# Patient Record
Sex: FEMALE | Race: OTHER | NOT HISPANIC OR LATINO
[De-identification: names, ages, dates, MRNs, and addresses within clinical notes are randomized per-mention and may not be internally consistent; named-entity substitution may affect disease eponyms.]

---

## 2017-01-03 ENCOUNTER — APPOINTMENT (OUTPATIENT)
Dept: PLASTIC SURGERY | Facility: HOSPITAL | Age: 62
End: 2017-01-03

## 2017-01-10 ENCOUNTER — APPOINTMENT (OUTPATIENT)
Dept: NEUROLOGY | Facility: HOSPITAL | Age: 62
End: 2017-01-10

## 2017-01-18 ENCOUNTER — APPOINTMENT (OUTPATIENT)
Dept: ORTHOPEDIC SURGERY | Facility: HOSPITAL | Age: 62
End: 2017-01-18

## 2017-01-31 ENCOUNTER — OUTPATIENT (OUTPATIENT)
Dept: OUTPATIENT SERVICES | Facility: HOSPITAL | Age: 62
LOS: 1 days | Discharge: HOME | End: 2017-01-31

## 2017-02-06 ENCOUNTER — OUTPATIENT (OUTPATIENT)
Dept: OUTPATIENT SERVICES | Facility: HOSPITAL | Age: 62
LOS: 1 days | End: 2017-02-06

## 2017-02-06 ENCOUNTER — APPOINTMENT (OUTPATIENT)
Dept: RHEUMATOLOGY | Facility: HOSPITAL | Age: 62
End: 2017-02-06

## 2017-02-06 VITALS
BODY MASS INDEX: 30.61 KG/M2 | SYSTOLIC BLOOD PRESSURE: 149 MMHG | HEIGHT: 67 IN | WEIGHT: 195 LBS | DIASTOLIC BLOOD PRESSURE: 64 MMHG | HEART RATE: 69 BPM

## 2017-02-06 DIAGNOSIS — M79.7 FIBROMYALGIA: ICD-10-CM

## 2017-02-06 DIAGNOSIS — M65.9 SYNOVITIS AND TENOSYNOVITIS, UNSPECIFIED: ICD-10-CM

## 2017-02-21 ENCOUNTER — APPOINTMENT (OUTPATIENT)
Dept: NEUROLOGY | Facility: HOSPITAL | Age: 62
End: 2017-02-21

## 2017-02-21 ENCOUNTER — OUTPATIENT (OUTPATIENT)
Dept: OUTPATIENT SERVICES | Facility: HOSPITAL | Age: 62
LOS: 1 days | End: 2017-02-21

## 2017-02-21 VITALS
BODY MASS INDEX: 30.45 KG/M2 | DIASTOLIC BLOOD PRESSURE: 70 MMHG | HEART RATE: 68 BPM | SYSTOLIC BLOOD PRESSURE: 135 MMHG | WEIGHT: 194 LBS | HEIGHT: 67 IN

## 2017-02-22 ENCOUNTER — APPOINTMENT (OUTPATIENT)
Dept: ORTHOPEDIC SURGERY | Facility: HOSPITAL | Age: 62
End: 2017-02-22

## 2017-02-22 ENCOUNTER — APPOINTMENT (OUTPATIENT)
Dept: RADIOLOGY | Facility: HOSPITAL | Age: 62
End: 2017-02-22

## 2017-02-22 ENCOUNTER — OUTPATIENT (OUTPATIENT)
Dept: OUTPATIENT SERVICES | Facility: HOSPITAL | Age: 62
LOS: 1 days | End: 2017-02-22
Payer: MEDICAID

## 2017-02-22 VITALS
DIASTOLIC BLOOD PRESSURE: 63 MMHG | HEART RATE: 66 BPM | BODY MASS INDEX: 30.13 KG/M2 | SYSTOLIC BLOOD PRESSURE: 135 MMHG | WEIGHT: 192 LBS | HEIGHT: 67 IN

## 2017-02-22 DIAGNOSIS — M54.5 LOW BACK PAIN: ICD-10-CM

## 2017-02-22 PROCEDURE — 73564 X-RAY EXAM KNEE 4 OR MORE: CPT | Mod: 26,50

## 2017-02-23 DIAGNOSIS — M19.90 UNSPECIFIED OSTEOARTHRITIS, UNSPECIFIED SITE: ICD-10-CM

## 2017-03-07 ENCOUNTER — APPOINTMENT (OUTPATIENT)
Dept: PLASTIC SURGERY | Facility: HOSPITAL | Age: 62
End: 2017-03-07

## 2017-03-07 ENCOUNTER — EMERGENCY (EMERGENCY)
Facility: HOSPITAL | Age: 62
LOS: 1 days | Discharge: HOME | End: 2017-03-07
Attending: EMERGENCY MEDICINE | Admitting: EMERGENCY MEDICINE
Payer: MEDICAID

## 2017-03-07 ENCOUNTER — OUTPATIENT (OUTPATIENT)
Dept: OUTPATIENT SERVICES | Facility: HOSPITAL | Age: 62
LOS: 1 days | End: 2017-03-07

## 2017-03-07 VITALS
TEMPERATURE: 98 F | RESPIRATION RATE: 16 BRPM | HEART RATE: 70 BPM | SYSTOLIC BLOOD PRESSURE: 127 MMHG | DIASTOLIC BLOOD PRESSURE: 60 MMHG | OXYGEN SATURATION: 98 %

## 2017-03-07 VITALS
RESPIRATION RATE: 16 BRPM | HEART RATE: 63 BPM | TEMPERATURE: 98 F | SYSTOLIC BLOOD PRESSURE: 148 MMHG | OXYGEN SATURATION: 95 % | DIASTOLIC BLOOD PRESSURE: 65 MMHG

## 2017-03-07 VITALS — HEIGHT: 64 IN | SYSTOLIC BLOOD PRESSURE: 142 MMHG | HEART RATE: 65 BPM | DIASTOLIC BLOOD PRESSURE: 75 MMHG

## 2017-03-07 LAB
ALBUMIN SERPL ELPH-MCNC: 4.1 G/DL — SIGNIFICANT CHANGE UP (ref 3.3–5)
ALP SERPL-CCNC: 96 U/L — SIGNIFICANT CHANGE UP (ref 40–120)
ALT FLD-CCNC: 28 U/L — SIGNIFICANT CHANGE UP (ref 4–33)
APPEARANCE UR: CLEAR — SIGNIFICANT CHANGE UP
AST SERPL-CCNC: 26 U/L — SIGNIFICANT CHANGE UP (ref 4–32)
BACTERIA # UR AUTO: SIGNIFICANT CHANGE UP
BASE EXCESS BLDV CALC-SCNC: 5.2 MMOL/L — SIGNIFICANT CHANGE UP
BASOPHILS # BLD AUTO: 0.02 K/UL — SIGNIFICANT CHANGE UP (ref 0–0.2)
BASOPHILS NFR BLD AUTO: 0.2 % — SIGNIFICANT CHANGE UP (ref 0–2)
BILIRUB SERPL-MCNC: 0.6 MG/DL — SIGNIFICANT CHANGE UP (ref 0.2–1.2)
BILIRUB UR-MCNC: NEGATIVE — SIGNIFICANT CHANGE UP
BLOOD GAS VENOUS - CREATININE: 0.51 MG/DL — SIGNIFICANT CHANGE UP (ref 0.5–1.3)
BLOOD UR QL VISUAL: NEGATIVE — SIGNIFICANT CHANGE UP
BUN SERPL-MCNC: 13 MG/DL — SIGNIFICANT CHANGE UP (ref 7–23)
CALCIUM SERPL-MCNC: 8.9 MG/DL — SIGNIFICANT CHANGE UP (ref 8.4–10.5)
CHLORIDE BLDV-SCNC: 106 MMOL/L — SIGNIFICANT CHANGE UP (ref 96–108)
CHLORIDE SERPL-SCNC: 105 MMOL/L — SIGNIFICANT CHANGE UP (ref 98–107)
CO2 SERPL-SCNC: 25 MMOL/L — SIGNIFICANT CHANGE UP (ref 22–31)
COLOR SPEC: SIGNIFICANT CHANGE UP
CREAT SERPL-MCNC: 0.55 MG/DL — SIGNIFICANT CHANGE UP (ref 0.5–1.3)
EOSINOPHIL # BLD AUTO: 0.13 K/UL — SIGNIFICANT CHANGE UP (ref 0–0.5)
EOSINOPHIL NFR BLD AUTO: 1.3 % — SIGNIFICANT CHANGE UP (ref 0–6)
GAS PNL BLDV: 137 MMOL/L — SIGNIFICANT CHANGE UP (ref 136–146)
GLUCOSE BLDV-MCNC: 89 — SIGNIFICANT CHANGE UP (ref 70–99)
GLUCOSE SERPL-MCNC: 92 MG/DL — SIGNIFICANT CHANGE UP (ref 70–99)
GLUCOSE UR-MCNC: NEGATIVE — SIGNIFICANT CHANGE UP
HCO3 BLDV-SCNC: 28 MMOL/L — HIGH (ref 20–27)
HCT VFR BLD CALC: 39.7 % — SIGNIFICANT CHANGE UP (ref 34.5–45)
HCT VFR BLDV CALC: 41.5 % — SIGNIFICANT CHANGE UP (ref 34.5–45)
HGB BLD-MCNC: 13.4 G/DL — SIGNIFICANT CHANGE UP (ref 11.5–15.5)
HGB BLDV-MCNC: 13.5 G/DL — SIGNIFICANT CHANGE UP (ref 11.5–15.5)
IMM GRANULOCYTES NFR BLD AUTO: 0.1 % — SIGNIFICANT CHANGE UP (ref 0–1.5)
KETONES UR-MCNC: NEGATIVE — SIGNIFICANT CHANGE UP
LACTATE BLDV-MCNC: 0.6 MMOL/L — SIGNIFICANT CHANGE UP (ref 0.5–2)
LEUKOCYTE ESTERASE UR-ACNC: NEGATIVE — SIGNIFICANT CHANGE UP
LIDOCAIN IGE QN: 28 U/L — SIGNIFICANT CHANGE UP (ref 7–60)
LYMPHOCYTES # BLD AUTO: 3.11 K/UL — SIGNIFICANT CHANGE UP (ref 1–3.3)
LYMPHOCYTES # BLD AUTO: 31.8 % — SIGNIFICANT CHANGE UP (ref 13–44)
MCHC RBC-ENTMCNC: 30.1 PG — SIGNIFICANT CHANGE UP (ref 27–34)
MCHC RBC-ENTMCNC: 33.8 % — SIGNIFICANT CHANGE UP (ref 32–36)
MCV RBC AUTO: 89.2 FL — SIGNIFICANT CHANGE UP (ref 80–100)
MONOCYTES # BLD AUTO: 0.6 K/UL — SIGNIFICANT CHANGE UP (ref 0–0.9)
MONOCYTES NFR BLD AUTO: 6.1 % — SIGNIFICANT CHANGE UP (ref 2–14)
MUCOUS THREADS # UR AUTO: SIGNIFICANT CHANGE UP
NEUTROPHILS # BLD AUTO: 5.92 K/UL — SIGNIFICANT CHANGE UP (ref 1.8–7.4)
NEUTROPHILS NFR BLD AUTO: 60.5 % — SIGNIFICANT CHANGE UP (ref 43–77)
NITRITE UR-MCNC: NEGATIVE — SIGNIFICANT CHANGE UP
PCO2 BLDV: 51 MMHG — SIGNIFICANT CHANGE UP (ref 41–51)
PH BLDV: 7.39 PH — SIGNIFICANT CHANGE UP (ref 7.32–7.43)
PH UR: 8 — SIGNIFICANT CHANGE UP (ref 4.6–8)
PLATELET # BLD AUTO: 295 K/UL — SIGNIFICANT CHANGE UP (ref 150–400)
PMV BLD: 10.1 FL — SIGNIFICANT CHANGE UP (ref 7–13)
PO2 BLDV: 39 MMHG — SIGNIFICANT CHANGE UP (ref 35–40)
POTASSIUM BLDV-SCNC: 3.9 MMOL/L — SIGNIFICANT CHANGE UP (ref 3.4–4.5)
POTASSIUM SERPL-MCNC: 4.3 MMOL/L — SIGNIFICANT CHANGE UP (ref 3.5–5.3)
POTASSIUM SERPL-SCNC: 4.3 MMOL/L — SIGNIFICANT CHANGE UP (ref 3.5–5.3)
PROT SERPL-MCNC: 6.3 G/DL — SIGNIFICANT CHANGE UP (ref 6–8.3)
PROT UR-MCNC: 10 — SIGNIFICANT CHANGE UP
RBC # BLD: 4.45 M/UL — SIGNIFICANT CHANGE UP (ref 3.8–5.2)
RBC # FLD: 14.2 % — SIGNIFICANT CHANGE UP (ref 10.3–14.5)
RBC CASTS # UR COMP ASSIST: SIGNIFICANT CHANGE UP (ref 0–?)
SAO2 % BLDV: 69.8 % — SIGNIFICANT CHANGE UP (ref 60–85)
SODIUM SERPL-SCNC: 143 MMOL/L — SIGNIFICANT CHANGE UP (ref 135–145)
SP GR SPEC: 1.02 — SIGNIFICANT CHANGE UP (ref 1–1.03)
UROBILINOGEN FLD QL: NORMAL E.U. — SIGNIFICANT CHANGE UP (ref 0.1–0.2)
WBC # BLD: 9.79 K/UL — SIGNIFICANT CHANGE UP (ref 3.8–10.5)
WBC # FLD AUTO: 9.79 K/UL — SIGNIFICANT CHANGE UP (ref 3.8–10.5)
WBC UR QL: SIGNIFICANT CHANGE UP (ref 0–?)

## 2017-03-07 PROCEDURE — 76705 ECHO EXAM OF ABDOMEN: CPT | Mod: 26

## 2017-03-07 PROCEDURE — 71020: CPT | Mod: 26

## 2017-03-07 PROCEDURE — 99284 EMERGENCY DEPT VISIT MOD MDM: CPT

## 2017-03-07 RX ORDER — ACETAMINOPHEN 500 MG
650 TABLET ORAL ONCE
Qty: 0 | Refills: 0 | Status: COMPLETED | OUTPATIENT
Start: 2017-03-07 | End: 2017-03-07

## 2017-03-07 RX ADMIN — Medication 650 MILLIGRAM(S): at 15:20

## 2017-03-07 NOTE — ED ADULT NURSE NOTE - OBJECTIVE STATEMENT
Received patient to room 14 with c/o chest pain and abdominal pain.  Pt states that her abdominal pain is more chronic than acute. Patient evaluated by MD. IVL placed to left AC 20 gauge. Labs drawn and sent. Awaiting results and disposition.   DALE Lawson

## 2017-03-07 NOTE — ED PROVIDER NOTE - ATTENDING CONTRIBUTION TO CARE
DR. CHAPA, ATTENDING MD-  I performed a face to face bedside interview with patient regarding history of present illness, review of symptoms and past medical history. I completed an independent physical exam.  I have discussed patient's plan of care with the resident.   Documentation as above in the note.   HPI: 63 yo F with multiple medical problems including fibromyalgia that presents with general body pain x multiple weeks but worsening in last few days. Pain RUQ of abd or right rib, non radiating. Worse with mvmt, improves with rest Not worse with breathing. Denies actual chest pain, SOB. No fevers, chills, nausea, vomiting, diarrhea. No trauma to area. She was told in past Costochondritis and was treated with IBU and improved. Thinks this is an exacerbation of her costochondritis. No history of shingles.   EXAM: No rash on body, tenderness to palpation right rib lateral 7-8. No signs trauma, lungs ctab, heart RRR, FROM in all extremities, appears well, walking around ED asking for pen. Eating and drinking.   MDM: Most likely acute on chronic pain. Appears well. Will provide meds and obtain labs and reassess.

## 2017-03-07 NOTE — ED PROVIDER NOTE - PROGRESS NOTE DETAILS
Pt feeling well, discussed results, need for f/u. Advised to take ibuprofen or celecoxib but not together, can also take tylenol for pain. Given copies of results. Pt expresses understanding and amenable to plan.

## 2017-03-07 NOTE — ED ADULT NURSE REASSESSMENT NOTE - NS ED NURSE REASSESS COMMENT FT1
rpt from Keeley HUNTER, pt appears in NAD @ this time, comfortable, SL placed/labs sent by Keeley, awaiting results and further orders, will continue to monitor.

## 2017-03-07 NOTE — ED PROVIDER NOTE - OBJECTIVE STATEMENT
62F PMH HTN, HLD, fibromyalgia, arthritis p/w 1 year RUQ pain radiating to flank and left sided chest discomfort worse with movement. Reports previously seeing a cardiologist (Does not recall name) for chest pain and diagnosed with costochondritis. Pain is at its baseline. Takes ibuprofen for pain which sometimes relieves symptoms. Nonexertional. Abdominal pain is intermittent, not associated with food, sometimes worse with movement. No fever/chills. No change in symptoms over the past few weeks.

## 2017-03-07 NOTE — ED ADULT NURSE NOTE - CHIEF COMPLAINT QUOTE
c/o left sided chest pain with no radiation,  RUQ abdominal pain with radiation to right side of back and scapula, with nausea x 2 days.  Saw hand surgeon today who instructed patient to go to ED for evaluation.   PMH: HTN, HLD, fibromyalgia, arthritis,

## 2017-04-03 ENCOUNTER — APPOINTMENT (OUTPATIENT)
Dept: RHEUMATOLOGY | Facility: HOSPITAL | Age: 62
End: 2017-04-03

## 2017-04-03 ENCOUNTER — OUTPATIENT (OUTPATIENT)
Dept: OUTPATIENT SERVICES | Facility: HOSPITAL | Age: 62
LOS: 1 days | End: 2017-04-03

## 2017-04-03 VITALS
BODY MASS INDEX: 33.64 KG/M2 | WEIGHT: 196 LBS | SYSTOLIC BLOOD PRESSURE: 125 MMHG | DIASTOLIC BLOOD PRESSURE: 60 MMHG | HEART RATE: 72 BPM

## 2017-04-03 VITALS — BODY MASS INDEX: 31.64 KG/M2 | HEIGHT: 66 IN

## 2017-04-03 DIAGNOSIS — M79.7 FIBROMYALGIA: ICD-10-CM

## 2017-04-03 DIAGNOSIS — M65.9 SYNOVITIS AND TENOSYNOVITIS, UNSPECIFIED: ICD-10-CM

## 2017-04-05 ENCOUNTER — APPOINTMENT (OUTPATIENT)
Dept: ORTHOPEDIC SURGERY | Facility: HOSPITAL | Age: 62
End: 2017-04-05

## 2017-04-05 ENCOUNTER — OUTPATIENT (OUTPATIENT)
Dept: OUTPATIENT SERVICES | Facility: HOSPITAL | Age: 62
LOS: 1 days | End: 2017-04-05

## 2017-04-05 VITALS
SYSTOLIC BLOOD PRESSURE: 125 MMHG | BODY MASS INDEX: 31.47 KG/M2 | HEART RATE: 70 BPM | DIASTOLIC BLOOD PRESSURE: 64 MMHG | WEIGHT: 195 LBS

## 2017-04-06 DIAGNOSIS — M25.50 PAIN IN UNSPECIFIED JOINT: ICD-10-CM

## 2017-04-06 DIAGNOSIS — M17.0 BILATERAL PRIMARY OSTEOARTHRITIS OF KNEE: ICD-10-CM

## 2017-04-06 DIAGNOSIS — F17.200 NICOTINE DEPENDENCE, UNSPECIFIED, UNCOMPLICATED: ICD-10-CM

## 2017-05-01 ENCOUNTER — APPOINTMENT (OUTPATIENT)
Dept: INTERNAL MEDICINE | Facility: HOSPITAL | Age: 62
End: 2017-05-01

## 2017-05-04 ENCOUNTER — APPOINTMENT (OUTPATIENT)
Dept: GASTROENTEROLOGY | Facility: HOSPITAL | Age: 62
End: 2017-05-04

## 2017-05-08 ENCOUNTER — OTHER (OUTPATIENT)
Age: 62
End: 2017-05-08

## 2017-05-10 ENCOUNTER — OTHER (OUTPATIENT)
Age: 62
End: 2017-05-10

## 2017-05-15 ENCOUNTER — APPOINTMENT (OUTPATIENT)
Dept: RHEUMATOLOGY | Facility: HOSPITAL | Age: 62
End: 2017-05-15

## 2017-05-15 ENCOUNTER — OUTPATIENT (OUTPATIENT)
Dept: OUTPATIENT SERVICES | Facility: HOSPITAL | Age: 62
LOS: 1 days | End: 2017-05-15

## 2017-05-15 VITALS
BODY MASS INDEX: 31.18 KG/M2 | WEIGHT: 194 LBS | HEART RATE: 66 BPM | HEIGHT: 66 IN | SYSTOLIC BLOOD PRESSURE: 118 MMHG | DIASTOLIC BLOOD PRESSURE: 53 MMHG

## 2017-05-15 DIAGNOSIS — Z79.899 OTHER LONG TERM (CURRENT) DRUG THERAPY: ICD-10-CM

## 2017-05-15 DIAGNOSIS — M79.7 FIBROMYALGIA: ICD-10-CM

## 2017-05-17 ENCOUNTER — APPOINTMENT (OUTPATIENT)
Dept: ORTHOPEDIC SURGERY | Facility: HOSPITAL | Age: 62
End: 2017-05-17

## 2017-05-17 ENCOUNTER — OUTPATIENT (OUTPATIENT)
Dept: OUTPATIENT SERVICES | Facility: HOSPITAL | Age: 62
LOS: 1 days | End: 2017-05-17

## 2017-05-17 VITALS
HEART RATE: 80 BPM | SYSTOLIC BLOOD PRESSURE: 116 MMHG | WEIGHT: 193.06 LBS | DIASTOLIC BLOOD PRESSURE: 62 MMHG | BODY MASS INDEX: 31.16 KG/M2

## 2017-05-17 DIAGNOSIS — F17.200 NICOTINE DEPENDENCE, UNSPECIFIED, UNCOMPLICATED: ICD-10-CM

## 2017-05-17 DIAGNOSIS — M17.10 UNILATERAL PRIMARY OSTEOARTHRITIS, UNSPECIFIED KNEE: ICD-10-CM

## 2017-05-23 ENCOUNTER — APPOINTMENT (OUTPATIENT)
Dept: NEUROLOGY | Facility: HOSPITAL | Age: 62
End: 2017-05-23

## 2017-05-23 ENCOUNTER — OUTPATIENT (OUTPATIENT)
Dept: OUTPATIENT SERVICES | Facility: HOSPITAL | Age: 62
LOS: 1 days | End: 2017-05-23

## 2017-05-23 VITALS
SYSTOLIC BLOOD PRESSURE: 134 MMHG | DIASTOLIC BLOOD PRESSURE: 64 MMHG | HEIGHT: 66 IN | HEART RATE: 71 BPM | WEIGHT: 194 LBS | BODY MASS INDEX: 31.18 KG/M2

## 2017-05-23 DIAGNOSIS — Z87.898 PERSONAL HISTORY OF OTHER SPECIFIED CONDITIONS: ICD-10-CM

## 2017-05-24 DIAGNOSIS — Z87.898 PERSONAL HISTORY OF OTHER SPECIFIED CONDITIONS: ICD-10-CM

## 2017-05-24 DIAGNOSIS — Z01.419 ENCOUNTER FOR GYNECOLOGICAL EXAMINATION (GENERAL) (ROUTINE) WITHOUT ABNORMAL FINDINGS: ICD-10-CM

## 2017-05-30 ENCOUNTER — FORM ENCOUNTER (OUTPATIENT)
Age: 62
End: 2017-05-30

## 2017-05-31 ENCOUNTER — OUTPATIENT (OUTPATIENT)
Dept: OUTPATIENT SERVICES | Facility: HOSPITAL | Age: 62
LOS: 1 days | End: 2017-05-31
Payer: MEDICAID

## 2017-05-31 ENCOUNTER — APPOINTMENT (OUTPATIENT)
Dept: MAMMOGRAPHY | Facility: IMAGING CENTER | Age: 62
End: 2017-05-31

## 2017-05-31 DIAGNOSIS — Z00.00 ENCOUNTER FOR GENERAL ADULT MEDICAL EXAMINATION WITHOUT ABNORMAL FINDINGS: ICD-10-CM

## 2017-05-31 DIAGNOSIS — J44.9 CHRONIC OBSTRUCTIVE PULMONARY DISEASE, UNSPECIFIED: ICD-10-CM

## 2017-05-31 PROCEDURE — 77066 DX MAMMO INCL CAD BI: CPT

## 2017-05-31 PROCEDURE — 76641 ULTRASOUND BREAST COMPLETE: CPT

## 2017-05-31 PROCEDURE — G0279: CPT

## 2017-06-01 ENCOUNTER — APPOINTMENT (OUTPATIENT)
Dept: GASTROENTEROLOGY | Facility: HOSPITAL | Age: 62
End: 2017-06-01

## 2017-06-06 ENCOUNTER — OUTPATIENT (OUTPATIENT)
Dept: OUTPATIENT SERVICES | Facility: HOSPITAL | Age: 62
LOS: 1 days | End: 2017-06-06
Payer: MEDICAID

## 2017-06-06 ENCOUNTER — APPOINTMENT (OUTPATIENT)
Dept: MRI IMAGING | Facility: IMAGING CENTER | Age: 62
End: 2017-06-06

## 2017-06-06 DIAGNOSIS — M65.9 SYNOVITIS AND TENOSYNOVITIS, UNSPECIFIED: ICD-10-CM

## 2017-06-06 PROCEDURE — 73221 MRI JOINT UPR EXTREM W/O DYE: CPT

## 2017-06-26 ENCOUNTER — APPOINTMENT (OUTPATIENT)
Dept: RHEUMATOLOGY | Facility: HOSPITAL | Age: 62
End: 2017-06-26

## 2017-07-10 ENCOUNTER — APPOINTMENT (OUTPATIENT)
Dept: INTERNAL MEDICINE | Facility: HOSPITAL | Age: 62
End: 2017-07-10

## 2017-07-10 ENCOUNTER — LABORATORY RESULT (OUTPATIENT)
Age: 62
End: 2017-07-10

## 2017-07-10 ENCOUNTER — OUTPATIENT (OUTPATIENT)
Dept: OUTPATIENT SERVICES | Facility: HOSPITAL | Age: 62
LOS: 1 days | End: 2017-07-10

## 2017-07-10 VITALS — HEIGHT: 66 IN | WEIGHT: 192 LBS | BODY MASS INDEX: 30.86 KG/M2

## 2017-07-10 VITALS — DIASTOLIC BLOOD PRESSURE: 64 MMHG | SYSTOLIC BLOOD PRESSURE: 134 MMHG

## 2017-07-10 LAB
ALBUMIN SERPL ELPH-MCNC: 4.3 G/DL — SIGNIFICANT CHANGE UP (ref 3.3–5)
ALP SERPL-CCNC: 110 U/L — SIGNIFICANT CHANGE UP (ref 40–120)
ALT FLD-CCNC: 29 U/L — SIGNIFICANT CHANGE UP (ref 4–33)
AST SERPL-CCNC: 21 U/L — SIGNIFICANT CHANGE UP (ref 4–32)
BILIRUB SERPL-MCNC: 0.6 MG/DL — SIGNIFICANT CHANGE UP (ref 0.2–1.2)
BUN SERPL-MCNC: 9 MG/DL — SIGNIFICANT CHANGE UP (ref 7–23)
CALCIUM SERPL-MCNC: 9.2 MG/DL — SIGNIFICANT CHANGE UP (ref 8.4–10.5)
CHLORIDE SERPL-SCNC: 103 MMOL/L — SIGNIFICANT CHANGE UP (ref 98–107)
CHOLEST SERPL-MCNC: 89 MG/DL — LOW (ref 120–199)
CO2 SERPL-SCNC: 27 MMOL/L — SIGNIFICANT CHANGE UP (ref 22–31)
CREAT SERPL-MCNC: 0.61 MG/DL — SIGNIFICANT CHANGE UP (ref 0.5–1.3)
GLUCOSE SERPL-MCNC: 82 MG/DL — SIGNIFICANT CHANGE UP (ref 70–99)
HBA1C BLD-MCNC: 6 % — HIGH (ref 4–5.6)
HDLC SERPL-MCNC: 42 MG/DL — LOW (ref 45–65)
LIPID PNL WITH DIRECT LDL SERPL: 31 MG/DL — SIGNIFICANT CHANGE UP
MAGNESIUM SERPL-MCNC: 2.2 MG/DL — SIGNIFICANT CHANGE UP (ref 1.6–2.6)
PHOSPHATE SERPL-MCNC: 4 MG/DL — SIGNIFICANT CHANGE UP (ref 2.5–4.5)
POTASSIUM SERPL-MCNC: 3.7 MMOL/L — SIGNIFICANT CHANGE UP (ref 3.5–5.3)
POTASSIUM SERPL-SCNC: 3.7 MMOL/L — SIGNIFICANT CHANGE UP (ref 3.5–5.3)
PROT SERPL-MCNC: 6.8 G/DL — SIGNIFICANT CHANGE UP (ref 6–8.3)
SODIUM SERPL-SCNC: 143 MMOL/L — SIGNIFICANT CHANGE UP (ref 135–145)
TRIGL SERPL-MCNC: 137 MG/DL — SIGNIFICANT CHANGE UP (ref 10–149)

## 2017-07-12 DIAGNOSIS — F17.200 NICOTINE DEPENDENCE, UNSPECIFIED, UNCOMPLICATED: ICD-10-CM

## 2017-07-12 DIAGNOSIS — J44.9 CHRONIC OBSTRUCTIVE PULMONARY DISEASE, UNSPECIFIED: ICD-10-CM

## 2017-07-12 DIAGNOSIS — M25.569 PAIN IN UNSPECIFIED KNEE: ICD-10-CM

## 2017-07-12 DIAGNOSIS — I10 ESSENTIAL (PRIMARY) HYPERTENSION: ICD-10-CM

## 2017-07-12 DIAGNOSIS — M79.7 FIBROMYALGIA: ICD-10-CM

## 2017-07-12 DIAGNOSIS — F32.9 MAJOR DEPRESSIVE DISORDER, SINGLE EPISODE, UNSPECIFIED: ICD-10-CM

## 2017-07-12 DIAGNOSIS — I44.7 LEFT BUNDLE-BRANCH BLOCK, UNSPECIFIED: ICD-10-CM

## 2017-07-12 DIAGNOSIS — E78.5 HYPERLIPIDEMIA, UNSPECIFIED: ICD-10-CM

## 2017-07-24 ENCOUNTER — APPOINTMENT (OUTPATIENT)
Dept: RHEUMATOLOGY | Facility: HOSPITAL | Age: 62
End: 2017-07-24

## 2017-08-10 ENCOUNTER — OUTPATIENT (OUTPATIENT)
Dept: OUTPATIENT SERVICES | Facility: HOSPITAL | Age: 62
LOS: 1 days | End: 2017-08-10

## 2017-08-10 ENCOUNTER — APPOINTMENT (OUTPATIENT)
Dept: GASTROENTEROLOGY | Facility: HOSPITAL | Age: 62
End: 2017-08-10

## 2017-08-10 VITALS
WEIGHT: 196 LBS | SYSTOLIC BLOOD PRESSURE: 124 MMHG | BODY MASS INDEX: 29.7 KG/M2 | HEIGHT: 68 IN | DIASTOLIC BLOOD PRESSURE: 54 MMHG | HEART RATE: 76 BPM

## 2017-08-11 DIAGNOSIS — E66.9 OBESITY, UNSPECIFIED: ICD-10-CM

## 2017-08-11 DIAGNOSIS — R79.89 OTHER SPECIFIED ABNORMAL FINDINGS OF BLOOD CHEMISTRY: ICD-10-CM

## 2017-08-11 DIAGNOSIS — Z08 ENCOUNTER FOR FOLLOW-UP EXAMINATION AFTER COMPLETED TREATMENT FOR MALIGNANT NEOPLASM: ICD-10-CM

## 2017-08-21 ENCOUNTER — APPOINTMENT (OUTPATIENT)
Dept: RHEUMATOLOGY | Facility: HOSPITAL | Age: 62
End: 2017-08-21
Payer: MEDICAID

## 2017-08-21 ENCOUNTER — OUTPATIENT (OUTPATIENT)
Dept: OUTPATIENT SERVICES | Facility: HOSPITAL | Age: 62
LOS: 1 days | End: 2017-08-21

## 2017-08-21 VITALS
HEIGHT: 68 IN | HEART RATE: 70 BPM | SYSTOLIC BLOOD PRESSURE: 84 MMHG | WEIGHT: 194 LBS | DIASTOLIC BLOOD PRESSURE: 72 MMHG | BODY MASS INDEX: 29.4 KG/M2

## 2017-08-21 VITALS — SYSTOLIC BLOOD PRESSURE: 115 MMHG | DIASTOLIC BLOOD PRESSURE: 55 MMHG

## 2017-08-21 PROCEDURE — 99214 OFFICE O/P EST MOD 30 MIN: CPT | Mod: GC

## 2017-08-21 RX ORDER — AMITRIPTYLINE HYDROCHLORIDE 10 MG/1
10 TABLET, FILM COATED ORAL
Qty: 30 | Refills: 1 | Status: DISCONTINUED | COMMUNITY
Start: 2017-02-21 | End: 2017-08-21

## 2017-08-23 DIAGNOSIS — M65.9 SYNOVITIS AND TENOSYNOVITIS, UNSPECIFIED: ICD-10-CM

## 2017-08-23 DIAGNOSIS — M17.0 BILATERAL PRIMARY OSTEOARTHRITIS OF KNEE: ICD-10-CM

## 2017-08-23 DIAGNOSIS — M79.7 FIBROMYALGIA: ICD-10-CM

## 2017-09-20 ENCOUNTER — APPOINTMENT (OUTPATIENT)
Dept: ORTHOPEDIC SURGERY | Facility: HOSPITAL | Age: 62
End: 2017-09-20

## 2017-09-20 ENCOUNTER — OUTPATIENT (OUTPATIENT)
Dept: OUTPATIENT SERVICES | Facility: HOSPITAL | Age: 62
LOS: 1 days | End: 2017-09-20

## 2017-09-20 VITALS
SYSTOLIC BLOOD PRESSURE: 107 MMHG | WEIGHT: 193.38 LBS | DIASTOLIC BLOOD PRESSURE: 53 MMHG | BODY MASS INDEX: 29.4 KG/M2 | HEART RATE: 65 BPM

## 2017-09-21 DIAGNOSIS — M17.0 BILATERAL PRIMARY OSTEOARTHRITIS OF KNEE: ICD-10-CM

## 2017-10-02 ENCOUNTER — LABORATORY RESULT (OUTPATIENT)
Age: 62
End: 2017-10-02

## 2017-10-02 ENCOUNTER — OUTPATIENT (OUTPATIENT)
Dept: OUTPATIENT SERVICES | Facility: HOSPITAL | Age: 62
LOS: 1 days | End: 2017-10-02

## 2017-10-02 ENCOUNTER — APPOINTMENT (OUTPATIENT)
Dept: RHEUMATOLOGY | Facility: HOSPITAL | Age: 62
End: 2017-10-02
Payer: MEDICAID

## 2017-10-02 VITALS
SYSTOLIC BLOOD PRESSURE: 139 MMHG | DIASTOLIC BLOOD PRESSURE: 64 MMHG | WEIGHT: 192 LBS | BODY MASS INDEX: 29.1 KG/M2 | HEIGHT: 68 IN

## 2017-10-02 DIAGNOSIS — M65.9 SYNOVITIS AND TENOSYNOVITIS, UNSPECIFIED: ICD-10-CM

## 2017-10-02 DIAGNOSIS — M79.7 FIBROMYALGIA: ICD-10-CM

## 2017-10-02 LAB
ALBUMIN SERPL ELPH-MCNC: 4 G/DL — SIGNIFICANT CHANGE UP (ref 3.3–5)
ALP SERPL-CCNC: 115 U/L — SIGNIFICANT CHANGE UP (ref 40–120)
ALT FLD-CCNC: 48 U/L — HIGH (ref 4–33)
AST SERPL-CCNC: 34 U/L — HIGH (ref 4–32)
BILIRUB SERPL-MCNC: 0.5 MG/DL — SIGNIFICANT CHANGE UP (ref 0.2–1.2)
BUN SERPL-MCNC: 14 MG/DL — SIGNIFICANT CHANGE UP (ref 7–23)
CALCIUM SERPL-MCNC: 8.9 MG/DL — SIGNIFICANT CHANGE UP (ref 8.4–10.5)
CHLORIDE SERPL-SCNC: 103 MMOL/L — SIGNIFICANT CHANGE UP (ref 98–107)
CK SERPL-CCNC: 276 U/L — HIGH (ref 25–170)
CO2 SERPL-SCNC: 27 MMOL/L — SIGNIFICANT CHANGE UP (ref 22–31)
CREAT SERPL-MCNC: 0.68 MG/DL — SIGNIFICANT CHANGE UP (ref 0.5–1.3)
GLUCOSE SERPL-MCNC: 89 MG/DL — SIGNIFICANT CHANGE UP (ref 70–99)
MAGNESIUM SERPL-MCNC: 1.9 MG/DL — SIGNIFICANT CHANGE UP (ref 1.6–2.6)
PHOSPHATE SERPL-MCNC: 4.4 MG/DL — SIGNIFICANT CHANGE UP (ref 2.5–4.5)
POTASSIUM SERPL-MCNC: 4.2 MMOL/L — SIGNIFICANT CHANGE UP (ref 3.5–5.3)
POTASSIUM SERPL-SCNC: 4.2 MMOL/L — SIGNIFICANT CHANGE UP (ref 3.5–5.3)
PROT SERPL-MCNC: 6.5 G/DL — SIGNIFICANT CHANGE UP (ref 6–8.3)
SODIUM SERPL-SCNC: 144 MMOL/L — SIGNIFICANT CHANGE UP (ref 135–145)

## 2017-10-02 PROCEDURE — 99214 OFFICE O/P EST MOD 30 MIN: CPT | Mod: GC

## 2017-10-24 ENCOUNTER — MEDICATION RENEWAL (OUTPATIENT)
Age: 62
End: 2017-10-24

## 2017-10-26 ENCOUNTER — APPOINTMENT (OUTPATIENT)
Dept: INTERNAL MEDICINE | Facility: HOSPITAL | Age: 62
End: 2017-10-26
Payer: MEDICAID

## 2017-10-26 ENCOUNTER — LABORATORY RESULT (OUTPATIENT)
Age: 62
End: 2017-10-26

## 2017-10-26 ENCOUNTER — OUTPATIENT (OUTPATIENT)
Dept: OUTPATIENT SERVICES | Facility: HOSPITAL | Age: 62
LOS: 1 days | End: 2017-10-26

## 2017-10-26 VITALS — HEIGHT: 68 IN | BODY MASS INDEX: 29.44 KG/M2 | WEIGHT: 194.22 LBS

## 2017-10-26 VITALS — SYSTOLIC BLOOD PRESSURE: 122 MMHG | DIASTOLIC BLOOD PRESSURE: 68 MMHG

## 2017-10-26 LAB
CK SERPL-CCNC: 329 U/L — HIGH (ref 25–170)
HBA1C BLD-MCNC: 5.9 % — HIGH (ref 4–5.6)

## 2017-10-26 PROCEDURE — 99214 OFFICE O/P EST MOD 30 MIN: CPT | Mod: GC

## 2017-10-26 RX ORDER — DULOXETINE HYDROCHLORIDE 30 MG/1
30 CAPSULE, DELAYED RELEASE ORAL
Qty: 1 | Refills: 2 | Status: DISCONTINUED | COMMUNITY
Start: 2017-08-21 | End: 2017-10-26

## 2017-10-27 ENCOUNTER — APPOINTMENT (OUTPATIENT)
Dept: ULTRASOUND IMAGING | Facility: CLINIC | Age: 62
End: 2017-10-27
Payer: MEDICAID

## 2017-10-27 ENCOUNTER — OUTPATIENT (OUTPATIENT)
Dept: OUTPATIENT SERVICES | Facility: HOSPITAL | Age: 62
LOS: 1 days | End: 2017-10-27
Payer: MEDICAID

## 2017-10-27 DIAGNOSIS — M17.0 BILATERAL PRIMARY OSTEOARTHRITIS OF KNEE: ICD-10-CM

## 2017-10-27 DIAGNOSIS — M79.7 FIBROMYALGIA: ICD-10-CM

## 2017-10-27 DIAGNOSIS — F17.200 NICOTINE DEPENDENCE, UNSPECIFIED, UNCOMPLICATED: ICD-10-CM

## 2017-10-27 DIAGNOSIS — R91.1 SOLITARY PULMONARY NODULE: ICD-10-CM

## 2017-10-27 DIAGNOSIS — K21.9 GASTRO-ESOPHAGEAL REFLUX DISEASE WITHOUT ESOPHAGITIS: ICD-10-CM

## 2017-10-27 DIAGNOSIS — R79.89 OTHER SPECIFIED ABNORMAL FINDINGS OF BLOOD CHEMISTRY: ICD-10-CM

## 2017-10-27 LAB
CREAT UR-MCNC: 76 MG/DL — SIGNIFICANT CHANGE UP
MICROALBUMIN UR-MCNC: < 0.3 MG/DL — SIGNIFICANT CHANGE UP
TSH SERPL-MCNC: 0.94 UIU/ML — SIGNIFICANT CHANGE UP (ref 0.27–4.2)

## 2017-10-27 PROCEDURE — 20604 DRAIN/INJ JOINT/BURSA W/US: CPT

## 2017-10-27 PROCEDURE — 20604 DRAIN/INJ JOINT/BURSA W/US: CPT | Mod: LT

## 2017-11-09 ENCOUNTER — EMERGENCY (EMERGENCY)
Facility: HOSPITAL | Age: 62
LOS: 1 days | Discharge: HOME | End: 2017-11-09
Attending: EMERGENCY MEDICINE
Payer: MEDICAID

## 2017-11-09 VITALS
TEMPERATURE: 98 F | RESPIRATION RATE: 18 BRPM | SYSTOLIC BLOOD PRESSURE: 143 MMHG | DIASTOLIC BLOOD PRESSURE: 69 MMHG | OXYGEN SATURATION: 97 % | HEART RATE: 70 BPM

## 2017-11-09 VITALS
HEART RATE: 78 BPM | OXYGEN SATURATION: 98 % | HEIGHT: 67 IN | RESPIRATION RATE: 987 BRPM | TEMPERATURE: 99 F | DIASTOLIC BLOOD PRESSURE: 66 MMHG | SYSTOLIC BLOOD PRESSURE: 112 MMHG | WEIGHT: 190.04 LBS

## 2017-11-09 LAB
ALBUMIN SERPL ELPH-MCNC: 3.6 G/DL — SIGNIFICANT CHANGE UP (ref 3.5–5)
ALP SERPL-CCNC: 122 U/L — HIGH (ref 40–120)
ALT FLD-CCNC: 48 U/L DA — SIGNIFICANT CHANGE UP (ref 10–60)
ANION GAP SERPL CALC-SCNC: 6 MMOL/L — SIGNIFICANT CHANGE UP (ref 5–17)
AST SERPL-CCNC: 27 U/L — SIGNIFICANT CHANGE UP (ref 10–40)
BASOPHILS # BLD AUTO: 0.1 K/UL — SIGNIFICANT CHANGE UP (ref 0–0.2)
BASOPHILS NFR BLD AUTO: 1 % — SIGNIFICANT CHANGE UP (ref 0–2)
BILIRUB SERPL-MCNC: 0.6 MG/DL — SIGNIFICANT CHANGE UP (ref 0.2–1.2)
BUN SERPL-MCNC: 9 MG/DL — SIGNIFICANT CHANGE UP (ref 7–18)
CALCIUM SERPL-MCNC: 8.4 MG/DL — SIGNIFICANT CHANGE UP (ref 8.4–10.5)
CHLORIDE SERPL-SCNC: 106 MMOL/L — SIGNIFICANT CHANGE UP (ref 96–108)
CK SERPL-CCNC: 383 U/L — HIGH (ref 21–215)
CO2 SERPL-SCNC: 28 MMOL/L — SIGNIFICANT CHANGE UP (ref 22–31)
CREAT SERPL-MCNC: 0.57 MG/DL — SIGNIFICANT CHANGE UP (ref 0.5–1.3)
EOSINOPHIL # BLD AUTO: 0.2 K/UL — SIGNIFICANT CHANGE UP (ref 0–0.5)
EOSINOPHIL NFR BLD AUTO: 2.5 % — SIGNIFICANT CHANGE UP (ref 0–6)
GLUCOSE SERPL-MCNC: 97 MG/DL — SIGNIFICANT CHANGE UP (ref 70–99)
HCT VFR BLD CALC: 41.5 % — SIGNIFICANT CHANGE UP (ref 34.5–45)
HGB BLD-MCNC: 13.5 G/DL — SIGNIFICANT CHANGE UP (ref 11.5–15.5)
LYMPHOCYTES # BLD AUTO: 2.9 K/UL — SIGNIFICANT CHANGE UP (ref 1–3.3)
LYMPHOCYTES # BLD AUTO: 33.3 % — SIGNIFICANT CHANGE UP (ref 13–44)
MCHC RBC-ENTMCNC: 30.9 PG — SIGNIFICANT CHANGE UP (ref 27–34)
MCHC RBC-ENTMCNC: 32.6 GM/DL — SIGNIFICANT CHANGE UP (ref 32–36)
MCV RBC AUTO: 94.8 FL — SIGNIFICANT CHANGE UP (ref 80–100)
MONOCYTES # BLD AUTO: 0.6 K/UL — SIGNIFICANT CHANGE UP (ref 0–0.9)
MONOCYTES NFR BLD AUTO: 6.8 % — SIGNIFICANT CHANGE UP (ref 2–14)
NEUTROPHILS # BLD AUTO: 4.9 K/UL — SIGNIFICANT CHANGE UP (ref 1.8–7.4)
NEUTROPHILS NFR BLD AUTO: 56.3 % — SIGNIFICANT CHANGE UP (ref 43–77)
PLATELET # BLD AUTO: 273 K/UL — SIGNIFICANT CHANGE UP (ref 150–400)
POTASSIUM SERPL-MCNC: 4.1 MMOL/L — SIGNIFICANT CHANGE UP (ref 3.5–5.3)
POTASSIUM SERPL-SCNC: 4.1 MMOL/L — SIGNIFICANT CHANGE UP (ref 3.5–5.3)
PROT SERPL-MCNC: 6.8 G/DL — SIGNIFICANT CHANGE UP (ref 6–8.3)
RBC # BLD: 4.38 M/UL — SIGNIFICANT CHANGE UP (ref 3.8–5.2)
RBC # FLD: 13.5 % — SIGNIFICANT CHANGE UP (ref 10.3–14.5)
SODIUM SERPL-SCNC: 140 MMOL/L — SIGNIFICANT CHANGE UP (ref 135–145)
TROPONIN I SERPL-MCNC: <0.015 NG/ML — SIGNIFICANT CHANGE UP (ref 0–0.04)
WBC # BLD: 8.7 K/UL — SIGNIFICANT CHANGE UP (ref 3.8–10.5)
WBC # FLD AUTO: 8.7 K/UL — SIGNIFICANT CHANGE UP (ref 3.8–10.5)

## 2017-11-09 PROCEDURE — 72040 X-RAY EXAM NECK SPINE 2-3 VW: CPT

## 2017-11-09 PROCEDURE — 71046 X-RAY EXAM CHEST 2 VIEWS: CPT

## 2017-11-09 PROCEDURE — 73030 X-RAY EXAM OF SHOULDER: CPT | Mod: 26,RT

## 2017-11-09 PROCEDURE — 72040 X-RAY EXAM NECK SPINE 2-3 VW: CPT | Mod: 26

## 2017-11-09 PROCEDURE — 73020 X-RAY EXAM OF SHOULDER: CPT

## 2017-11-09 PROCEDURE — 99285 EMERGENCY DEPT VISIT HI MDM: CPT

## 2017-11-09 PROCEDURE — 80053 COMPREHEN METABOLIC PANEL: CPT

## 2017-11-09 PROCEDURE — 71020: CPT | Mod: 26

## 2017-11-09 PROCEDURE — 73030 X-RAY EXAM OF SHOULDER: CPT

## 2017-11-09 PROCEDURE — 84484 ASSAY OF TROPONIN QUANT: CPT

## 2017-11-09 PROCEDURE — 99284 EMERGENCY DEPT VISIT MOD MDM: CPT | Mod: 25

## 2017-11-09 PROCEDURE — 82550 ASSAY OF CK (CPK): CPT

## 2017-11-09 PROCEDURE — 85027 COMPLETE CBC AUTOMATED: CPT

## 2017-11-09 RX ORDER — IBUPROFEN 200 MG
400 TABLET ORAL ONCE
Qty: 0 | Refills: 0 | Status: COMPLETED | OUTPATIENT
Start: 2017-11-09 | End: 2017-11-09

## 2017-11-09 RX ORDER — SODIUM CHLORIDE 9 MG/ML
1000 INJECTION INTRAMUSCULAR; INTRAVENOUS; SUBCUTANEOUS ONCE
Qty: 0 | Refills: 0 | Status: COMPLETED | OUTPATIENT
Start: 2017-11-09 | End: 2017-11-09

## 2017-11-09 RX ADMIN — Medication 400 MILLIGRAM(S): at 11:54

## 2017-11-09 RX ADMIN — SODIUM CHLORIDE 1000 MILLILITER(S): 9 INJECTION INTRAMUSCULAR; INTRAVENOUS; SUBCUTANEOUS at 11:13

## 2017-11-09 RX ADMIN — Medication 400 MILLIGRAM(S): at 11:13

## 2017-11-09 NOTE — ED PROVIDER NOTE - MEDICAL DECISION MAKING DETAILS
Pt with R should pain likely component of osteoarthritis, will r/o dislocation or fracture. Will order xray of neck, CXR, R shoulder. Anterior neck and chest consider component of cellulitis. Given Hx of CK, will check for rabdo. Will order Labs, and check CK level. Pt with R should pain likely component of osteoarthritis, will r/o dislocation or fracture. Will order xray of neck, CXR, R shoulder. Anterior neck and chest redness appears inflammatory; consider component of cellulitis. Given Hx of elevated CK, will check for rabdo. Will order Labs, and check CK level.

## 2017-11-09 NOTE — ED PROVIDER NOTE - CARE PLAN
Principal Discharge DX:	Neck pain  Secondary Diagnosis:	Shoulder pain  Secondary Diagnosis:	Elevated CK

## 2017-11-09 NOTE — ED PROVIDER NOTE - UPPER EXTREMITY EXAM, RIGHT
(+) tenderness to palpation over R clavicle T3 and T4 with mild redness, blanching erythema and swelling

## 2017-11-09 NOTE — ED PROVIDER NOTE - OBJECTIVE STATEMENT
61 y/o female with significant PMHx of HTN, HLD, fibromyalgia and osteoporosis presents to the ED c/o neck pain x several months. Pt reports redness on upper chest area x 1 week. Pt also reports pain in anterior chest, and R shoulder pain. Pt reports neck pain worsened after taking Duloxetine. Pt was on atorvastatin that was stopped x 2 weeks ago due to CK level. Pt denies vomiting, CP, SOB, fever, chills or any other complaints. Current Smoker. Allergies: Aleve.

## 2017-11-11 ENCOUNTER — OUTPATIENT (OUTPATIENT)
Dept: OUTPATIENT SERVICES | Facility: HOSPITAL | Age: 62
LOS: 1 days | End: 2017-11-11
Payer: MEDICAID

## 2017-11-11 ENCOUNTER — APPOINTMENT (OUTPATIENT)
Dept: CT IMAGING | Facility: IMAGING CENTER | Age: 62
End: 2017-11-11
Payer: MEDICAID

## 2017-11-11 DIAGNOSIS — R79.89 OTHER SPECIFIED ABNORMAL FINDINGS OF BLOOD CHEMISTRY: ICD-10-CM

## 2017-11-11 PROCEDURE — G0297: CPT | Mod: 26

## 2017-11-11 PROCEDURE — G0297: CPT

## 2017-11-30 ENCOUNTER — APPOINTMENT (OUTPATIENT)
Dept: INTERNAL MEDICINE | Facility: HOSPITAL | Age: 62
End: 2017-11-30
Payer: MEDICAID

## 2017-11-30 ENCOUNTER — OUTPATIENT (OUTPATIENT)
Dept: OUTPATIENT SERVICES | Facility: HOSPITAL | Age: 62
LOS: 1 days | End: 2017-11-30

## 2017-11-30 ENCOUNTER — LABORATORY RESULT (OUTPATIENT)
Age: 62
End: 2017-11-30

## 2017-11-30 VITALS — SYSTOLIC BLOOD PRESSURE: 111 MMHG | DIASTOLIC BLOOD PRESSURE: 59 MMHG | HEART RATE: 75 BPM

## 2017-11-30 VITALS — HEIGHT: 68 IN | WEIGHT: 194 LBS | BODY MASS INDEX: 29.4 KG/M2

## 2017-11-30 LAB
ALBUMIN SERPL ELPH-MCNC: 3.9 G/DL — SIGNIFICANT CHANGE UP (ref 3.3–5)
ALP SERPL-CCNC: 117 U/L — SIGNIFICANT CHANGE UP (ref 40–120)
ALT FLD-CCNC: 45 U/L — HIGH (ref 4–33)
AST SERPL-CCNC: 27 U/L — SIGNIFICANT CHANGE UP (ref 4–32)
BASOPHILS # BLD AUTO: 0.04 K/UL — SIGNIFICANT CHANGE UP (ref 0–0.2)
BASOPHILS NFR BLD AUTO: 0.4 % — SIGNIFICANT CHANGE UP (ref 0–2)
BILIRUB SERPL-MCNC: 0.3 MG/DL — SIGNIFICANT CHANGE UP (ref 0.2–1.2)
BUN SERPL-MCNC: 17 MG/DL — SIGNIFICANT CHANGE UP (ref 7–23)
CALCIUM SERPL-MCNC: 8.3 MG/DL — LOW (ref 8.4–10.5)
CHLORIDE SERPL-SCNC: 102 MMOL/L — SIGNIFICANT CHANGE UP (ref 98–107)
CK SERPL-CCNC: 225 U/L — HIGH (ref 25–170)
CO2 SERPL-SCNC: 27 MMOL/L — SIGNIFICANT CHANGE UP (ref 22–31)
CREAT SERPL-MCNC: 0.63 MG/DL — SIGNIFICANT CHANGE UP (ref 0.5–1.3)
EOSINOPHIL # BLD AUTO: 0.21 K/UL — SIGNIFICANT CHANGE UP (ref 0–0.5)
EOSINOPHIL NFR BLD AUTO: 1.9 % — SIGNIFICANT CHANGE UP (ref 0–6)
GLUCOSE SERPL-MCNC: 81 MG/DL — SIGNIFICANT CHANGE UP (ref 70–99)
HCT VFR BLD CALC: 38.7 % — SIGNIFICANT CHANGE UP (ref 34.5–45)
HGB BLD-MCNC: 13.2 G/DL — SIGNIFICANT CHANGE UP (ref 11.5–15.5)
IMM GRANULOCYTES # BLD AUTO: 0.08 # — SIGNIFICANT CHANGE UP
IMM GRANULOCYTES NFR BLD AUTO: 0.7 % — SIGNIFICANT CHANGE UP (ref 0–1.5)
LDH SERPL L TO P-CCNC: 243 U/L — HIGH (ref 135–225)
LYMPHOCYTES # BLD AUTO: 3.39 K/UL — HIGH (ref 1–3.3)
LYMPHOCYTES # BLD AUTO: 30.4 % — SIGNIFICANT CHANGE UP (ref 13–44)
MCHC RBC-ENTMCNC: 30.8 PG — SIGNIFICANT CHANGE UP (ref 27–34)
MCHC RBC-ENTMCNC: 34.1 % — SIGNIFICANT CHANGE UP (ref 32–36)
MCV RBC AUTO: 90.4 FL — SIGNIFICANT CHANGE UP (ref 80–100)
MONOCYTES # BLD AUTO: 0.91 K/UL — HIGH (ref 0–0.9)
MONOCYTES NFR BLD AUTO: 8.2 % — SIGNIFICANT CHANGE UP (ref 2–14)
NEUTROPHILS # BLD AUTO: 6.51 K/UL — SIGNIFICANT CHANGE UP (ref 1.8–7.4)
NEUTROPHILS NFR BLD AUTO: 58.4 % — SIGNIFICANT CHANGE UP (ref 43–77)
NRBC # FLD: 0 — SIGNIFICANT CHANGE UP
PLATELET # BLD AUTO: 303 K/UL — SIGNIFICANT CHANGE UP (ref 150–400)
PMV BLD: 10.6 FL — SIGNIFICANT CHANGE UP (ref 7–13)
POTASSIUM SERPL-MCNC: 4.1 MMOL/L — SIGNIFICANT CHANGE UP (ref 3.5–5.3)
POTASSIUM SERPL-SCNC: 4.1 MMOL/L — SIGNIFICANT CHANGE UP (ref 3.5–5.3)
PROT SERPL-MCNC: 6.3 G/DL — SIGNIFICANT CHANGE UP (ref 6–8.3)
RBC # BLD: 4.28 M/UL — SIGNIFICANT CHANGE UP (ref 3.8–5.2)
RBC # FLD: 15.2 % — HIGH (ref 10.3–14.5)
SODIUM SERPL-SCNC: 141 MMOL/L — SIGNIFICANT CHANGE UP (ref 135–145)
WBC # BLD: 11.14 K/UL — HIGH (ref 3.8–10.5)
WBC # FLD AUTO: 11.14 K/UL — HIGH (ref 3.8–10.5)

## 2017-11-30 PROCEDURE — 99214 OFFICE O/P EST MOD 30 MIN: CPT | Mod: GC

## 2017-11-30 RX ORDER — DULOXETINE HYDROCHLORIDE 60 MG/1
60 CAPSULE, DELAYED RELEASE PELLETS ORAL
Qty: 30 | Refills: 0 | Status: DISCONTINUED | COMMUNITY
Start: 2017-10-26 | End: 2017-11-30

## 2017-12-01 DIAGNOSIS — K21.9 GASTRO-ESOPHAGEAL REFLUX DISEASE WITHOUT ESOPHAGITIS: ICD-10-CM

## 2017-12-01 DIAGNOSIS — Z00.00 ENCOUNTER FOR GENERAL ADULT MEDICAL EXAMINATION WITHOUT ABNORMAL FINDINGS: ICD-10-CM

## 2017-12-01 DIAGNOSIS — J44.9 CHRONIC OBSTRUCTIVE PULMONARY DISEASE, UNSPECIFIED: ICD-10-CM

## 2017-12-01 DIAGNOSIS — M19.90 UNSPECIFIED OSTEOARTHRITIS, UNSPECIFIED SITE: ICD-10-CM

## 2017-12-01 DIAGNOSIS — F17.200 NICOTINE DEPENDENCE, UNSPECIFIED, UNCOMPLICATED: ICD-10-CM

## 2017-12-01 DIAGNOSIS — R79.89 OTHER SPECIFIED ABNORMAL FINDINGS OF BLOOD CHEMISTRY: ICD-10-CM

## 2017-12-01 DIAGNOSIS — Z23 ENCOUNTER FOR IMMUNIZATION: ICD-10-CM

## 2017-12-01 DIAGNOSIS — M79.7 FIBROMYALGIA: ICD-10-CM

## 2017-12-01 DIAGNOSIS — R91.1 SOLITARY PULMONARY NODULE: ICD-10-CM

## 2017-12-07 ENCOUNTER — LABORATORY RESULT (OUTPATIENT)
Age: 62
End: 2017-12-07

## 2017-12-07 LAB
BASOPHILS # BLD AUTO: 0.02 K/UL — SIGNIFICANT CHANGE UP (ref 0–0.2)
BASOPHILS NFR BLD AUTO: 0.3 % — SIGNIFICANT CHANGE UP (ref 0–2)
CK SERPL-CCNC: 253 U/L — HIGH (ref 25–170)
EOSINOPHIL # BLD AUTO: 0.15 K/UL — SIGNIFICANT CHANGE UP (ref 0–0.5)
EOSINOPHIL NFR BLD AUTO: 2 % — SIGNIFICANT CHANGE UP (ref 0–6)
HCT VFR BLD CALC: 40.1 % — SIGNIFICANT CHANGE UP (ref 34.5–45)
HGB BLD-MCNC: 13.6 G/DL — SIGNIFICANT CHANGE UP (ref 11.5–15.5)
IMM GRANULOCYTES # BLD AUTO: 0.03 # — SIGNIFICANT CHANGE UP
IMM GRANULOCYTES NFR BLD AUTO: 0.4 % — SIGNIFICANT CHANGE UP (ref 0–1.5)
LDH SERPL L TO P-CCNC: 276 U/L — HIGH (ref 135–225)
LYMPHOCYTES # BLD AUTO: 2.91 K/UL — SIGNIFICANT CHANGE UP (ref 1–3.3)
LYMPHOCYTES # BLD AUTO: 38.5 % — SIGNIFICANT CHANGE UP (ref 13–44)
MCHC RBC-ENTMCNC: 31 PG — SIGNIFICANT CHANGE UP (ref 27–34)
MCHC RBC-ENTMCNC: 33.9 % — SIGNIFICANT CHANGE UP (ref 32–36)
MCV RBC AUTO: 91.3 FL — SIGNIFICANT CHANGE UP (ref 80–100)
MONOCYTES # BLD AUTO: 0.56 K/UL — SIGNIFICANT CHANGE UP (ref 0–0.9)
MONOCYTES NFR BLD AUTO: 7.4 % — SIGNIFICANT CHANGE UP (ref 2–14)
NEUTROPHILS # BLD AUTO: 3.89 K/UL — SIGNIFICANT CHANGE UP (ref 1.8–7.4)
NEUTROPHILS NFR BLD AUTO: 51.4 % — SIGNIFICANT CHANGE UP (ref 43–77)
NRBC # FLD: 0 — SIGNIFICANT CHANGE UP
PLATELET # BLD AUTO: 293 K/UL — SIGNIFICANT CHANGE UP (ref 150–400)
PMV BLD: 10.5 FL — SIGNIFICANT CHANGE UP (ref 7–13)
RBC # BLD: 4.39 M/UL — SIGNIFICANT CHANGE UP (ref 3.8–5.2)
RBC # FLD: 14.6 % — HIGH (ref 10.3–14.5)
WBC # BLD: 7.56 K/UL — SIGNIFICANT CHANGE UP (ref 3.8–10.5)
WBC # FLD AUTO: 7.56 K/UL — SIGNIFICANT CHANGE UP (ref 3.8–10.5)

## 2017-12-13 ENCOUNTER — LABORATORY RESULT (OUTPATIENT)
Age: 62
End: 2017-12-13

## 2017-12-13 ENCOUNTER — APPOINTMENT (OUTPATIENT)
Dept: INTERNAL MEDICINE | Facility: HOSPITAL | Age: 62
End: 2017-12-13
Payer: MEDICAID

## 2017-12-13 ENCOUNTER — OUTPATIENT (OUTPATIENT)
Dept: OUTPATIENT SERVICES | Facility: HOSPITAL | Age: 62
LOS: 1 days | End: 2017-12-13

## 2017-12-13 VITALS — HEIGHT: 68 IN | BODY MASS INDEX: 29.34 KG/M2 | WEIGHT: 193.56 LBS

## 2017-12-13 VITALS
DIASTOLIC BLOOD PRESSURE: 61 MMHG | RESPIRATION RATE: 16 BRPM | SYSTOLIC BLOOD PRESSURE: 140 MMHG | TEMPERATURE: 98.2 F | HEART RATE: 68 BPM

## 2017-12-13 DIAGNOSIS — K46.9 UNSPECIFIED ABDOMINAL HERNIA W/OUT OBSTRUCTION OR GANGRENE: ICD-10-CM

## 2017-12-13 DIAGNOSIS — Z08 ENCOUNTER FOR FOLLOW-UP EXAMINATION AFTER COMPLETED TREATMENT FOR MALIGNANT NEOPLASM: ICD-10-CM

## 2017-12-13 DIAGNOSIS — M62.89 OTHER SPECIFIED DISORDERS OF MUSCLE: ICD-10-CM

## 2017-12-13 DIAGNOSIS — I83.899 VARICOSE VEINS OF UNSPECIFIED LOWER EXTREMITY WITH OTHER COMPLICATIONS: ICD-10-CM

## 2017-12-13 DIAGNOSIS — K21.9 GASTRO-ESOPHAGEAL REFLUX DISEASE W/OUT ESOPHAGITIS: ICD-10-CM

## 2017-12-13 DIAGNOSIS — K62.5 HEMORRHAGE OF ANUS AND RECTUM: ICD-10-CM

## 2017-12-13 DIAGNOSIS — M25.50 PAIN IN UNSPECIFIED JOINT: ICD-10-CM

## 2017-12-13 DIAGNOSIS — M79.606 PAIN IN LEG, UNSPECIFIED: ICD-10-CM

## 2017-12-13 DIAGNOSIS — Z87.39 PERSONAL HISTORY OF OTHER DISEASES OF THE MUSCULOSKELETAL SYSTEM AND CONNECTIVE TISSUE: ICD-10-CM

## 2017-12-13 DIAGNOSIS — Z86.2 PERSONAL HISTORY OF DISEASES OF THE BLOOD AND BLOOD-FORMING ORGANS AND CERTAIN DISORDERS INVOLVING THE IMMUNE MECHANISM: ICD-10-CM

## 2017-12-13 DIAGNOSIS — Z87.42 PERSONAL HISTORY OF OTHER DISEASES OF THE FEMALE GENITAL TRACT: ICD-10-CM

## 2017-12-13 DIAGNOSIS — R19.5 OTHER FECAL ABNORMALITIES: ICD-10-CM

## 2017-12-13 DIAGNOSIS — L84 CORNS AND CALLOSITIES: ICD-10-CM

## 2017-12-13 DIAGNOSIS — Z87.448 PERSONAL HISTORY OF OTHER DISEASES OF URINARY SYSTEM: ICD-10-CM

## 2017-12-13 DIAGNOSIS — Z87.898 PERSONAL HISTORY OF OTHER SPECIFIED CONDITIONS: ICD-10-CM

## 2017-12-13 DIAGNOSIS — Z85.038 ENCOUNTER FOR FOLLOW-UP EXAMINATION AFTER COMPLETED TREATMENT FOR MALIGNANT NEOPLASM: ICD-10-CM

## 2017-12-13 DIAGNOSIS — M21.949 UNSPECIFIED ACQUIRED DEFORMITY OF HAND, UNSPECIFIED HAND: ICD-10-CM

## 2017-12-13 DIAGNOSIS — Z87.2 PERSONAL HISTORY OF DISEASES OF THE SKIN AND SUBCUTANEOUS TISSUE: ICD-10-CM

## 2017-12-13 DIAGNOSIS — M25.569 PAIN IN UNSPECIFIED KNEE: ICD-10-CM

## 2017-12-13 LAB
ALBUMIN SERPL ELPH-MCNC: 4 G/DL — SIGNIFICANT CHANGE UP (ref 3.3–5)
ALP SERPL-CCNC: 104 U/L — SIGNIFICANT CHANGE UP (ref 40–120)
ALT FLD-CCNC: 36 U/L — HIGH (ref 4–33)
AST SERPL-CCNC: 24 U/L — SIGNIFICANT CHANGE UP (ref 4–32)
BILIRUB SERPL-MCNC: 0.2 MG/DL — SIGNIFICANT CHANGE UP (ref 0.2–1.2)
BUN SERPL-MCNC: 12 MG/DL — SIGNIFICANT CHANGE UP (ref 7–23)
CALCIUM SERPL-MCNC: 8.9 MG/DL — SIGNIFICANT CHANGE UP (ref 8.4–10.5)
CHLORIDE SERPL-SCNC: 102 MMOL/L — SIGNIFICANT CHANGE UP (ref 98–107)
CHOLEST SERPL-MCNC: 161 MG/DL — SIGNIFICANT CHANGE UP (ref 120–199)
CK MB BLD-MCNC: 2.4 — SIGNIFICANT CHANGE UP (ref 0–2.5)
CK MB BLD-MCNC: 5.59 NG/ML — HIGH (ref 1–4.7)
CK SERPL-CCNC: 230 U/L — HIGH (ref 25–170)
CO2 SERPL-SCNC: 28 MMOL/L — SIGNIFICANT CHANGE UP (ref 22–31)
CREAT SERPL-MCNC: 0.78 MG/DL — SIGNIFICANT CHANGE UP (ref 0.5–1.3)
GLUCOSE SERPL-MCNC: 81 MG/DL — SIGNIFICANT CHANGE UP (ref 70–99)
HDLC SERPL-MCNC: 48 MG/DL — SIGNIFICANT CHANGE UP (ref 45–65)
LIPID PNL WITH DIRECT LDL SERPL: 97 MG/DL — SIGNIFICANT CHANGE UP
POTASSIUM SERPL-MCNC: 4.1 MMOL/L — SIGNIFICANT CHANGE UP (ref 3.5–5.3)
POTASSIUM SERPL-SCNC: 4.1 MMOL/L — SIGNIFICANT CHANGE UP (ref 3.5–5.3)
PROT SERPL-MCNC: 6.3 G/DL — SIGNIFICANT CHANGE UP (ref 6–8.3)
SODIUM SERPL-SCNC: 141 MMOL/L — SIGNIFICANT CHANGE UP (ref 135–145)
TRIGL SERPL-MCNC: 179 MG/DL — HIGH (ref 10–149)

## 2017-12-13 PROCEDURE — 99214 OFFICE O/P EST MOD 30 MIN: CPT | Mod: GC

## 2017-12-18 DIAGNOSIS — J44.9 CHRONIC OBSTRUCTIVE PULMONARY DISEASE, UNSPECIFIED: ICD-10-CM

## 2017-12-18 DIAGNOSIS — M19.019 PRIMARY OSTEOARTHRITIS, UNSPECIFIED SHOULDER: ICD-10-CM

## 2017-12-18 DIAGNOSIS — I10 ESSENTIAL (PRIMARY) HYPERTENSION: ICD-10-CM

## 2017-12-18 DIAGNOSIS — M67.911 UNSPECIFIED DISORDER OF SYNOVIUM AND TENDON, RIGHT SHOULDER: ICD-10-CM

## 2017-12-18 DIAGNOSIS — Z00.00 ENCOUNTER FOR GENERAL ADULT MEDICAL EXAMINATION WITHOUT ABNORMAL FINDINGS: ICD-10-CM

## 2017-12-18 DIAGNOSIS — K21.9 GASTRO-ESOPHAGEAL REFLUX DISEASE WITHOUT ESOPHAGITIS: ICD-10-CM

## 2017-12-18 DIAGNOSIS — E78.5 HYPERLIPIDEMIA, UNSPECIFIED: ICD-10-CM

## 2017-12-18 DIAGNOSIS — F32.9 MAJOR DEPRESSIVE DISORDER, SINGLE EPISODE, UNSPECIFIED: ICD-10-CM

## 2017-12-18 DIAGNOSIS — M46.92 UNSPECIFIED INFLAMMATORY SPONDYLOPATHY, CERVICAL REGION: ICD-10-CM

## 2017-12-22 ENCOUNTER — FORM ENCOUNTER (OUTPATIENT)
Age: 62
End: 2017-12-22

## 2017-12-23 ENCOUNTER — OUTPATIENT (OUTPATIENT)
Dept: OUTPATIENT SERVICES | Facility: HOSPITAL | Age: 62
LOS: 1 days | End: 2017-12-23
Payer: MEDICAID

## 2017-12-23 ENCOUNTER — APPOINTMENT (OUTPATIENT)
Dept: ULTRASOUND IMAGING | Facility: IMAGING CENTER | Age: 62
End: 2017-12-23
Payer: MEDICAID

## 2017-12-23 DIAGNOSIS — Z00.8 ENCOUNTER FOR OTHER GENERAL EXAMINATION: ICD-10-CM

## 2017-12-23 PROCEDURE — 76536 US EXAM OF HEAD AND NECK: CPT | Mod: 26

## 2017-12-23 PROCEDURE — 76536 US EXAM OF HEAD AND NECK: CPT

## 2017-12-27 ENCOUNTER — APPOINTMENT (OUTPATIENT)
Dept: ORTHOPEDIC SURGERY | Facility: HOSPITAL | Age: 62
End: 2017-12-27

## 2018-01-03 ENCOUNTER — APPOINTMENT (OUTPATIENT)
Dept: PULMONOLOGY | Facility: CLINIC | Age: 63
End: 2018-01-03
Payer: MEDICAID

## 2018-01-03 VITALS
BODY MASS INDEX: 31.44 KG/M2 | SYSTOLIC BLOOD PRESSURE: 130 MMHG | HEIGHT: 65.5 IN | WEIGHT: 191 LBS | TEMPERATURE: 98 F | DIASTOLIC BLOOD PRESSURE: 70 MMHG | RESPIRATION RATE: 18 BRPM | OXYGEN SATURATION: 98 % | HEART RATE: 63 BPM

## 2018-01-03 PROCEDURE — ZZZZZ: CPT

## 2018-01-03 PROCEDURE — 94060 EVALUATION OF WHEEZING: CPT

## 2018-01-03 PROCEDURE — 94729 DIFFUSING CAPACITY: CPT

## 2018-01-03 PROCEDURE — 99205 OFFICE O/P NEW HI 60 MIN: CPT | Mod: 25

## 2018-01-03 PROCEDURE — 99407 BEHAV CHNG SMOKING > 10 MIN: CPT

## 2018-01-03 PROCEDURE — 94726 PLETHYSMOGRAPHY LUNG VOLUMES: CPT

## 2018-01-04 ENCOUNTER — APPOINTMENT (OUTPATIENT)
Dept: INTERNAL MEDICINE | Facility: HOSPITAL | Age: 63
End: 2018-01-04

## 2018-01-08 ENCOUNTER — APPOINTMENT (OUTPATIENT)
Dept: RHEUMATOLOGY | Facility: HOSPITAL | Age: 63
End: 2018-01-08

## 2018-01-10 ENCOUNTER — APPOINTMENT (OUTPATIENT)
Dept: OBGYN | Facility: HOSPITAL | Age: 63
End: 2018-01-10

## 2018-01-13 ENCOUNTER — APPOINTMENT (OUTPATIENT)
Dept: ULTRASOUND IMAGING | Facility: IMAGING CENTER | Age: 63
End: 2018-01-13

## 2018-01-29 ENCOUNTER — APPOINTMENT (OUTPATIENT)
Dept: ULTRASOUND IMAGING | Facility: IMAGING CENTER | Age: 63
End: 2018-01-29
Payer: MEDICAID

## 2018-01-29 ENCOUNTER — OUTPATIENT (OUTPATIENT)
Dept: OUTPATIENT SERVICES | Facility: HOSPITAL | Age: 63
LOS: 1 days | End: 2018-01-29
Payer: MEDICAID

## 2018-01-29 DIAGNOSIS — R79.89 OTHER SPECIFIED ABNORMAL FINDINGS OF BLOOD CHEMISTRY: ICD-10-CM

## 2018-01-29 PROCEDURE — 76700 US EXAM ABDOM COMPLETE: CPT | Mod: 26

## 2018-01-29 PROCEDURE — 76700 US EXAM ABDOM COMPLETE: CPT

## 2018-01-31 ENCOUNTER — OUTPATIENT (OUTPATIENT)
Dept: OUTPATIENT SERVICES | Facility: HOSPITAL | Age: 63
LOS: 1 days | End: 2018-01-31

## 2018-01-31 ENCOUNTER — APPOINTMENT (OUTPATIENT)
Dept: OBGYN | Facility: HOSPITAL | Age: 63
End: 2018-01-31
Payer: MEDICAID

## 2018-01-31 VITALS
HEART RATE: 68 BPM | HEIGHT: 65.5 IN | SYSTOLIC BLOOD PRESSURE: 150 MMHG | DIASTOLIC BLOOD PRESSURE: 68 MMHG | WEIGHT: 192.46 LBS | BODY MASS INDEX: 31.68 KG/M2

## 2018-01-31 PROCEDURE — ZZZZZ: CPT | Mod: GC

## 2018-02-01 ENCOUNTER — RESULT REVIEW (OUTPATIENT)
Age: 63
End: 2018-02-01

## 2018-02-01 DIAGNOSIS — Z00.00 ENCOUNTER FOR GENERAL ADULT MEDICAL EXAMINATION WITHOUT ABNORMAL FINDINGS: ICD-10-CM

## 2018-02-01 DIAGNOSIS — D72.829 ELEVATED WHITE BLOOD CELL COUNT, UNSPECIFIED: ICD-10-CM

## 2018-02-01 DIAGNOSIS — Z01.419 ENCOUNTER FOR GYNECOLOGICAL EXAMINATION (GENERAL) (ROUTINE) WITHOUT ABNORMAL FINDINGS: ICD-10-CM

## 2018-02-02 LAB — HPV HIGH+LOW RISK DNA PNL CVX: SIGNIFICANT CHANGE UP

## 2018-02-06 ENCOUNTER — RESULT REVIEW (OUTPATIENT)
Age: 63
End: 2018-02-06

## 2018-02-06 ENCOUNTER — APPOINTMENT (OUTPATIENT)
Dept: HEMATOLOGY ONCOLOGY | Facility: CLINIC | Age: 63
End: 2018-02-06
Payer: MEDICAID

## 2018-02-06 ENCOUNTER — APPOINTMENT (OUTPATIENT)
Dept: INTERNAL MEDICINE | Facility: HOSPITAL | Age: 63
End: 2018-02-06

## 2018-02-06 VITALS
WEIGHT: 190.7 LBS | DIASTOLIC BLOOD PRESSURE: 80 MMHG | RESPIRATION RATE: 16 BRPM | SYSTOLIC BLOOD PRESSURE: 148 MMHG | BODY MASS INDEX: 31.02 KG/M2 | OXYGEN SATURATION: 98 % | HEIGHT: 65.63 IN | HEART RATE: 71 BPM | TEMPERATURE: 98 F

## 2018-02-06 DIAGNOSIS — D72.829 ELEVATED WHITE BLOOD CELL COUNT, UNSPECIFIED: ICD-10-CM

## 2018-02-06 LAB
BASOPHILS # BLD AUTO: 0.1 K/UL — SIGNIFICANT CHANGE UP (ref 0–0.2)
BASOPHILS NFR BLD AUTO: 1 % — SIGNIFICANT CHANGE UP (ref 0–2)
CYTOLOGY SPEC DOC CYTO: SIGNIFICANT CHANGE UP
EOSINOPHIL # BLD AUTO: 0.3 K/UL — SIGNIFICANT CHANGE UP (ref 0–0.5)
EOSINOPHIL NFR BLD AUTO: 3 % — SIGNIFICANT CHANGE UP (ref 0–6)
HCT VFR BLD CALC: 40.5 % — SIGNIFICANT CHANGE UP (ref 34.5–45)
HGB BLD-MCNC: 14.2 G/DL — SIGNIFICANT CHANGE UP (ref 11.5–15.5)
LYMPHOCYTES # BLD AUTO: 3 K/UL — SIGNIFICANT CHANGE UP (ref 1–3.3)
LYMPHOCYTES # BLD AUTO: 34.8 % — SIGNIFICANT CHANGE UP (ref 13–44)
MCHC RBC-ENTMCNC: 31.4 PG — SIGNIFICANT CHANGE UP (ref 27–34)
MCHC RBC-ENTMCNC: 35.1 G/DL — SIGNIFICANT CHANGE UP (ref 32–36)
MCV RBC AUTO: 89.6 FL — SIGNIFICANT CHANGE UP (ref 80–100)
MONOCYTES # BLD AUTO: 0.7 K/UL — SIGNIFICANT CHANGE UP (ref 0–0.9)
MONOCYTES NFR BLD AUTO: 7.9 % — SIGNIFICANT CHANGE UP (ref 2–14)
NEUTROPHILS # BLD AUTO: 4.6 K/UL — SIGNIFICANT CHANGE UP (ref 1.8–7.4)
NEUTROPHILS NFR BLD AUTO: 53.1 % — SIGNIFICANT CHANGE UP (ref 43–77)
PLATELET # BLD AUTO: 301 K/UL — SIGNIFICANT CHANGE UP (ref 150–400)
RBC # BLD: 4.52 M/UL — SIGNIFICANT CHANGE UP (ref 3.8–5.2)
RBC # FLD: 12.3 % — SIGNIFICANT CHANGE UP (ref 10.3–14.5)
WBC # BLD: 8.8 K/UL — SIGNIFICANT CHANGE UP (ref 3.8–10.5)
WBC # FLD AUTO: 8.8 K/UL — SIGNIFICANT CHANGE UP (ref 3.8–10.5)

## 2018-02-06 PROCEDURE — 99204 OFFICE O/P NEW MOD 45 MIN: CPT

## 2018-02-06 RX ORDER — ROSUVASTATIN CALCIUM 20 MG/1
20 TABLET, FILM COATED ORAL DAILY
Qty: 30 | Refills: 2 | Status: COMPLETED | COMMUNITY
Start: 2017-12-14 | End: 2018-02-06

## 2018-02-07 PROBLEM — D72.829 LEUKOCYTOSIS, UNSPECIFIED TYPE: Status: ACTIVE | Noted: 2017-12-01

## 2018-02-15 ENCOUNTER — OUTPATIENT (OUTPATIENT)
Dept: OUTPATIENT SERVICES | Facility: HOSPITAL | Age: 63
LOS: 1 days | End: 2018-02-15

## 2018-02-15 ENCOUNTER — APPOINTMENT (OUTPATIENT)
Dept: GASTROENTEROLOGY | Facility: HOSPITAL | Age: 63
End: 2018-02-15
Payer: MEDICAID

## 2018-02-15 VITALS
HEART RATE: 70 BPM | BODY MASS INDEX: 31.65 KG/M2 | SYSTOLIC BLOOD PRESSURE: 123 MMHG | HEIGHT: 65 IN | DIASTOLIC BLOOD PRESSURE: 42 MMHG | WEIGHT: 190 LBS

## 2018-02-15 PROCEDURE — 99213 OFFICE O/P EST LOW 20 MIN: CPT | Mod: GC

## 2018-02-16 DIAGNOSIS — K63.5 POLYP OF COLON: ICD-10-CM

## 2018-02-16 DIAGNOSIS — K92.1 MELENA: ICD-10-CM

## 2018-02-16 DIAGNOSIS — E66.9 OBESITY, UNSPECIFIED: ICD-10-CM

## 2018-02-22 ENCOUNTER — OUTPATIENT (OUTPATIENT)
Dept: OUTPATIENT SERVICES | Facility: HOSPITAL | Age: 63
LOS: 1 days | End: 2018-02-22

## 2018-02-22 ENCOUNTER — LABORATORY RESULT (OUTPATIENT)
Age: 63
End: 2018-02-22

## 2018-02-22 ENCOUNTER — APPOINTMENT (OUTPATIENT)
Dept: INTERNAL MEDICINE | Facility: HOSPITAL | Age: 63
End: 2018-02-22
Payer: MEDICAID

## 2018-02-22 VITALS — HEART RATE: 72 BPM | RESPIRATION RATE: 16 BRPM | SYSTOLIC BLOOD PRESSURE: 140 MMHG | DIASTOLIC BLOOD PRESSURE: 68 MMHG

## 2018-02-22 VITALS — WEIGHT: 187 LBS | BODY MASS INDEX: 31.16 KG/M2 | HEIGHT: 65 IN

## 2018-02-22 LAB
ALBUMIN SERPL ELPH-MCNC: 4 G/DL — SIGNIFICANT CHANGE UP (ref 3.3–5)
ALP SERPL-CCNC: 98 U/L — SIGNIFICANT CHANGE UP (ref 40–120)
ALT FLD-CCNC: 16 U/L — SIGNIFICANT CHANGE UP (ref 4–33)
AST SERPL-CCNC: 19 U/L — SIGNIFICANT CHANGE UP (ref 4–32)
BILIRUB SERPL-MCNC: 0.3 MG/DL — SIGNIFICANT CHANGE UP (ref 0.2–1.2)
BUN SERPL-MCNC: 12 MG/DL — SIGNIFICANT CHANGE UP (ref 7–23)
CALCIUM SERPL-MCNC: 8.9 MG/DL — SIGNIFICANT CHANGE UP (ref 8.4–10.5)
CHLORIDE SERPL-SCNC: 100 MMOL/L — SIGNIFICANT CHANGE UP (ref 98–107)
CK SERPL-CCNC: 233 U/L — HIGH (ref 25–170)
CO2 SERPL-SCNC: 29 MMOL/L — SIGNIFICANT CHANGE UP (ref 22–31)
CREAT SERPL-MCNC: 0.67 MG/DL — SIGNIFICANT CHANGE UP (ref 0.5–1.3)
GLUCOSE SERPL-MCNC: 114 MG/DL — HIGH (ref 70–99)
HBA1C BLD-MCNC: 5.7 % — HIGH (ref 4–5.6)
POTASSIUM SERPL-MCNC: 3.8 MMOL/L — SIGNIFICANT CHANGE UP (ref 3.5–5.3)
POTASSIUM SERPL-SCNC: 3.8 MMOL/L — SIGNIFICANT CHANGE UP (ref 3.5–5.3)
PROT SERPL-MCNC: 6.4 G/DL — SIGNIFICANT CHANGE UP (ref 6–8.3)
SODIUM SERPL-SCNC: 140 MMOL/L — SIGNIFICANT CHANGE UP (ref 135–145)

## 2018-02-22 PROCEDURE — 99214 OFFICE O/P EST MOD 30 MIN: CPT | Mod: GC

## 2018-02-23 DIAGNOSIS — R73.03 PREDIABETES: ICD-10-CM

## 2018-02-23 DIAGNOSIS — M67.911 UNSPECIFIED DISORDER OF SYNOVIUM AND TENDON, RIGHT SHOULDER: ICD-10-CM

## 2018-02-23 LAB — GLUCOSE BLDC GLUCOMTR-MCNC: 161

## 2018-02-26 ENCOUNTER — LABORATORY RESULT (OUTPATIENT)
Age: 63
End: 2018-02-26

## 2018-02-26 ENCOUNTER — APPOINTMENT (OUTPATIENT)
Dept: RHEUMATOLOGY | Facility: HOSPITAL | Age: 63
End: 2018-02-26
Payer: MEDICAID

## 2018-02-26 ENCOUNTER — OUTPATIENT (OUTPATIENT)
Dept: OUTPATIENT SERVICES | Facility: HOSPITAL | Age: 63
LOS: 1 days | End: 2018-02-26

## 2018-02-26 VITALS
HEART RATE: 62 BPM | WEIGHT: 189 LBS | SYSTOLIC BLOOD PRESSURE: 131 MMHG | DIASTOLIC BLOOD PRESSURE: 60 MMHG | BODY MASS INDEX: 31.49 KG/M2 | HEIGHT: 65 IN

## 2018-02-26 DIAGNOSIS — M25.419 EFFUSION, UNSPECIFIED SHOULDER: ICD-10-CM

## 2018-02-26 DIAGNOSIS — R73.03 PREDIABETES: ICD-10-CM

## 2018-02-26 DIAGNOSIS — M67.911 UNSPECIFIED DISORDER OF SYNOVIUM AND TENDON, RIGHT SHOULDER: ICD-10-CM

## 2018-02-26 DIAGNOSIS — M19.90 UNSPECIFIED OSTEOARTHRITIS, UNSPECIFIED SITE: ICD-10-CM

## 2018-02-26 DIAGNOSIS — M17.0 BILATERAL PRIMARY OSTEOARTHRITIS OF KNEE: ICD-10-CM

## 2018-02-26 LAB
HBV CORE AB SER-ACNC: NONREACTIVE — SIGNIFICANT CHANGE UP
HBV SURFACE AB SER-ACNC: NONREACTIVE — SIGNIFICANT CHANGE UP
HBV SURFACE AG SER-ACNC: NONREACTIVE — SIGNIFICANT CHANGE UP
HCV AB S/CO SERPL IA: 0.08 S/CO — SIGNIFICANT CHANGE UP
HCV AB SERPL-IMP: SIGNIFICANT CHANGE UP

## 2018-02-26 PROCEDURE — 99215 OFFICE O/P EST HI 40 MIN: CPT | Mod: GC

## 2018-02-28 LAB
M TB TUBERC IFN-G BLD QL: 0.04 IU/ML — SIGNIFICANT CHANGE UP
M TB TUBERC IFN-G BLD QL: 0.28 IU/ML — SIGNIFICANT CHANGE UP
M TB TUBERC IFN-G BLD QL: NEGATIVE — SIGNIFICANT CHANGE UP
MITOGEN IGNF BCKGRD COR BLD-ACNC: >10 IU/ML — SIGNIFICANT CHANGE UP

## 2018-03-01 LAB — HLA-B27 QL: SIGNIFICANT CHANGE UP

## 2018-03-10 ENCOUNTER — APPOINTMENT (OUTPATIENT)
Dept: MRI IMAGING | Facility: IMAGING CENTER | Age: 63
End: 2018-03-10

## 2018-03-18 ENCOUNTER — TRANSCRIPTION ENCOUNTER (OUTPATIENT)
Age: 63
End: 2018-03-18

## 2018-03-21 ENCOUNTER — APPOINTMENT (OUTPATIENT)
Dept: ORTHOPEDIC SURGERY | Facility: HOSPITAL | Age: 63
End: 2018-03-21

## 2018-03-26 ENCOUNTER — APPOINTMENT (OUTPATIENT)
Dept: RHEUMATOLOGY | Facility: HOSPITAL | Age: 63
End: 2018-03-26
Payer: MEDICAID

## 2018-03-26 ENCOUNTER — OUTPATIENT (OUTPATIENT)
Dept: OUTPATIENT SERVICES | Facility: HOSPITAL | Age: 63
LOS: 1 days | End: 2018-03-26

## 2018-03-26 VITALS
SYSTOLIC BLOOD PRESSURE: 140 MMHG | BODY MASS INDEX: 31.34 KG/M2 | HEART RATE: 67 BPM | HEIGHT: 66 IN | DIASTOLIC BLOOD PRESSURE: 55 MMHG | WEIGHT: 195 LBS

## 2018-03-26 DIAGNOSIS — R79.89 OTHER SPECIFIED ABNORMAL FINDINGS OF BLOOD CHEMISTRY: ICD-10-CM

## 2018-03-26 DIAGNOSIS — M46.90 UNSPECIFIED INFLAMMATORY SPONDYLOPATHY, SITE UNSPECIFIED: ICD-10-CM

## 2018-03-26 PROCEDURE — 99214 OFFICE O/P EST MOD 30 MIN: CPT | Mod: GC

## 2018-03-31 ENCOUNTER — APPOINTMENT (OUTPATIENT)
Dept: ULTRASOUND IMAGING | Facility: IMAGING CENTER | Age: 63
End: 2018-03-31

## 2018-04-09 ENCOUNTER — FORM ENCOUNTER (OUTPATIENT)
Age: 63
End: 2018-04-09

## 2018-04-10 ENCOUNTER — OUTPATIENT (OUTPATIENT)
Dept: OUTPATIENT SERVICES | Facility: HOSPITAL | Age: 63
LOS: 1 days | End: 2018-04-10
Payer: MEDICAID

## 2018-04-10 ENCOUNTER — APPOINTMENT (OUTPATIENT)
Dept: ULTRASOUND IMAGING | Facility: CLINIC | Age: 63
End: 2018-04-10
Payer: MEDICAID

## 2018-04-10 ENCOUNTER — APPOINTMENT (OUTPATIENT)
Dept: ULTRASOUND IMAGING | Facility: CLINIC | Age: 63
End: 2018-04-10

## 2018-04-10 DIAGNOSIS — M25.419 EFFUSION, UNSPECIFIED SHOULDER: ICD-10-CM

## 2018-04-10 PROCEDURE — 76882 US LMTD JT/FCL EVL NVASC XTR: CPT | Mod: 26,RT

## 2018-04-10 PROCEDURE — 76882 US LMTD JT/FCL EVL NVASC XTR: CPT

## 2018-04-10 PROCEDURE — 76536 US EXAM OF HEAD AND NECK: CPT

## 2018-04-11 ENCOUNTER — APPOINTMENT (OUTPATIENT)
Dept: ORTHOPEDIC SURGERY | Facility: HOSPITAL | Age: 63
End: 2018-04-11

## 2018-04-19 ENCOUNTER — APPOINTMENT (OUTPATIENT)
Dept: INTERNAL MEDICINE | Facility: HOSPITAL | Age: 63
End: 2018-04-19

## 2018-04-23 ENCOUNTER — APPOINTMENT (OUTPATIENT)
Dept: RHEUMATOLOGY | Facility: HOSPITAL | Age: 63
End: 2018-04-23

## 2018-05-02 ENCOUNTER — OUTPATIENT (OUTPATIENT)
Dept: OUTPATIENT SERVICES | Facility: HOSPITAL | Age: 63
LOS: 1 days | End: 2018-05-02

## 2018-05-02 ENCOUNTER — APPOINTMENT (OUTPATIENT)
Dept: ORTHOPEDIC SURGERY | Facility: HOSPITAL | Age: 63
End: 2018-05-02

## 2018-05-02 VITALS
BODY MASS INDEX: 31.16 KG/M2 | WEIGHT: 193.06 LBS | SYSTOLIC BLOOD PRESSURE: 127 MMHG | HEART RATE: 104 BPM | DIASTOLIC BLOOD PRESSURE: 62 MMHG

## 2018-05-04 DIAGNOSIS — M25.511 PAIN IN RIGHT SHOULDER: ICD-10-CM

## 2018-05-04 DIAGNOSIS — M17.0 BILATERAL PRIMARY OSTEOARTHRITIS OF KNEE: ICD-10-CM

## 2018-05-14 ENCOUNTER — APPOINTMENT (OUTPATIENT)
Dept: RHEUMATOLOGY | Facility: HOSPITAL | Age: 63
End: 2018-05-14

## 2018-05-25 ENCOUNTER — APPOINTMENT (OUTPATIENT)
Dept: INTERNAL MEDICINE | Facility: HOSPITAL | Age: 63
End: 2018-05-25
Payer: MEDICAID

## 2018-05-25 ENCOUNTER — LABORATORY RESULT (OUTPATIENT)
Age: 63
End: 2018-05-25

## 2018-05-25 ENCOUNTER — OUTPATIENT (OUTPATIENT)
Dept: OUTPATIENT SERVICES | Facility: HOSPITAL | Age: 63
LOS: 1 days | End: 2018-05-25

## 2018-05-25 VITALS — HEART RATE: 68 BPM | DIASTOLIC BLOOD PRESSURE: 70 MMHG | SYSTOLIC BLOOD PRESSURE: 130 MMHG

## 2018-05-25 VITALS — HEIGHT: 66 IN | BODY MASS INDEX: 30.53 KG/M2 | WEIGHT: 190 LBS

## 2018-05-25 DIAGNOSIS — J44.9 CHRONIC OBSTRUCTIVE PULMONARY DISEASE, UNSPECIFIED: ICD-10-CM

## 2018-05-25 DIAGNOSIS — R79.89 OTHER SPECIFIED ABNORMAL FINDINGS OF BLOOD CHEMISTRY: ICD-10-CM

## 2018-05-25 LAB
ALBUMIN SERPL ELPH-MCNC: 4.3 G/DL — SIGNIFICANT CHANGE UP (ref 3.3–5)
ALP SERPL-CCNC: 98 U/L — SIGNIFICANT CHANGE UP (ref 40–120)
ALT FLD-CCNC: 13 U/L — SIGNIFICANT CHANGE UP (ref 4–33)
AST SERPL-CCNC: 16 U/L — SIGNIFICANT CHANGE UP (ref 4–32)
BASOPHILS # BLD AUTO: 0.03 K/UL — SIGNIFICANT CHANGE UP (ref 0–0.2)
BASOPHILS NFR BLD AUTO: 0.3 % — SIGNIFICANT CHANGE UP (ref 0–2)
BILIRUB SERPL-MCNC: 0.6 MG/DL — SIGNIFICANT CHANGE UP (ref 0.2–1.2)
BUN SERPL-MCNC: 14 MG/DL — SIGNIFICANT CHANGE UP (ref 7–23)
CALCIUM SERPL-MCNC: 9.1 MG/DL — SIGNIFICANT CHANGE UP (ref 8.4–10.5)
CHLORIDE SERPL-SCNC: 98 MMOL/L — SIGNIFICANT CHANGE UP (ref 98–107)
CO2 SERPL-SCNC: 24 MMOL/L — SIGNIFICANT CHANGE UP (ref 22–31)
CREAT SERPL-MCNC: 0.79 MG/DL — SIGNIFICANT CHANGE UP (ref 0.5–1.3)
CRP SERPL-MCNC: 5.4 MG/L — HIGH
EOSINOPHIL # BLD AUTO: 0.17 K/UL — SIGNIFICANT CHANGE UP (ref 0–0.5)
EOSINOPHIL NFR BLD AUTO: 1.6 % — SIGNIFICANT CHANGE UP (ref 0–6)
ERYTHROCYTE [SEDIMENTATION RATE] IN BLOOD: 12 MM/HR — SIGNIFICANT CHANGE UP (ref 4–25)
GLUCOSE SERPL-MCNC: 98 MG/DL — SIGNIFICANT CHANGE UP (ref 70–99)
HCT VFR BLD CALC: 42.1 % — SIGNIFICANT CHANGE UP (ref 34.5–45)
HGB BLD-MCNC: 14.3 G/DL — SIGNIFICANT CHANGE UP (ref 11.5–15.5)
IMM GRANULOCYTES # BLD AUTO: 0.03 # — SIGNIFICANT CHANGE UP
IMM GRANULOCYTES NFR BLD AUTO: 0.3 % — SIGNIFICANT CHANGE UP (ref 0–1.5)
LYMPHOCYTES # BLD AUTO: 29.5 % — SIGNIFICANT CHANGE UP (ref 13–44)
LYMPHOCYTES # BLD AUTO: 3.08 K/UL — SIGNIFICANT CHANGE UP (ref 1–3.3)
MCHC RBC-ENTMCNC: 29.8 PG — SIGNIFICANT CHANGE UP (ref 27–34)
MCHC RBC-ENTMCNC: 34 % — SIGNIFICANT CHANGE UP (ref 32–36)
MCV RBC AUTO: 87.7 FL — SIGNIFICANT CHANGE UP (ref 80–100)
MONOCYTES # BLD AUTO: 0.8 K/UL — SIGNIFICANT CHANGE UP (ref 0–0.9)
MONOCYTES NFR BLD AUTO: 7.7 % — SIGNIFICANT CHANGE UP (ref 2–14)
NEUTROPHILS # BLD AUTO: 6.33 K/UL — SIGNIFICANT CHANGE UP (ref 1.8–7.4)
NEUTROPHILS NFR BLD AUTO: 60.6 % — SIGNIFICANT CHANGE UP (ref 43–77)
NRBC # FLD: 0 — SIGNIFICANT CHANGE UP
PLATELET # BLD AUTO: 333 K/UL — SIGNIFICANT CHANGE UP (ref 150–400)
PMV BLD: 10.8 FL — SIGNIFICANT CHANGE UP (ref 7–13)
POTASSIUM SERPL-MCNC: 3.9 MMOL/L — SIGNIFICANT CHANGE UP (ref 3.5–5.3)
POTASSIUM SERPL-SCNC: 3.9 MMOL/L — SIGNIFICANT CHANGE UP (ref 3.5–5.3)
PROT SERPL-MCNC: 6.9 G/DL — SIGNIFICANT CHANGE UP (ref 6–8.3)
RBC # BLD: 4.8 M/UL — SIGNIFICANT CHANGE UP (ref 3.8–5.2)
RBC # FLD: 14 % — SIGNIFICANT CHANGE UP (ref 10.3–14.5)
SODIUM SERPL-SCNC: 137 MMOL/L — SIGNIFICANT CHANGE UP (ref 135–145)
WBC # BLD: 10.44 K/UL — SIGNIFICANT CHANGE UP (ref 3.8–10.5)
WBC # FLD AUTO: 10.44 K/UL — SIGNIFICANT CHANGE UP (ref 3.8–10.5)

## 2018-05-25 PROCEDURE — 99214 OFFICE O/P EST MOD 30 MIN: CPT | Mod: GC

## 2018-05-25 RX ORDER — PREDNISONE 5 MG/1
5 TABLET ORAL
Qty: 60 | Refills: 0 | Status: COMPLETED | COMMUNITY
Start: 2018-02-26 | End: 2018-05-25

## 2018-05-25 RX ORDER — METHOTREXATE 2.5 MG/1
2.5 TABLET ORAL
Qty: 30 | Refills: 0 | Status: COMPLETED | COMMUNITY
Start: 2018-03-26 | End: 2018-05-25

## 2018-05-25 RX ORDER — FAMOTIDINE 20 MG/1
20 TABLET, FILM COATED ORAL
Qty: 1 | Refills: 1 | Status: DISCONTINUED | COMMUNITY
Start: 2017-10-26 | End: 2018-05-25

## 2018-05-25 RX ORDER — BUPROPION HYDROCHLORIDE 75 MG/1
75 TABLET, FILM COATED ORAL
Refills: 0 | Status: DISCONTINUED | COMMUNITY
Start: 2018-02-06 | End: 2018-05-25

## 2018-05-25 RX ORDER — ALBUTEROL SULFATE 90 UG/1
108 (90 BASE) AEROSOL, METERED RESPIRATORY (INHALATION)
Qty: 1 | Refills: 3 | Status: DISCONTINUED | COMMUNITY
Start: 2017-11-30 | End: 2018-05-25

## 2018-05-25 NOTE — REVIEW OF SYSTEMS
[Chest Pain] : chest pain [Cough] : cough [Joint Pain] : joint pain [Joint Swelling] : joint swelling [Muscle Pain] : muscle pain [Depression] : depression [Negative] : Gastrointestinal

## 2018-05-29 DIAGNOSIS — M19.019 PRIMARY OSTEOARTHRITIS, UNSPECIFIED SHOULDER: ICD-10-CM

## 2018-05-29 DIAGNOSIS — M79.7 FIBROMYALGIA: ICD-10-CM

## 2018-05-29 DIAGNOSIS — F17.200 NICOTINE DEPENDENCE, UNSPECIFIED, UNCOMPLICATED: ICD-10-CM

## 2018-05-29 DIAGNOSIS — I10 ESSENTIAL (PRIMARY) HYPERTENSION: ICD-10-CM

## 2018-05-29 DIAGNOSIS — E78.5 HYPERLIPIDEMIA, UNSPECIFIED: ICD-10-CM

## 2018-05-29 DIAGNOSIS — M67.911 UNSPECIFIED DISORDER OF SYNOVIUM AND TENDON, RIGHT SHOULDER: ICD-10-CM

## 2018-05-29 DIAGNOSIS — R79.89 OTHER SPECIFIED ABNORMAL FINDINGS OF BLOOD CHEMISTRY: ICD-10-CM

## 2018-05-29 DIAGNOSIS — R91.1 SOLITARY PULMONARY NODULE: ICD-10-CM

## 2018-05-29 DIAGNOSIS — F32.9 MAJOR DEPRESSIVE DISORDER, SINGLE EPISODE, UNSPECIFIED: ICD-10-CM

## 2018-05-29 DIAGNOSIS — R07.9 CHEST PAIN, UNSPECIFIED: ICD-10-CM

## 2018-05-29 NOTE — PHYSICAL EXAM
[No Acute Distress] : no acute distress [Well Nourished] : well nourished [Well Developed] : well developed [Normal Sclera/Conjunctiva] : normal sclera/conjunctiva [PERRL] : pupils equal round and reactive to light [No JVD] : no jugular venous distention [No Respiratory Distress] : no respiratory distress  [Normal Rate] : normal rate  [Regular Rhythm] : with a regular rhythm [No Carotid Bruits] : no carotid bruits [No Edema] : there was no peripheral edema [Soft] : abdomen soft [Non Tender] : non-tender [No Rash] : no rash [de-identified] : Mild diffuse rhonchi [de-identified] : 1/6 systolic murmur heard at R USB, non-radiating to carotids  [de-identified] : Swelling in L sternoclavicular joing, Rib pain, knee pain

## 2018-05-29 NOTE — ASSESSMENT
[FreeTextEntry1] : 62 y.o. F with HTN, pre-diabetes, HLD, HTN, fibromyalgia, R rotator cuff tendonitis, b/l knee OA, R sternolavicular arthritis, NAFLD, depression colon polyps, and active smoking who presents for f/u chronic conditions:\par \par 1)R sternoclavicular pain: \par -rheum concerned for inflammatory arthritis, serologies negative for RA and inflammatory myopathies,; also concern for sero negative arthropathy; however, HLA B 27 negative \par -Pain improved on course of MTX and prednisone taper ; sxs have returned, but not as severe as before \par -continue pain control with tylenol and NSAIDs\par -c/w with possible management with MTX as per here\par -CBC without evidence of anemia s/p MTX \par \par 2) R rotator cuff tendonitis: \par -c/w PT\par -declined subacromial and biceps tendon injection \par -f/u with ortho\par \par 3) B/l knee OA: \par -PT of LE after completion of shoulder PT\par -discussion with ortho, but ortho defers surgery until after smoking cessation is accomplished \par -prescription for rollator printed as pt's current walker has a damaged wheel \par \par 4) Fibromyalgia: \par -c/w duloxetine 60 mg daily; \par -wanted to initiate low dose pregablin, but it is not covered by pt's insurance \par \par 5) Chest pain: \par -Occurs with activity and resolves with rest; possibly consistent with stable angina\par -ordered nuclear stress test \par \par 6) Lung nodule: \par -yearly low dose chest CT, due in 11/2018\par \par 7) Smoking: \par -Counseled on smoking cessation again \par -Patient agreeable to trying nicotine gum again\par -plans to go to [place], as pt feels being around her grandkids will motivate her to stop smoking. \par -chest CT shows emphysematous changes, but PFTs normal\par \par 8) HLD: \par -Lipid panel upon next visit\par \par 9) HTN\par -well controlled \par -c/w with amlodipine and lisinopril \par -Rx for BP device given to patient\par -needs urine microalbumin next visit\par \par 10) Colonic polyps: \par -Pending colonoscopy and GI f/u\par \par 11) NAFLD: \par -Transaminitis resolved with weight loss\par \par 12) Pre-diabetes\par -A1C continues in pre-diabetic range\par -continue with lifestyle modifications \par \par 13) HCM:\par -UTD with influenza vaccine\par -UTD pap smear\par -Colonoscopy to be scheduled for 2018\par -UTD pneumovax, Tdap\par -mammogram in 2017; can repeat in up to 2 years, hence 2019\par \par

## 2018-05-29 NOTE — HISTORY OF PRESENT ILLNESS
[FreeTextEntry1] : f/u chronic issues  [de-identified] : 62 y.o. F with HTN, pre-diabetes, HLD, HTN, fibromyalgia, R rotator cuff tendonitis, b/l knee OA, R sternolavicular arthritis, NAFLD, depression colon polyps, and active smoking who presents for f/u chronic conditions. Patient missed her appt last clinic cycle. With regard to her sternoclavicular pain, patient was started on a course of prednisone by rheumatology, which was followed by a 2 week course of MTX. Patient states that while on prednisone, her pain improved; symptoms have since returned, but are not as severe as they were prior to initiation of prednisone.  \par \par Patient has intentionally lost 36 lbs since last seen in clinic. She has been successful by eliminating soda, beer, mixed drinks, and a diet high in meat from her diet. She also no longer eats late at night. \par With regard to R shoulder tendonitis, patient has been following with PT. She saw ortho recently; they recommended subacromial and biceps injection, but pt declined. Patient was advised to continue PT and return in 6 weeks. Pt had unfortunate encounter with ortho in clinic, and she tearfully recounted how rude the attending was toward her during the appointment. Pt states she is going to [place] and wont' make her next appt with ortho, and will request a different orthopedist when she returns. \par \par With regard to knee OA, pt will undergo PT for LE once she finishes shoulder PT. Total knee replacement was discussed with ortho, but they defer surgery until patient quits smoking. \par \par With regard to smoking, pt continues to smoke 7-8 cigarettes a day. She hopes that she will stop smoking once she travels to [place], as she does not smoke around her grandkids. She does not like using nicotine patches, but expressed desire to use nicotine gum. \par \par With regard to fibromyalgia, patient continues to report generalized body aches. She states her pain is somewhat better on duloxetine, but she still experiences pain daily. \par \par With regard to HTN, patient's BP home monitor broke; she requests new one. She measures her BP at a local pharmacy a few times a week; she states it is usually <140/90.\par \par Patient saw GI in February for evaluation for colonoscopy given hx of colonic polyps in 2015. She was prescribed prep for colonoscopy and instructed to make appt for colonoscopy; she is to return to see GI after colonoscopy is performed. Additionally, given hx of elevated transaminases, now resolved, GI suspects hx of NAFLD. Transaminitis has resolved now, after weight loss. \par \par With regard to lung nodule, CT shows stability in size. Pulm performed PFTs, which were normal. Pt was instructed by pulm to repeat yearly low dose CT. \par \par On review of systems, patient is positive for CP with activity that resolves at rest. Review of systems is otherwise negative. \par

## 2018-06-04 ENCOUNTER — APPOINTMENT (OUTPATIENT)
Dept: RHEUMATOLOGY | Facility: HOSPITAL | Age: 63
End: 2018-06-04

## 2018-06-06 ENCOUNTER — APPOINTMENT (OUTPATIENT)
Dept: ORTHOPEDIC SURGERY | Facility: HOSPITAL | Age: 63
End: 2018-06-06

## 2018-06-07 ENCOUNTER — APPOINTMENT (OUTPATIENT)
Dept: GASTROENTEROLOGY | Facility: HOSPITAL | Age: 63
End: 2018-06-07
Payer: MEDICAID

## 2018-06-07 ENCOUNTER — OUTPATIENT (OUTPATIENT)
Dept: OUTPATIENT SERVICES | Facility: HOSPITAL | Age: 63
LOS: 1 days | End: 2018-06-07

## 2018-06-07 VITALS
SYSTOLIC BLOOD PRESSURE: 149 MMHG | DIASTOLIC BLOOD PRESSURE: 89 MMHG | HEART RATE: 68 BPM | BODY MASS INDEX: 31.98 KG/M2 | HEIGHT: 66 IN | WEIGHT: 199 LBS

## 2018-06-07 PROCEDURE — 99213 OFFICE O/P EST LOW 20 MIN: CPT | Mod: GC

## 2018-06-08 DIAGNOSIS — K63.5 POLYP OF COLON: ICD-10-CM

## 2018-06-08 DIAGNOSIS — D12.6 BENIGN NEOPLASM OF COLON, UNSPECIFIED: ICD-10-CM

## 2018-06-08 DIAGNOSIS — E66.9 OBESITY, UNSPECIFIED: ICD-10-CM

## 2018-07-16 PROBLEM — M79.7 FIBROMYALGIA: Chronic | Status: ACTIVE | Noted: 2017-11-09

## 2018-07-19 ENCOUNTER — APPOINTMENT (OUTPATIENT)
Dept: CV DIAGNOSTICS | Facility: HOSPITAL | Age: 63
End: 2018-07-19
Payer: MEDICAID

## 2018-07-19 PROCEDURE — 78452 HT MUSCLE IMAGE SPECT MULT: CPT | Mod: 26

## 2018-07-19 PROCEDURE — 93016 CV STRESS TEST SUPVJ ONLY: CPT | Mod: GC

## 2018-07-19 PROCEDURE — 93018 CV STRESS TEST I&R ONLY: CPT | Mod: GC

## 2018-07-23 ENCOUNTER — APPOINTMENT (OUTPATIENT)
Dept: RHEUMATOLOGY | Facility: HOSPITAL | Age: 63
End: 2018-07-23

## 2018-07-25 ENCOUNTER — OUTPATIENT (OUTPATIENT)
Dept: OUTPATIENT SERVICES | Facility: HOSPITAL | Age: 63
LOS: 1 days | End: 2018-07-25
Payer: MEDICAID

## 2018-07-25 ENCOUNTER — APPOINTMENT (OUTPATIENT)
Dept: MAMMOGRAPHY | Facility: IMAGING CENTER | Age: 63
End: 2018-07-25
Payer: MEDICAID

## 2018-07-25 DIAGNOSIS — Z00.8 ENCOUNTER FOR OTHER GENERAL EXAMINATION: ICD-10-CM

## 2018-07-25 PROCEDURE — 77063 BREAST TOMOSYNTHESIS BI: CPT | Mod: 26

## 2018-07-25 PROCEDURE — 77063 BREAST TOMOSYNTHESIS BI: CPT

## 2018-07-25 PROCEDURE — 77067 SCR MAMMO BI INCL CAD: CPT

## 2018-07-25 PROCEDURE — 77067 SCR MAMMO BI INCL CAD: CPT | Mod: 26

## 2018-07-26 ENCOUNTER — RX RENEWAL (OUTPATIENT)
Age: 63
End: 2018-07-26

## 2018-08-02 ENCOUNTER — APPOINTMENT (OUTPATIENT)
Dept: INTERNAL MEDICINE | Facility: HOSPITAL | Age: 63
End: 2018-08-02
Payer: MEDICAID

## 2018-08-02 ENCOUNTER — OUTPATIENT (OUTPATIENT)
Dept: OUTPATIENT SERVICES | Facility: HOSPITAL | Age: 63
LOS: 1 days | End: 2018-08-02

## 2018-08-02 VITALS — HEIGHT: 66 IN | WEIGHT: 195 LBS | BODY MASS INDEX: 31.34 KG/M2

## 2018-08-02 DIAGNOSIS — T50.905A EPIGASTRIC PAIN: ICD-10-CM

## 2018-08-02 DIAGNOSIS — M67.911 UNSPECIFIED DISORDER OF SYNOVIUM AND TENDON, RIGHT SHOULDER: ICD-10-CM

## 2018-08-02 DIAGNOSIS — R10.13 EPIGASTRIC PAIN: ICD-10-CM

## 2018-08-02 PROCEDURE — 99214 OFFICE O/P EST MOD 30 MIN: CPT | Mod: GC

## 2018-08-02 RX ORDER — ASPIRIN 81 MG/1
81 TABLET, DELAYED RELEASE ORAL
Qty: 30 | Refills: 3 | Status: DISCONTINUED | COMMUNITY
Start: 2018-02-06 | End: 2018-08-02

## 2018-08-02 RX ORDER — SERTRALINE HYDROCHLORIDE 50 MG/1
50 TABLET, FILM COATED ORAL
Qty: 30 | Refills: 0 | Status: COMPLETED | COMMUNITY
Start: 2017-03-30 | End: 2018-08-02

## 2018-08-02 RX ORDER — IBUPROFEN 600 MG/1
600 TABLET, FILM COATED ORAL 3 TIMES DAILY
Qty: 30 | Refills: 1 | Status: COMPLETED | COMMUNITY
Start: 2018-05-26 | End: 2018-08-02

## 2018-08-02 RX ORDER — FOLIC ACID 1 MG/1
1 TABLET ORAL
Qty: 90 | Refills: 0 | Status: COMPLETED | COMMUNITY
Start: 2018-03-26 | End: 2018-08-02

## 2018-08-07 DIAGNOSIS — N64.4 MASTODYNIA: ICD-10-CM

## 2018-08-09 PROBLEM — M67.911 DISORDER OF RIGHT ROTATOR CUFF: Status: ACTIVE | Noted: 2017-12-13

## 2018-08-09 NOTE — REVIEW OF SYSTEMS
[Heartburn] : heartburn [Joint Pain] : joint pain [Joint Stiffness] : joint stiffness [Negative] : Genitourinary [FreeTextEntry7] : epigastric pain [FreeTextEntry9] : Pain in right shoulder

## 2018-08-09 NOTE — ASSESSMENT
[FreeTextEntry1] : 62 y.o. F with HTN, pre-diabetes, HLD, HTN, fibromyalgia, R rotator cuff tendonitis, b/l knee OA, R sternolavicular arthritis, NAFLD, depression colon polyps, and active smoking who presents for f/u chronic conditions:\par \par 1)R sternoclavicular pain: \par -improved since last visti\par -Patient continued to take NSAIDs and tylenol PRN for pain\par \par 2) R rotator cuff tendonitis: \par -c/w PT\par -f/u with ortho\par \par 3) B/l knee OA: \par -PT of LE after completion of shoulder PT\par -discussion with ortho, but ortho defers surgery until after smoking cessation is accomplished \par -prescription and letter from physician stating necessity for walker for rollator printed as pt's current walker has a damaged wheel \par \par 4) Fibromyalgia: \par -c/w duloxetine 60 mg daily; \par -follow up with rheumatology\par \par 5) Chest pain: \par -Stress test with no significant ST changes. Nuclear stress test study with normal myocardial perfusion\par -suspect musculoskeletal etiology of CP\par \par 6) Lung nodule: \par -yearly low dose chest CT, due in 11/2018\par \par 7) Smoking: \par -Counseled on smoking cessation again \par -Pt in pre-contemplative stage \par -chest CT shows emphysematous changes, but PFTs normal\par \par 8) HLD: \par -Lipid panel upon next visit\par \par 9) HTN\par -well controlled \par -c/w with amlodipine and lisinopril \par -Rx for BP device given to patient\par \par 10) Colonic polyps: \par -Pending colonoscopy scheduling\par \par 11) Epigastric pain: \par -likely in setting of NSAID use\par -initiated pepcid for symptom control\par \par 12) Pre-diabetes\par -A1C continues in pre-diabetic range\par -continue with lifestyle modifications \par \par 13) HCM:\par -UTD with influenza vaccine\par -UTD pap smear\par -Colonoscopy to be scheduled for 2018\par -UTD pneumovax, Tdap\par -mammogram in 2017; can repeat in up to 2 years, hence 2019\par

## 2018-08-09 NOTE — PHYSICAL EXAM
[No Acute Distress] : no acute distress [Well Nourished] : well nourished [Well Developed] : well developed [Normal Sclera/Conjunctiva] : normal sclera/conjunctiva [PERRL] : pupils equal round and reactive to light [EOMI] : extraocular movements intact [Normal Outer Ear/Nose] : the outer ears and nose were normal in appearance [Normal Oropharynx] : the oropharynx was normal [No JVD] : no jugular venous distention [Supple] : supple [No Lymphadenopathy] : no lymphadenopathy [No Respiratory Distress] : no respiratory distress  [Clear to Auscultation] : lungs were clear to auscultation bilaterally [Normal Rate] : normal rate  [Regular Rhythm] : with a regular rhythm [Normal S1, S2] : normal S1 and S2 [No Carotid Bruits] : no carotid bruits [No Abdominal Bruit] : a ~M bruit was not heard ~T in the abdomen [No Varicosities] : no varicosities [No Edema] : there was no peripheral edema [No Extremity Clubbing/Cyanosis] : no extremity clubbing/cyanosis [No Palpable Aorta] : no palpable aorta [Soft] : abdomen soft [Non Tender] : non-tender [Non-distended] : non-distended [No Masses] : no abdominal mass palpated [No HSM] : no HSM [Normal Bowel Sounds] : normal bowel sounds [No Joint Swelling] : no joint swelling [Grossly Normal Strength/Tone] : grossly normal strength/tone

## 2018-08-15 DIAGNOSIS — F17.200 NICOTINE DEPENDENCE, UNSPECIFIED, UNCOMPLICATED: ICD-10-CM

## 2018-08-15 DIAGNOSIS — K21.9 GASTRO-ESOPHAGEAL REFLUX DISEASE WITHOUT ESOPHAGITIS: ICD-10-CM

## 2018-08-15 DIAGNOSIS — R73.03 PREDIABETES: ICD-10-CM

## 2018-08-15 DIAGNOSIS — R91.1 SOLITARY PULMONARY NODULE: ICD-10-CM

## 2018-08-15 DIAGNOSIS — I10 ESSENTIAL (PRIMARY) HYPERTENSION: ICD-10-CM

## 2018-08-15 DIAGNOSIS — M19.019 PRIMARY OSTEOARTHRITIS, UNSPECIFIED SHOULDER: ICD-10-CM

## 2018-08-15 DIAGNOSIS — M67.911 UNSPECIFIED DISORDER OF SYNOVIUM AND TENDON, RIGHT SHOULDER: ICD-10-CM

## 2018-08-15 DIAGNOSIS — K63.5 POLYP OF COLON: ICD-10-CM

## 2018-08-15 DIAGNOSIS — M79.7 FIBROMYALGIA: ICD-10-CM

## 2018-08-30 ENCOUNTER — APPOINTMENT (OUTPATIENT)
Dept: GASTROENTEROLOGY | Facility: HOSPITAL | Age: 63
End: 2018-08-30

## 2018-10-01 ENCOUNTER — APPOINTMENT (OUTPATIENT)
Dept: RHEUMATOLOGY | Facility: HOSPITAL | Age: 63
End: 2018-10-01

## 2018-10-07 ENCOUNTER — FORM ENCOUNTER (OUTPATIENT)
Age: 63
End: 2018-10-07

## 2018-10-08 ENCOUNTER — APPOINTMENT (OUTPATIENT)
Dept: RHEUMATOLOGY | Facility: HOSPITAL | Age: 63
End: 2018-10-08
Payer: MEDICAID

## 2018-10-08 ENCOUNTER — APPOINTMENT (OUTPATIENT)
Dept: RADIOLOGY | Facility: HOSPITAL | Age: 63
End: 2018-10-08

## 2018-10-08 ENCOUNTER — OUTPATIENT (OUTPATIENT)
Dept: OUTPATIENT SERVICES | Facility: HOSPITAL | Age: 63
LOS: 1 days | End: 2018-10-08
Payer: MEDICAID

## 2018-10-08 ENCOUNTER — LABORATORY RESULT (OUTPATIENT)
Age: 63
End: 2018-10-08

## 2018-10-08 VITALS
HEART RATE: 66 BPM | HEIGHT: 66 IN | SYSTOLIC BLOOD PRESSURE: 139 MMHG | BODY MASS INDEX: 31.5 KG/M2 | DIASTOLIC BLOOD PRESSURE: 90 MMHG | WEIGHT: 196 LBS

## 2018-10-08 DIAGNOSIS — Z79.899 OTHER LONG TERM (CURRENT) DRUG THERAPY: ICD-10-CM

## 2018-10-08 LAB
ALBUMIN SERPL ELPH-MCNC: 4.1 G/DL — SIGNIFICANT CHANGE UP (ref 3.3–5)
ALP SERPL-CCNC: 113 U/L — SIGNIFICANT CHANGE UP (ref 40–120)
ALT FLD-CCNC: 16 U/L — SIGNIFICANT CHANGE UP (ref 4–33)
AST SERPL-CCNC: 17 U/L — SIGNIFICANT CHANGE UP (ref 4–32)
BASOPHILS # BLD AUTO: 0.02 K/UL — SIGNIFICANT CHANGE UP (ref 0–0.2)
BASOPHILS NFR BLD AUTO: 0.2 % — SIGNIFICANT CHANGE UP (ref 0–2)
BILIRUB SERPL-MCNC: 0.4 MG/DL — SIGNIFICANT CHANGE UP (ref 0.2–1.2)
BUN SERPL-MCNC: 10 MG/DL — SIGNIFICANT CHANGE UP (ref 7–23)
CALCIUM SERPL-MCNC: 9.1 MG/DL — SIGNIFICANT CHANGE UP (ref 8.4–10.5)
CHLORIDE SERPL-SCNC: 103 MMOL/L — SIGNIFICANT CHANGE UP (ref 98–107)
CO2 SERPL-SCNC: 27 MMOL/L — SIGNIFICANT CHANGE UP (ref 22–31)
CREAT SERPL-MCNC: 0.64 MG/DL — SIGNIFICANT CHANGE UP (ref 0.5–1.3)
EOSINOPHIL # BLD AUTO: 0.19 K/UL — SIGNIFICANT CHANGE UP (ref 0–0.5)
EOSINOPHIL NFR BLD AUTO: 2.3 % — SIGNIFICANT CHANGE UP (ref 0–6)
GLUCOSE SERPL-MCNC: 95 MG/DL — SIGNIFICANT CHANGE UP (ref 70–99)
HCT VFR BLD CALC: 41.2 % — SIGNIFICANT CHANGE UP (ref 34.5–45)
HGB BLD-MCNC: 13.6 G/DL — SIGNIFICANT CHANGE UP (ref 11.5–15.5)
IMM GRANULOCYTES # BLD AUTO: 0.03 # — SIGNIFICANT CHANGE UP
IMM GRANULOCYTES NFR BLD AUTO: 0.4 % — SIGNIFICANT CHANGE UP (ref 0–1.5)
LYMPHOCYTES # BLD AUTO: 2.87 K/UL — SIGNIFICANT CHANGE UP (ref 1–3.3)
LYMPHOCYTES # BLD AUTO: 35.3 % — SIGNIFICANT CHANGE UP (ref 13–44)
MCHC RBC-ENTMCNC: 29.2 PG — SIGNIFICANT CHANGE UP (ref 27–34)
MCHC RBC-ENTMCNC: 33 % — SIGNIFICANT CHANGE UP (ref 32–36)
MCV RBC AUTO: 88.6 FL — SIGNIFICANT CHANGE UP (ref 80–100)
MONOCYTES # BLD AUTO: 0.68 K/UL — SIGNIFICANT CHANGE UP (ref 0–0.9)
MONOCYTES NFR BLD AUTO: 8.4 % — SIGNIFICANT CHANGE UP (ref 2–14)
NEUTROPHILS # BLD AUTO: 4.34 K/UL — SIGNIFICANT CHANGE UP (ref 1.8–7.4)
NEUTROPHILS NFR BLD AUTO: 53.4 % — SIGNIFICANT CHANGE UP (ref 43–77)
NRBC # FLD: 0 — SIGNIFICANT CHANGE UP
PLATELET # BLD AUTO: 314 K/UL — SIGNIFICANT CHANGE UP (ref 150–400)
PMV BLD: 10.7 FL — SIGNIFICANT CHANGE UP (ref 7–13)
POTASSIUM SERPL-MCNC: 4.1 MMOL/L — SIGNIFICANT CHANGE UP (ref 3.5–5.3)
POTASSIUM SERPL-SCNC: 4.1 MMOL/L — SIGNIFICANT CHANGE UP (ref 3.5–5.3)
PROT SERPL-MCNC: 6.6 G/DL — SIGNIFICANT CHANGE UP (ref 6–8.3)
RBC # BLD: 4.65 M/UL — SIGNIFICANT CHANGE UP (ref 3.8–5.2)
RBC # FLD: 13.9 % — SIGNIFICANT CHANGE UP (ref 10.3–14.5)
SODIUM SERPL-SCNC: 142 MMOL/L — SIGNIFICANT CHANGE UP (ref 135–145)
WBC # BLD: 8.13 K/UL — SIGNIFICANT CHANGE UP (ref 3.8–10.5)
WBC # FLD AUTO: 8.13 K/UL — SIGNIFICANT CHANGE UP (ref 3.8–10.5)

## 2018-10-08 PROCEDURE — 99214 OFFICE O/P EST MOD 30 MIN: CPT | Mod: GC

## 2018-10-08 PROCEDURE — 73565 X-RAY EXAM OF KNEES: CPT | Mod: 26

## 2018-10-08 PROCEDURE — 73560 X-RAY EXAM OF KNEE 1 OR 2: CPT | Mod: 26,50

## 2018-10-08 PROCEDURE — 73521 X-RAY EXAM HIPS BI 2 VIEWS: CPT | Mod: 26

## 2018-10-08 PROCEDURE — 72202 X-RAY EXAM SI JOINTS 3/> VWS: CPT | Mod: 26

## 2018-10-09 DIAGNOSIS — M79.7 FIBROMYALGIA: ICD-10-CM

## 2018-10-09 DIAGNOSIS — Z79.899 OTHER LONG TERM (CURRENT) DRUG THERAPY: ICD-10-CM

## 2018-10-09 DIAGNOSIS — M19.90 UNSPECIFIED OSTEOARTHRITIS, UNSPECIFIED SITE: ICD-10-CM

## 2018-10-09 DIAGNOSIS — M19.019 PRIMARY OSTEOARTHRITIS, UNSPECIFIED SHOULDER: ICD-10-CM

## 2018-12-02 ENCOUNTER — FORM ENCOUNTER (OUTPATIENT)
Age: 63
End: 2018-12-02

## 2018-12-03 ENCOUNTER — EMERGENCY (EMERGENCY)
Facility: HOSPITAL | Age: 63
LOS: 1 days | Discharge: STILL ADMITTED OR ADMINISTRATIVE | End: 2018-12-03
Admitting: EMERGENCY MEDICINE

## 2018-12-03 ENCOUNTER — OUTPATIENT (OUTPATIENT)
Dept: OUTPATIENT SERVICES | Facility: HOSPITAL | Age: 63
LOS: 1 days | End: 2018-12-03
Payer: MEDICAID

## 2018-12-03 ENCOUNTER — APPOINTMENT (OUTPATIENT)
Dept: RHEUMATOLOGY | Facility: HOSPITAL | Age: 63
End: 2018-12-03
Payer: MEDICAID

## 2018-12-03 ENCOUNTER — LABORATORY RESULT (OUTPATIENT)
Age: 63
End: 2018-12-03

## 2018-12-03 ENCOUNTER — APPOINTMENT (OUTPATIENT)
Dept: RADIOLOGY | Facility: HOSPITAL | Age: 63
End: 2018-12-03

## 2018-12-03 VITALS
SYSTOLIC BLOOD PRESSURE: 134 MMHG | WEIGHT: 201 LBS | DIASTOLIC BLOOD PRESSURE: 65 MMHG | HEIGHT: 66 IN | BODY MASS INDEX: 32.3 KG/M2 | HEART RATE: 75 BPM

## 2018-12-03 VITALS — TEMPERATURE: 97.9 F

## 2018-12-03 LAB
ALBUMIN SERPL ELPH-MCNC: 4 G/DL — SIGNIFICANT CHANGE UP (ref 3.3–5)
ALP SERPL-CCNC: 118 U/L — SIGNIFICANT CHANGE UP (ref 40–120)
ALT FLD-CCNC: 16 U/L — SIGNIFICANT CHANGE UP (ref 4–33)
AST SERPL-CCNC: 15 U/L — SIGNIFICANT CHANGE UP (ref 4–32)
BASOPHILS # BLD AUTO: 0.04 K/UL — SIGNIFICANT CHANGE UP (ref 0–0.2)
BASOPHILS NFR BLD AUTO: 0.5 % — SIGNIFICANT CHANGE UP (ref 0–2)
BILIRUB SERPL-MCNC: 0.3 MG/DL — SIGNIFICANT CHANGE UP (ref 0.2–1.2)
BUN SERPL-MCNC: 6 MG/DL — LOW (ref 7–23)
CALCIUM SERPL-MCNC: 9.4 MG/DL — SIGNIFICANT CHANGE UP (ref 8.4–10.5)
CHLORIDE SERPL-SCNC: 104 MMOL/L — SIGNIFICANT CHANGE UP (ref 98–107)
CO2 SERPL-SCNC: 27 MMOL/L — SIGNIFICANT CHANGE UP (ref 22–31)
CREAT SERPL-MCNC: 0.71 MG/DL — SIGNIFICANT CHANGE UP (ref 0.5–1.3)
EOSINOPHIL # BLD AUTO: 0.18 K/UL — SIGNIFICANT CHANGE UP (ref 0–0.5)
EOSINOPHIL NFR BLD AUTO: 2.2 % — SIGNIFICANT CHANGE UP (ref 0–6)
GLUCOSE SERPL-MCNC: 85 MG/DL — SIGNIFICANT CHANGE UP (ref 70–99)
HCT VFR BLD CALC: 40.6 % — SIGNIFICANT CHANGE UP (ref 34.5–45)
HGB BLD-MCNC: 13.6 G/DL — SIGNIFICANT CHANGE UP (ref 11.5–15.5)
IMM GRANULOCYTES # BLD AUTO: 0.04 # — SIGNIFICANT CHANGE UP
IMM GRANULOCYTES NFR BLD AUTO: 0.5 % — SIGNIFICANT CHANGE UP (ref 0–1.5)
LYMPHOCYTES # BLD AUTO: 2.37 K/UL — SIGNIFICANT CHANGE UP (ref 1–3.3)
LYMPHOCYTES # BLD AUTO: 28.9 % — SIGNIFICANT CHANGE UP (ref 13–44)
MCHC RBC-ENTMCNC: 30.2 PG — SIGNIFICANT CHANGE UP (ref 27–34)
MCHC RBC-ENTMCNC: 33.5 % — SIGNIFICANT CHANGE UP (ref 32–36)
MCV RBC AUTO: 90 FL — SIGNIFICANT CHANGE UP (ref 80–100)
MONOCYTES # BLD AUTO: 0.73 K/UL — SIGNIFICANT CHANGE UP (ref 0–0.9)
MONOCYTES NFR BLD AUTO: 8.9 % — SIGNIFICANT CHANGE UP (ref 2–14)
NEUTROPHILS # BLD AUTO: 4.83 K/UL — SIGNIFICANT CHANGE UP (ref 1.8–7.4)
NEUTROPHILS NFR BLD AUTO: 59 % — SIGNIFICANT CHANGE UP (ref 43–77)
NRBC # FLD: 0 — SIGNIFICANT CHANGE UP
PLATELET # BLD AUTO: 326 K/UL — SIGNIFICANT CHANGE UP (ref 150–400)
PMV BLD: 10.5 FL — SIGNIFICANT CHANGE UP (ref 7–13)
POTASSIUM SERPL-MCNC: 4.1 MMOL/L — SIGNIFICANT CHANGE UP (ref 3.5–5.3)
POTASSIUM SERPL-SCNC: 4.1 MMOL/L — SIGNIFICANT CHANGE UP (ref 3.5–5.3)
PROT SERPL-MCNC: 6.4 G/DL — SIGNIFICANT CHANGE UP (ref 6–8.3)
RBC # BLD: 4.51 M/UL — SIGNIFICANT CHANGE UP (ref 3.8–5.2)
RBC # FLD: 14.1 % — SIGNIFICANT CHANGE UP (ref 10.3–14.5)
SODIUM SERPL-SCNC: 143 MMOL/L — SIGNIFICANT CHANGE UP (ref 135–145)
WBC # BLD: 8.19 K/UL — SIGNIFICANT CHANGE UP (ref 3.8–10.5)
WBC # FLD AUTO: 8.19 K/UL — SIGNIFICANT CHANGE UP (ref 3.8–10.5)

## 2018-12-03 PROCEDURE — 71045 X-RAY EXAM CHEST 1 VIEW: CPT | Mod: 26

## 2018-12-03 PROCEDURE — 99213 OFFICE O/P EST LOW 20 MIN: CPT | Mod: GC

## 2018-12-05 DIAGNOSIS — R20.2 PARESTHESIA OF SKIN: ICD-10-CM

## 2018-12-05 DIAGNOSIS — M19.90 UNSPECIFIED OSTEOARTHRITIS, UNSPECIFIED SITE: ICD-10-CM

## 2018-12-05 DIAGNOSIS — M79.7 FIBROMYALGIA: ICD-10-CM

## 2018-12-05 DIAGNOSIS — M17.0 BILATERAL PRIMARY OSTEOARTHRITIS OF KNEE: ICD-10-CM

## 2018-12-05 DIAGNOSIS — Z79.899 OTHER LONG TERM (CURRENT) DRUG THERAPY: ICD-10-CM

## 2018-12-05 DIAGNOSIS — M19.019 PRIMARY OSTEOARTHRITIS, UNSPECIFIED SHOULDER: ICD-10-CM

## 2018-12-05 LAB
BACTERIA UR CULT: SIGNIFICANT CHANGE UP
SPECIMEN SOURCE: SIGNIFICANT CHANGE UP

## 2018-12-05 NOTE — DISCUSSION/SUMMARY
[Specialty: _____] : Specialty: [unfilled] [ED Visit] : a visit to ED [FreeTextEntry1] : Patient went to the [facility] on December 3rd for back pain but left before Triage or physican saw the pt as she had another appt to go to.

## 2018-12-20 ENCOUNTER — APPOINTMENT (OUTPATIENT)
Dept: GASTROENTEROLOGY | Facility: HOSPITAL | Age: 63
End: 2018-12-20

## 2019-01-06 ENCOUNTER — FORM ENCOUNTER (OUTPATIENT)
Age: 64
End: 2019-01-06

## 2019-01-07 ENCOUNTER — APPOINTMENT (OUTPATIENT)
Dept: RADIOLOGY | Facility: HOSPITAL | Age: 64
End: 2019-01-07

## 2019-01-07 ENCOUNTER — LABORATORY RESULT (OUTPATIENT)
Age: 64
End: 2019-01-07

## 2019-01-07 ENCOUNTER — APPOINTMENT (OUTPATIENT)
Dept: RHEUMATOLOGY | Facility: HOSPITAL | Age: 64
End: 2019-01-07
Payer: MEDICAID

## 2019-01-07 ENCOUNTER — OUTPATIENT (OUTPATIENT)
Dept: OUTPATIENT SERVICES | Facility: HOSPITAL | Age: 64
LOS: 1 days | End: 2019-01-07
Payer: MEDICAID

## 2019-01-07 VITALS
BODY MASS INDEX: 32.47 KG/M2 | HEART RATE: 75 BPM | SYSTOLIC BLOOD PRESSURE: 141 MMHG | WEIGHT: 202 LBS | HEIGHT: 66 IN | DIASTOLIC BLOOD PRESSURE: 57 MMHG

## 2019-01-07 DIAGNOSIS — M13.80 OTHER SPECIFIED ARTHRITIS, UNSPECIFIED SITE: ICD-10-CM

## 2019-01-07 DIAGNOSIS — M79.7 FIBROMYALGIA: ICD-10-CM

## 2019-01-07 DIAGNOSIS — Z79.899 OTHER LONG TERM (CURRENT) DRUG THERAPY: ICD-10-CM

## 2019-01-07 DIAGNOSIS — M19.019 PRIMARY OSTEOARTHRITIS, UNSPECIFIED SHOULDER: ICD-10-CM

## 2019-01-07 LAB
ALBUMIN SERPL ELPH-MCNC: 4.1 G/DL — SIGNIFICANT CHANGE UP (ref 3.3–5)
ALP SERPL-CCNC: 108 U/L — SIGNIFICANT CHANGE UP (ref 40–120)
ALT FLD-CCNC: 18 U/L — SIGNIFICANT CHANGE UP (ref 4–33)
AST SERPL-CCNC: 13 U/L — SIGNIFICANT CHANGE UP (ref 4–32)
BASOPHILS # BLD AUTO: 0.04 K/UL — SIGNIFICANT CHANGE UP (ref 0–0.2)
BASOPHILS NFR BLD AUTO: 0.3 % — SIGNIFICANT CHANGE UP (ref 0–2)
BILIRUB SERPL-MCNC: < 0.2 MG/DL — LOW (ref 0.2–1.2)
BUN SERPL-MCNC: 13 MG/DL — SIGNIFICANT CHANGE UP (ref 7–23)
CALCIUM SERPL-MCNC: 9 MG/DL — SIGNIFICANT CHANGE UP (ref 8.4–10.5)
CHLORIDE SERPL-SCNC: 104 MMOL/L — SIGNIFICANT CHANGE UP (ref 98–107)
CO2 SERPL-SCNC: 27 MMOL/L — SIGNIFICANT CHANGE UP (ref 22–31)
CREAT SERPL-MCNC: 0.65 MG/DL — SIGNIFICANT CHANGE UP (ref 0.5–1.3)
EOSINOPHIL # BLD AUTO: 0.04 K/UL — SIGNIFICANT CHANGE UP (ref 0–0.5)
EOSINOPHIL NFR BLD AUTO: 0.3 % — SIGNIFICANT CHANGE UP (ref 0–6)
GLUCOSE SERPL-MCNC: 84 MG/DL — SIGNIFICANT CHANGE UP (ref 70–99)
HCT VFR BLD CALC: 42.5 % — SIGNIFICANT CHANGE UP (ref 34.5–45)
HGB BLD-MCNC: 13.7 G/DL — SIGNIFICANT CHANGE UP (ref 11.5–15.5)
IMM GRANULOCYTES NFR BLD AUTO: 0.6 % — SIGNIFICANT CHANGE UP (ref 0–1.5)
LYMPHOCYTES # BLD AUTO: 1.91 K/UL — SIGNIFICANT CHANGE UP (ref 1–3.3)
LYMPHOCYTES # BLD AUTO: 13.9 % — SIGNIFICANT CHANGE UP (ref 13–44)
MCHC RBC-ENTMCNC: 29.5 PG — SIGNIFICANT CHANGE UP (ref 27–34)
MCHC RBC-ENTMCNC: 32.2 % — SIGNIFICANT CHANGE UP (ref 32–36)
MCV RBC AUTO: 91.6 FL — SIGNIFICANT CHANGE UP (ref 80–100)
MONOCYTES # BLD AUTO: 0.7 K/UL — SIGNIFICANT CHANGE UP (ref 0–0.9)
MONOCYTES NFR BLD AUTO: 5.1 % — SIGNIFICANT CHANGE UP (ref 2–14)
NEUTROPHILS # BLD AUTO: 10.97 K/UL — HIGH (ref 1.8–7.4)
NEUTROPHILS NFR BLD AUTO: 79.8 % — HIGH (ref 43–77)
NRBC # FLD: 0 K/UL — LOW (ref 25–125)
PLATELET # BLD AUTO: 354 K/UL — SIGNIFICANT CHANGE UP (ref 150–400)
PMV BLD: 10 FL — SIGNIFICANT CHANGE UP (ref 7–13)
POTASSIUM SERPL-MCNC: 4.3 MMOL/L — SIGNIFICANT CHANGE UP (ref 3.5–5.3)
POTASSIUM SERPL-SCNC: 4.3 MMOL/L — SIGNIFICANT CHANGE UP (ref 3.5–5.3)
PROT SERPL-MCNC: 6.7 G/DL — SIGNIFICANT CHANGE UP (ref 6–8.3)
RBC # BLD: 4.64 M/UL — SIGNIFICANT CHANGE UP (ref 3.8–5.2)
RBC # FLD: 14.9 % — HIGH (ref 10.3–14.5)
SODIUM SERPL-SCNC: 142 MMOL/L — SIGNIFICANT CHANGE UP (ref 135–145)
WBC # BLD: 13.74 K/UL — HIGH (ref 3.8–10.5)
WBC # FLD AUTO: 13.74 K/UL — HIGH (ref 3.8–10.5)

## 2019-01-07 PROCEDURE — 72052 X-RAY EXAM NECK SPINE 6/>VWS: CPT | Mod: 26

## 2019-01-07 PROCEDURE — 99214 OFFICE O/P EST MOD 30 MIN: CPT | Mod: GC

## 2019-01-08 ENCOUNTER — OTHER (OUTPATIENT)
Age: 64
End: 2019-01-08

## 2019-01-08 NOTE — PHYSICAL EXAM
[General Appearance - Alert] : alert [General Appearance - In No Acute Distress] : in no acute distress [General Appearance - Well Nourished] : well nourished [General Appearance - Well-Appearing] : healthy appearing [Sclera] : the sclera and conjunctiva were normal [Extraocular Movements] : extraocular movements were intact [Outer Ear] : the ears and nose were normal in appearance [Hearing Threshold Finger Rub Not [place]] : hearing was normal [Neck Appearance] : the appearance of the neck was normal [Heart Sounds] : normal S1 and S2 [Murmurs] : no murmurs [Edema] : there was no peripheral edema [Bowel Sounds] : normal bowel sounds [Abdomen Soft] : soft [Abdomen Tenderness] : non-tender [Cervical Lymph Nodes Enlarged Posterior Bilaterally] : posterior cervical [Cervical Lymph Nodes Enlarged Anterior Bilaterally] : anterior cervical [Supraclavicular Lymph Nodes Enlarged Bilaterally] : supraclavicular [] : no rash [Sensation] : the sensory exam was normal to light touch and pinprick [Motor Exam] : the motor exam was normal [No Focal Deficits] : no focal deficits [Oriented To Time, Place, And Person] : oriented to person, place, and time [Auscultation Breath Sounds / Voice Sounds] : lungs were clear to auscultation bilaterally [FreeTextEntry1] : TTP and minimal redness over b/l sternoclavicular joint. Mild swelling. Mild tenosynovitis synovitis of L hand. No synovitis of rest of joints. L hand w venous anomaly. Pt tender on lower back, upper back, and minimally on neck, upper arms, forearms, thighs, calves. Neck with ROM of lateral flexion, rotation, flexion, extension, but w pain.

## 2019-01-08 NOTE — REVIEW OF SYSTEMS
[Feeling Poorly] : feeling poorly [Feeling Tired] : feeling tired [Cough] : cough [Arthralgias] : arthralgias [Joint Pain] : joint pain [Joint Swelling] : joint swelling [As Noted in HPI] : as noted in HPI [Depression] : depression [Negative] : Heme/Lymph [Fever] : no fever [Chills] : no chills [Eyesight Problems] : no eyesight problems [Discharge From Eyes] : no purulent discharge from the eyes [Chest Pain] : no chest pain [Palpitations] : no palpitations [Shortness Of Breath] : no shortness of breath [Wheezing] : no wheezing [Abdominal Pain] : no abdominal pain [Vomiting] : no vomiting [Constipation] : no constipation [Diarrhea] : no diarrhea [Dysuria] : no dysuria [Incontinence] : no incontinence [Pelvic Pain] : no pelvic pain [Dysmenorrhea] : no dysmenorrhea [Skin Lesions] : no skin lesions [Skin Wound] : no skin wound [Dizziness] : no dizziness [Fainting] : no fainting [Muscle Weakness] : no muscle weakness

## 2019-01-08 NOTE — ASSESSMENT
[FreeTextEntry1] : 63yo F hx inflammatory arthritis (tenosynovitis/ R sternoclavicular arthritis), fibromyalgia, b/l knee OA presents for f/u visit\par \par # inflammatory arthritis\par -pt w evidence of tenosynovitis on R hand in MRI of 2016, US of sternoclavicular joints on 4/18 showed synovitis\par -pattern more consistent with seronegative spondyloarthropathy, however, imaging showed no SI pathology, 10/18\par -MITCHELL 1:320 but negative for RF, CCP, HLA- B27 , SSA, SSB, dsDNA\par -pt w continued improvement on prednisone 10 mg and MTX 12.5; will consider increase dosage after CBC, CMP is sent and resulted\par -c/w folic acid while on MTX\par -c/w pred 10 mg as pt still w synovitis despite improvement \par \par \par #neck pain\par -likely secondary to FM but will obtain Xray of cervical spine to assess for arthritic changes\par -PT with massages, US trigger points, stretching exercises\par \par # Fibromyalgia\par - on Cymbalta 60mg qd in coordination w psychiatrist\par -in the past on amitriptyline and sertraline w no relief\par -not fully controlled, so will add PT for further relief\par \par # Knee OA\par --Xray of knees done 10/18 showed b/l knee effusion, R>L, and advanced OA changes with osteophytes, medial joint space narrowing\par -ortho offered TKR, but patient hesitant \par \par #cough\par -pt with same complaints last month, w negative CXR\par -seen By Dr. Lisker on 1/18, w normal PFTs\par -likely secondary to smoking, pt counseled on smoking cessation\par \par RTC in 4 weeks\par d/w Dr. Dias\par

## 2019-01-08 NOTE — HISTORY OF PRESENT ILLNESS
[FreeTextEntry1] : 62 F hx inflammatory arthritis (tenosynovitis/ R sternoclavicular arthritis), fibromyalgia, b/l knee OA presents for f/u visit\par \par Today's event:\par Pt feels chest congestion since when she was last seen here on Dec 3. Chest congestion, yellow phlegm. No nasal congestion. Coughing. Minimal SOB. Continues to smoke half a pack a day. \par \par Pt does feel better as far as b/l hand pain. Gives 5/10, before 8/10. Pt also improved w sternoclavicular pain. Less swelling swelling. However, does feel stiff all the time. \par \par Pt pain w neck. for 2 years. For last year, pt has had neck pain that is worsening. Worse with movement. Has gotten physical therapy before and has helped. Pt has not had recent fall. But did fall in 5/18.Pt fell on L side. Did not strike head/neck. Pt tripped.\par \par Has pain on right ribs, under breast. Pt has pain for months. Pain on palpation. \par \par Feeling tired, fatigue. Only sleep 4-5 hours. Interrupted. Does not feel refreshed. Pt feels depressed, , no SI, HI. \par \par Pt last visit on  Dec 3rd was started on MTX 12.5 weekly, and pred 10mg, after long period of no medications, and uncontrolled arthritis. \par \par \par FM: has consant body aches and tenderness. Currenlty on cymbalta 60mg. \par \par . \par Hx: \par \par 10/18\par Pt with pain in sternoclavicular joint, R>L. b/l hands and wrist. There was suspicion of seronegative arthropathy, given pattern, but Xray of SI joint was intact. Pt started on MTX and pred, took only for 1 month and stopped, because pt was confused. \par \par 3/18\par Reporting 3 months of R neck pain and R clavicular swelling. Pt had US done, which showed sternoclavicular synovitis on 4/18. Pt was placed on MTX prednisone but pt did not follow up. \par \par \par 10/17\par At last visit in 08/17 Cymbalta 30mg started for FM. Pt without response on 30mg so her psychiatrist increased to 60mg.  Pt was on Zoloft and Elavil in the past without FM relief.  Pt requires b/l TKR and has been following with ortho, however they will operate on her only once she stops smoking, Pt working on smoking cessation. Takes ibuprofen only intermittently. Pt also c/o intermittent calf cramps. On Lipitor 80 mg x 2 years without recent medication changes.\par \par 08/17\par Pt last seen on 5/15/17 when she had complained of continuing pain of her L wrist and b/l LE pain.\par She had MRI L hand on 6/6/17 which showed persistent fluid within the extensor carpi radialis brevis and longus and extensor digitorum tendons with resolution of fluid surrounding flexor tendons compared to MRI LUE from 5/10/16.\par \par Pt was prescribed amitriptyline previously but says she has not been taking it because she was getting headaches. Takes Tylenol for pain control. \par Not taking her Zoloft because it was tapered off due to concern for interaction with Amitriptyline.  \par \par 8/16\par First clinic visit. Hypertrophy of L hand noticed. MRI of L hand was ordered, which showed tenosynitis. Xrays showed OA. MITCHELL was 1:320RF, CCP, HLA- B27, DsDNA, DsDNA negative

## 2019-02-04 ENCOUNTER — APPOINTMENT (OUTPATIENT)
Dept: RHEUMATOLOGY | Facility: HOSPITAL | Age: 64
End: 2019-02-04

## 2019-02-21 ENCOUNTER — EMERGENCY (EMERGENCY)
Facility: HOSPITAL | Age: 64
LOS: 1 days | Discharge: HOME | End: 2019-02-21
Attending: EMERGENCY MEDICINE
Payer: MEDICAID

## 2019-02-21 VITALS
TEMPERATURE: 98 F | DIASTOLIC BLOOD PRESSURE: 74 MMHG | SYSTOLIC BLOOD PRESSURE: 121 MMHG | HEIGHT: 68 IN | WEIGHT: 202.83 LBS | OXYGEN SATURATION: 97 % | HEART RATE: 72 BPM | RESPIRATION RATE: 20 BRPM

## 2019-02-21 VITALS
DIASTOLIC BLOOD PRESSURE: 73 MMHG | HEART RATE: 70 BPM | OXYGEN SATURATION: 99 % | SYSTOLIC BLOOD PRESSURE: 119 MMHG | RESPIRATION RATE: 18 BRPM

## 2019-02-21 PROCEDURE — 82962 GLUCOSE BLOOD TEST: CPT

## 2019-02-21 PROCEDURE — 94640 AIRWAY INHALATION TREATMENT: CPT

## 2019-02-21 PROCEDURE — 99283 EMERGENCY DEPT VISIT LOW MDM: CPT | Mod: 25

## 2019-02-21 PROCEDURE — 93010 ELECTROCARDIOGRAM REPORT: CPT

## 2019-02-21 PROCEDURE — 71046 X-RAY EXAM CHEST 2 VIEWS: CPT

## 2019-02-21 PROCEDURE — 71046 X-RAY EXAM CHEST 2 VIEWS: CPT | Mod: 26

## 2019-02-21 PROCEDURE — 93005 ELECTROCARDIOGRAM TRACING: CPT

## 2019-02-21 PROCEDURE — 99284 EMERGENCY DEPT VISIT MOD MDM: CPT

## 2019-02-21 RX ORDER — ALBUTEROL 90 UG/1
3 AEROSOL, METERED ORAL
Qty: 100 | Refills: 0
Start: 2019-02-21

## 2019-02-21 RX ORDER — ALBUTEROL 90 UG/1
2 AEROSOL, METERED ORAL
Qty: 1 | Refills: 0
Start: 2019-02-21

## 2019-02-21 RX ORDER — ALBUTEROL 90 UG/1
2.5 AEROSOL, METERED ORAL ONCE
Qty: 0 | Refills: 0 | Status: COMPLETED | OUTPATIENT
Start: 2019-02-21 | End: 2019-02-21

## 2019-02-21 RX ADMIN — ALBUTEROL 2.5 MILLIGRAM(S): 90 AEROSOL, METERED ORAL at 11:13

## 2019-02-21 NOTE — ED PROVIDER NOTE - CLINICAL SUMMARY MEDICAL DECISION MAKING FREE TEXT BOX
63 y/o F pt with chronic cough and back pain. Will check x-ray, give nebs and reassess. 63 y/o F pt with chronic cough and back pain shortness of breath. no respiratory distress on evaluation. Will check x-ray, give nebs and reassess.

## 2019-02-21 NOTE — ED ADULT NURSE NOTE - OBJECTIVE STATEMENT
pt came in via walker w/ co sob, back pain and rib pain bilaterally accompanied by productive cough ( green-yellow sputum). Breathing is unlabored, denies fever, chills. Denies cp, dizziness, headache.

## 2019-02-21 NOTE — ED PROVIDER NOTE - PHYSICAL EXAMINATION
back tenderness to palpation to upper and lower back. diffuse, no focal tenderness to palpation. no respiratory distress, speaking full sentences

## 2019-02-21 NOTE — ED PROVIDER NOTE - OBJECTIVE STATEMENT
65 y/o F pt with a PMHx of endometriosis, fibromyalgia, HLD, HTN, and a significant PSHx of tubal ligation, presents to the ED with complaints of 1 month of diffused back pin worse with movement associated with cough. Patient reports her cough has gotten worse. Patient states she had a pulmonary function test recently and does not have to take Albuterol. Patient is currently on 10mg of prednisone for arthritis. Patient denies fever, chills or any other complaints. Allergies: Aleve 65 y/o F pt with a PMHx of endometriosis, fibromyalgia, HLD, HTN, and a significant PSHx of tubal ligation, presents to the ED with complaints of 1 month of diffused back pin worse with movement associated with cough. Patient reports her cough has gotten worse. Patient states she had a pulmonary function test recently and does not have to take Albuterol. Patient is a long time smoke. Patient is currently on 10mg of prednisone for arthritis. Patient denies fever, chills or any other complaints. Allergies: Aleve

## 2019-02-25 ENCOUNTER — APPOINTMENT (OUTPATIENT)
Dept: INTERNAL MEDICINE | Facility: HOSPITAL | Age: 64
End: 2019-02-25

## 2019-02-26 ENCOUNTER — LABORATORY RESULT (OUTPATIENT)
Age: 64
End: 2019-02-26

## 2019-02-26 ENCOUNTER — OTHER (OUTPATIENT)
Age: 64
End: 2019-02-26

## 2019-02-26 ENCOUNTER — OUTPATIENT (OUTPATIENT)
Dept: OUTPATIENT SERVICES | Facility: HOSPITAL | Age: 64
LOS: 1 days | End: 2019-02-26

## 2019-02-26 ENCOUNTER — MED ADMIN CHARGE (OUTPATIENT)
Age: 64
End: 2019-02-26

## 2019-02-26 ENCOUNTER — APPOINTMENT (OUTPATIENT)
Dept: INTERNAL MEDICINE | Facility: HOSPITAL | Age: 64
End: 2019-02-26
Payer: MEDICAID

## 2019-02-26 VITALS — BODY MASS INDEX: 32.14 KG/M2 | HEIGHT: 66 IN | WEIGHT: 200 LBS

## 2019-02-26 VITALS — SYSTOLIC BLOOD PRESSURE: 136 MMHG | HEART RATE: 69 BPM | DIASTOLIC BLOOD PRESSURE: 69 MMHG

## 2019-02-26 LAB
CHOLEST SERPL-MCNC: 215 MG/DL — HIGH (ref 120–199)
HBA1C BLD-MCNC: 5.5 % — SIGNIFICANT CHANGE UP (ref 4–5.6)
HDLC SERPL-MCNC: 43 MG/DL — LOW (ref 45–65)
LIPID PNL WITH DIRECT LDL SERPL: 146 MG/DL — SIGNIFICANT CHANGE UP
TRIGL SERPL-MCNC: 248 MG/DL — HIGH (ref 10–149)

## 2019-02-26 PROCEDURE — 99214 OFFICE O/P EST MOD 30 MIN: CPT | Mod: GC

## 2019-02-26 RX ORDER — FAMOTIDINE 20 MG/1
20 TABLET, FILM COATED ORAL
Qty: 90 | Refills: 1 | Status: DISCONTINUED | COMMUNITY
Start: 2018-08-02 | End: 2019-02-26

## 2019-02-26 NOTE — COUNSELING
[Discussed Risks and Advised to Quit Smoking] : Discussed risks and advised to quit smoking [Discussed Cessation Medication] : cessation medication was discussed [Patient motivation] : Patient motivation

## 2019-02-27 DIAGNOSIS — Z23 ENCOUNTER FOR IMMUNIZATION: ICD-10-CM

## 2019-03-03 NOTE — PHYSICAL EXAM
[No Acute Distress] : no acute distress [Well Nourished] : well nourished [Well Developed] : well developed [PERRL] : pupils equal round and reactive to light [EOMI] : extraocular movements intact [Normal Outer Ear/Nose] : the outer ears and nose were normal in appearance [Normal Oropharynx] : the oropharynx was normal [No JVD] : no jugular venous distention [No Lymphadenopathy] : no lymphadenopathy [No Respiratory Distress] : no respiratory distress  [Clear to Auscultation] : lungs were clear to auscultation bilaterally [Normal Rate] : normal rate  [Regular Rhythm] : with a regular rhythm [Normal S1, S2] : normal S1 and S2 [No CVA Tenderness] : no CVA  tenderness [No Rash] : no rash [No Focal Deficits] : no focal deficits [Normal Affect] : the affect was normal

## 2019-03-05 NOTE — HEALTH RISK ASSESSMENT
[16-20] : 16-20 [One fall no injury in past year] : Patient reported one fall in the past year without injury [] : No

## 2019-03-05 NOTE — HISTORY OF PRESENT ILLNESS
[FreeTextEntry1] : f/u of chronic conditions  [de-identified] : 64 y.o. F with HTN, pre-diabetes, HLD, HTN, fibromyalgia, inflammatory arthritis, b/l knee OA, R sternolavicular arthritis, NAFLD, depression, colonic polyps, and active smoking who presents for f/u chronic conditions.\par \par Fibromyalgia: Pt complains of diffuse body aches. She is endorsing pain and spasms of her calves, L>R, which has been worsening since about a week ago. The pain improves with stretching and ankle dorsiflexion. \par \par Inflammatory arthritis: Pt has evidence of tenosynovitis on R hand and sternoclavicular synovitis evident on US. Pt reports that her sternoclavicular pain had improved after initiation of prednisone/MTX by rheumatology. Patient has been out of medication for approximately 2 weeks; now that she's off medication, her pain is recurring. She was supposed to follow up with rheumatology but missed her appointment as she was not feeling well. \par \par Knee OA: Pt has not continued to follow up with ortho. She takes PRN Tylenol for knee pain. \par \par Smoking: Patient continues to smoke between 7-10 cigarettes a day. She states there are days when she does not smoke at all. She uses nicotine gum on the days she does not smoke. \par

## 2019-03-05 NOTE — ASSESSMENT
[FreeTextEntry1] : 64 y.o. F with HTN, pre-diabetes, HLD, HTN, fibromyalgia, inflammatory arthritis, b/l knee OA, R sternolavicular arthritis, NAFLD, depression, colonic polyps, and active smoking who presents for f/u chronic conditions.\par \par 1) Inflammatory arthritis: \par -Pt continues to endorse \par -Tasked rheumatology to ascertain whether pt will be continued on prednisone/MTX. \par \par 2) Fibromyalgia:\par -continue with Cymbalta 60 mg\par -pt will follow up with PT as per rheumatology recommendation\par \par 3) Colonic polyps: \par -Pt due for colonoscopy this year \par -referral given \par \par 4) Smoking: \par -pt declines smoking cessation program referral at this time \par -counseled re smoking cessation\par -pt has nicotine gum at home that she is encouraged to use \par -annual low dose chest CT referral given \par -pulm f/u for repeat PFTs\par \par 5) HCM: \par -flu vaccinination today\par -A1C, lipid panel\par -UTD mammogram until July 2019\par -Annual gyn exam due; UTD with pap smear (HPV negative) \par \par RTC in 6 months\par \par \par Dorothy Valentino, PGY-2\par \par Discussed with Dr. Cintron \par \par \par

## 2019-03-05 NOTE — REVIEW OF SYSTEMS
[Recent Change In Weight] : ~T recent weight change [Lower Ext Edema] : lower extremity edema [Cough] : cough [Joint Pain] : joint pain [Joint Stiffness] : joint stiffness [Joint Swelling] : joint swelling [Muscle Pain] : muscle pain [Back Pain] : back pain [Negative] : Heme/Lymph [FreeTextEntry2] : Weight gain

## 2019-03-25 ENCOUNTER — OUTPATIENT (OUTPATIENT)
Dept: OUTPATIENT SERVICES | Facility: HOSPITAL | Age: 64
LOS: 1 days | End: 2019-03-25

## 2019-03-25 ENCOUNTER — LABORATORY RESULT (OUTPATIENT)
Age: 64
End: 2019-03-25

## 2019-03-25 ENCOUNTER — APPOINTMENT (OUTPATIENT)
Dept: RHEUMATOLOGY | Facility: HOSPITAL | Age: 64
End: 2019-03-25
Payer: MEDICAID

## 2019-03-25 VITALS
HEART RATE: 71 BPM | DIASTOLIC BLOOD PRESSURE: 70 MMHG | WEIGHT: 204 LBS | HEIGHT: 66 IN | BODY MASS INDEX: 32.78 KG/M2 | SYSTOLIC BLOOD PRESSURE: 143 MMHG

## 2019-03-25 DIAGNOSIS — M79.18 MYALGIA, OTHER SITE: ICD-10-CM

## 2019-03-25 PROBLEM — R20.2 PARESTHESIAS: Status: ACTIVE | Noted: 2019-03-25

## 2019-03-25 LAB
ALBUMIN SERPL ELPH-MCNC: 4.4 G/DL — SIGNIFICANT CHANGE UP (ref 3.3–5)
ALP SERPL-CCNC: 105 U/L — SIGNIFICANT CHANGE UP (ref 40–120)
ALT FLD-CCNC: 18 U/L — SIGNIFICANT CHANGE UP (ref 4–33)
ANION GAP SERPL CALC-SCNC: 12 MMO/L — SIGNIFICANT CHANGE UP (ref 7–14)
AST SERPL-CCNC: 16 U/L — SIGNIFICANT CHANGE UP (ref 4–32)
BASOPHILS # BLD AUTO: 0.03 K/UL — SIGNIFICANT CHANGE UP (ref 0–0.2)
BASOPHILS NFR BLD AUTO: 0.2 % — SIGNIFICANT CHANGE UP (ref 0–2)
BILIRUB SERPL-MCNC: 0.4 MG/DL — SIGNIFICANT CHANGE UP (ref 0.2–1.2)
BUN SERPL-MCNC: 13 MG/DL — SIGNIFICANT CHANGE UP (ref 7–23)
CALCIUM SERPL-MCNC: 9.4 MG/DL — SIGNIFICANT CHANGE UP (ref 8.4–10.5)
CHLORIDE SERPL-SCNC: 104 MMOL/L — SIGNIFICANT CHANGE UP (ref 98–107)
CO2 SERPL-SCNC: 28 MMOL/L — SIGNIFICANT CHANGE UP (ref 22–31)
CREAT SERPL-MCNC: 0.69 MG/DL — SIGNIFICANT CHANGE UP (ref 0.5–1.3)
EOSINOPHIL # BLD AUTO: 0.11 K/UL — SIGNIFICANT CHANGE UP (ref 0–0.5)
EOSINOPHIL NFR BLD AUTO: 0.8 % — SIGNIFICANT CHANGE UP (ref 0–6)
FOLATE SERPL-MCNC: > 20 NG/ML — HIGH (ref 4.7–20)
GLUCOSE SERPL-MCNC: 103 MG/DL — HIGH (ref 70–99)
HCT VFR BLD CALC: 43.3 % — SIGNIFICANT CHANGE UP (ref 34.5–45)
HGB BLD-MCNC: 14 G/DL — SIGNIFICANT CHANGE UP (ref 11.5–15.5)
IMM GRANULOCYTES NFR BLD AUTO: 0.8 % — SIGNIFICANT CHANGE UP (ref 0–1.5)
LYMPHOCYTES # BLD AUTO: 15.5 % — SIGNIFICANT CHANGE UP (ref 13–44)
LYMPHOCYTES # BLD AUTO: 2.23 K/UL — SIGNIFICANT CHANGE UP (ref 1–3.3)
MCHC RBC-ENTMCNC: 30.3 PG — SIGNIFICANT CHANGE UP (ref 27–34)
MCHC RBC-ENTMCNC: 32.3 % — SIGNIFICANT CHANGE UP (ref 32–36)
MCV RBC AUTO: 93.7 FL — SIGNIFICANT CHANGE UP (ref 80–100)
MONOCYTES # BLD AUTO: 0.73 K/UL — SIGNIFICANT CHANGE UP (ref 0–0.9)
MONOCYTES NFR BLD AUTO: 5.1 % — SIGNIFICANT CHANGE UP (ref 2–14)
NEUTROPHILS # BLD AUTO: 11.16 K/UL — HIGH (ref 1.8–7.4)
NEUTROPHILS NFR BLD AUTO: 77.6 % — HIGH (ref 43–77)
NRBC # FLD: 0 K/UL — SIGNIFICANT CHANGE UP (ref 0–0)
PLATELET # BLD AUTO: 348 K/UL — SIGNIFICANT CHANGE UP (ref 150–400)
PMV BLD: 10.9 FL — SIGNIFICANT CHANGE UP (ref 7–13)
POTASSIUM SERPL-MCNC: 4.5 MMOL/L — SIGNIFICANT CHANGE UP (ref 3.5–5.3)
POTASSIUM SERPL-SCNC: 4.5 MMOL/L — SIGNIFICANT CHANGE UP (ref 3.5–5.3)
PROT SERPL-MCNC: 6.7 G/DL — SIGNIFICANT CHANGE UP (ref 6–8.3)
RBC # BLD: 4.62 M/UL — SIGNIFICANT CHANGE UP (ref 3.8–5.2)
RBC # FLD: 15.9 % — HIGH (ref 10.3–14.5)
SODIUM SERPL-SCNC: 144 MMOL/L — SIGNIFICANT CHANGE UP (ref 135–145)
TSH SERPL-MCNC: 1 UIU/ML — SIGNIFICANT CHANGE UP (ref 0.27–4.2)
VIT B12 SERPL-MCNC: 240 PG/ML — SIGNIFICANT CHANGE UP (ref 200–900)
WBC # BLD: 14.38 K/UL — HIGH (ref 3.8–10.5)
WBC # FLD AUTO: 14.38 K/UL — HIGH (ref 3.8–10.5)

## 2019-03-25 PROCEDURE — 99214 OFFICE O/P EST MOD 30 MIN: CPT | Mod: GC

## 2019-03-26 DIAGNOSIS — M79.18 MYALGIA, OTHER SITE: ICD-10-CM

## 2019-03-26 DIAGNOSIS — M19.90 UNSPECIFIED OSTEOARTHRITIS, UNSPECIFIED SITE: ICD-10-CM

## 2019-03-26 DIAGNOSIS — R20.0 ANESTHESIA OF SKIN: ICD-10-CM

## 2019-03-26 NOTE — ASSESSMENT
[FreeTextEntry1] : 63yo F hx inflammatory arthritis (tenosynovitis/ R sternoclavicular arthritis), fibromyalgia, b/l knee OA presents for f/u visit\par \par # inflammatory arthritis\par -pt w evidence of tenosynovitis on R hand in MRI of 2016, US of sternoclavicular joints on 4/18 showed synovitis\par -pattern more consistent with seronegative spondyloarthropathy, however, imaging showed no SI pathology, 10/18\par -MITCHELL 1:320 but negative for RF, CCP, HLA- B27 , SSA, SSB, dsDNA\par -pt w minimal synovitis on exam on prednisone 10 mg and MTX 12.5; will however stop MTX for now as possible metabolic neuropathy being worked up\par -c/w folic acid \par -c/w pred 10 mg as pt still w synovitis despite improvement \par -will obtain CBC, CMP\par \par #L arm numbness/ b/l hand numbness\par -Xray done n 1/19 did show cervical spine degenerative changes, including C4-C5, C5-C6 disc protrusions and retrolisthesis\par -reproducible symptoms on L lateral flexion of neck\par -will obtain MRI of cervical spine wout contrast\par -will refer to physiatry for possible facet joint injection\par -will refer to neurology and obtain TSH, B12, folic acid for possible metabolic neuropathy\par \par #R upper buttock pain\par -pt did have several falls\par -will obtain Xray of pelvis to evaluate fracture\par \par \par # Fibromyalgia\par - on Cymbalta 60mg qd in coordination w psychiatrist\par -in the past on amitriptyline and sertraline w no relief\par \par # Knee OA\par --Xray of knees done 10/18 showed b/l knee effusion, R>L, and advanced OA changes with osteophytes, medial joint space narrowing\par -ortho offered TKR, but patient hesitant \par \par #cough\par -chronic \par -seen By Dr. Lisker on 1/18, w normal PFTs; \par -likely secondary to smoking, pt has been counseled on smoking cessation\par -pt to f/u w pulm\par \par #HCM\par -influenza vaccine 3/19, prevnar 2015\par -Quant Gold, Hep B, C negative 2/18, will need repeat\par -will need DEXA scan \par \par RTC in 2 weeks\par d/w Dr. Jauregui\par

## 2019-03-26 NOTE — HISTORY OF PRESENT ILLNESS
[FreeTextEntry1] : 62 F hx inflammatory arthritis (tenosynovitis/ R sternoclavicular arthritis), fibromyalgia, b/l knee OA presents for f/u visit\par \par Todays event:\par \par 2 weeks ago, pt went to ER in [place]. Pt saw ER for SOB. Had nebulizer tx done. No increase in steroids. No abx. \par \par Pt complaining of weight gain since on steroids. Has gained 14 lbs. Pt on pred 10mg and MTX 6 tabs weekly. \par \par Pt says when she lifts up R shoulder, pt feels tingling, and pins and needles. Goes up down to the fingers. It is her entire hand. Pt also in whole arm. Pt says she had that also in R torso, but that went away. Pt says this has been going on for a week. Pt also experiences tingling and numbness on both hands. \par \par Pt says her sternoclavicular joints are much better. Pain is 5/10. Does have b/l shoulder pain 7/10.  \par \par 2 weeks has had pain in R lower buttock for 2 weeks. Pt feel first week of March. Pt was moving from one sit to the other, and fell on her buttocks. She was on SSI office. Pt did fall 1 week ago. Pt sitting on bed, and reached for something, and landed on back. Pt able to walk. \par \par No fevers, chills, rashes. \par \par Hx: \par \par 10/18\par Pt with pain in sternoclavicular joint, R>L. b/l hands and wrist. There was suspicion of seronegative arthropathy, given pattern, but Xray of SI joint was intact. Pt started on MTX and pred, took only for 1 month and stopped, because pt was confused. \par \par 3/18\par Reporting 3 months of R neck pain and R clavicular swelling. Pt had US done, which showed sternoclavicular synovitis on 4/18. Pt was placed on MTX prednisone but pt did not follow up. \par \par \par 10/17\par At last visit in 08/17 Cymbalta 30mg started for FM. Pt without response on 30mg so her psychiatrist increased to 60mg.  Pt was on Zoloft and Elavil in the past without FM relief.  Pt requires b/l TKR and has been following with ortho, however they will operate on her only once she stops smoking, Pt working on smoking cessation. Takes ibuprofen only intermittently. Pt also c/o intermittent calf cramps. On Lipitor 80 mg x 2 years without recent medication changes.\par \par 08/17\par Pt last seen on 5/15/17 when she had complained of continuing pain of her L wrist and b/l LE pain.\par She had MRI L hand on 6/6/17 which showed persistent fluid within the extensor carpi radialis brevis and longus and extensor digitorum tendons with resolution of fluid surrounding flexor tendons compared to MRI LUE from 5/10/16.\par \par Pt was prescribed amitriptyline previously but says she has not been taking it because she was getting headaches. Takes Tylenol for pain control. \par Not taking her Zoloft because it was tapered off due to concern for interaction with Amitriptyline.  \par \par 8/16\par First clinic visit. Hypertrophy of L hand noticed. MRI of L hand was ordered, which showed tenosynitis. Xrays showed OA. MITCHELL was 1:320RF, CCP, HLA- B27, DsDNA, DsDNA negative

## 2019-03-26 NOTE — PHYSICAL EXAM
[General Appearance - Alert] : alert [General Appearance - In No Acute Distress] : in no acute distress [General Appearance - Well Nourished] : well nourished [General Appearance - Well-Appearing] : healthy appearing [Sclera] : the sclera and conjunctiva were normal [Extraocular Movements] : extraocular movements were intact [Outer Ear] : the ears and nose were normal in appearance [Hearing Threshold Finger Rub Not Dickey] : hearing was normal [Neck Appearance] : the appearance of the neck was normal [Auscultation Breath Sounds / Voice Sounds] : lungs were clear to auscultation bilaterally [Heart Sounds] : normal S1 and S2 [Murmurs] : no murmurs [Edema] : there was no peripheral edema [Bowel Sounds] : normal bowel sounds [Abdomen Soft] : soft [Abdomen Tenderness] : non-tender [Cervical Lymph Nodes Enlarged Posterior Bilaterally] : posterior cervical [Cervical Lymph Nodes Enlarged Anterior Bilaterally] : anterior cervical [Supraclavicular Lymph Nodes Enlarged Bilaterally] : supraclavicular [] : no rash [Sensation] : the sensory exam was normal to light touch and pinprick [Motor Exam] : the motor exam was normal [No Focal Deficits] : no focal deficits [Oriented To Time, Place, And Person] : oriented to person, place, and time [FreeTextEntry1] : no focal deficitis. Numbness/tingling reproducible when flexing neck L laterally.

## 2019-03-26 NOTE — REVIEW OF SYSTEMS
[Feeling Poorly] : feeling poorly [Feeling Tired] : feeling tired [As Noted in HPI] : as noted in HPI [Negative] : Genitourinary [Fever] : no fever [Chills] : no chills [Eyesight Problems] : no eyesight problems [Discharge From Eyes] : no purulent discharge from the eyes [Chest Pain] : no chest pain [Palpitations] : no palpitations [Abdominal Pain] : no abdominal pain [Vomiting] : no vomiting [Constipation] : no constipation [Diarrhea] : no diarrhea [Dysuria] : no dysuria [Incontinence] : no incontinence [Pelvic Pain] : no pelvic pain [Dysmenorrhea] : no dysmenorrhea [Skin Lesions] : no skin lesions [Skin Wound] : no skin wound [Dizziness] : no dizziness [Fainting] : no fainting [Muscle Weakness] : no muscle weakness

## 2019-04-08 ENCOUNTER — APPOINTMENT (OUTPATIENT)
Dept: RHEUMATOLOGY | Facility: HOSPITAL | Age: 64
End: 2019-04-08

## 2019-04-26 ENCOUNTER — FORM ENCOUNTER (OUTPATIENT)
Age: 64
End: 2019-04-26

## 2019-04-27 ENCOUNTER — APPOINTMENT (OUTPATIENT)
Dept: MRI IMAGING | Facility: IMAGING CENTER | Age: 64
End: 2019-04-27
Payer: MEDICAID

## 2019-04-27 ENCOUNTER — OUTPATIENT (OUTPATIENT)
Dept: OUTPATIENT SERVICES | Facility: HOSPITAL | Age: 64
LOS: 1 days | End: 2019-04-27
Payer: MEDICAID

## 2019-04-27 ENCOUNTER — APPOINTMENT (OUTPATIENT)
Dept: RADIOLOGY | Facility: IMAGING CENTER | Age: 64
End: 2019-04-27
Payer: MEDICAID

## 2019-04-27 DIAGNOSIS — R20.0 ANESTHESIA OF SKIN: ICD-10-CM

## 2019-04-27 DIAGNOSIS — M79.18 MYALGIA, OTHER SITE: ICD-10-CM

## 2019-04-27 PROCEDURE — 72141 MRI NECK SPINE W/O DYE: CPT | Mod: 26

## 2019-04-27 PROCEDURE — 72170 X-RAY EXAM OF PELVIS: CPT | Mod: 26

## 2019-04-27 PROCEDURE — 72170 X-RAY EXAM OF PELVIS: CPT

## 2019-04-27 PROCEDURE — 72141 MRI NECK SPINE W/O DYE: CPT

## 2019-05-07 ENCOUNTER — APPOINTMENT (OUTPATIENT)
Dept: PULMONOLOGY | Facility: CLINIC | Age: 64
End: 2019-05-07
Payer: MEDICAID

## 2019-05-07 VITALS
SYSTOLIC BLOOD PRESSURE: 150 MMHG | RESPIRATION RATE: 16 BRPM | HEIGHT: 64.96 IN | TEMPERATURE: 97.5 F | HEART RATE: 68 BPM | DIASTOLIC BLOOD PRESSURE: 83 MMHG | WEIGHT: 202 LBS | BODY MASS INDEX: 33.66 KG/M2 | OXYGEN SATURATION: 97 %

## 2019-05-07 PROCEDURE — ZZZZZ: CPT

## 2019-05-07 PROCEDURE — 99214 OFFICE O/P EST MOD 30 MIN: CPT | Mod: 25

## 2019-05-07 PROCEDURE — 94060 EVALUATION OF WHEEZING: CPT

## 2019-05-07 PROCEDURE — 94729 DIFFUSING CAPACITY: CPT

## 2019-05-07 PROCEDURE — 94726 PLETHYSMOGRAPHY LUNG VOLUMES: CPT

## 2019-05-07 NOTE — PHYSICAL EXAM
[General Appearance - Well Developed] : well developed [Normal Appearance] : normal appearance [Well Groomed] : well groomed [General Appearance - Well Nourished] : well nourished [No Deformities] : no deformities [General Appearance - In No Acute Distress] : no acute distress [Normal Conjunctiva] : the conjunctiva exhibited no abnormalities [Eyelids - No Xanthelasma] : the eyelids demonstrated no xanthelasmas [Normal Oropharynx] : normal oropharynx [Neck Appearance] : the appearance of the neck was normal [Neck Cervical Mass (___cm)] : no neck mass was observed [Jugular Venous Distention Increased] : there was no jugular-venous distention [Thyroid Diffuse Enlargement] : the thyroid was not enlarged [Thyroid Nodule] : there were no palpable thyroid nodules [Heart Rate And Rhythm] : heart rate and rhythm were normal [Heart Sounds] : normal S1 and S2 [Murmurs] : no murmurs present [Respiration, Rhythm And Depth] : normal respiratory rhythm and effort [Exaggerated Use Of Accessory Muscles For Inspiration] : no accessory muscle use [Auscultation Breath Sounds / Voice Sounds] : lungs were clear to auscultation bilaterally [Abdomen Soft] : soft [Abdomen Mass (___ Cm)] : no abdominal mass palpated [Abdomen Tenderness] : non-tender [Nail Clubbing] : no clubbing of the fingernails [Cyanosis, Localized] : no localized cyanosis [Petechial Hemorrhages (___cm)] : no petechial hemorrhages [] : no ischemic changes [Deep Tendon Reflexes (DTR)] : deep tendon reflexes were 2+ and symmetric [Sensation] : the sensory exam was normal to light touch and pinprick [No Focal Deficits] : no focal deficits [Impaired Insight] : insight and judgment were intact [Oriented To Time, Place, And Person] : oriented to person, place, and time [Affect] : the affect was normal [FreeTextEntry1] : walker

## 2019-05-07 NOTE — HISTORY OF PRESENT ILLNESS
[FreeTextEntry1] : 64-year-old woman here for followup. Current smoker. Seen in the [facility] emergency department February 21 for COPD exacerbation. Treated with albuterol. Negative chest x-ray. Her port she was sent home with medication albuterol but did not get a prescription for nebulizer machine, so she has been using her friends. She is using it sometimes twice a day, but not every day. She is still smoking a pack per day.\par \par Patient was last seen in January 2018, at which time it was recommended that she have her low-dose CAT scan in November. She did not followup with me. CAT scan was again recommended by rheumatology this past February, she has not yet had it done.\par \par Using a walker to walk secondary to inflammatory arthritis.\par \par Still smokes a pack per day. Now starts to notice more cough and shortness of breath, needing albuterol.

## 2019-05-20 ENCOUNTER — OUTPATIENT (OUTPATIENT)
Dept: OUTPATIENT SERVICES | Facility: HOSPITAL | Age: 64
LOS: 1 days | End: 2019-05-20

## 2019-05-20 ENCOUNTER — APPOINTMENT (OUTPATIENT)
Dept: RHEUMATOLOGY | Facility: CLINIC | Age: 64
End: 2019-05-20
Payer: MEDICAID

## 2019-05-20 ENCOUNTER — LABORATORY RESULT (OUTPATIENT)
Age: 64
End: 2019-05-20

## 2019-05-20 VITALS — HEIGHT: 64 IN | WEIGHT: 200 LBS | BODY MASS INDEX: 34.15 KG/M2

## 2019-05-20 VITALS — DIASTOLIC BLOOD PRESSURE: 70 MMHG | SYSTOLIC BLOOD PRESSURE: 156 MMHG | HEART RATE: 77 BPM

## 2019-05-20 DIAGNOSIS — M17.10 UNILATERAL PRIMARY OSTEOARTHRITIS, UNSPECIFIED KNEE: ICD-10-CM

## 2019-05-20 DIAGNOSIS — M79.7 FIBROMYALGIA: ICD-10-CM

## 2019-05-20 DIAGNOSIS — R20.0 ANESTHESIA OF SKIN: ICD-10-CM

## 2019-05-20 DIAGNOSIS — M48.02 SPINAL STENOSIS, CERVICAL REGION: ICD-10-CM

## 2019-05-20 DIAGNOSIS — G99.2 SPINAL STENOSIS, CERVICAL REGION: ICD-10-CM

## 2019-05-20 DIAGNOSIS — M19.90 UNSPECIFIED OSTEOARTHRITIS, UNSPECIFIED SITE: ICD-10-CM

## 2019-05-20 DIAGNOSIS — Z79.899 OTHER LONG TERM (CURRENT) DRUG THERAPY: ICD-10-CM

## 2019-05-20 LAB
ALBUMIN SERPL ELPH-MCNC: 4.3 G/DL — SIGNIFICANT CHANGE UP (ref 3.3–5)
ALP SERPL-CCNC: 90 U/L — SIGNIFICANT CHANGE UP (ref 40–120)
ALT FLD-CCNC: 13 U/L — SIGNIFICANT CHANGE UP (ref 4–33)
ANION GAP SERPL CALC-SCNC: 11 MMO/L — SIGNIFICANT CHANGE UP (ref 7–14)
AST SERPL-CCNC: 13 U/L — SIGNIFICANT CHANGE UP (ref 4–32)
BASOPHILS # BLD AUTO: 0.04 K/UL — SIGNIFICANT CHANGE UP (ref 0–0.2)
BASOPHILS NFR BLD AUTO: 0.3 % — SIGNIFICANT CHANGE UP (ref 0–2)
BILIRUB SERPL-MCNC: 0.2 MG/DL — SIGNIFICANT CHANGE UP (ref 0.2–1.2)
BUN SERPL-MCNC: 12 MG/DL — SIGNIFICANT CHANGE UP (ref 7–23)
CALCIUM SERPL-MCNC: 9.6 MG/DL — SIGNIFICANT CHANGE UP (ref 8.4–10.5)
CHLORIDE SERPL-SCNC: 104 MMOL/L — SIGNIFICANT CHANGE UP (ref 98–107)
CO2 SERPL-SCNC: 28 MMOL/L — SIGNIFICANT CHANGE UP (ref 22–31)
CREAT SERPL-MCNC: 0.68 MG/DL — SIGNIFICANT CHANGE UP (ref 0.5–1.3)
EOSINOPHIL # BLD AUTO: 0.12 K/UL — SIGNIFICANT CHANGE UP (ref 0–0.5)
EOSINOPHIL NFR BLD AUTO: 0.9 % — SIGNIFICANT CHANGE UP (ref 0–6)
GLUCOSE SERPL-MCNC: 105 MG/DL — HIGH (ref 70–99)
HCT VFR BLD CALC: 43.9 % — SIGNIFICANT CHANGE UP (ref 34.5–45)
HGB BLD-MCNC: 13.9 G/DL — SIGNIFICANT CHANGE UP (ref 11.5–15.5)
IMM GRANULOCYTES NFR BLD AUTO: 0.5 % — SIGNIFICANT CHANGE UP (ref 0–1.5)
LYMPHOCYTES # BLD AUTO: 18.8 % — SIGNIFICANT CHANGE UP (ref 13–44)
LYMPHOCYTES # BLD AUTO: 2.54 K/UL — SIGNIFICANT CHANGE UP (ref 1–3.3)
MCHC RBC-ENTMCNC: 30 PG — SIGNIFICANT CHANGE UP (ref 27–34)
MCHC RBC-ENTMCNC: 31.7 % — LOW (ref 32–36)
MCV RBC AUTO: 94.6 FL — SIGNIFICANT CHANGE UP (ref 80–100)
MONOCYTES # BLD AUTO: 1.03 K/UL — HIGH (ref 0–0.9)
MONOCYTES NFR BLD AUTO: 7.6 % — SIGNIFICANT CHANGE UP (ref 2–14)
NEUTROPHILS # BLD AUTO: 9.69 K/UL — HIGH (ref 1.8–7.4)
NEUTROPHILS NFR BLD AUTO: 71.9 % — SIGNIFICANT CHANGE UP (ref 43–77)
NRBC # FLD: 0.02 K/UL — SIGNIFICANT CHANGE UP (ref 0–0)
PLATELET # BLD AUTO: 339 K/UL — SIGNIFICANT CHANGE UP (ref 150–400)
PMV BLD: 10.6 FL — SIGNIFICANT CHANGE UP (ref 7–13)
POTASSIUM SERPL-MCNC: 4.5 MMOL/L — SIGNIFICANT CHANGE UP (ref 3.5–5.3)
POTASSIUM SERPL-SCNC: 4.5 MMOL/L — SIGNIFICANT CHANGE UP (ref 3.5–5.3)
PROT SERPL-MCNC: 6.6 G/DL — SIGNIFICANT CHANGE UP (ref 6–8.3)
RBC # BLD: 4.64 M/UL — SIGNIFICANT CHANGE UP (ref 3.8–5.2)
RBC # FLD: 14.1 % — SIGNIFICANT CHANGE UP (ref 10.3–14.5)
SODIUM SERPL-SCNC: 143 MMOL/L — SIGNIFICANT CHANGE UP (ref 135–145)
WBC # BLD: 13.49 K/UL — HIGH (ref 3.8–10.5)
WBC # FLD AUTO: 13.49 K/UL — HIGH (ref 3.8–10.5)

## 2019-05-20 PROCEDURE — 99214 OFFICE O/P EST MOD 30 MIN: CPT | Mod: GC

## 2019-05-23 ENCOUNTER — APPOINTMENT (OUTPATIENT)
Dept: NEUROLOGY | Facility: CLINIC | Age: 64
End: 2019-05-23
Payer: MEDICAID

## 2019-05-23 VITALS
HEART RATE: 67 BPM | DIASTOLIC BLOOD PRESSURE: 70 MMHG | SYSTOLIC BLOOD PRESSURE: 152 MMHG | TEMPERATURE: 97.4 F | OXYGEN SATURATION: 98 % | BODY MASS INDEX: 34.15 KG/M2 | HEIGHT: 64 IN | WEIGHT: 200 LBS

## 2019-05-23 PROCEDURE — 99205 OFFICE O/P NEW HI 60 MIN: CPT

## 2019-05-23 RX ORDER — GABAPENTIN 100 MG/1
100 CAPSULE ORAL
Qty: 90 | Refills: 2 | Status: DISCONTINUED | COMMUNITY
Start: 2019-05-20 | End: 2019-05-23

## 2019-05-23 RX ORDER — FOLIC ACID 1 MG/1
1 TABLET ORAL DAILY
Qty: 30 | Refills: 1 | Status: DISCONTINUED | COMMUNITY
Start: 2018-12-05 | End: 2019-05-23

## 2019-05-23 NOTE — DATA REVIEWED
[de-identified] : rheum note appreciated\par TSH normal\par \par \par \par EXAM: MR SPINE CERVICAL  (images reviwed)\par \par \par PROCEDURE DATE: 04/27/2019 \par \par \par \par INTERPRETATION: CLINICAL INDICATION: Left arm numbness. \par \par Multiplanar Multisequence MR of the CERVICAL SPINE without contrast \par \par Prior Studies: None. \par \par FINDINGS: \par \par ALIGNMENT: There is 4 mm retrolisthesis of C4 on C5, 2 to 3 mm \par retrolisthesis of C5 on C6, and trace retrolisthesis of C6 on C7. \par \par VERTEBRAL BODIES: No acute compression deformity. \par \par DISC SPACES: There is multilevel disc desiccation. There is moderate disc \par height loss at C4/C5 and mild disc height loss at C5/C6. \par \par MARROW: There is trace endplate edema at C4/C5. \par \par IMAGED BRAIN: Within normal limits. \par \par CERVICAL CORD: Normal in caliber and signal characteristics. \par \par IMAGED NECK SOFT TISSUES: Grossly unremarkable. \par \par The findings at the individual levels are as follows: \par \par C1/2: Intact. \par \par C2/3: There is no spinal canal or neural foraminal narrowing. \par \par C3/4: There is no spinal canal or neural foraminal narrowing. \par \par C4/5: There is a diffuse disc bulge with posterior osseous ridging which is \par asymmetric to the left. There is left greater than right uncovertebral \par hypertrophy and bilateral facet arthrosis. Findings result in mild to \par moderate spinal canal narrowing and bilateral neural foraminal narrowing, \par severe on the left and moderate to severe on the right. \par \par C5/6: There is a diffuse disc bulge with a superimposed central disc \par protrusion. There is posterior osseous ridging and bilateral uncovertebral \par hypertrophy. There is bilateral facet arthrosis. Findings result in moderate \par spinal canal narrowing and moderate to severe bilateral neural foraminal \par narrowing, left greater than right. \par \par C6/7: There is a small central disc contusion which is asymmetric to the \par left. There is left greater than right uncovertebral hypertrophy. Findings \par result in mild to moderate spinal canal narrowing and moderate left neural \par foraminal narrowing. \par \par C7/T1: There is mild posterior ligamentous hypertrophy. There is no spinal \par canal or neural foraminal narrowing. \par \par IMPRESSION: \par \par Multilevel cervical spondylosis with mild retrolisthesis of C4 on C5, trace \par to mild retrolisthesis of C5 on C6, and trace retrolisthesis of C6 on C7. \par \par C4/5: There is mild to moderate spinal canal narrowing and bilateral neural \par foraminal narrowing, severe on the left and moderate to severe on the right. \par \par C5/6: There is moderate spinal canal narrowing and moderate to severe \par bilateral neural foraminal narrowing, left greater than right. \par \par C6/7: There is mild to moderate spinal canal narrowing and moderate left \par neural foraminal narrowing. \par \par \par \par \par \par \par \par \par EKG: \par ZANDER ESCALANTE M.D., ATTENDING RADIOLOGIST \par This document has been electronically signed. May 1 2019 1:17PM \par \par

## 2019-05-23 NOTE — REVIEW OF SYSTEMS
[As Noted in HPI] : as noted in HPI [Depression] : depression [Incontinence] : incontinence [Joint Pain] : joint pain [Skin Lesions] : skin lesion [Muscle Weakness] : muscle weakness [Easy Bruising] : a tendency for easy bruising [Fever] : no fever [Suicidal] : not suicidal [Eyesight Problems] : no eyesight problems [Loss Of Hearing] : no hearing loss [Chest Pain] : no chest pain [Shortness Of Breath] : no shortness of breath [Vomiting] : no vomiting

## 2019-05-23 NOTE — HISTORY OF PRESENT ILLNESS
[FreeTextEntry1] : Patient has hypertension, rheumatoid arthritis and fibromyalgia and is here for balance problems going on for years. The patient feels numbness and tingling in bilateral legs for years. She also has weakness in her legs. She has had several falls. She has diffuse body pains but no specific back pain. No bowel or bladder incontinence or saddle anesthesia. The patient denies strokelike symptoms.\par \par Additionally, the patient has neck pain radiating to her left arm, present for the past 2 months. There is no weakness in the arms. The neck pain is worsening.\par \par The patient takes Cymbalta 60 mg for pain. She was also given Neurontin 100 mg 3 times a day, however she has not yet started this medication.

## 2019-05-23 NOTE — CONSULT LETTER
[Dear  ___] : Dear  [unfilled], [Consult Letter:] : I had the pleasure of evaluating your patient, [unfilled]. [Please see my note below.] : Please see my note below. [Consult Closing:] : Thank you very much for allowing me to participate in the care of this patient.  If you have any questions, please do not hesitate to contact me. [Sincerely,] : Sincerely, [FreeTextEntry3] : Allison Mercer MD, MPH\par

## 2019-05-23 NOTE — PHYSICAL EXAM
Yes [General Appearance - Alert] : alert [General Appearance - In No Acute Distress] : in no acute distress [Person] : oriented to person [Place] : oriented to place [Time] : oriented to time [Registration Intact] : recent registration memory intact [Concentration Intact] : normal concentrating ability [Visual Intact] : visual attention was ~T not ~L decreased [Naming Objects] : no difficulty naming common objects [Repeating Phrases] : no difficulty repeating a phrase [Fluency] : fluency intact [Comprehension] : comprehension intact [Vocabulary] : adequate range of vocabulary [Cranial Nerves Optic (II)] : visual acuity intact bilaterally,  visual fields full to confrontation, pupils equal round and reactive to light [Cranial Nerves Oculomotor (III)] : extraocular motion intact [Cranial Nerves Trigeminal (V)] : facial sensation intact symmetrically [Cranial Nerves Facial (VII)] : face symmetrical [Cranial Nerves Vestibulocochlear (VIII)] : hearing was intact bilaterally [Cranial Nerves Glossopharyngeal (IX)] : tongue and palate midline [Cranial Nerves Accessory (XI - Cranial And Spinal)] : head turning and shoulder shrug symmetric [Cranial Nerves Hypoglossal (XII)] : there was no tongue deviation with protrusion [Motor Tone] : muscle tone was normal in all four extremities [Motor Strength] : muscle strength was normal in all four extremities [Involuntary Movements] : no involuntary movements were seen [Limited Balance] : the patient's balance was impaired [1+] : Ankle jerk left 1+ [Full Pulse] : the pedal pulses are present [Coordination - Dysmetria Impaired Finger-to-Nose Bilateral] : not present [Coordination - Dysmetria Impaired Heel-to-Shin Bilateral] : not present [Plantar Reflex Right Only] : normal on the right [Plantar Reflex Left Only] : normal on the left [FreeTextEntry5] : fundi not visualized [FreeTextEntry7] : sensory loss in right C7 distribution to LT, vibration decreased to bl ankles [FreeTextEntry8] : Antalgic gait

## 2019-05-23 NOTE — ASSESSMENT
[FreeTextEntry1] : Balance problems concerning for peripheral neuropathy. Will obtain neuropathy labs and nerve conduction EMG of the legs.\par \par Cervical radiculopathy to the right with sensory loss in the right T7 distribution. We will get nerve conduction EMG of the arms.  Will refer the patient to physical therapy. Will consider spine surgery, patient declined surgery at this time.\par \par Will titrate Neurontin to 300 mg 3 times a day. Continue Cymbalta 60 mg. Will refer the patient to evaluation with orthopedics for bilateral hip and knee pain.

## 2019-05-24 LAB
ALBUMIN MFR SERPL ELPH: 63.4 %
ALBUMIN SERPL-MCNC: 3.9 G/DL
ALBUMIN/GLOB SERPL: 1.8 RATIO
ALPHA1 GLOB MFR SERPL ELPH: 5 %
ALPHA1 GLOB SERPL ELPH-MCNC: 0.3 G/DL
ALPHA2 GLOB MFR SERPL ELPH: 11.3 %
ALPHA2 GLOB SERPL ELPH-MCNC: 0.7 G/DL
B-GLOBULIN MFR SERPL ELPH: 11.2 %
B-GLOBULIN SERPL ELPH-MCNC: 0.7 G/DL
ESTIMATED AVERAGE GLUCOSE: 117 MG/DL
FOLATE SERPL-MCNC: >20 NG/ML
GAMMA GLOB FLD ELPH-MCNC: 0.6 G/DL
GAMMA GLOB MFR SERPL ELPH: 9.1 %
HBA1C MFR BLD HPLC: 5.7 %
INTERPRETATION SERPL IEP-IMP: NORMAL
M PROTEIN SPEC IFE-MCNC: NORMAL
PROT SERPL-MCNC: 6.1 G/DL
PROT SERPL-MCNC: 6.1 G/DL
T3 SERPL-MCNC: 119 NG/DL
T4 SERPL-MCNC: 6.2 UG/DL
TSH SERPL-ACNC: 0.79 UIU/ML
VIT B12 SERPL-MCNC: 239 PG/ML

## 2019-05-24 NOTE — PHYSICAL EXAM
[General Appearance - Alert] : alert [General Appearance - In No Acute Distress] : in no acute distress [General Appearance - Well Nourished] : well nourished [General Appearance - Well-Appearing] : healthy appearing [Sclera] : the sclera and conjunctiva were normal [Extraocular Movements] : extraocular movements were intact [Outer Ear] : the ears and nose were normal in appearance [Hearing Threshold Finger Rub Not [place]] : hearing was normal [Neck Appearance] : the appearance of the neck was normal [Auscultation Breath Sounds / Voice Sounds] : lungs were clear to auscultation bilaterally [Heart Sounds] : normal S1 and S2 [Murmurs] : no murmurs [Edema] : there was no peripheral edema [Bowel Sounds] : normal bowel sounds [Abdomen Soft] : soft [Abdomen Tenderness] : non-tender [Cervical Lymph Nodes Enlarged Posterior Bilaterally] : posterior cervical [Cervical Lymph Nodes Enlarged Anterior Bilaterally] : anterior cervical [Supraclavicular Lymph Nodes Enlarged Bilaterally] : supraclavicular [] : no rash [Sensation] : the sensory exam was normal to light touch and pinprick [Motor Exam] : the motor exam was normal [No Focal Deficits] : no focal deficits [Oriented To Time, Place, And Person] : oriented to person, place, and time [FreeTextEntry1] :  b/l sternoclavicular joint w no synovitis. L hand w no tenosynovitis. No synovitis of rest of joints. tender points throughout body.

## 2019-05-24 NOTE — ASSESSMENT
[FreeTextEntry1] : 63yo F hx inflammatory arthritis (tenosynovitis/ R sternoclavicular arthritis), fibromyalgia, b/l knee OA presents for f/u visit\par \par # inflammatory arthritis\par -pt w evidence of tenosynovitis on R hand in MRI of 2016, US of sternoclavicular joints on 4/18 showed synovitis\par -pattern more consistent with seronegative spondyloarthropathy, however, imaging showed no SI pathology, 10/18\par -MITCHELL 1:320 but negative for RF, CCP, HLA- B27 , SSA, SSB, dsDNA\par -pt w minimal synovitis on exam on prednisone 10 mg ; will restart MTX at 12.5mg weekly\par -c/w folic acid \par  -will obtain CBC, CMP\par \par #L arm numbness/ b/l hand numbness\par -MRI of cervical spine done on 4/19 showed face arthropathy and mod cervical spine stenosis\par -pt referred to spine center\par \par # Fibromyalgia\par - on Cymbalta 60mg qd in coordination w psychiatrist\par -in the past on amitriptyline and sertraline w no relief\par -started on gabapentin 100mg TID \par \par # Knee OA\par --Xray of knees done 10/18 showed b/l knee effusion, R>L, and advanced OA changes with osteophytes, medial joint space narrowing\par -ortho offered TKR, but patient hesitant \par \par #cough\par -chronic \par -seen By Dr. Lisker on 1/19,  PFTs showing FVC response to bronchodilators\par -pt prescribed bronchodilators, follows w pulm\par -likely secondary to smoking, pt has been counseled on smoking cessation\par \par \par #HCM\par -influenza vaccine 3/19, prevnar 2015, needs pneumovax\par -Quant Gold, Hep B, C negative 2/18, will need repeat\par -will need DEXA scan \par \par RTC in 6 weeks\par d/w Dr. Dias\par

## 2019-05-24 NOTE — REVIEW OF SYSTEMS
[As Noted in HPI] : as noted in HPI [Negative] : Heme/Lymph [Fever] : no fever [Chills] : no chills [Eyesight Problems] : no eyesight problems [Discharge From Eyes] : no purulent discharge from the eyes [Chest Pain] : no chest pain [Palpitations] : no palpitations [Abdominal Pain] : no abdominal pain [Vomiting] : no vomiting [Constipation] : no constipation [Diarrhea] : no diarrhea [Dysuria] : no dysuria [Incontinence] : no incontinence [Pelvic Pain] : no pelvic pain [Dysmenorrhea] : no dysmenorrhea [Skin Lesions] : no skin lesions [Skin Wound] : no skin wound [Dizziness] : no dizziness [Fainting] : no fainting [Muscle Weakness] : no muscle weakness

## 2019-05-24 NOTE — HISTORY OF PRESENT ILLNESS
[FreeTextEntry1] : 62 F hx inflammatory arthritis (tenosynovitis/ R sternoclavicular arthritis), fibromyalgia, b/l knee OA presents for f/u visit\par \par Todays event:\par Pt was seen on 3/19 and had tingling/numbness in R arm. MRI of the cervical spine was done, and it showed mod cervical spine stenosis. Given neuropathy work up MTX was temporarily d/ester. \par \par Feels her arthritis is under control. Not as much sternoclavicular pain. Pt L hand w no significant synovitis. \par \par No fevers, chills, SOB, n/v, abdominal pain, focal weakness, sensory loss. Pt does get diffuse body aches form FM. \par \par Hx: \par \par 10/18\par Pt with pain in sternoclavicular joint, R>L. b/l hands and wrist. There was suspicion of seronegative arthropathy, given pattern, but Xray of SI joint was intact. Pt started on MTX and pred, took only for 1 month and stopped, because pt was confused. \par \par 3/18\par Reporting 3 months of R neck pain and R clavicular swelling. Pt had US done, which showed sternoclavicular synovitis on 4/18. Pt was placed on MTX prednisone but pt did not follow up. \par \par \par 10/17\par At last visit in 08/17 Cymbalta 30mg started for FM. Pt without response on 30mg so her psychiatrist increased to 60mg.  Pt was on Zoloft and Elavil in the past without FM relief.  Pt requires b/l TKR and has been following with ortho, however they will operate on her only once she stops smoking, Pt working on smoking cessation. Takes ibuprofen only intermittently. Pt also c/o intermittent calf cramps. On Lipitor 80 mg x 2 years without recent medication changes.\par \par 08/17\par Pt last seen on 5/15/17 when she had complained of continuing pain of her L wrist and b/l LE pain.\par She had MRI L hand on 6/6/17 which showed persistent fluid within the extensor carpi radialis brevis and longus and extensor digitorum tendons with resolution of fluid surrounding flexor tendons compared to MRI LUE from 5/10/16.\par \par Pt was prescribed amitriptyline previously but says she has not been taking it because she was getting headaches. Takes Tylenol for pain control. \par Not taking her Zoloft because it was tapered off due to concern for interaction with Amitriptyline.  \par \par 8/16\par First clinic visit. Hypertrophy of L hand noticed. MRI of L hand was ordered, which showed tenosynitis. Xrays showed OA. MITCHELL was 1:320RF, CCP, HLA- B27, DsDNA, DsDNA negative

## 2019-05-30 LAB — METHYLMALONATE SERPL-SCNC: 247 NMOL/L

## 2019-05-31 ENCOUNTER — OTHER (OUTPATIENT)
Age: 64
End: 2019-05-31

## 2019-06-03 ENCOUNTER — OUTPATIENT (OUTPATIENT)
Dept: OUTPATIENT SERVICES | Facility: HOSPITAL | Age: 64
LOS: 1 days | End: 2019-06-03

## 2019-06-03 ENCOUNTER — APPOINTMENT (OUTPATIENT)
Dept: OBGYN | Facility: HOSPITAL | Age: 64
End: 2019-06-03
Payer: MEDICAID

## 2019-06-03 PROCEDURE — 99396 PREV VISIT EST AGE 40-64: CPT | Mod: GC

## 2019-06-05 DIAGNOSIS — Z01.419 ENCOUNTER FOR GYNECOLOGICAL EXAMINATION (GENERAL) (ROUTINE) WITHOUT ABNORMAL FINDINGS: ICD-10-CM

## 2019-06-06 ENCOUNTER — OUTPATIENT (OUTPATIENT)
Dept: OUTPATIENT SERVICES | Facility: HOSPITAL | Age: 64
LOS: 1 days | End: 2019-06-06

## 2019-06-06 ENCOUNTER — APPOINTMENT (OUTPATIENT)
Dept: GASTROENTEROLOGY | Facility: CLINIC | Age: 64
End: 2019-06-06
Payer: MEDICAID

## 2019-06-06 ENCOUNTER — FORM ENCOUNTER (OUTPATIENT)
Age: 64
End: 2019-06-06

## 2019-06-06 VITALS
WEIGHT: 205 LBS | HEART RATE: 68 BPM | HEIGHT: 64 IN | BODY MASS INDEX: 35 KG/M2 | SYSTOLIC BLOOD PRESSURE: 136 MMHG | DIASTOLIC BLOOD PRESSURE: 66 MMHG

## 2019-06-06 DIAGNOSIS — F17.200 NICOTINE DEPENDENCE, UNSPECIFIED, UNCOMPLICATED: ICD-10-CM

## 2019-06-06 DIAGNOSIS — K57.30 DIVERTICULOSIS OF LARGE INTESTINE W/OUT PERFORATION OR ABSCESS W/OUT BLEEDING: ICD-10-CM

## 2019-06-06 PROCEDURE — 99213 OFFICE O/P EST LOW 20 MIN: CPT | Mod: GC

## 2019-06-06 NOTE — PHYSICAL EXAM
[General Appearance - Well Nourished] : well nourished [General Appearance - Well Developed] : well developed [Sclera] : the sclera and conjunctiva were normal [Extraocular Movements] : extraocular movements were intact [Outer Ear] : the ears and nose were normal in appearance [Hearing Threshold Finger Rub Not [place]] : hearing was normal [Neck Appearance] : the appearance of the neck was normal [Neck Cervical Mass (___cm)] : no neck mass was observed [Respiration, Rhythm And Depth] : normal respiratory rhythm and effort [Exaggerated Use Of Accessory Muscles For Inspiration] : no accessory muscle use [Heart Sounds] : normal S1 and S2 [Murmurs] : no murmurs [Heart Sounds Pericardial Friction Rub] : no pericardial rub [Bowel Sounds] : normal bowel sounds [Abdomen Soft] : soft [Abdomen Tenderness] : non-tender [Cervical Lymph Nodes Enlarged Posterior Bilaterally] : posterior cervical [Cervical Lymph Nodes Enlarged Anterior Bilaterally] : anterior cervical [Abnormal Walk] : normal gait [Nail Clubbing] : no clubbing  or cyanosis of the fingernails [Motor Tone] : muscle strength and tone were normal [Skin Color & Pigmentation] : normal skin color and pigmentation [] : no rash [Motor Exam] : the motor exam was normal [Sensation] : the sensory exam was normal to light touch and pinprick [Affect] : the affect was normal [Mood] : the mood was normal

## 2019-06-06 NOTE — END OF VISIT
[] : Fellow [FreeTextEntry3] : As modified and discussed with patient\par MD EDWIGE Jimenez [facility]\par Associate Professor of Medicine\par [facility]\par

## 2019-06-06 NOTE — HISTORY OF PRESENT ILLNESS
[de-identified] : 63 year old female with HTN, HLD, COPD, fibromyalgia, obesity, colonic polyps (tubular adenoma) presenting for follow-up, given history of colonic polyp. \par \par Pt is here for a repeat colonoscopy. Pt had a colonoscopy in 2015, at that time was found to have multiple polyps.\par \par Colonoscopy report is as below: 11/2015                                                                               \par Impression:          - One 7 mm polyp in the cecum. Resected and retrieved.\par                      - Multiple 3 to 7 mm polyps in the descending and \par                      sigmoid colon. Resected and retrieved.\par                      - Non-bleeding internal hemorrhoids.\par Recommendation:      - Discharge patient to home.\par                      - Resume previous diet.\par                      - Await pathology results.\par                      - Return to GI clinic in 4 weeks.\par                      - Repeat colonoscopy in 3 years for surveillance based \par                      on pathology results.\par \par Biopsy report: 11/2015\par 1. Rectal polyp, biopsy:\par - Hyperplastic polyp.\par \par 2. Cecal polyp, biopsy:\par - Tubular adenoma.\par \par 3. Descending colon  polyps, biopsy:\par - Hyperplastic polyp.\par \par 4. Sigmoid colon polyps #1, biopsy:\par - Hyperplastic polyp.\par \par 5. Sigmoid colon polyps #2, biopsy:\par - Hyperplastic polyp.\par \par                                                                           \par The patient denies heartburn, dyspepsia, dysphagia, change in bowel habit, melena, weight loss, anemia or hematochezia, hematemesis. Pt denies family history of gallstones, colorectal cancer, polyps, breast, ovarian cancer. \par

## 2019-06-06 NOTE — ASSESSMENT
[FreeTextEntry1] : Impression\par 1) Colonoscopy screening in an average risk patient\par \par Recommendations:\par -Repeat colonoscopy ordered\par Risks and benefits of the procedure, including: perforation (tear in the colon), bleeding, reaction to sedation, and possible risk for surgical resection and colostomy bag explained\par \par Discussed with Dr Lucas/Paulette\par

## 2019-06-07 ENCOUNTER — APPOINTMENT (OUTPATIENT)
Dept: CT IMAGING | Facility: IMAGING CENTER | Age: 64
End: 2019-06-07
Payer: MEDICAID

## 2019-06-07 ENCOUNTER — OUTPATIENT (OUTPATIENT)
Dept: OUTPATIENT SERVICES | Facility: HOSPITAL | Age: 64
LOS: 1 days | End: 2019-06-07
Payer: MEDICAID

## 2019-06-07 DIAGNOSIS — F17.210 NICOTINE DEPENDENCE, CIGARETTES, UNCOMPLICATED: ICD-10-CM

## 2019-06-07 PROCEDURE — G0297: CPT

## 2019-06-07 PROCEDURE — G0297: CPT | Mod: 26

## 2019-06-10 ENCOUNTER — APPOINTMENT (OUTPATIENT)
Dept: ORTHOPEDIC SURGERY | Facility: CLINIC | Age: 64
End: 2019-06-10
Payer: MEDICAID

## 2019-06-10 VITALS
WEIGHT: 202 LBS | BODY MASS INDEX: 33.66 KG/M2 | HEIGHT: 65 IN | SYSTOLIC BLOOD PRESSURE: 135 MMHG | DIASTOLIC BLOOD PRESSURE: 68 MMHG | HEART RATE: 72 BPM

## 2019-06-10 DIAGNOSIS — Z80.0 FAMILY HISTORY OF MALIGNANT NEOPLASM OF DIGESTIVE ORGANS: ICD-10-CM

## 2019-06-10 DIAGNOSIS — Z87.39 PERSONAL HISTORY OF OTHER DISEASES OF THE MUSCULOSKELETAL SYSTEM AND CONNECTIVE TISSUE: ICD-10-CM

## 2019-06-10 DIAGNOSIS — Z80.41 FAMILY HISTORY OF MALIGNANT NEOPLASM OF OVARY: ICD-10-CM

## 2019-06-10 DIAGNOSIS — Z86.79 PERSONAL HISTORY OF OTHER DISEASES OF THE CIRCULATORY SYSTEM: ICD-10-CM

## 2019-06-10 DIAGNOSIS — Z80.3 FAMILY HISTORY OF MALIGNANT NEOPLASM OF BREAST: ICD-10-CM

## 2019-06-10 DIAGNOSIS — Z87.42 PERSONAL HISTORY OF OTHER DISEASES OF THE FEMALE GENITAL TRACT: ICD-10-CM

## 2019-06-10 DIAGNOSIS — Z82.49 FAMILY HISTORY OF ISCHEMIC HEART DISEASE AND OTHER DISEASES OF THE CIRCULATORY SYSTEM: ICD-10-CM

## 2019-06-10 PROCEDURE — 99213 OFFICE O/P EST LOW 20 MIN: CPT

## 2019-06-10 PROCEDURE — 73564 X-RAY EXAM KNEE 4 OR MORE: CPT | Mod: RT

## 2019-06-10 NOTE — HISTORY OF PRESENT ILLNESS
[de-identified] : CC bilateral knee\par \par HPI 63 yo female right HD presents with chronic onset of many months of constant pain in the bilateral  knee [without injury]. The pain is worse, and rated a 10 out of 10, described as pain of all types, [without radiation]. Praying makes the pain better and not praying makes the pain worse. The patient reports associated symptoms of pop clinic locking grinding swelling instability numbness and weakness. The patient - pain at night affecting sleep, and + similar pain previously.\par \par The patient has tried the following treatments:\par Activity modification	+\par Ice/Compression  	+\par Braces    		-\par Nsaids    		+\par Physical Therapy 	+ without improvement\par Cortisone Injection	+ with 1-2 days improvement\par Visco Injection		-\par Arthroscopy		-\par \par Review of Systems is positive for the above musculoskeletal symptoms and is otherwise non-contributory for general, constitutional, psychiatric, neurologic, HEENT, cardiac, respiratory, gastrointestinal, reproductive, lymphatic, and dermatologic complaints.\par \par Consult by Dr Mercer\par \par

## 2019-06-10 NOTE — PHYSICAL EXAM
[de-identified] : Physical Examination\par General: well nourished, in no acute distress, alert and oriented to person, place and time\par Psychiatric: normal mood and affect, no abnormal movements or speech patterns\par Eyes: vision intact + glasses\par Throat: no thyromegaly\par Lymph: no enlarged nodes, no lymphedema in extremity\par Respiratory: no wheezing, no shortness of breath with ambulation\par Cardiac: no cardiac leg swelling, 2+ peripheral pulses\par Neurology: normal gross sensation in extremities to light touch\par Abdomen: soft, non-tender, tympanic, no masses\par \par Musculoskeletal Examination\par Ambulation	+ antalgic gait, + assistive devices\par \par Knee			Right			Left\par General\par      Swelling/Deformity	normal			normal	\par      Skin			normal			normal\par      Erythema		-			-\par      Standing Alignment	varus			varus\par      Effusion		trace			trace\par Range of Motion\par      Hip			full painless ROM		full painless ROM\par      Knee Flexion		100			100\par      Knee Extension	0			0\par Patella\par      J Sign		-			-\par      Quad Medial/Lateral	1/1 1/1\par      Apprehension		-			-\par      Rodolfo's		+			+\par      Grind Sign		+			+\par      Crepitus		+			+\par Palpation\par      Medial Joint Line	+			+\par      Medial Fem Condyle	-			-\par      Lateral Joint Line	-			-\par      Quad Tendon		-			-\par      Patella Tendon	-			-\par      Medial Patella		-			-\par      Lateral Patella 	-			-\par      Posterior Knee	+			+\par Ligamentous\par      Varus @ 0° / 30°	-/-			-/-\par      Valgus @ 0° / 30°	-/-			-/-\par \par Strength Examination/Atrophy\par      Hip Flexors 		5+			5+\par      Quadriceps		5+			5+\par      Hamstring		5+			5+\par      Tibialis Anterior	5+			5+\par      Achilles/Soleus	5+			5+\par Sensation\par      Deep Peroneal	normal			normal\par      Superficial Peroneal 	normal			normal\par      Sural  		normal			normal\par      Posterior Tibial 	normal			normal\par      Saphneous 		normal			normal\par Pulses\par      DP			2+			2+\par

## 2019-06-10 NOTE — DISCUSSION/SUMMARY
[de-identified] : Bilateral knee osteoarthritis\par \par \par The patient and I discussed the causes and progression of degenerative joint disease of the knee. Models, diagrams and drawings were used in the discussion. Treatment can include conservative non-operative management and surgical options. Conservative management includes weight loss, activity modification, physical therapy to improve motion and strength in the muscles around the knee and the body's core, PO and topical NSAIDs, corticosteroid and/or viscosupplementation intra-articular injections. If the patient fails to improve with non-operative management, surgical management is possible. Depending upon the patient's age, BMI, activity level, degree and location of arthrosis different surgical options are possible including arthroscopic debridement with chondroplasty, high-tibial osteotomy, unicondylar/partial arthroplasty, and total joint arthroplasty.\par \par Patient has not responded significantly to all nonoperative modalities and treatment.\par Patient is not yet ready for surgery.\par Given lack of response all other nonoperative management techniques I do not recommend further trials of nonoperative technique.\par \par The patient verifies their understanding the the visit, diagnosis and plan. They agree with the treatment plan and will contact the office with any questions or problems.\par Follow up\par PRN

## 2019-06-11 DIAGNOSIS — K59.00 CONSTIPATION, UNSPECIFIED: ICD-10-CM

## 2019-06-11 DIAGNOSIS — E66.9 OBESITY, UNSPECIFIED: ICD-10-CM

## 2019-06-11 DIAGNOSIS — D12.6 BENIGN NEOPLASM OF COLON, UNSPECIFIED: ICD-10-CM

## 2019-06-11 DIAGNOSIS — K57.30 DIVERTICULOSIS OF LARGE INTESTINE WITHOUT PERFORATION OR ABSCESS WITHOUT BLEEDING: ICD-10-CM

## 2019-06-12 ENCOUNTER — CHART COPY (OUTPATIENT)
Age: 64
End: 2019-06-12

## 2019-06-15 DIAGNOSIS — M47.812 SPONDYLOSIS W/OUT MYELOPATHY OR RADICULOPATHY, CERVICAL REGION: ICD-10-CM

## 2019-06-15 DIAGNOSIS — M46.90 UNSPECIFIED INFLAMMATORY SPONDYLOPATHY, SITE UNSPECIFIED: ICD-10-CM

## 2019-06-17 ENCOUNTER — APPOINTMENT (OUTPATIENT)
Dept: RHEUMATOLOGY | Facility: CLINIC | Age: 64
End: 2019-06-17
Payer: MEDICAID

## 2019-06-17 ENCOUNTER — OUTPATIENT (OUTPATIENT)
Dept: OUTPATIENT SERVICES | Facility: HOSPITAL | Age: 64
LOS: 1 days | End: 2019-06-17

## 2019-06-17 VITALS
HEIGHT: 65 IN | HEART RATE: 70 BPM | WEIGHT: 202 LBS | DIASTOLIC BLOOD PRESSURE: 72 MMHG | BODY MASS INDEX: 33.66 KG/M2 | SYSTOLIC BLOOD PRESSURE: 140 MMHG

## 2019-06-17 DIAGNOSIS — M13.89 OTHER SPECIFIED ARTHRITIS, MULTIPLE SITES: ICD-10-CM

## 2019-06-17 DIAGNOSIS — M54.12 RADICULOPATHY, CERVICAL REGION: ICD-10-CM

## 2019-06-17 DIAGNOSIS — M46.90 UNSPECIFIED INFLAMMATORY SPONDYLOPATHY, SITE UNSPECIFIED: ICD-10-CM

## 2019-06-17 DIAGNOSIS — M19.90 UNSPECIFIED OSTEOARTHRITIS, UNSPECIFIED SITE: ICD-10-CM

## 2019-06-17 DIAGNOSIS — M17.10 UNILATERAL PRIMARY OSTEOARTHRITIS, UNSPECIFIED KNEE: ICD-10-CM

## 2019-06-17 PROBLEM — M47.812 NECK ARTHRITIS: Status: ACTIVE | Noted: 2017-12-13

## 2019-06-17 PROCEDURE — 99214 OFFICE O/P EST MOD 30 MIN: CPT | Mod: GC

## 2019-06-18 NOTE — ASSESSMENT
[FreeTextEntry1] : 63yo F hx inflammatory arthritis (tenosynovitis/ R sternoclavicular arthritis), fibromyalgia, b/l knee OA presents for f/u visit\par \par # inflammatory arthritis\par -pt w evidence of tenosynovitis on R hand in MRI of 2016, US of sternoclavicular joints on 4/18 showed synovitis\par -pattern more consistent with seronegative spondyloarthropathy, however, imaging showed no SI pathology, 10/18\par -MITCHELL 1:320 but negative for RF, CCP, HLA- B27 , SSA, SSB, dsDNA\par -pt w minimal synovitis on exam; will reduce prednisone to 7.5mg, and increase MTX to 20mg weekly\par -c/w folic acid \par  -will obtain CBC, CMP 2 weeks after increase in MTX\par \par #L arm numbness/ b/l hand numbness\par -MRI of cervical spine done on 4/19 showed face arthropathy and mod cervical spine stenosis\par -spine center appt on June 21st\par \par # Fibromyalgia\par - on Cymbalta 60mg qd in coordination w psychiatrist\par -in the past on amitriptyline and sertraline w no relief\par -gabapentin 100mg TID\par \par # Knee OA\par --Xray of knees done 10/18 showed b/l knee effusion, R>L, and advanced OA changes with osteophytes, medial joint space narrowing\par -ortho offered TKR on 6/19, but patient hesitant \par \par #cough\par -chronic \par -seen By Dr. Lisker on 4/19,  PFTs showing FVC response to bronchodilators\par -pt prescribed bronchodilators, follows w pulm\par -likely secondary to smoking\par \par \par #HCM\par -influenza vaccine 3/19, prevnar 2015, needs pneumovax\par -Quant Gold, Hep B, C negative 2/18, will repeat when patient gets bloodwork in 2 weeks\par -DEXA scan referral given \par \par RTC in 6-8 weeks\par d/w Dr. Jauregui\par

## 2019-06-18 NOTE — PHYSICAL EXAM
[General Appearance - Alert] : alert [General Appearance - Well Nourished] : well nourished [General Appearance - In No Acute Distress] : in no acute distress [General Appearance - Well-Appearing] : healthy appearing [Sclera] : the sclera and conjunctiva were normal [Extraocular Movements] : extraocular movements were intact [Outer Ear] : the ears and nose were normal in appearance [Hearing Threshold Finger Rub Not [place]] : hearing was normal [Neck Appearance] : the appearance of the neck was normal [Auscultation Breath Sounds / Voice Sounds] : lungs were clear to auscultation bilaterally [Heart Sounds] : normal S1 and S2 [Edema] : there was no peripheral edema [Murmurs] : no murmurs [Bowel Sounds] : normal bowel sounds [Abdomen Soft] : soft [Abdomen Tenderness] : non-tender [Cervical Lymph Nodes Enlarged Posterior Bilaterally] : posterior cervical [Cervical Lymph Nodes Enlarged Anterior Bilaterally] : anterior cervical [Supraclavicular Lymph Nodes Enlarged Bilaterally] : supraclavicular [] : no rash [No Focal Deficits] : no focal deficits [Oriented To Time, Place, And Person] : oriented to person, place, and time [Motor Exam] : the motor exam was normal [FreeTextEntry1] : residual synovitis on R sternoclavicular joint. . L hand w no tenosynovitis. No synovitis of rest of joints. tender points throughout body.

## 2019-06-18 NOTE — HISTORY OF PRESENT ILLNESS
[FreeTextEntry1] : 64 F hx inflammatory arthritis (tenosynovitis/ R sternoclavicular arthritis), fibromyalgia, b/l knee OA presents for f/u visit\par \par Todays event: \par Threw away gabapentin because she thought it was opioids. \par \par Has appt w spine surgery on June 21st for cervical spine stenosis. Continues w numbness on finger tips. \par \par Being worked up for balance issues by neurology EMG. Metabolic work up negative. \par Pt saw ortho 6/10 for OA. Recommended for sx, but pt not ready. \par \par Pt says she has minimal activity w her arthritis. Minimal pain on R sternoiclavicular joint. Minimal swelling. Does have pain on exertion. No swelling or pain of in jont of hands, but still w tingling/numbness of hands.  Pain in knee on exertion/walking from OA. \par \par Does have pain on body from FM at various tender points. \par \par No fevers, chills, rashes, abdominal pain, n/v/d. \par \par \par Going [place] for 2 weeks.  \par \par Hx: \par \par 5/19\par MTX 12.5mg restarted given neuropathy work up completed. \par \par 3/19\par W numbness on finger tips. MRI of cervical spine showed mod cervical spine stenosis\par \par 10/18\par Pt with pain in sternoclavicular joint, R>L. b/l hands and wrist. There was suspicion of seronegative arthropathy, given pattern, but Xray of SI joint was intact. Pt started on MTX and pred, took only for 1 month and stopped, because pt was confused. \par \par 3/18\par Reporting 3 months of R neck pain and R clavicular swelling. Pt had US done, which showed sternoclavicular synovitis on 4/18. Pt was placed on MTX prednisone but pt did not follow up. \par \par \par 10/17\par At last visit in 08/17 Cymbalta 30mg started for FM. Pt without response on 30mg so her psychiatrist increased to 60mg.  Pt was on Zoloft and Elavil in the past without FM relief.  Pt requires b/l TKR and has been following with ortho, however they will operate on her only once she stops smoking, Pt working on smoking cessation. Takes ibuprofen only intermittently. Pt also c/o intermittent calf cramps. On Lipitor 80 mg x 2 years without recent medication changes.\par \par 08/17\par Pt last seen on 5/15/17 when she had complained of continuing pain of her L wrist and b/l LE pain.\par She had MRI L hand on 6/6/17 which showed persistent fluid within the extensor carpi radialis brevis and longus and extensor digitorum tendons with resolution of fluid surrounding flexor tendons compared to MRI LUE from 5/10/16.\par \par Pt was prescribed amitriptyline previously but says she has not been taking it because she was getting headaches. Takes Tylenol for pain control. \par Not taking her Zoloft because it was tapered off due to concern for interaction with Amitriptyline.  \par \par 8/16\par First clinic visit. Hypertrophy of L hand noticed. MRI of L hand was ordered, which showed tenosynitis. Xrays showed OA. MITCHELL was 1:320RF, CCP, HLA- B27, DsDNA, DsDNA negative

## 2019-06-18 NOTE — REVIEW OF SYSTEMS
[Negative] : Heme/Lymph [As Noted in HPI] : as noted in HPI [Feeling Poorly] : feeling poorly [Feeling Tired] : feeling tired [Fever] : no fever [Chills] : no chills [Eyesight Problems] : no eyesight problems [Discharge From Eyes] : no purulent discharge from the eyes [Chest Pain] : no chest pain [Palpitations] : no palpitations [Abdominal Pain] : no abdominal pain [Vomiting] : no vomiting [Constipation] : no constipation [Diarrhea] : no diarrhea [Dysuria] : no dysuria [Incontinence] : no incontinence [Pelvic Pain] : no pelvic pain [Dysmenorrhea] : no dysmenorrhea [Skin Lesions] : no skin lesions [Skin Wound] : no skin wound [Dizziness] : no dizziness [Fainting] : no fainting [Muscle Weakness] : no muscle weakness

## 2019-06-21 ENCOUNTER — APPOINTMENT (OUTPATIENT)
Dept: ORTHOPEDIC SURGERY | Facility: CLINIC | Age: 64
End: 2019-06-21
Payer: MEDICAID

## 2019-06-21 VITALS
BODY MASS INDEX: 33.28 KG/M2 | WEIGHT: 200 LBS | HEART RATE: 68 BPM | OXYGEN SATURATION: 97 % | SYSTOLIC BLOOD PRESSURE: 150 MMHG | DIASTOLIC BLOOD PRESSURE: 82 MMHG

## 2019-06-21 DIAGNOSIS — M43.16 SPONDYLOLISTHESIS, LUMBAR REGION: ICD-10-CM

## 2019-06-21 DIAGNOSIS — M47.817 SPONDYLOSIS W/OUT MYELOPATHY OR RADICULOPATHY, LUMBOSACRAL REGION: ICD-10-CM

## 2019-06-21 PROCEDURE — 99214 OFFICE O/P EST MOD 30 MIN: CPT

## 2019-06-21 PROCEDURE — 72100 X-RAY EXAM L-S SPINE 2/3 VWS: CPT

## 2019-07-05 ENCOUNTER — APPOINTMENT (OUTPATIENT)
Dept: ORTHOPEDIC SURGERY | Facility: CLINIC | Age: 64
End: 2019-07-05

## 2019-07-05 NOTE — PHYSICAL EXAM
[Alert] : alert [Awake] : awake [Normal] : vagina [No Tenderness] : no rectal tenderness [Labia Majora] : labia major [Acute Distress] : no acute distress

## 2019-07-11 ENCOUNTER — OTHER (OUTPATIENT)
Age: 64
End: 2019-07-11

## 2019-07-15 ENCOUNTER — APPOINTMENT (OUTPATIENT)
Dept: NEUROLOGY | Facility: CLINIC | Age: 64
End: 2019-07-15
Payer: MEDICAID

## 2019-07-15 VITALS
HEART RATE: 77 BPM | WEIGHT: 201 LBS | OXYGEN SATURATION: 98 % | SYSTOLIC BLOOD PRESSURE: 151 MMHG | DIASTOLIC BLOOD PRESSURE: 73 MMHG | HEIGHT: 65 IN | BODY MASS INDEX: 33.49 KG/M2

## 2019-07-15 PROCEDURE — 95908 NRV CNDJ TST 3-4 STUDIES: CPT

## 2019-07-15 PROCEDURE — 99214 OFFICE O/P EST MOD 30 MIN: CPT

## 2019-07-15 NOTE — ASSESSMENT
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  Will refer to hand surgery\par \par Will titrate Neurontin to 300 mg 3 times a day. Continue Cymbalta 60 mg. patient will see orthopedics for bilateral hip and knee pain. \par \par

## 2019-07-15 NOTE — HISTORY OF PRESENT ILLNESS
[FreeTextEntry1] : Patient has hypertension, rheumatoid arthritis and fibromyalgia and is here for balance problems going on for years. The patient feels numbness and tingling in bilateral legs for years. She also has weakness in her legs. She has had several falls. She has diffuse body pains but no specific back pain. No bowel or bladder incontinence or saddle anesthesia. The patient denies strokelike symptoms.\par \par Additionally, the patient has neck pain radiating to her left arm, present for the past few months. There is no weakness in the arms. The neck pain is worsening.  The patient has mass in the left dorsal hand which was previously excised and has reappeared.\par \par The patient takes Cymbalta 60 mg for pain. She was also given Neurontin 300 mg 3 times a day, however she has not yet started this medication. \par

## 2019-07-15 NOTE — PHYSICAL EXAM
[General Appearance - Alert] : alert [General Appearance - In No Acute Distress] : in no acute distress [Person] : oriented to person [Place] : oriented to place [Time] : oriented to time [Concentration Intact] : normal concentrating ability [Fluency] : fluency intact [Comprehension] : comprehension intact [Vocabulary] : adequate range of vocabulary [Cranial Nerves Optic (II)] : visual acuity intact bilaterally,  visual fields full to confrontation, pupils equal round and reactive to light [Cranial Nerves Oculomotor (III)] : extraocular motion intact [Cranial Nerves Trigeminal (V)] : facial sensation intact symmetrically [Cranial Nerves Facial (VII)] : face symmetrical [Motor Tone] : muscle tone was normal in all four extremities [Motor Strength] : muscle strength was normal in all four extremities [Limited Balance] : the patient's balance was impaired [FreeTextEntry7] : sensory loss bilateral median nerve distribution and bl legs to LT

## 2019-07-15 NOTE — DATA REVIEWED
[de-identified] : ortho and rheum notes appreciated\par ncs/emg: termianted and incomplete due to patient cooperation, CTS with demyelinating features ion the left\par neuropathy w/p notable for HbA1C of 5.7

## 2019-07-17 ENCOUNTER — FORM ENCOUNTER (OUTPATIENT)
Age: 64
End: 2019-07-17

## 2019-07-18 ENCOUNTER — APPOINTMENT (OUTPATIENT)
Dept: RADIOLOGY | Facility: IMAGING CENTER | Age: 64
End: 2019-07-18
Payer: MEDICAID

## 2019-07-18 ENCOUNTER — OUTPATIENT (OUTPATIENT)
Dept: OUTPATIENT SERVICES | Facility: HOSPITAL | Age: 64
LOS: 1 days | End: 2019-07-18
Payer: MEDICAID

## 2019-07-18 DIAGNOSIS — Z79.899 OTHER LONG TERM (CURRENT) DRUG THERAPY: ICD-10-CM

## 2019-07-18 PROCEDURE — 77080 DXA BONE DENSITY AXIAL: CPT | Mod: 26

## 2019-07-18 PROCEDURE — 77080 DXA BONE DENSITY AXIAL: CPT

## 2019-07-22 ENCOUNTER — OUTPATIENT (OUTPATIENT)
Dept: OUTPATIENT SERVICES | Facility: HOSPITAL | Age: 64
LOS: 1 days | End: 2019-07-22

## 2019-07-22 ENCOUNTER — LABORATORY RESULT (OUTPATIENT)
Age: 64
End: 2019-07-22

## 2019-07-22 ENCOUNTER — APPOINTMENT (OUTPATIENT)
Dept: RHEUMATOLOGY | Facility: CLINIC | Age: 64
End: 2019-07-22
Payer: MEDICAID

## 2019-07-22 VITALS
OXYGEN SATURATION: 96 % | HEIGHT: 67 IN | WEIGHT: 201 LBS | SYSTOLIC BLOOD PRESSURE: 130 MMHG | BODY MASS INDEX: 31.55 KG/M2 | HEART RATE: 75 BPM | DIASTOLIC BLOOD PRESSURE: 70 MMHG

## 2019-07-22 DIAGNOSIS — M65.9 SYNOVITIS AND TENOSYNOVITIS, UNSPECIFIED: ICD-10-CM

## 2019-07-22 DIAGNOSIS — Z79.899 OTHER LONG TERM (CURRENT) DRUG THERAPY: ICD-10-CM

## 2019-07-22 DIAGNOSIS — M54.12 RADICULOPATHY, CERVICAL REGION: ICD-10-CM

## 2019-07-22 DIAGNOSIS — M19.90 UNSPECIFIED OSTEOARTHRITIS, UNSPECIFIED SITE: ICD-10-CM

## 2019-07-22 LAB
ALBUMIN SERPL ELPH-MCNC: 4.2 G/DL — SIGNIFICANT CHANGE UP (ref 3.3–5)
ALP SERPL-CCNC: 94 U/L — SIGNIFICANT CHANGE UP (ref 40–120)
ALT FLD-CCNC: 19 U/L — SIGNIFICANT CHANGE UP (ref 4–33)
ANION GAP SERPL CALC-SCNC: 12 MMO/L — SIGNIFICANT CHANGE UP (ref 7–14)
AST SERPL-CCNC: 16 U/L — SIGNIFICANT CHANGE UP (ref 4–32)
BASOPHILS # BLD AUTO: 0.03 K/UL — SIGNIFICANT CHANGE UP (ref 0–0.2)
BASOPHILS NFR BLD AUTO: 0.4 % — SIGNIFICANT CHANGE UP (ref 0–2)
BILIRUB SERPL-MCNC: 0.2 MG/DL — SIGNIFICANT CHANGE UP (ref 0.2–1.2)
BUN SERPL-MCNC: 11 MG/DL — SIGNIFICANT CHANGE UP (ref 7–23)
CALCIUM SERPL-MCNC: 9.3 MG/DL — SIGNIFICANT CHANGE UP (ref 8.4–10.5)
CHLORIDE SERPL-SCNC: 103 MMOL/L — SIGNIFICANT CHANGE UP (ref 98–107)
CO2 SERPL-SCNC: 25 MMOL/L — SIGNIFICANT CHANGE UP (ref 22–31)
CREAT SERPL-MCNC: 0.63 MG/DL — SIGNIFICANT CHANGE UP (ref 0.5–1.3)
EOSINOPHIL # BLD AUTO: 0.14 K/UL — SIGNIFICANT CHANGE UP (ref 0–0.5)
EOSINOPHIL NFR BLD AUTO: 1.6 % — SIGNIFICANT CHANGE UP (ref 0–6)
GLUCOSE SERPL-MCNC: 108 MG/DL — HIGH (ref 70–99)
HCT VFR BLD CALC: 41.7 % — SIGNIFICANT CHANGE UP (ref 34.5–45)
HGB BLD-MCNC: 13.7 G/DL — SIGNIFICANT CHANGE UP (ref 11.5–15.5)
IMM GRANULOCYTES NFR BLD AUTO: 0.4 % — SIGNIFICANT CHANGE UP (ref 0–1.5)
LYMPHOCYTES # BLD AUTO: 1.91 K/UL — SIGNIFICANT CHANGE UP (ref 1–3.3)
LYMPHOCYTES # BLD AUTO: 22.4 % — SIGNIFICANT CHANGE UP (ref 13–44)
MCHC RBC-ENTMCNC: 31.2 PG — SIGNIFICANT CHANGE UP (ref 27–34)
MCHC RBC-ENTMCNC: 32.9 % — SIGNIFICANT CHANGE UP (ref 32–36)
MCV RBC AUTO: 95 FL — SIGNIFICANT CHANGE UP (ref 80–100)
MONOCYTES # BLD AUTO: 0.62 K/UL — SIGNIFICANT CHANGE UP (ref 0–0.9)
MONOCYTES NFR BLD AUTO: 7.3 % — SIGNIFICANT CHANGE UP (ref 2–14)
NEUTROPHILS # BLD AUTO: 5.8 K/UL — SIGNIFICANT CHANGE UP (ref 1.8–7.4)
NEUTROPHILS NFR BLD AUTO: 67.9 % — SIGNIFICANT CHANGE UP (ref 43–77)
NRBC # FLD: 0 K/UL — SIGNIFICANT CHANGE UP (ref 0–0)
PLATELET # BLD AUTO: 314 K/UL — SIGNIFICANT CHANGE UP (ref 150–400)
PMV BLD: 11.1 FL — SIGNIFICANT CHANGE UP (ref 7–13)
POTASSIUM SERPL-MCNC: 4 MMOL/L — SIGNIFICANT CHANGE UP (ref 3.5–5.3)
POTASSIUM SERPL-SCNC: 4 MMOL/L — SIGNIFICANT CHANGE UP (ref 3.5–5.3)
PROT SERPL-MCNC: 6.7 G/DL — SIGNIFICANT CHANGE UP (ref 6–8.3)
RBC # BLD: 4.39 M/UL — SIGNIFICANT CHANGE UP (ref 3.8–5.2)
RBC # FLD: 15.1 % — HIGH (ref 10.3–14.5)
SODIUM SERPL-SCNC: 140 MMOL/L — SIGNIFICANT CHANGE UP (ref 135–145)
WBC # BLD: 8.53 K/UL — SIGNIFICANT CHANGE UP (ref 3.8–10.5)
WBC # FLD AUTO: 8.53 K/UL — SIGNIFICANT CHANGE UP (ref 3.8–10.5)

## 2019-07-22 PROCEDURE — 99214 OFFICE O/P EST MOD 30 MIN: CPT | Mod: GC

## 2019-07-23 LAB
HAV IGM SER-ACNC: NONREACTIVE — SIGNIFICANT CHANGE UP
HBV CORE AB SER-ACNC: NONREACTIVE — SIGNIFICANT CHANGE UP
HBV CORE IGM SER-ACNC: NONREACTIVE — SIGNIFICANT CHANGE UP
HBV SURFACE AG SER-ACNC: NONREACTIVE — SIGNIFICANT CHANGE UP
HCV AB S/CO SERPL IA: 0.06 S/CO — SIGNIFICANT CHANGE UP (ref 0–0.99)
HCV AB SERPL-IMP: SIGNIFICANT CHANGE UP

## 2019-07-23 NOTE — PHYSICAL EXAM
[General Appearance - Alert] : alert [General Appearance - In No Acute Distress] : in no acute distress [General Appearance - Well Nourished] : well nourished [General Appearance - Well-Appearing] : healthy appearing [Sclera] : the sclera and conjunctiva were normal [Extraocular Movements] : extraocular movements were intact [Outer Ear] : the ears and nose were normal in appearance [Hearing Threshold Finger Rub Not [place]] : hearing was normal [Neck Appearance] : the appearance of the neck was normal [Heart Sounds] : normal S1 and S2 [Auscultation Breath Sounds / Voice Sounds] : lungs were clear to auscultation bilaterally [Murmurs] : no murmurs [Bowel Sounds] : normal bowel sounds [Edema] : there was no peripheral edema [Abdomen Soft] : soft [Abdomen Tenderness] : non-tender [Cervical Lymph Nodes Enlarged Posterior Bilaterally] : posterior cervical [Cervical Lymph Nodes Enlarged Anterior Bilaterally] : anterior cervical [Supraclavicular Lymph Nodes Enlarged Bilaterally] : supraclavicular [] : no rash [Motor Exam] : the motor exam was normal [No Focal Deficits] : no focal deficits [Oriented To Time, Place, And Person] : oriented to person, place, and time [FreeTextEntry1] : no synovitis R sternoclavicular joint. . L hand w no tenosynovitis. L dorsum malformation.  No synovitis of rest of joints. tender points throughout body.

## 2019-07-23 NOTE — HISTORY OF PRESENT ILLNESS
[FreeTextEntry1] : 64 F hx inflammatory arthritis (tenosynovitis/ R sternoclavicular arthritis), fibromyalgia, b/l knee OA presents for f/u visit\par \par Saw spine sx, and offered PT. However, not sure if cervical spine stenosis addressed? \par \par Pain in R ribs for months now. Pt feels its getting worse. Worse when pt gets upset. No fall, or trauma.\par \par Saw neuro  7/19. Neurontin increased, but pt refusing to take neurontin. Pt said to have disbalance from cervical radiculopathy, T7. Pt also found to CTS in L wrist in EMG. Pt could not tolerate rest of EMG. \par \par \par No fevers, chills, rashes, abdominal pain, n/v/d. \par \par \par Hx: \par \par 7/19\par Pt MTX uptitrated from 15mg to 20mg. Pred decreased from 10mg to 7.5 mg. \par \par 5/19\par MTX 12.5mg restarted given neuropathy work up completed. \par \par 3/19\par W numbness on finger tips. MRI of cervical spine showed mod cervical spine stenosis\par \par 10/18\par Pt with pain in sternoclavicular joint, R>L. b/l hands and wrist. There was suspicion of seronegative arthropathy, given pattern, but Xray of SI joint was intact. Pt started on MTX and pred, took only for 1 month and stopped, because pt was confused. \par \par 3/18\par Reporting 3 months of R neck pain and R clavicular swelling. Pt had US done, which showed sternoclavicular synovitis on 4/18. Pt was placed on MTX prednisone but pt did not follow up. \par \par \par 10/17\par At last visit in 08/17 Cymbalta 30mg started for FM. Pt without response on 30mg so her psychiatrist increased to 60mg.  Pt was on Zoloft and Elavil in the past without FM relief.  Pt requires b/l TKR and has been following with ortho, however they will operate on her only once she stops smoking, Pt working on smoking cessation. Takes ibuprofen only intermittently. Pt also c/o intermittent calf cramps. On Lipitor 80 mg x 2 years without recent medication changes.\par \par 08/17\par Pt last seen on 5/15/17 when she had complained of continuing pain of her L wrist and b/l LE pain.\par She had MRI L hand on 6/6/17 which showed persistent fluid within the extensor carpi radialis brevis and longus and extensor digitorum tendons with resolution of fluid surrounding flexor tendons compared to MRI LUE from 5/10/16.\par \par Pt was prescribed amitriptyline previously but says she has not been taking it because she was getting headaches. Takes Tylenol for pain control. \par Not taking her Zoloft because it was tapered off due to concern for interaction with Amitriptyline.  \par \par 8/16\par First clinic visit. Hypertrophy of L hand noticed. MRI of L hand was ordered, which showed tenosynitis. Xrays showed OA. MITCHELL was 1:320RF, CCP, HLA- B27, DsDNA, DsDNA negative

## 2019-07-23 NOTE — REVIEW OF SYSTEMS
[As Noted in HPI] : as noted in HPI [Negative] : Integumentary [Discharge From Eyes] : no purulent discharge from the eyes [Eyesight Problems] : no eyesight problems [Chest Pain] : no chest pain [Palpitations] : no palpitations [Constipation] : no constipation [Diarrhea] : no diarrhea [Pelvic Pain] : no pelvic pain [Dysmenorrhea] : no dysmenorrhea [Dizziness] : no dizziness [Fainting] : no fainting [Muscle Weakness] : no muscle weakness [de-identified] : numbness/tingling in UE

## 2019-07-24 LAB
GAMMA INTERFERON BACKGROUND BLD IA-ACNC: 0.02 IU/ML — SIGNIFICANT CHANGE UP
M TB IFN-G BLD-IMP: NEGATIVE — SIGNIFICANT CHANGE UP
M TB IFN-G CD4+ BCKGRND COR BLD-ACNC: 0.06 IU/ML — SIGNIFICANT CHANGE UP
M TB IFN-G CD4+CD8+ BCKGRND COR BLD-ACNC: 0.03 IU/ML — SIGNIFICANT CHANGE UP
QUANT TB PLUS MITOGEN MINUS NIL: 7.71 IU/ML — SIGNIFICANT CHANGE UP

## 2019-07-26 ENCOUNTER — OTHER (OUTPATIENT)
Age: 64
End: 2019-07-26

## 2019-07-29 ENCOUNTER — FORM ENCOUNTER (OUTPATIENT)
Age: 64
End: 2019-07-29

## 2019-07-30 ENCOUNTER — APPOINTMENT (OUTPATIENT)
Dept: ULTRASOUND IMAGING | Facility: IMAGING CENTER | Age: 64
End: 2019-07-30
Payer: MEDICAID

## 2019-07-30 ENCOUNTER — OUTPATIENT (OUTPATIENT)
Dept: OUTPATIENT SERVICES | Facility: HOSPITAL | Age: 64
LOS: 1 days | End: 2019-07-30
Payer: MEDICAID

## 2019-07-30 ENCOUNTER — APPOINTMENT (OUTPATIENT)
Dept: MAMMOGRAPHY | Facility: IMAGING CENTER | Age: 64
End: 2019-07-30
Payer: MEDICAID

## 2019-07-30 DIAGNOSIS — R92.8 OTHER ABNORMAL AND INCONCLUSIVE FINDINGS ON DIAGNOSTIC IMAGING OF BREAST: ICD-10-CM

## 2019-07-30 PROCEDURE — 77066 DX MAMMO INCL CAD BI: CPT | Mod: 26

## 2019-07-30 PROCEDURE — G0279: CPT

## 2019-07-30 PROCEDURE — 77062 BREAST TOMOSYNTHESIS BI: CPT | Mod: 26

## 2019-07-30 PROCEDURE — 76641 ULTRASOUND BREAST COMPLETE: CPT | Mod: 26,50

## 2019-07-30 PROCEDURE — 77066 DX MAMMO INCL CAD BI: CPT

## 2019-07-30 PROCEDURE — 76641 ULTRASOUND BREAST COMPLETE: CPT

## 2019-07-31 ENCOUNTER — APPOINTMENT (OUTPATIENT)
Dept: NEUROLOGY | Facility: CLINIC | Age: 64
End: 2019-07-31

## 2019-08-14 ENCOUNTER — OTHER (OUTPATIENT)
Age: 64
End: 2019-08-14

## 2019-08-16 ENCOUNTER — OTHER (OUTPATIENT)
Age: 64
End: 2019-08-16

## 2019-08-29 ENCOUNTER — APPOINTMENT (OUTPATIENT)
Dept: GASTROENTEROLOGY | Facility: CLINIC | Age: 64
End: 2019-08-29

## 2019-09-09 ENCOUNTER — MESSAGE (OUTPATIENT)
Age: 64
End: 2019-09-09

## 2019-09-16 ENCOUNTER — LABORATORY RESULT (OUTPATIENT)
Age: 64
End: 2019-09-16

## 2019-09-16 ENCOUNTER — APPOINTMENT (OUTPATIENT)
Dept: RHEUMATOLOGY | Facility: CLINIC | Age: 64
End: 2019-09-16
Payer: MEDICAID

## 2019-09-16 ENCOUNTER — OUTPATIENT (OUTPATIENT)
Dept: OUTPATIENT SERVICES | Facility: HOSPITAL | Age: 64
LOS: 1 days | End: 2019-09-16

## 2019-09-16 VITALS
HEIGHT: 67 IN | BODY MASS INDEX: 31.39 KG/M2 | DIASTOLIC BLOOD PRESSURE: 76 MMHG | HEART RATE: 76 BPM | WEIGHT: 200 LBS | SYSTOLIC BLOOD PRESSURE: 132 MMHG

## 2019-09-16 DIAGNOSIS — M17.10 UNILATERAL PRIMARY OSTEOARTHRITIS, UNSPECIFIED KNEE: ICD-10-CM

## 2019-09-16 DIAGNOSIS — M54.12 RADICULOPATHY, CERVICAL REGION: ICD-10-CM

## 2019-09-16 DIAGNOSIS — Z79.899 OTHER LONG TERM (CURRENT) DRUG THERAPY: ICD-10-CM

## 2019-09-16 DIAGNOSIS — R26.89 OTHER ABNORMALITIES OF GAIT AND MOBILITY: ICD-10-CM

## 2019-09-16 DIAGNOSIS — M65.9 SYNOVITIS AND TENOSYNOVITIS, UNSPECIFIED: ICD-10-CM

## 2019-09-16 DIAGNOSIS — F17.210 NICOTINE DEPENDENCE, CIGARETTES, UNCOMPLICATED: ICD-10-CM

## 2019-09-16 DIAGNOSIS — M67.441 GANGLION, RIGHT HAND: ICD-10-CM

## 2019-09-16 DIAGNOSIS — M25.552 PAIN IN RIGHT HIP: ICD-10-CM

## 2019-09-16 DIAGNOSIS — M25.551 PAIN IN RIGHT HIP: ICD-10-CM

## 2019-09-16 DIAGNOSIS — M19.019 PRIMARY OSTEOARTHRITIS, UNSPECIFIED SHOULDER: ICD-10-CM

## 2019-09-16 DIAGNOSIS — M19.90 UNSPECIFIED OSTEOARTHRITIS, UNSPECIFIED SITE: ICD-10-CM

## 2019-09-16 DIAGNOSIS — M54.5 LOW BACK PAIN: ICD-10-CM

## 2019-09-16 LAB
ALBUMIN SERPL ELPH-MCNC: 4.8 G/DL — SIGNIFICANT CHANGE UP (ref 3.3–5)
ALP SERPL-CCNC: 102 U/L — SIGNIFICANT CHANGE UP (ref 40–120)
ALT FLD-CCNC: 16 U/L — SIGNIFICANT CHANGE UP (ref 4–33)
ANION GAP SERPL CALC-SCNC: 13 MMO/L — SIGNIFICANT CHANGE UP (ref 7–14)
AST SERPL-CCNC: 14 U/L — SIGNIFICANT CHANGE UP (ref 4–32)
BASOPHILS # BLD AUTO: 0.03 K/UL — SIGNIFICANT CHANGE UP (ref 0–0.2)
BASOPHILS NFR BLD AUTO: 0.3 % — SIGNIFICANT CHANGE UP (ref 0–2)
BILIRUB SERPL-MCNC: 0.3 MG/DL — SIGNIFICANT CHANGE UP (ref 0.2–1.2)
BUN SERPL-MCNC: 10 MG/DL — SIGNIFICANT CHANGE UP (ref 7–23)
CALCIUM SERPL-MCNC: 9.6 MG/DL — SIGNIFICANT CHANGE UP (ref 8.4–10.5)
CHLORIDE SERPL-SCNC: 100 MMOL/L — SIGNIFICANT CHANGE UP (ref 98–107)
CO2 SERPL-SCNC: 27 MMOL/L — SIGNIFICANT CHANGE UP (ref 22–31)
CREAT SERPL-MCNC: 0.72 MG/DL — SIGNIFICANT CHANGE UP (ref 0.5–1.3)
EOSINOPHIL # BLD AUTO: 0.14 K/UL — SIGNIFICANT CHANGE UP (ref 0–0.5)
EOSINOPHIL NFR BLD AUTO: 1.2 % — SIGNIFICANT CHANGE UP (ref 0–6)
GLUCOSE SERPL-MCNC: 99 MG/DL — SIGNIFICANT CHANGE UP (ref 70–99)
HCT VFR BLD CALC: 43.8 % — SIGNIFICANT CHANGE UP (ref 34.5–45)
HGB BLD-MCNC: 14.3 G/DL — SIGNIFICANT CHANGE UP (ref 11.5–15.5)
IMM GRANULOCYTES NFR BLD AUTO: 0.4 % — SIGNIFICANT CHANGE UP (ref 0–1.5)
LYMPHOCYTES # BLD AUTO: 2.63 K/UL — SIGNIFICANT CHANGE UP (ref 1–3.3)
LYMPHOCYTES # BLD AUTO: 23.1 % — SIGNIFICANT CHANGE UP (ref 13–44)
MCHC RBC-ENTMCNC: 30.7 PG — SIGNIFICANT CHANGE UP (ref 27–34)
MCHC RBC-ENTMCNC: 32.6 % — SIGNIFICANT CHANGE UP (ref 32–36)
MCV RBC AUTO: 94 FL — SIGNIFICANT CHANGE UP (ref 80–100)
MONOCYTES # BLD AUTO: 0.82 K/UL — SIGNIFICANT CHANGE UP (ref 0–0.9)
MONOCYTES NFR BLD AUTO: 7.2 % — SIGNIFICANT CHANGE UP (ref 2–14)
NEUTROPHILS # BLD AUTO: 7.72 K/UL — HIGH (ref 1.8–7.4)
NEUTROPHILS NFR BLD AUTO: 67.8 % — SIGNIFICANT CHANGE UP (ref 43–77)
NRBC # FLD: 0 K/UL — SIGNIFICANT CHANGE UP (ref 0–0)
PLATELET # BLD AUTO: 360 K/UL — SIGNIFICANT CHANGE UP (ref 150–400)
PMV BLD: 10.3 FL — SIGNIFICANT CHANGE UP (ref 7–13)
POTASSIUM SERPL-MCNC: 3.8 MMOL/L — SIGNIFICANT CHANGE UP (ref 3.5–5.3)
POTASSIUM SERPL-SCNC: 3.8 MMOL/L — SIGNIFICANT CHANGE UP (ref 3.5–5.3)
PROT SERPL-MCNC: 7.4 G/DL — SIGNIFICANT CHANGE UP (ref 6–8.3)
RBC # BLD: 4.66 M/UL — SIGNIFICANT CHANGE UP (ref 3.8–5.2)
RBC # FLD: 14.6 % — HIGH (ref 10.3–14.5)
SODIUM SERPL-SCNC: 140 MMOL/L — SIGNIFICANT CHANGE UP (ref 135–145)
WBC # BLD: 11.39 K/UL — HIGH (ref 3.8–10.5)
WBC # FLD AUTO: 11.39 K/UL — HIGH (ref 3.8–10.5)

## 2019-09-16 PROCEDURE — 99214 OFFICE O/P EST MOD 30 MIN: CPT | Mod: GC

## 2019-09-17 LAB — 24R-OH-CALCIDIOL SERPL-MCNC: 20.5 NG/ML — LOW (ref 30–80)

## 2019-09-17 NOTE — HISTORY OF PRESENT ILLNESS
[FreeTextEntry1] : 64 F hx inflammatory arthritis (tenosynovitis/ R sternoclavicular arthritis), fibromyalgia, b/l knee OA presents for f/u visit\par \par Todays event:\par \par b/l shoulder pain, back of L knee. Swelling of b/l knees. Pain worse when moving around or when sitting in a certain position. \par Gained 10 lbs. \par \par Still w pain, numbness in finger tips. \par \par Saw spine sx, and offered PT. However, not sure if cervical spine stenosis addressed? \par \par No fevers, chills, rashes, abdominal pain, n/v/d. \par \par \par Hx: \par \par #inflammatory arthritis hx \par Pt came on 8/16 Hypertrophy of L hand noticed. MRI of L hand was ordered, which showed tenosynitis. Xrays showed OA. MITCHELL was 1:320RF, CCP, HLA- B27, DsDNA, DsDNA negative. Pt initially only tx as FM, but repeat MRI L hand on 6/6/17 showed persistent fluid within the extensor carpi radialis brevis and longus and extensor digitorum tendons with resolution of fluid surrounding flexor tendons compared to MRI LUE from 5/10/16. On 3/18, pt complained of R neck and R clavicular pain. . Pt had US done, which showed sternoclavicular synovitis on 4/18. Pt was placed on MTX prednisone but pt did not follow up. On 10/18 pt returned, w persistent pain on R sternoclavicular joint, and L hand pain. Given pattern, seronegative spondyloarthropathy was suspected. 10/18 SI joint Xrays shows no sacroiliitis. Pt again lost of f/u. On 3/19 complained of hand numbness, pain. MTX restarted on 5/19. Uptitrated to 20mg on 7/19.\par \par #FM hx\par Pt also has hx of superimposed fibromyalgia w various tender points. Amitriptyline gave pt h/a. Zoloft seemed no effective? Pt started on cymbalta, which was somewhat effective on 10/17.\par \par #OA\par Radiographic and clinical signs of OA on knees. Offered TKR on 6/19 by ortho but pt refuses. \par \par #Neuropathy\par 3/19 complaining of W numbness on finger tips. MTX was held given concern for neuropathy. Neuropathy work up was done, including B12, folic acid levels, methylmalonic acid, TSH, immunofixation, SPEP, all negative.  MRI of cervical spine showed mod cervical spine stenosis. \par \par \par \par \par \par \par \par \par \par \par

## 2019-09-17 NOTE — PHYSICAL EXAM
[General Appearance - Alert] : alert [General Appearance - In No Acute Distress] : in no acute distress [General Appearance - Well Nourished] : well nourished [General Appearance - Well-Appearing] : healthy appearing [Sclera] : the sclera and conjunctiva were normal [Outer Ear] : the ears and nose were normal in appearance [Extraocular Movements] : extraocular movements were intact [Hearing Threshold Finger Rub Not [place]] : hearing was normal [Neck Appearance] : the appearance of the neck was normal [Auscultation Breath Sounds / Voice Sounds] : lungs were clear to auscultation bilaterally [Heart Sounds] : normal S1 and S2 [Murmurs] : no murmurs [Edema] : there was no peripheral edema [Bowel Sounds] : normal bowel sounds [Abdomen Tenderness] : non-tender [Abdomen Soft] : soft [Cervical Lymph Nodes Enlarged Posterior Bilaterally] : posterior cervical [Cervical Lymph Nodes Enlarged Anterior Bilaterally] : anterior cervical [Supraclavicular Lymph Nodes Enlarged Bilaterally] : supraclavicular [] : no rash [Motor Exam] : the motor exam was normal [No Focal Deficits] : no focal deficits [Oriented To Time, Place, And Person] : oriented to person, place, and time [FreeTextEntry1] : obese

## 2019-09-17 NOTE — ASSESSMENT
[FreeTextEntry1] : 63yo F hx inflammatory arthritis (tenosynovitis/ R sternoclavicular arthritis), fibromyalgia, b/l knee OA presents for f/u visit\par \par # inflammatory arthritis\par -pt w evidence of tenosynovitis on R hand in MRI of 2016, US of sternoclavicular joints on 5/18 showed synovitis\par -pattern more consistent with seronegative spondyloarthropathy, however, imaging showed no SI pathology, 10/18\par -MITCHELL 1:320 but negative for RF, CCP, HLA- B27 , SSA, SSB, dsDNA\par -unsure if pt has continued synovitis on exam, which will warrant escalation to biologic vs FM\par -will obtain US of L wrist/hand to assess for synovitis and dictate on tx\par -c/w  prednisone to 7.5mg\par c/w MTX 20mg weekly w folic acid \par  -CBC, CMP \par \par #L arm numbness/ b/l hand numbness\par -MRI of cervical spine done on 4/19 showed face arthropathy and mod cervical spine stenosis\par -pt had spine surgery visit on 6/19, but only lumbar spine degenerative changes addressed, but cervical spine stenosis? \par -will message spine sx regarding plan of tx for this\par \par # Fibromyalgia\par - on Cymbalta 60mg qd in coordination w psychiatrist\par -in the past on amitriptyline and sertraline w no relief\par -PT\par \par # Knee OA\par --Xray of knees done 10/18 showed b/l knee effusion, R>L, and advanced OA changes with osteophytes, medial joint space narrowing\par -ortho offered TKR on 6/19, but patient refused\par \par #cough\par -chronic \par -seen By Dr. Lisker on 4/19,  PFTs showing FVC response to bronchodilators\par -pt prescribed bronchodilators, follows w pulm\par -likely secondary to smoking\par \par \par #HCM\par -influenza vaccine 3/19, pneumovax 2015; prevnar 7/19\par -Quant Gold, Hep B, C 7/19\par -DEXA scan normal 6/19, repeat in 2 years \par \par RTC in 8 weeks\par d/w Dr. Jauregui \par

## 2019-09-25 ENCOUNTER — APPOINTMENT (OUTPATIENT)
Dept: ORTHOPEDIC SURGERY | Facility: CLINIC | Age: 64
End: 2019-09-25

## 2019-10-15 ENCOUNTER — MEDICATION RENEWAL (OUTPATIENT)
Age: 64
End: 2019-10-15

## 2019-10-16 ENCOUNTER — RX RENEWAL (OUTPATIENT)
Age: 64
End: 2019-10-16

## 2019-10-23 ENCOUNTER — OTHER (OUTPATIENT)
Age: 64
End: 2019-10-23

## 2019-10-28 ENCOUNTER — APPOINTMENT (OUTPATIENT)
Dept: INTERNAL MEDICINE | Facility: CLINIC | Age: 64
End: 2019-10-28

## 2019-11-01 ENCOUNTER — APPOINTMENT (OUTPATIENT)
Dept: INTERNAL MEDICINE | Facility: CLINIC | Age: 64
End: 2019-11-01

## 2019-11-18 ENCOUNTER — APPOINTMENT (OUTPATIENT)
Dept: RHEUMATOLOGY | Facility: CLINIC | Age: 64
End: 2019-11-18

## 2019-12-02 ENCOUNTER — APPOINTMENT (OUTPATIENT)
Dept: INTERNAL MEDICINE | Facility: CLINIC | Age: 64
End: 2019-12-02
Payer: MEDICAID

## 2019-12-02 ENCOUNTER — OUTPATIENT (OUTPATIENT)
Dept: OUTPATIENT SERVICES | Facility: HOSPITAL | Age: 64
LOS: 1 days | End: 2019-12-02

## 2019-12-02 VITALS
OXYGEN SATURATION: 97 % | SYSTOLIC BLOOD PRESSURE: 144 MMHG | WEIGHT: 202.5 LBS | DIASTOLIC BLOOD PRESSURE: 72 MMHG | HEART RATE: 76 BPM | BODY MASS INDEX: 31.78 KG/M2 | HEIGHT: 67 IN

## 2019-12-02 PROCEDURE — 99214 OFFICE O/P EST MOD 30 MIN: CPT | Mod: GC

## 2019-12-02 RX ORDER — MAGNESIUM CITRATE
SOLUTION, ORAL ORAL
Qty: 2 | Refills: 0 | Status: DISCONTINUED | COMMUNITY
Start: 2018-06-07 | End: 2019-12-02

## 2019-12-02 RX ORDER — NEOMYCIN SULFATE, POLYMYXIN B SULFATE, AND PRAMOXINE HYDROCHLORIDE 3.5; 10000; 1 MG/G; [USP'U]/G; MG/G
2 CREAM TOPICAL
Qty: 1 | Refills: 2 | Status: DISCONTINUED | COMMUNITY
Start: 2018-05-26 | End: 2019-12-02

## 2019-12-02 RX ORDER — PREDNISONE 10 MG/1
10 TABLET ORAL DAILY
Qty: 30 | Refills: 1 | Status: DISCONTINUED | COMMUNITY
Start: 2018-10-08 | End: 2019-12-02

## 2019-12-02 RX ORDER — ATORVASTATIN CALCIUM 20 MG/1
20 TABLET, FILM COATED ORAL
Qty: 90 | Refills: 1 | Status: DISCONTINUED | COMMUNITY
Start: 2019-02-27 | End: 2019-12-02

## 2019-12-02 RX ORDER — GABAPENTIN 300 MG/1
300 CAPSULE ORAL
Qty: 90 | Refills: 5 | Status: DISCONTINUED | COMMUNITY
Start: 2019-07-15 | End: 2019-12-02

## 2019-12-05 ENCOUNTER — RX RENEWAL (OUTPATIENT)
Age: 64
End: 2019-12-05

## 2019-12-06 DIAGNOSIS — R73.03 PREDIABETES: ICD-10-CM

## 2019-12-06 DIAGNOSIS — E78.5 HYPERLIPIDEMIA, UNSPECIFIED: ICD-10-CM

## 2019-12-06 DIAGNOSIS — R13.10 DYSPHAGIA, UNSPECIFIED: ICD-10-CM

## 2019-12-06 DIAGNOSIS — M79.7 FIBROMYALGIA: ICD-10-CM

## 2019-12-06 DIAGNOSIS — I10 ESSENTIAL (PRIMARY) HYPERTENSION: ICD-10-CM

## 2019-12-06 DIAGNOSIS — F17.200 NICOTINE DEPENDENCE, UNSPECIFIED, UNCOMPLICATED: ICD-10-CM

## 2019-12-06 DIAGNOSIS — E32.9 DISEASE OF THYMUS, UNSPECIFIED: ICD-10-CM

## 2019-12-06 NOTE — PHYSICAL EXAM
[No Acute Distress] : no acute distress [Normal Sclera/Conjunctiva] : normal sclera/conjunctiva [EOMI] : extraocular movements intact [Normal Oropharynx] : the oropharynx was normal [No Lymphadenopathy] : no lymphadenopathy [Supple] : supple [No Accessory Muscle Use] : no accessory muscle use [Soft] : abdomen soft [Normal Rate] : normal rate  [Normal S1, S2] : normal S1 and S2 [Regular Rhythm] : with a regular rhythm [No Murmur] : no murmur heard [Normal Supraclavicular Nodes] : no supraclavicular lymphadenopathy [Normal Axillary Nodes] : no axillary lymphadenopathy [No Rash] : no rash [Normal Insight/Judgement] : insight and judgment were intact [Normal Anterior Cervical Nodes] : no anterior cervical lymphadenopathy [Alert and Oriented x3] : oriented to person, place, and time [No Respiratory Distress] : no respiratory distress  [Non-distended] : non-distended [de-identified] : Obese female  [de-identified] : Abdominal hernia noted  [de-identified] : coarse breath sounds, rhonchi L>R, no wheezing appreciated  [de-identified] : Pt unsteady gait, ambulates with walker.  [de-identified] : Depressed mood, denies passive or active SI.

## 2019-12-06 NOTE — END OF VISIT
[] : Resident [FreeTextEntry3] : 64yoF presents for follow up of chronic medical conditions.\par -Smoking: Smoking cessation discussed at length with patient today, patient is willing to cut down and will continue to consider quitting fully next year\par -Dysphagia: With solids and liquids, no weight loss or vomiting, patient advised to follow up with GI\par -HTN: BP slightly elevated today but has been out of lisinopril for a few days - will refill meds and recheck at next appointment\par -HLD: patient states that she had side effects from atorvastatin, will try rosuvastatin\par -Depression: patient seeing outpatient psychiatrist but would like referral to [facility] as well

## 2019-12-06 NOTE — ASSESSMENT
[FreeTextEntry1] : 65 y/o F current smoker (1 PPD) with asthma, HTN, HLD, fibromyalgia, inflammatory arthritis (followed by [facility]), b/l knee OA, R sternolavicular arthritis, depression (followed by outpatient psychiatry), pre-diabetes (A1c 5.7%) and colonic polyps who presents for f/u of chronic conditions and dysphagia. \par \par #Dysphagia/GERD \par - Pt reports recent cough and choking sensation associated with liquids and solids \par - Recommend follow up with GI for evaluation of possible EGD for evaluation \par \par #Asthma\par - Pt followed by Pulmonology  \par - Pt continues to smoke, discussed importance of smoking cessation \par - c/w Breo daily and prn albuterol, refilled today \par \par #Hypertension \par Pt reports not taking her Lisinopril for 6 days due to not having medications, BP today 144/72 \par - c/w Lisinopril and Amlodipine, refilled  \par - f/u in 2 months to monitor BP \par \par #HLD, poorly controlled, pt not taking medications  \par Lipid panel 2/19: , , LDL goal <70. Pt not taking atorvastatin, due to concern for side effects for palpitation \par - Discussed importance of taking statin, exercise and smoking cessation \par - Rosuvastatin 10 mg not covered by insurance, will send Pravastatin  \par - Repeat Lipid panel, pt will have labwork drawn on 12/23 visit with Rheumatology \par \par #Depression\par - No active or passive SI, pt sees outpatient psychiatrist every 8 weeks, outpatient therapist month \par - c/w Cymbalta 60 mg managed by psychiatrist \par - Referral to Behavioral Health for outpatient therapy, pt interested in care within [facility] \par \par #Inflammatory arthritis: \par - Follows with rheumatology, currently taking 2.5 mg Prednisone and Methotrexate  \par - Pt has appointment with Rheumatology on 12/23 \par \par #Fibromyalgia:\par -continue with Cymbalta 60 mg\par - Will start Gabapentin 300 TID for fibromyalgia, pt has been prescribed several times and not taken due to concerns of addiction potential \par \par #Colonic polyps \par - Pt follows with GI \par - Colonoscopy scheduled for Dec 11th \par \par #Nicotine Use Disorder, currently smoking 1 PPD  \par - Pt declines smoking cessation program referral at this time\par - Motivational interviewing on smoking cessation, pt has quit in the past, grandchildren motivation for quitting\par - Nicotine replacement and medications discussed \par - CT annual scan UTD, stable pulmonary nodule \par \par #Elevated A1c \par - f/u A1c, pt will have labwork drawn on 12/23 visit with Rheumatology \par - Discussed importance of lifestyle modification \par \par #HCM: \par - Flu shot, pt deferred for this appointment, will return on 12/23 for influenza appointment \par -Mammogram UTD 07/30/19, BIRADS 2 \par -Annual gyn exam 2019, UTD with pap smear (HPV negative in 2018) \par - DEXA scan UTD, due in 2021 \par \par RTC in 2 months for HTN and HLD \par \par Elisabeth Hooker MD \par PGY-1\par \par Discussed with Dr. De Leon. Pt seen and examined.

## 2019-12-06 NOTE — REVIEW OF SYSTEMS
[Joint Pain] : joint pain [Cough] : cough [Muscle Pain] : muscle pain [Joint Stiffness] : joint stiffness [Back Pain] : back pain [Insomnia] : insomnia [Depression] : depression [Negative] : Heme/Lymph [Suicidal] : not suicidal [Anxiety] : no anxiety

## 2019-12-06 NOTE — COUNSELING
[Behavioral health counseling provided] : Behavioral health counseling provided [Cessation strategies including cessation program discussed] : Cessation strategies including cessation program discussed [Risk of tobacco use and health benefits of smoking cessation discussed] : Risk of tobacco use and health benefits of smoking cessation discussed [Use of nicotine replacement therapies and other medications discussed] : Use of nicotine replacement therapies and other medications discussed [Willing to Quit Smoking] : Willing to quit smoking [Tobacco Use Cessation Intensive Greater Than 10 Minutes] : Tobacco Use Cessation Intensive Greater Than 10 Minutes [Patient Non-adherent to care plan] : Patient non-adherent to care plan [Needs reinforcement, provided] : Patient needs reinforcement on understanding of lifestyle changes and steps needed to achieve self management goal; reinforcement was provided [Patient motivation] : Patient motivation [FreeTextEntry3] : 15 [FreeTextEntry2] : 1/1/2020

## 2019-12-06 NOTE — HISTORY OF PRESENT ILLNESS
[FreeTextEntry1] : f/u of chronic conditions, dysphagia  [de-identified] : 65 y/o F current smoker (1 PPD) with asthma, HTN, HLD, fibromyalgia, inflammatory arthritis (followed by [facility]), b/l knee OA, R sternolavicular arthritis, NAFLD, depression (followed by outpatient psychiatry), pre-diabetes (A1c 5.7%), colonic polyps who presents for f/u chronic conditions and dysphagia. \par \par Dysphagia: Pt reports feeling that her neck is enlarged and that at times she feels that she is choking with liquids and solids and has been coughing more. Denies weight loss or vomiting. \par \par HLD: Pt reports that she has not been taking a statin for a "long time." Reports that she had adverse side effects from atorvastatin, including palpitations. Pt agreeable to try rosuvastatin. Discussed importance of taking cholesterol medication. \par \par Fibromyalgia: Pt complains of diffuse body aches. She is endorsing pain and spasms of her calves, L>R, which has been worsening since about a week ago. The pain improves with stretching and ankle dorsiflexion. \par \par Inflammatory arthritis: Pt follows with rheumatology. Reports taking Methotrexate and Prednisone. \par \par Knee OA: Pt deferred TKR by ortho. She takes PRN Tylenol for knee pain. Pt ambulated with walker, has stopped doing PT, does exercises at home. Reports that morning are very difficult for her. \par \par Smoking: Pt currently smokes 1 PPD. States that she feels guilty about smoking because she knows it is not good for her and has several health risks. Pt quit smoking for 2 years with nicotine replacement. States that she would consider quitting smoking if her grandchildren wrote her letters of why she should quit smoking. Discussed motivational barriers to smoking cessation, pt agreeable to smoke 1/2 pack  and consider quitting smoking in 2020. \par \par Depression: Pt follows with psychiatry as outpatient every 8 weeks, outpatient therapist monthly, currently taking 60 mg of Cymbalta. Reports poor sleep, increased appetite and low mood. Pt reports that her depression is worse when her fibromyalgia/OA pain is poorly controlled. Denies passive SI, active SI intent of plan.

## 2020-01-22 ENCOUNTER — RX RENEWAL (OUTPATIENT)
Age: 65
End: 2020-01-22

## 2020-02-12 ENCOUNTER — LABORATORY RESULT (OUTPATIENT)
Age: 65
End: 2020-02-12

## 2020-02-12 ENCOUNTER — APPOINTMENT (OUTPATIENT)
Dept: INTERNAL MEDICINE | Facility: CLINIC | Age: 65
End: 2020-02-12
Payer: MEDICAID

## 2020-02-12 ENCOUNTER — OUTPATIENT (OUTPATIENT)
Dept: OUTPATIENT SERVICES | Facility: HOSPITAL | Age: 65
LOS: 1 days | End: 2020-02-12

## 2020-02-12 VITALS
SYSTOLIC BLOOD PRESSURE: 120 MMHG | HEIGHT: 67 IN | HEART RATE: 80 BPM | WEIGHT: 207 LBS | BODY MASS INDEX: 32.49 KG/M2 | DIASTOLIC BLOOD PRESSURE: 60 MMHG

## 2020-02-12 DIAGNOSIS — M19.019 PRIMARY OSTEOARTHRITIS, UNSPECIFIED SHOULDER: ICD-10-CM

## 2020-02-12 LAB
ANION GAP SERPL CALC-SCNC: 13 MMO/L — SIGNIFICANT CHANGE UP (ref 7–14)
BASOPHILS # BLD AUTO: 0.02 K/UL — SIGNIFICANT CHANGE UP (ref 0–0.2)
BASOPHILS NFR BLD AUTO: 0.2 % — SIGNIFICANT CHANGE UP (ref 0–2)
BUN SERPL-MCNC: 11 MG/DL — SIGNIFICANT CHANGE UP (ref 7–23)
CALCIUM SERPL-MCNC: 9.3 MG/DL — SIGNIFICANT CHANGE UP (ref 8.4–10.5)
CHLORIDE SERPL-SCNC: 102 MMOL/L — SIGNIFICANT CHANGE UP (ref 98–107)
CHOLEST SERPL-MCNC: 191 MG/DL — SIGNIFICANT CHANGE UP (ref 120–199)
CO2 SERPL-SCNC: 26 MMOL/L — SIGNIFICANT CHANGE UP (ref 22–31)
CREAT SERPL-MCNC: 0.7 MG/DL — SIGNIFICANT CHANGE UP (ref 0.5–1.3)
EOSINOPHIL # BLD AUTO: 0.19 K/UL — SIGNIFICANT CHANGE UP (ref 0–0.5)
EOSINOPHIL NFR BLD AUTO: 1.9 % — SIGNIFICANT CHANGE UP (ref 0–6)
GLUCOSE SERPL-MCNC: 93 MG/DL — SIGNIFICANT CHANGE UP (ref 70–99)
HBA1C BLD-MCNC: 5.7 % — HIGH (ref 4–5.6)
HCT VFR BLD CALC: 42.7 % — SIGNIFICANT CHANGE UP (ref 34.5–45)
HDLC SERPL-MCNC: 52 MG/DL — SIGNIFICANT CHANGE UP (ref 45–65)
HGB BLD-MCNC: 13.4 G/DL — SIGNIFICANT CHANGE UP (ref 11.5–15.5)
IMM GRANULOCYTES NFR BLD AUTO: 0.6 % — SIGNIFICANT CHANGE UP (ref 0–1.5)
LIPID PNL WITH DIRECT LDL SERPL: 103 MG/DL — SIGNIFICANT CHANGE UP
LYMPHOCYTES # BLD AUTO: 1.93 K/UL — SIGNIFICANT CHANGE UP (ref 1–3.3)
LYMPHOCYTES # BLD AUTO: 19.4 % — SIGNIFICANT CHANGE UP (ref 13–44)
MCHC RBC-ENTMCNC: 30.7 PG — SIGNIFICANT CHANGE UP (ref 27–34)
MCHC RBC-ENTMCNC: 31.4 % — LOW (ref 32–36)
MCV RBC AUTO: 97.7 FL — SIGNIFICANT CHANGE UP (ref 80–100)
MONOCYTES # BLD AUTO: 0.76 K/UL — SIGNIFICANT CHANGE UP (ref 0–0.9)
MONOCYTES NFR BLD AUTO: 7.7 % — SIGNIFICANT CHANGE UP (ref 2–14)
NEUTROPHILS # BLD AUTO: 6.97 K/UL — SIGNIFICANT CHANGE UP (ref 1.8–7.4)
NEUTROPHILS NFR BLD AUTO: 70.2 % — SIGNIFICANT CHANGE UP (ref 43–77)
NRBC # FLD: 0 K/UL — SIGNIFICANT CHANGE UP (ref 0–0)
PLATELET # BLD AUTO: 344 K/UL — SIGNIFICANT CHANGE UP (ref 150–400)
PMV BLD: 11.2 FL — SIGNIFICANT CHANGE UP (ref 7–13)
POTASSIUM SERPL-MCNC: 4.4 MMOL/L — SIGNIFICANT CHANGE UP (ref 3.5–5.3)
POTASSIUM SERPL-SCNC: 4.4 MMOL/L — SIGNIFICANT CHANGE UP (ref 3.5–5.3)
RBC # BLD: 4.37 M/UL — SIGNIFICANT CHANGE UP (ref 3.8–5.2)
RBC # FLD: 14.9 % — HIGH (ref 10.3–14.5)
SODIUM SERPL-SCNC: 141 MMOL/L — SIGNIFICANT CHANGE UP (ref 135–145)
TRIGL SERPL-MCNC: 263 MG/DL — HIGH (ref 10–149)
WBC # BLD: 9.93 K/UL — SIGNIFICANT CHANGE UP (ref 3.8–10.5)
WBC # FLD AUTO: 9.93 K/UL — SIGNIFICANT CHANGE UP (ref 3.8–10.5)

## 2020-02-12 PROCEDURE — 99214 OFFICE O/P EST MOD 30 MIN: CPT | Mod: GC

## 2020-02-12 RX ORDER — POLYETHYLENE GLYCOL 3350 AND ELECTROLYTES WITH LEMON FLAVOR 236; 22.74; 6.74; 5.86; 2.97 G/4L; G/4L; G/4L; G/4L; G/4L
236 POWDER, FOR SOLUTION ORAL
Qty: 1 | Refills: 0 | Status: DISCONTINUED | COMMUNITY
Start: 2018-07-26 | End: 2020-02-12

## 2020-02-12 RX ORDER — POLYETHYLENE GLYCOL 3350 AND ELECTROLYTES WITH LEMON FLAVOR 236; 22.74; 6.74; 5.86; 2.97 G/4L; G/4L; G/4L; G/4L; G/4L
236 POWDER, FOR SOLUTION ORAL
Qty: 1 | Refills: 0 | Status: DISCONTINUED | COMMUNITY
Start: 2019-06-06 | End: 2020-02-12

## 2020-02-12 RX ORDER — ACETAMINOPHEN 500 MG/1
500 TABLET, COATED ORAL
Qty: 90 | Refills: 0 | Status: DISCONTINUED | COMMUNITY
Start: 2018-12-05 | End: 2020-02-12

## 2020-02-12 RX ORDER — FLUTICASONE FUROATE AND VILANTEROL TRIFENATATE 100; 25 UG/1; UG/1
100-25 POWDER RESPIRATORY (INHALATION) DAILY
Qty: 1 | Refills: 5 | Status: DISCONTINUED | COMMUNITY
Start: 2019-05-07 | End: 2020-02-12

## 2020-02-12 RX ORDER — ROSUVASTATIN CALCIUM 10 MG/1
10 TABLET, FILM COATED ORAL
Qty: 90 | Refills: 0 | Status: DISCONTINUED | COMMUNITY
Start: 2019-12-02 | End: 2020-02-12

## 2020-02-12 RX ORDER — ALBUTEROL SULFATE 108 UG/1
108 (90 BASE) AEROSOL, METERED RESPIRATORY (INHALATION)
Qty: 1 | Refills: 3 | Status: DISCONTINUED | COMMUNITY
Start: 2019-05-07 | End: 2020-02-12

## 2020-02-12 RX ORDER — BISACODYL 5 MG/1
5 TABLET ORAL
Qty: 2 | Refills: 0 | Status: DISCONTINUED | COMMUNITY
Start: 2018-06-07 | End: 2020-02-12

## 2020-02-13 DIAGNOSIS — F17.210 NICOTINE DEPENDENCE, CIGARETTES, UNCOMPLICATED: ICD-10-CM

## 2020-02-13 DIAGNOSIS — R73.03 PREDIABETES: ICD-10-CM

## 2020-02-13 DIAGNOSIS — M19.019 PRIMARY OSTEOARTHRITIS, UNSPECIFIED SHOULDER: ICD-10-CM

## 2020-02-13 DIAGNOSIS — M79.7 FIBROMYALGIA: ICD-10-CM

## 2020-02-13 DIAGNOSIS — J44.9 CHRONIC OBSTRUCTIVE PULMONARY DISEASE, UNSPECIFIED: ICD-10-CM

## 2020-02-13 DIAGNOSIS — E78.5 HYPERLIPIDEMIA, UNSPECIFIED: ICD-10-CM

## 2020-02-13 DIAGNOSIS — I10 ESSENTIAL (PRIMARY) HYPERTENSION: ICD-10-CM

## 2020-02-13 DIAGNOSIS — R13.10 DYSPHAGIA, UNSPECIFIED: ICD-10-CM

## 2020-02-13 DIAGNOSIS — Z23 ENCOUNTER FOR IMMUNIZATION: ICD-10-CM

## 2020-02-13 DIAGNOSIS — M13.89 OTHER SPECIFIED ARTHRITIS, MULTIPLE SITES: ICD-10-CM

## 2020-02-13 NOTE — COUNSELING
[Risk of tobacco use and health benefits of smoking cessation discussed] : Risk of tobacco use and health benefits of smoking cessation discussed [Cessation strategies including cessation program discussed] : Cessation strategies including cessation program discussed [Willing to Quit Smoking] : Willing to quit smoking [FreeTextEntry2] : Offered discussing with SBIRT  however patient adamantly declined.

## 2020-02-13 NOTE — REVIEW OF SYSTEMS
[Recent Change In Weight] : ~T recent weight change [Vision Problems] : vision problems [Sore Throat] : sore throat [Lower Ext Edema] : lower extremity edema [Shortness Of Breath] : shortness of breath [Wheezing] : wheezing [Cough] : cough [Dyspnea on Exertion] : dyspnea on exertion [Heartburn] : heartburn [Joint Pain] : joint pain [Joint Stiffness] : joint stiffness [Back Pain] : back pain [Joint Swelling] : joint swelling [Unsteady Walking] : ataxia [Fever] : no fever [Chills] : no chills [Hot Flashes] : no hot flashes [Pain] : no pain [Nasal Discharge] : no nasal discharge [Discharge] : no discharge [Hearing Loss] : no hearing loss [Chest Pain] : no chest pain [Palpitations] : no palpitations [Orthopnea] : no orthopnea [Abdominal Pain] : no abdominal pain [Nausea] : no nausea [Melena] : no melena [Constipation] : no constipation [Vomiting] : no vomiting [Muscle Weakness] : no muscle weakness [Dysuria] : no dysuria [Hematuria] : no hematuria [Skin Rash] : no skin rash [Headache] : no headache [Hair Changes] : no hair changes [Dizziness] : no dizziness

## 2020-02-13 NOTE — PHYSICAL EXAM
[No Acute Distress] : no acute distress [Well-Appearing] : well-appearing [Normal Sclera/Conjunctiva] : normal sclera/conjunctiva [EOMI] : extraocular movements intact [Normal Oropharynx] : the oropharynx was normal [Supple] : supple [No Accessory Muscle Use] : no accessory muscle use [No Lymphadenopathy] : no lymphadenopathy [Regular Rhythm] : with a regular rhythm [Normal Rate] : normal rate  [Pedal Pulses Present] : the pedal pulses are present [Normal S1, S2] : normal S1 and S2 [Soft] : abdomen soft [Non Tender] : non-tender [Normal Bowel Sounds] : normal bowel sounds [No Spinal Tenderness] : no spinal tenderness [Grossly Normal Strength/Tone] : grossly normal strength/tone [de-identified] : no thrush, +dentures [de-identified] : b/l rhonchi in mid and lower lung fields, L>R, no wheezing [de-identified] : b/l 1+ LE edema to mid shin [de-identified] : Tenderness to palpation throughout all back muscles [de-identified] : unsteady gait

## 2020-02-13 NOTE — ASSESSMENT
[FreeTextEntry1] : 64 y/o F current smoker (1 PPD) with asthma/COPD, HTN, HLD, fibromyalgia, inflammatory arthritis (followed by Rheum), b/l knee OA, R sternoclavicular arthritis, NAFLD, depression (followed by outpatient psychiatry), pre-diabetes (A1c 5.7% in 5/2019), colonic polyps who presents for f/u chronic conditions.\par \par #fibromyalgia - with diffuse pains in her upper body\par - on duloxetine\par - was on gabapentin however patient states she took 1 pill and felt like she was "stoned", no longer taking\par - follows with rheum - has appt on 2/24 for f/u \par \par #seronegative arthritis\par - takes tylenol daily for pains in legs and back\par - usually takes 1000mg daily\par - renew rx\par - on methotrexate and prednisone per rheum, f/u as above\par \par #COPD \par - increased cough without increased sputum or purulence, low concern for exacerbation at this time\par - last PFTs May 2019 with decreased FEV1/FVC ratio without response to bronchodilator\par - currently only uses albuterol nebulizer when SOB\par - advised patient she can use it more as needed for wheezing as well\par - discussed importance of maintenance inhaler for symptoms - agreed to try incruse ellipta, rx sent\par - lung cancer screening UTD as below\par \par \par #active smoker\par - discussed in detail regarding smoking cessation, patient became tearful when discussing her grandchildren and knows she has to quit for herself and them. Reports she is stubborn and can do it herself. Not interested in speaking with SBIRT  or other resource for cessation. \par \par #dysphagia/odynophagia - \par - no weight loss, regurgitation of food, denies melena, hematemesis, hematochezia\par - does not appear to have difficulty initiating swallowing however reports a problem after swallowing with coughing and choking as well as "throat pain" - with solids and liquids\par - cuts her food up very small to try to avoid this\par - patient has f/u with GI on 3/12 \par \par #Hyperlipidemia\par - had a period where she was off statin and lost insurance coverage of rosuvastatin\par - now on pravastatin, reports compliance\par - will repeat lipid panel today\par \par #pre-diabetes \par - last A1C in May 2019 5.7%\par - given recent weight gain and on steroids will check again today\par \par #HTN - currently well controlled\par -c/w amlodipine, lisinopril\par \par #HCM\par - last colonoscopy 2015 with multiple polyps, 1TA due in 2018 - has GI f/u 3/12\par - last mammo 7/2019 wnl, due 2021\par - bone scan 7/2019 normal\par - low dose CT done 6/2019 with stable lung nodules since 2013\par - last pap/hpv neg in 2018, due 2023 however will be >65\par - flu shot today, UTD on pneumo vacc, Tdap, will discuss zoster vacc at next visit\par \par RTC in 4 months for management of chronic medical problems\par d/w Dr Huerta\par \par [employer] 1

## 2020-02-13 NOTE — HISTORY OF PRESENT ILLNESS
[FreeTextEntry1] : back pain, increasing cough [de-identified] : 64 y/o F current smoker (1 PPD) with asthma/COPD, HTN, HLD, fibromyalgia, inflammatory arthritis (followed by Rheum), b/l knee OA, R sternoclavicular arthritis, NAFLD, depression (followed by outpatient psychiatry), pre-diabetes (A1c 5.7% in 5/2019), colonic polyps who presents for f/u chronic conditions.\par \par #rib/back pain - patient states she has a chronic pain under right breast which has been going on for a while now moving to the back for last 2-3 weeks. any type of movement makes it worse, picking up objects, coughing especially in the morning. B/l arm extension makes it worse, getting dressed and taking off clothes also worsens the pain. \par \par #cough/SOB - patient also notes chronic cough, SOB and sputum production. She reports she feels the cough has worsened over the past few months along with the SOB however the sputum has stayed the same amount, color and consistency. She denies fevers or chills. She notes the SOB worse at night or when doing activities. Currently still smoking 1ppd. Motivated to quit because of her grandchildren - reports she does not smoke around them but lives alone so she smokes when she's home. She does want to quit and knows she has to. \par \par #vision changes - recently saw ophtho for wavy lines in visual field - exam was normal per patient. Vision change happened 5-6 times over past 6 months, lasts for a couple seconds-minutes and goes away. No HA. Had a brief episode of lightheadedness yesterday that resolved quickly. \par \par #dysphagia - patient continues with dysphagia. She reports she gets an itching discomfort when she eats that makes her cough and feel like she's choking. She does not feel like food is getting stuck. She does endorse acid reflux and feels food coming back up on rare occasions. Reports 12 lb weight gain over past 1 year. Denies melena, hematochezia, N/V, regurgitation of food.

## 2020-02-24 ENCOUNTER — APPOINTMENT (OUTPATIENT)
Dept: RHEUMATOLOGY | Facility: CLINIC | Age: 65
End: 2020-02-24

## 2020-03-12 ENCOUNTER — OUTPATIENT (OUTPATIENT)
Dept: OUTPATIENT SERVICES | Facility: HOSPITAL | Age: 65
LOS: 1 days | End: 2020-03-12

## 2020-03-12 ENCOUNTER — APPOINTMENT (OUTPATIENT)
Dept: GASTROENTEROLOGY | Facility: CLINIC | Age: 65
End: 2020-03-12
Payer: MEDICAID

## 2020-03-12 VITALS
TEMPERATURE: 98.3 F | SYSTOLIC BLOOD PRESSURE: 140 MMHG | DIASTOLIC BLOOD PRESSURE: 60 MMHG | OXYGEN SATURATION: 99 % | BODY MASS INDEX: 32.41 KG/M2 | HEIGHT: 67 IN | HEART RATE: 79 BPM | WEIGHT: 206.5 LBS

## 2020-03-12 DIAGNOSIS — R13.10 DYSPHAGIA, UNSPECIFIED: ICD-10-CM

## 2020-03-12 DIAGNOSIS — R05 COUGH: ICD-10-CM

## 2020-03-12 DIAGNOSIS — D12.6 BENIGN NEOPLASM OF COLON, UNSPECIFIED: ICD-10-CM

## 2020-03-12 PROCEDURE — 99214 OFFICE O/P EST MOD 30 MIN: CPT | Mod: GC

## 2020-03-16 NOTE — PHYSICAL EXAM
[General Appearance - Alert] : alert [General Appearance - In No Acute Distress] : in no acute distress [Bowel Sounds] : normal bowel sounds [Abdomen Soft] : soft [Abdomen Tenderness] : non-tender [Abdomen Mass (___ Cm)] : no abdominal mass palpated [Abnormal Walk] : normal gait [Nail Clubbing] : no clubbing  or cyanosis of the fingernails [Musculoskeletal - Swelling] : no joint swelling seen [Motor Tone] : muscle strength and tone were normal [Skin Color & Pigmentation] : normal skin color and pigmentation [Skin Turgor] : normal skin turgor [Sclera] : the sclera and conjunctiva were normal [Outer Ear] : the ears and nose were normal in appearance [Neck Appearance] : the appearance of the neck was normal [] : no respiratory distress [Exaggerated Use Of Accessory Muscles For Inspiration] : no accessory muscle use [Heart Rate And Rhythm] : heart rate was normal and rhythm regular [No Focal Deficits] : no focal deficits [Oriented To Time, Place, And Person] : oriented to person, place, and time [Impaired Insight] : insight and judgment were intact [Affect] : the affect was normal [FreeTextEntry1] : intermittent coughing

## 2020-03-16 NOTE — HISTORY OF PRESENT ILLNESS
[de-identified] : The patient is a 65 year old female with HTN, HLD, COPD, fibromyalgia, obesity, colonic polyps (tubular adenoma) presenting for follow-up, given history of colonic polyp. \par \par Pt was last seen in clinic in June 2019 and was here for a repeat colonoscopy. Pt had a colonoscopy in 2015, at that time was found to have multiple polyps. \par \par At last visit she was recommend to have repeat colonoscopy however this appears not to have been done.\par \par The patient states that she has had a persistent cough for the last few months, the patient is also bringing up phlegm.  The patient also notes sometimes when she is eating she feels that the food makes her cough, but does not necessarily get stuck.  The patient denies any weight loss and does not get full quickly.  No issues with liquids or pills.  This has been getting progressively worse.  The patient also notes that she has had registration of food maybe once or twice over the last month.  Does note she needs to cut food very small.  \par \par Colonoscopy report is as below: 11/2015 \par Impression: - One 7 mm polyp in the cecum. Resected and retrieved.\par  - Multiple 3 to 7 mm polyps in the descending and \par  sigmoid colon. Resected and retrieved.\par  - Non-bleeding internal hemorrhoids.\par Recommendation: - Discharge patient to home.\par  - Resume previous diet.\par  - Await pathology results.\par  - Return to GI clinic in 4 weeks.\par  - Repeat colonoscopy in 3 years for surveillance based \par  on pathology results.\par \par Biopsy report: 11/2015\par 1. Rectal polyp, biopsy:\par - Hyperplastic polyp.\par \par 2. Cecal polyp, biopsy:\par - Tubular adenoma.\par \par 3. Descending colon polyps, biopsy:\par - Hyperplastic polyp.\par \par 4. Sigmoid colon polyps #1, biopsy:\par - Hyperplastic polyp.\par \par 5. Sigmoid colon polyps #2, biopsy:\par - Hyperplastic polyp.\par \par  \par The patient denies heartburn, dyspepsia, dysphagia, change in bowel habit, melena, weight loss, anemia or hematochezia, hematemesis. Pt denies family history of gallstones, colorectal cancer, polyps, breast, ovarian cancer. \par

## 2020-03-16 NOTE — ASSESSMENT
[FreeTextEntry1] : Impression\par 1) Colonoscopy surveillance - patient with previous history of tubular adenoma in 2015, recommended repeat in 3 years and has not been done yet\par 2) Dysphagia - only to solids, mild with out weight loss or early satiety\par 3) Increase in chronic cough - with sputum COPD exacerbation, possible GERD related. rule out malignancy as well given smoking history.\par \par Recommendations:\par -Repeat colonoscopy ordered\par - EGD for dysphagia component of patients upper GI/pulmonary symptoms\par - will prep with ricardo\par - will recommend pulmonary follow up\par - recommended PST testing before colonoscopy\par  - Risks and benefits of the procedure, including: perforation (tear in the colon), bleeding, reaction to sedation, and possible risk for surgical resection and colostomy bag explained\par

## 2020-03-16 NOTE — END OF VISIT
[] : Fellow [FreeTextEntry3] : Colonoscopy with polyps in 2015. Overdue for surveillance.\par Recent worsening of chronic, productive cough. Also intermittent dysphagia and ?throat pain with PO intake of solids. No prior EGD. \par Discussed importance of pulmonary follow up for relatively new cough over last 6 months with intermittent phlegm.

## 2020-03-20 ENCOUNTER — RX RENEWAL (OUTPATIENT)
Age: 65
End: 2020-03-20

## 2020-04-06 ENCOUNTER — APPOINTMENT (OUTPATIENT)
Dept: RHEUMATOLOGY | Facility: CLINIC | Age: 65
End: 2020-04-06

## 2020-04-18 ENCOUNTER — APPOINTMENT (OUTPATIENT)
Dept: DISASTER EMERGENCY | Facility: CLINIC | Age: 65
End: 2020-04-18
Payer: MEDICAID

## 2020-04-18 VITALS
RESPIRATION RATE: 16 BRPM | SYSTOLIC BLOOD PRESSURE: 136 MMHG | TEMPERATURE: 98.9 F | HEART RATE: 80 BPM | OXYGEN SATURATION: 99 % | DIASTOLIC BLOOD PRESSURE: 70 MMHG

## 2020-04-18 VITALS — BODY MASS INDEX: 30.31 KG/M2 | HEIGHT: 68 IN | WEIGHT: 200 LBS

## 2020-04-18 PROCEDURE — 99203 OFFICE O/P NEW LOW 30 MIN: CPT

## 2020-04-18 NOTE — HISTORY OF PRESENT ILLNESS
[Patient presents to the office today for COVID-19 evaluation and testing.] : Patient presents to the office today for COVID-19 evaluation and testing. [Patient has been pre-screened by RN at call center for appointment today with our facility.] : Patient has been pre-screened by RN at call center for appointment today with our facility. [] : no dizziness on standing [None Known] : none known [Age >= 60 years] : age >= 60 years [None] : none [AMS] : altered mental status [Clear] : clear [Speaks in full sentences] : speaks in full sentences [Normal O2 sat at rest] : normal O2 sat at rest [Grossly normal, interacts, not tired or weak] : grossly normal, interacts, not tired or weak [COVID-19 testing ordered and specimen obtained] : COVID-19 testing ordered and specimen obtained [Discharged with current Quarantine instructions and advised of signs of worsening illness.] : Patient discharged with current quarantine instructions and advised of signs of worsening illness. Patient told to seek emergent care if symptoms occur. [FreeTextEntry1] : Mayelin is a 65 year old female who presents for COVID-19 testing. She reports fatigue, dry cough, body aches, headache and nausea x 3 days. She lives alone with visits from her  a few times a week. She is retired and is current smoker.  [TextBox_[id number]] : \par Patient education provided for COVID-19. Explained if symptoms such as SOB or fever progress, patient instructed to go to ED.  Discussed isolation precautions and handwashing techniques. Social distancing reviewed. Utilize Tylenol for fever >101. No signs of cardiovascular decompensation. Patient verbalized understanding of provided instructions. Tests results may take up to 3-7 days to result. Discussed possibility of false negative results. Instructed to self quarantine and must remain quarantined x 14 days and no fever for three days without antipyretic medication.  All patient close contacts should also self quarantine. Social distancing once quarantine is completed. If patient has symptoms of chest discomfort, severe SOB at rest, worsening shortness of breath with minimal exertion, new or worsening wheezing, dizziness were instructed to seek immediate medical evaluation. \par \par COVID-19 nasal swab preformed and ordered in office today. Advised test results may take 3-7 days to return. Discussed diagnostic accuracy and possibility of false negative results. Instructed to self quarantine and must remain quarantined x 14 days and no fever for 3 days without antifever medications.  All patients close contacts should also self quarantine.  Close contacts with comorbidities at higher risk of COVID disease.  Social distancing once quarantine is completed.  If high risk symptoms of chest discomfort, severe shortness of breath at rest, worsening shortness of breath with minimal exertion, new or worsening wheezing, dizziness then instructed to seek immediate medical evaluation.  A letter for work and patient education form was provided.  The above plan was reviewed with the patient.  All questions have been answered.  Instructed to call if any new symptoms\par  \par \par \par

## 2020-04-19 LAB — SARS-COV-2 N GENE NPH QL NAA+PROBE: NOT DETECTED

## 2020-05-18 ENCOUNTER — APPOINTMENT (OUTPATIENT)
Dept: RHEUMATOLOGY | Facility: CLINIC | Age: 65
End: 2020-05-18

## 2020-05-28 ENCOUNTER — APPOINTMENT (OUTPATIENT)
Dept: GASTROENTEROLOGY | Facility: CLINIC | Age: 65
End: 2020-05-28

## 2020-06-10 ENCOUNTER — APPOINTMENT (OUTPATIENT)
Dept: NEPHROLOGY | Facility: CLINIC | Age: 65
End: 2020-06-10

## 2020-06-22 ENCOUNTER — OUTPATIENT (OUTPATIENT)
Dept: OUTPATIENT SERVICES | Facility: HOSPITAL | Age: 65
LOS: 1 days | End: 2020-06-22

## 2020-06-22 ENCOUNTER — APPOINTMENT (OUTPATIENT)
Dept: RHEUMATOLOGY | Facility: CLINIC | Age: 65
End: 2020-06-22
Payer: MEDICAID

## 2020-06-22 ENCOUNTER — LABORATORY RESULT (OUTPATIENT)
Age: 65
End: 2020-06-22

## 2020-06-22 VITALS
RESPIRATION RATE: 15 BRPM | HEART RATE: 84 BPM | DIASTOLIC BLOOD PRESSURE: 98 MMHG | WEIGHT: 204 LBS | SYSTOLIC BLOOD PRESSURE: 138 MMHG | HEIGHT: 68 IN | OXYGEN SATURATION: 94 % | BODY MASS INDEX: 30.92 KG/M2 | TEMPERATURE: 97.3 F

## 2020-06-22 DIAGNOSIS — M79.7 FIBROMYALGIA: ICD-10-CM

## 2020-06-22 DIAGNOSIS — M19.019 PRIMARY OSTEOARTHRITIS, UNSPECIFIED SHOULDER: ICD-10-CM

## 2020-06-22 DIAGNOSIS — F17.210 NICOTINE DEPENDENCE, CIGARETTES, UNCOMPLICATED: ICD-10-CM

## 2020-06-22 DIAGNOSIS — M19.90 UNSPECIFIED OSTEOARTHRITIS, UNSPECIFIED SITE: ICD-10-CM

## 2020-06-22 LAB
ALBUMIN SERPL ELPH-MCNC: 4.4 G/DL — SIGNIFICANT CHANGE UP (ref 3.3–5)
ALP SERPL-CCNC: 104 U/L — SIGNIFICANT CHANGE UP (ref 40–120)
ALT FLD-CCNC: 18 U/L — SIGNIFICANT CHANGE UP (ref 4–33)
ANION GAP SERPL CALC-SCNC: 12 MMO/L — SIGNIFICANT CHANGE UP (ref 7–14)
AST SERPL-CCNC: 22 U/L — SIGNIFICANT CHANGE UP (ref 4–32)
BASOPHILS # BLD AUTO: 0.03 K/UL — SIGNIFICANT CHANGE UP (ref 0–0.2)
BASOPHILS NFR BLD AUTO: 0.3 % — SIGNIFICANT CHANGE UP (ref 0–2)
BILIRUB SERPL-MCNC: 0.3 MG/DL — SIGNIFICANT CHANGE UP (ref 0.2–1.2)
BUN SERPL-MCNC: 8 MG/DL — SIGNIFICANT CHANGE UP (ref 7–23)
CALCIUM SERPL-MCNC: 9.4 MG/DL — SIGNIFICANT CHANGE UP (ref 8.4–10.5)
CHLORIDE SERPL-SCNC: 104 MMOL/L — SIGNIFICANT CHANGE UP (ref 98–107)
CO2 SERPL-SCNC: 26 MMOL/L — SIGNIFICANT CHANGE UP (ref 22–31)
CREAT SERPL-MCNC: 0.7 MG/DL — SIGNIFICANT CHANGE UP (ref 0.5–1.3)
CRP SERPL-MCNC: 6.2 MG/L — HIGH
EOSINOPHIL # BLD AUTO: 0.08 K/UL — SIGNIFICANT CHANGE UP (ref 0–0.5)
EOSINOPHIL NFR BLD AUTO: 0.7 % — SIGNIFICANT CHANGE UP (ref 0–6)
ERYTHROCYTE [SEDIMENTATION RATE] IN BLOOD: 10 MM/HR — SIGNIFICANT CHANGE UP (ref 4–25)
GLUCOSE SERPL-MCNC: 133 MG/DL — HIGH (ref 70–99)
HCT VFR BLD CALC: 41.7 % — SIGNIFICANT CHANGE UP (ref 34.5–45)
HGB BLD-MCNC: 13.3 G/DL — SIGNIFICANT CHANGE UP (ref 11.5–15.5)
IMM GRANULOCYTES NFR BLD AUTO: 0.3 % — SIGNIFICANT CHANGE UP (ref 0–1.5)
LYMPHOCYTES # BLD AUTO: 1.32 K/UL — SIGNIFICANT CHANGE UP (ref 1–3.3)
LYMPHOCYTES # BLD AUTO: 11.4 % — LOW (ref 13–44)
MCHC RBC-ENTMCNC: 29.8 PG — SIGNIFICANT CHANGE UP (ref 27–34)
MCHC RBC-ENTMCNC: 31.9 % — LOW (ref 32–36)
MCV RBC AUTO: 93.5 FL — SIGNIFICANT CHANGE UP (ref 80–100)
MONOCYTES # BLD AUTO: 0.55 K/UL — SIGNIFICANT CHANGE UP (ref 0–0.9)
MONOCYTES NFR BLD AUTO: 4.8 % — SIGNIFICANT CHANGE UP (ref 2–14)
NEUTROPHILS # BLD AUTO: 9.51 K/UL — HIGH (ref 1.8–7.4)
NEUTROPHILS NFR BLD AUTO: 82.5 % — HIGH (ref 43–77)
NRBC # FLD: 0 K/UL — SIGNIFICANT CHANGE UP (ref 0–0)
PLATELET # BLD AUTO: 351 K/UL — SIGNIFICANT CHANGE UP (ref 150–400)
PMV BLD: 11 FL — SIGNIFICANT CHANGE UP (ref 7–13)
POTASSIUM SERPL-MCNC: 4.3 MMOL/L — SIGNIFICANT CHANGE UP (ref 3.5–5.3)
POTASSIUM SERPL-SCNC: 4.3 MMOL/L — SIGNIFICANT CHANGE UP (ref 3.5–5.3)
PROT SERPL-MCNC: 6.7 G/DL — SIGNIFICANT CHANGE UP (ref 6–8.3)
RBC # BLD: 4.46 M/UL — SIGNIFICANT CHANGE UP (ref 3.8–5.2)
RBC # FLD: 15.4 % — HIGH (ref 10.3–14.5)
SODIUM SERPL-SCNC: 142 MMOL/L — SIGNIFICANT CHANGE UP (ref 135–145)
WBC # BLD: 11.53 K/UL — HIGH (ref 3.8–10.5)
WBC # FLD AUTO: 11.53 K/UL — HIGH (ref 3.8–10.5)

## 2020-06-22 PROCEDURE — 99214 OFFICE O/P EST MOD 30 MIN: CPT | Mod: GC

## 2020-06-22 NOTE — PHYSICAL EXAM
[General Appearance - In No Acute Distress] : in no acute distress [General Appearance - Alert] : alert [General Appearance - Well Nourished] : well nourished [General Appearance - Well-Appearing] : healthy appearing [Extraocular Movements] : extraocular movements were intact [Sclera] : the sclera and conjunctiva were normal [Hearing Threshold Finger Rub Not [place]] : hearing was normal [Outer Ear] : the ears and nose were normal in appearance [Neck Appearance] : the appearance of the neck was normal [Auscultation Breath Sounds / Voice Sounds] : lungs were clear to auscultation bilaterally [Heart Sounds] : normal S1 and S2 [Murmurs] : no murmurs [Edema] : there was no peripheral edema [Abdomen Soft] : soft [Bowel Sounds] : normal bowel sounds [Cervical Lymph Nodes Enlarged Anterior Bilaterally] : anterior cervical [Abdomen Tenderness] : non-tender [Cervical Lymph Nodes Enlarged Posterior Bilaterally] : posterior cervical [] : no rash [Supraclavicular Lymph Nodes Enlarged Bilaterally] : supraclavicular [Motor Exam] : the motor exam was normal [No Focal Deficits] : no focal deficits [Oriented To Time, Place, And Person] : oriented to person, place, and time [FreeTextEntry1] : no synovitis R sternoclavicular joint.  L hand w no tenosynovitis. L dorsum malformation.  tender R sternoclavicular joint, L hand/wrist. Also tender points throughout body.

## 2020-06-22 NOTE — HISTORY OF PRESENT ILLNESS
[FreeTextEntry1] : 65 F hx inflammatory arthritis (tenosynovitis/ R sternoclavicular arthritis), fibromyalgia, b/l knee OA presents for f/u visit\par \par \par Today:\par The patient reports to feel about the same as before. she describes diffuse body aches but mainly located over the mid back, ribs and clavicular area. she denies having any falls or recent accidents. she continue to have chronic knee pain and intermittent hand discomfort.\par \par \par ROS\par Last follow up in April\par she reported to have a viral illness in April that increased her body aches. \par No new skin rashes\par no alopecia\par no oral ulcers\par no fever or chills\par \par Hx: \par \par #inflammatory arthritis hx \par Pt came on 8/16 Hypertrophy of L hand noticed. MRI of L hand was ordered, which showed tenosynitis. Xrays showed OA. MITCHELL was 1:320RF, CCP, HLA- B27, DsDNA, DsDNA negative. Pt initially only tx as FM, but repeat MRI L hand on 6/6/17 showed persistent fluid within the extensor carpi radialis brevis and longus and extensor digitorum tendons with resolution of fluid surrounding flexor tendons compared to MRI LUE from 5/10/16. On 3/18, pt complained of R neck and R clavicular pain. . Pt had US done, which showed sternoclavicular synovitis on 4/18. Pt was placed on MTX prednisone but pt did not follow up. On 10/18 pt returned, w persistent pain on R sternoclavicular joint, and L hand pain. Given pattern, seronegative spondyloarthropathy was suspected. 10/18 SI joint Xrays shows no sacroiliitis. Pt again lost of f/u. On 3/19 complained of hand numbness, pain. MTX restarted on 5/19. Uptitrated to 20mg on 7/19.\par \par #FM hx\par Pt also has hx of superimposed fibromyalgia w various tender points. Amitriptyline gave pt h/a. Zoloft seemed no effective? Pt started on cymbalta, which was somewhat effective on 10/17.\par \par #OA\par Radiographic and clinical signs of OA on knees. Offered TKR on 6/19 by ortho but pt refuses. \par \par #Neuropathy\par 3/19 complaining of W numbness on finger tips. MTX was held given concern for neuropathy. Neuropathy work up was done, including B12, folic acid levels, methylmalonic acid, TSH, immunofixation, SPEP, all negative.  MRI of cervical spine showed mod cervical spine stenosis.

## 2020-06-22 NOTE — ASSESSMENT
[FreeTextEntry1] : 66yo F hx inflammatory arthritis (tenosynovitis/ R sternoclavicular arthritis), fibromyalgia, b/l knee OA presents for f/u visit\par \par # inflammatory arthritis\par -pt w evidence of tenosynovitis on R hand in MRI of 2016, US of sternoclavicular joints on 5/18 showed synovitis\par -pattern more consistent with seronegative spondyloarthropathy, however, imaging showed no SI pathology, 10/18\par -MITCHELL 1:320 but negative for RF, CCP, HLA- B27 , SSA, SSB, dsDNA\par \par Will reassess back pain and diffuse joint complains. will decrease prednisone to 5 mg daily and will obtain xrays. the patient will follow up in the clinic in 4 - 6 weeks.\par -decrease prednisone to 5 mg daily PO\par c/w MTX 20mg weekly w folic acid\par -Naproxen therapy 500 mg BID prn PO\par Xray: Ribs, lumbar and thoracic spine\par Labs including cbc, bmp, sed rate, CRP, hepatitis panel and quant gold \par  \par \par \par #L arm numbness/ b/l hand numbness\par -MRI of cervical spine done on 4/19 showed face arthropathy and mod cervical spine stenosis\par -pt had spine surgery visit on 6/19, but only lumbar spine degenerative changes addressed, but cervical spine stenosis.\par \par \par # Fibromyalgia\par - on Cymbalta 60mg qd in coordination w psychiatrist\par -in the past on amitriptyline and sertraline w no relief\par -PT\par \par # Knee OA\par --Xray of knees done 10/18 showed b/l knee effusion, R>L, and advanced OA changes with osteophytes, medial joint space narrowing\par -ortho offered TKR on 6/19, but patient refused\par \par #cough/SOB: secondary to COPD and active smoking\par -Lung cancer screening CT ordered today\par -seen By Dr. Lisker on 4/19,  PFTs showing FVC response to bronchodilators\par -pt prescribed bronchodilators, follows w pulm\par -likely secondary to smoking\par \par \par #HCM\par -influenza vaccine 3/19, pneumovax 2015; prevnar 7/19\par -Quant Gold, Hep B, C 7/19\par -DEXA scan normal 6/19, repeat in 2 years \par \par RTC in 6 weeks\par d/w Dr. Jauregui \par

## 2020-06-23 LAB
HAV IGM SER-ACNC: NONREACTIVE — SIGNIFICANT CHANGE UP
HBV CORE IGM SER-ACNC: NONREACTIVE — SIGNIFICANT CHANGE UP
HBV SURFACE AG SER-ACNC: NONREACTIVE — SIGNIFICANT CHANGE UP
HCV AB S/CO SERPL IA: 0.07 S/CO — SIGNIFICANT CHANGE UP (ref 0–0.99)
HCV AB SERPL-IMP: SIGNIFICANT CHANGE UP

## 2020-06-24 LAB
GAMMA INTERFERON BACKGROUND BLD IA-ACNC: 0 IU/ML — SIGNIFICANT CHANGE UP
M TB IFN-G BLD-IMP: HIGH
M TB IFN-G CD4+ BCKGRND COR BLD-ACNC: 0.02 IU/ML — SIGNIFICANT CHANGE UP
M TB IFN-G CD4+CD8+ BCKGRND COR BLD-ACNC: 0.03 IU/ML — SIGNIFICANT CHANGE UP
QUANT TB PLUS MITOGEN MINUS NIL: 0.1 IU/ML — SIGNIFICANT CHANGE UP

## 2020-07-02 ENCOUNTER — OUTPATIENT (OUTPATIENT)
Dept: OUTPATIENT SERVICES | Facility: HOSPITAL | Age: 65
LOS: 1 days | End: 2020-07-02

## 2020-07-02 ENCOUNTER — APPOINTMENT (OUTPATIENT)
Dept: INTERNAL MEDICINE | Facility: CLINIC | Age: 65
End: 2020-07-02
Payer: MEDICAID

## 2020-07-02 VITALS — HEIGHT: 67 IN | BODY MASS INDEX: 31.71 KG/M2 | WEIGHT: 202 LBS

## 2020-07-02 PROCEDURE — ZZZZZ: CPT

## 2020-07-02 RX ORDER — GABAPENTIN 300 MG/1
300 CAPSULE ORAL EVERY 8 HOURS
Qty: 270 | Refills: 2 | Status: DISCONTINUED | COMMUNITY
Start: 2019-05-23 | End: 2020-07-02

## 2020-07-02 RX ORDER — ZOSTER VACCINE RECOMBINANT, ADJUVANTED 50 MCG/0.5
50 KIT INTRAMUSCULAR
Qty: 1 | Refills: 1 | Status: ACTIVE | COMMUNITY
Start: 2020-07-02 | End: 1900-01-01

## 2020-07-05 NOTE — ASSESSMENT
[FreeTextEntry1] : Telephonic visit time: 28 minutes\par \par 66 y/o F current smoker (1 PPD) with asthma/COPD, HTN, HLD, fibromyalgia, inflammatory arthritis (followed by Rheum), b/l knee OA, R sternoclavicular arthritis, NAFLD, depression (followed by outpatient psychiatry), pre-diabetes (A1c 5.7% in 5/2019), colonic polyps who presents for f/u chronic conditions with recent viral prodrome. \par \par # Viral prodrome (shortness of breath, malaise, body aches)\par - pt with subjective viral prodrome symptoms including SOB, without fever\par - on the phone, pt was speaking comfortably in a loud voice, for extended periods of time, without any s/s respiratory distress\par - symptoms more likely related to COPD and fibromyalgia rather than prolonged infection, low suspicion for covid given lack of fever and tested negative x 1\par - pt will make an appointment for an urgent visit if worsening respiratory symptoms\par \par # lung ca screening for current smoker\par - pt due for lung ca screening; was already ordered by rheumatology\par - f/u results\par \par # HCM\par - counseled on shingles vaccine, pt undecided. rx sent to pharmacy if pt later becomes agreeable. \par - overdue for colon ca screening (TAs in past), has f/u appointment with GI and colon scheduled later this month\par - pneumonia, flu, tdap vaccines UTD\par - lung ca screening tbd\par \par RTC 5 weeks\par Zane Castro, PGY-2 \par \par CDW Dr. Cintron

## 2020-07-05 NOTE — HISTORY OF PRESENT ILLNESS
[Verbal consent obtained from patient] : the patient, [unfilled] [Other Location: e.g. Home (Enter Location, City,State)___] : at [unfilled] [Home] : at home, [unfilled] , at the time of the visit. [FreeTextEntry1] : follow-up [de-identified] : 64 y/o F current smoker (1 PPD) with asthma/COPD, HTN, HLD, fibromyalgia, inflammatory arthritis (followed by Rheum), b/l knee OA, R sternoclavicular arthritis, NAFLD, depression (followed by outpatient psychiatry), pre-diabetes (A1c 5.7% in 5/2019), colonic polyps who presents for f/u chronic conditions.\par \par Of note, since last visit in 02/2020, pt developed viral prodrome with diffuse body aches and shortness of berath, without fever, chills, vomiting, or diarrhea. She became concerned about COVID and was evaluated at an [facility], where she tested negative. She states her symptoms did not improve and went back for evaluation. She underwent an xray and was found to have clear lungs but the provider was concerned about developing PNA and rx antibiotics x 7 days. Ms. Joaquin completed the course of antibiotics without subjective improvement. She went back to urgent care about 2 weeks ago and was given another 7 day course of antibiotics which she did not take. \par \par She was also evaluated by rheumatology who ordered xrays and adjusted down prednisone to 5 mg daily. Pt also on MTX 20 mg weekly with folic acid. \par \par Pt had to leave after 28 minutes and wanted to visit clinic in person next month for follow-up.

## 2020-07-06 DIAGNOSIS — F17.210 NICOTINE DEPENDENCE, CIGARETTES, UNCOMPLICATED: ICD-10-CM

## 2020-07-06 DIAGNOSIS — R05 COUGH: ICD-10-CM

## 2020-07-13 ENCOUNTER — OUTPATIENT (OUTPATIENT)
Dept: OUTPATIENT SERVICES | Facility: HOSPITAL | Age: 65
LOS: 1 days | End: 2020-07-13
Payer: MEDICAID

## 2020-07-13 ENCOUNTER — APPOINTMENT (OUTPATIENT)
Dept: RHEUMATOLOGY | Facility: CLINIC | Age: 65
End: 2020-07-13

## 2020-07-13 VITALS
SYSTOLIC BLOOD PRESSURE: 130 MMHG | RESPIRATION RATE: 16 BRPM | DIASTOLIC BLOOD PRESSURE: 70 MMHG | OXYGEN SATURATION: 97 % | HEIGHT: 65 IN | HEART RATE: 70 BPM | WEIGHT: 205.91 LBS | TEMPERATURE: 98 F

## 2020-07-13 DIAGNOSIS — Z98.890 OTHER SPECIFIED POSTPROCEDURAL STATES: Chronic | ICD-10-CM

## 2020-07-13 DIAGNOSIS — R73.03 PREDIABETES: ICD-10-CM

## 2020-07-13 DIAGNOSIS — J44.9 CHRONIC OBSTRUCTIVE PULMONARY DISEASE, UNSPECIFIED: ICD-10-CM

## 2020-07-13 DIAGNOSIS — R07.9 CHEST PAIN, UNSPECIFIED: ICD-10-CM

## 2020-07-13 DIAGNOSIS — G47.33 OBSTRUCTIVE SLEEP APNEA (ADULT) (PEDIATRIC): ICD-10-CM

## 2020-07-13 DIAGNOSIS — D12.6 BENIGN NEOPLASM OF COLON, UNSPECIFIED: ICD-10-CM

## 2020-07-13 DIAGNOSIS — Z91.040 LATEX ALLERGY STATUS: ICD-10-CM

## 2020-07-13 DIAGNOSIS — K63.5 POLYP OF COLON: ICD-10-CM

## 2020-07-13 DIAGNOSIS — M79.7 FIBROMYALGIA: ICD-10-CM

## 2020-07-13 LAB
HBA1C BLD-MCNC: 5.8 % — HIGH (ref 4–5.6)
HCT VFR BLD CALC: 39.8 % — SIGNIFICANT CHANGE UP (ref 34.5–45)
HGB BLD-MCNC: 12.6 G/DL — SIGNIFICANT CHANGE UP (ref 11.5–15.5)
MCHC RBC-ENTMCNC: 29.4 PG — SIGNIFICANT CHANGE UP (ref 27–34)
MCHC RBC-ENTMCNC: 31.7 % — LOW (ref 32–36)
MCV RBC AUTO: 93 FL — SIGNIFICANT CHANGE UP (ref 80–100)
NRBC # FLD: 0 K/UL — SIGNIFICANT CHANGE UP (ref 0–0)
PLATELET # BLD AUTO: 327 K/UL — SIGNIFICANT CHANGE UP (ref 150–400)
PMV BLD: 10.5 FL — SIGNIFICANT CHANGE UP (ref 7–13)
RBC # BLD: 4.28 M/UL — SIGNIFICANT CHANGE UP (ref 3.8–5.2)
RBC # FLD: 15.3 % — HIGH (ref 10.3–14.5)
WBC # BLD: 10.28 K/UL — SIGNIFICANT CHANGE UP (ref 3.8–10.5)
WBC # FLD AUTO: 10.28 K/UL — SIGNIFICANT CHANGE UP (ref 3.8–10.5)

## 2020-07-13 PROCEDURE — 93010 ELECTROCARDIOGRAM REPORT: CPT

## 2020-07-13 NOTE — H&P PST ADULT - NSICDXPROBLEM_GEN_ALL_CORE_FT
PROBLEM DIAGNOSES  Problem: Colon polyps  Assessment and Plan:     Problem: Pre-diabetes  Assessment and Plan:     Problem: Latex allergy  Assessment and Plan:     Problem: JASMIN (obstructive sleep apnea)  Assessment and Plan:     Problem: Chest pain  Assessment and Plan: PROBLEM DIAGNOSES  Problem: Colon polyps  Assessment and Plan: Endoscopy/ Colonoscopy : anesthesia   Pre op instructions reviewed with pt ; pt verbalized good understanding of pre op instructions  Pt to Dr Castro [ pcp]  for pre op evaluation    Problem: Chest pain  Assessment and Plan: Pt  reports h/o chest pain  when ambulating " a little fast"  Pt states last occurring 1 week ago  Pt reports sob on exertion; pt also with h/o copd  Pt reports h/o palpitations  Pt denies cp, palpitations, sob in PST  Call to Dr Castro office ; pcp; appt requested for evaluation  As per stephen ; RN to contact pt  for appt  Call to Dr Castro ; s/w Salma ; to make surgeon aware  Reviewed with Dr Hu     Problem: Pre-diabetes  Assessment and Plan: BMP done 6/22/2020  HGBA1C done 7/13/2020  FS dos     Problem: Latex allergy  Assessment and Plan: Latex allergy  OR Booking notified via fax    Problem: JASMIN (obstructive sleep apnea)  Assessment and Plan: JASMIN precautions  OR booking notified via fax     Problem: Chronic obstructive pulmonary disease (COPD)  Assessment and Plan: Pt to use inhaler dos PROBLEM DIAGNOSES  Problem: Colon polyps  Assessment and Plan: Endoscopy/ Colonoscopy : anesthesia   Pre op instructions reviewed with pt ; pt verbalized good understanding of pre op instructions  Pt to Dr Castro [ pcp]  for pre op evaluation    Problem: Chest pain  Assessment and Plan: Pt  reports h/o chest pain  when ambulating " a little fast"  Pt states last occurring 1 week ago  Pt reports sob on exertion; pt also with h/o copd  Pt reports h/o palpitations  Pt denies cp, palpitations, sob in PST  VSS ; pt in nad  Call to Dr Castro office ; pcp; appt requested for evaluation  As per Herbie ; RN to contact pt  for appt  Call to Dr Castro ; s/w Salma ; to make surgeon aware  Received call from Colin ; to make surgeon aware  Reviewed with Dr Hu     Problem: Pre-diabetes  Assessment and Plan: BMP done 6/22/2020  HGBA1C done 7/13/2020  FS dos     Problem: Latex allergy  Assessment and Plan: Latex allergy  OR Booking notified via fax    Problem: JASMIN (obstructive sleep apnea)  Assessment and Plan: JASMIN precautions  OR booking notified via fax     Problem: Chronic obstructive pulmonary disease (COPD)  Assessment and Plan: Pt to use inhaler dos PROBLEM DIAGNOSES  Problem: Colon polyps  Assessment and Plan: Endoscopy/ Colonoscopy : anesthesia   Pre op instructions reviewed with pt ; pt verbalized good understanding of pre op instructions  Pt to Dr Castro [ pcp]  for pre op evaluation    Problem: Chest pain  Assessment and Plan: Pt  reports h/o chest pain  when ambulating " a little fast"  Pt states last occurring 1 week ago  Pt reports sob on exertion; pt also with h/o copd  Pt reports h/o palpitations  Pt denies cp, palpitations, sob in PST  VSS ; pt in nad  Call to Dr Castro office ; pcp; appt requested for evaluation  As per Herbie ; RN to contact pt  for appt  Call to Dr Castro ; s/w Salma ; to make surgeon aware  Received call from Colin ; to make surgeon aware  Reviewed with Dr Hu     Problem: Pre-diabetes  Assessment and Plan: BMP done 6/22/2020  HGBA1C done 7/13/2020  FS dos     Problem: Latex allergy  Assessment and Plan: Latex allergy  OR Booking notified via fax    Problem: JASMIN (obstructive sleep apnea)  Assessment and Plan: JASMIN precautions  OR booking notified via fax     Problem: Chronic obstructive pulmonary disease (COPD)  Assessment and Plan: Pt to use inhaler dos    Problem: Fibromyalgia  Assessment and Plan: Regarding Methotrexate ; pt to review pre op plan with pcp

## 2020-07-13 NOTE — H&P PST ADULT - CARDIOVASCULAR COMMENTS
h/o chest pain when ambulating " fast ' last 1 week ago , h/o sob on exertion ; h/o palpitations h/o chest pain when ambulating "when I walk a little faster "  last 1 week ago , h/o sob on exertion ; h/o palpitations

## 2020-07-13 NOTE — H&P PST ADULT - NSICDXPASTSURGICALHX_GEN_ALL_CORE_FT
PAST SURGICAL HISTORY:  Endometriosis [id number]    H/O hand surgery Left childhood    S/P arthroscopy of knee Left    Tubal ligation status

## 2020-07-13 NOTE — H&P PST ADULT - HISTORY OF PRESENT ILLNESS
Pt is a 65 y.o. female ; pt s/p Colonoscopy 2017 ; pt reports Polyps x 9 " benign " Pt reports h/o Gerd ; pt to surgeon ; pt now presents for Endoscopy/ Colonoscopy    Pt reports constipation

## 2020-07-13 NOTE — H&P PST ADULT - SYMPTOMS
pt reports chest pain  with exertion " if I walk fast " last 1 week ago ; + palpitations > 1 month ago ; + sob on exertion/none pt reports chest pain  with exertion " if I walk fast " last 1 week ago ; + palpitations > 1 month ago ; + sob on exertion; pt with h/o COPD; In PST ; pt denies cp, palpitations, sob/none

## 2020-07-13 NOTE — H&P PST ADULT - NSICDXPASTMEDICALHX_GEN_ALL_CORE_FT
PAST MEDICAL HISTORY:  Arthritis pt reports need  bilateral TKR    Depression     Endometriosis     Fibromyalgia     History of COPD     Hypercholesteremia     Hyperlipidemia     Hypertension     Osteoporosis     Pneumonia 2/2020; pt states C/T scan pending    Pre-diabetes

## 2020-07-13 NOTE — H&P PST ADULT - NSANTHOSAYNRD_GEN_A_CORE
No. JASMIN screening performed.  STOP BANG Legend: 0-2 = LOW Risk; 3-4 = INTERMEDIATE Risk; 5-8 = HIGH Risk

## 2020-07-15 ENCOUNTER — APPOINTMENT (OUTPATIENT)
Dept: INTERNAL MEDICINE | Facility: CLINIC | Age: 65
End: 2020-07-15

## 2020-07-17 ENCOUNTER — APPOINTMENT (OUTPATIENT)
Dept: INTERNAL MEDICINE | Facility: CLINIC | Age: 65
End: 2020-07-17
Payer: MEDICAID

## 2020-07-17 ENCOUNTER — OUTPATIENT (OUTPATIENT)
Dept: OUTPATIENT SERVICES | Facility: HOSPITAL | Age: 65
LOS: 1 days | End: 2020-07-17

## 2020-07-17 VITALS
WEIGHT: 206 LBS | SYSTOLIC BLOOD PRESSURE: 140 MMHG | HEIGHT: 67 IN | BODY MASS INDEX: 32.33 KG/M2 | DIASTOLIC BLOOD PRESSURE: 80 MMHG | HEART RATE: 87 BPM | OXYGEN SATURATION: 97 %

## 2020-07-17 VITALS — TEMPERATURE: 98 F

## 2020-07-17 DIAGNOSIS — Z98.890 OTHER SPECIFIED POSTPROCEDURAL STATES: Chronic | ICD-10-CM

## 2020-07-17 DIAGNOSIS — Z01.818 ENCOUNTER FOR OTHER PREPROCEDURAL EXAMINATION: ICD-10-CM

## 2020-07-17 DIAGNOSIS — F17.210 NICOTINE DEPENDENCE, CIGARETTES, UNCOMPLICATED: ICD-10-CM

## 2020-07-17 DIAGNOSIS — J45.909 UNSPECIFIED ASTHMA, UNCOMPLICATED: ICD-10-CM

## 2020-07-17 DIAGNOSIS — M25.50 PAIN IN UNSPECIFIED JOINT: ICD-10-CM

## 2020-07-17 DIAGNOSIS — F32.9 MAJOR DEPRESSIVE DISORDER, SINGLE EPISODE, UNSPECIFIED: ICD-10-CM

## 2020-07-17 DIAGNOSIS — M79.7 FIBROMYALGIA: ICD-10-CM

## 2020-07-17 DIAGNOSIS — E78.5 HYPERLIPIDEMIA, UNSPECIFIED: ICD-10-CM

## 2020-07-17 DIAGNOSIS — J44.9 CHRONIC OBSTRUCTIVE PULMONARY DISEASE, UNSPECIFIED: ICD-10-CM

## 2020-07-17 DIAGNOSIS — I10 ESSENTIAL (PRIMARY) HYPERTENSION: ICD-10-CM

## 2020-07-17 PROCEDURE — 99214 OFFICE O/P EST MOD 30 MIN: CPT | Mod: GC

## 2020-07-18 ENCOUNTER — APPOINTMENT (OUTPATIENT)
Dept: DISASTER EMERGENCY | Facility: CLINIC | Age: 65
End: 2020-07-18

## 2020-07-18 PROBLEM — F32.9 MAJOR DEPRESSIVE DISORDER, SINGLE EPISODE, UNSPECIFIED: Chronic | Status: ACTIVE | Noted: 2020-07-13

## 2020-07-28 ENCOUNTER — APPOINTMENT (OUTPATIENT)
Dept: CT IMAGING | Facility: IMAGING CENTER | Age: 65
End: 2020-07-28
Payer: MEDICAID

## 2020-07-28 ENCOUNTER — OUTPATIENT (OUTPATIENT)
Dept: OUTPATIENT SERVICES | Facility: HOSPITAL | Age: 65
LOS: 1 days | End: 2020-07-28
Payer: MEDICAID

## 2020-07-28 ENCOUNTER — RESULT REVIEW (OUTPATIENT)
Age: 65
End: 2020-07-28

## 2020-07-28 VITALS — WEIGHT: 202 LBS | BODY MASS INDEX: 31.71 KG/M2 | HEIGHT: 67 IN

## 2020-07-28 DIAGNOSIS — I44.7 LEFT BUNDLE-BRANCH BLOCK, UNSPECIFIED: ICD-10-CM

## 2020-07-28 DIAGNOSIS — Z98.890 OTHER SPECIFIED POSTPROCEDURAL STATES: Chronic | ICD-10-CM

## 2020-07-28 DIAGNOSIS — M19.019 PRIMARY OSTEOARTHRITIS, UNSPECIFIED SHOULDER: ICD-10-CM

## 2020-07-28 PROCEDURE — G0297: CPT | Mod: 26

## 2020-07-28 PROCEDURE — G0297: CPT

## 2020-07-28 NOTE — HISTORY OF PRESENT ILLNESS
[_____ pack-years] : [unfilled] pack-years [Current] : current smoker [TextBox_13] : She denies hemoptysis, denies new cough, denies unexplained weight loss.\par She is a current smoker with a 45 pack year smoking history (1PPD x 45 years).  She denies family h/o lung cancer.  She is referred by Dr. Danilo Mg.  \par

## 2020-07-28 NOTE — ASSESSMENT
[FreeTextEntry1] : 65yo F hx inflammatory arthritis (tenosynovitis/ R sternoclavicular arthritis), fibromyalgia, b/l knee OA presents for f/u visit\par \par # inflammatory arthritis\par -pt w evidence of tenosynovitis on R hand in MRI of 2016, US of sternoclavicular joints on 4/18 showed synovitis\par -pattern more consistent with seronegative spondyloarthropathy, however, imaging showed no SI pathology, 10/18\par -MITCHELL 1:320 but negative for RF, CCP, HLA- B27 , SSA, SSB, dsDNA\par -pt w minimal synovitis on exam; will reduce prednisone to 7.5mg, and increase MTX to 20mg weekly; written instructions given to pt\par -will in future refer to hand sx of L dorsum of hand for possible bx\par -c/w folic acid \par  -CBC, CMP \par \par #L arm numbness/ b/l hand numbness\par -MRI of cervical spine done on 4/19 showed face arthropathy and mod cervical spine stenosis\par -pt had spine surgery visit on 6/19, but only lumbar spine degenerative changes addressed, but cervical spine stenosis? Will [facility] spine surgery. \par -for now PT referral \par \par # Fibromyalgia\par - on Cymbalta 60mg qd in coordination w psychiatrist\par -in the past on amitriptyline and sertraline w no relief\par -pt concerned w gabapentin and for now will defer \par \par # Knee OA\par --Xray of knees done 10/18 showed b/l knee effusion, R>L, and advanced OA changes with osteophytes, medial joint space narrowing\par -ortho offered TKR on 6/19, but patient hesitant \par \par #cough\par -chronic \par -seen By Dr. Lisker on 4/19,  PFTs showing FVC response to bronchodilators\par -pt prescribed bronchodilators, follows w pulm\par -likely secondary to smoking\par \par \par #HCM\par -influenza vaccine 3/19, pneumovax 2015; pt given prevnar today \par -Quant Gold, Hep B, C negative 2/18, will repeat today \par -DEXA scan normal 6/19, repeat in 2 years \par \par RTC in 6-8 weeks\par d/w Dr. Dias\par  Detail Level: Simple Additional Notes: SPG observed

## 2020-07-28 NOTE — PLAN
[[facility]] : referred to [facility] [phone] [Smoking Cessation] : smoking cessation [Age appropriate screenings] : Age appropriate screenings [Regular Exercise] : regular exercise [Regular follow-up with healthcare provider] : regular follow-up with healthcare provider [FreeTextEntry1] : Her LDCT is scheduled for 7/28/20 at the [facility] location.  She will follow up with her referring physician for the results.

## 2020-07-28 NOTE — ASSESSMENT
[Discussed Risks and Advised to Quit Smoking] : Discussed risks and advised to quit smoking [Ready] : Patient is ready for cessation intervention [Discussed Cessation Strategies] : cessation strategies were discussed [Contemplation] : Contemplation: The patient is considering quitting smoking

## 2020-08-03 ENCOUNTER — OUTPATIENT (OUTPATIENT)
Dept: OUTPATIENT SERVICES | Facility: HOSPITAL | Age: 65
LOS: 1 days | End: 2020-08-03

## 2020-08-03 ENCOUNTER — APPOINTMENT (OUTPATIENT)
Dept: INTERNAL MEDICINE | Facility: CLINIC | Age: 65
End: 2020-08-03
Payer: MEDICAID

## 2020-08-03 VITALS — TEMPERATURE: 97.2 F

## 2020-08-03 VITALS
DIASTOLIC BLOOD PRESSURE: 60 MMHG | BODY MASS INDEX: 31.86 KG/M2 | SYSTOLIC BLOOD PRESSURE: 135 MMHG | HEART RATE: 77 BPM | HEIGHT: 67 IN | RESPIRATION RATE: 16 BRPM | WEIGHT: 203 LBS | OXYGEN SATURATION: 97 %

## 2020-08-03 DIAGNOSIS — Z12.2 ENCOUNTER FOR SCREENING FOR MALIGNANT NEOPLASM OF RESPIRATORY ORGANS: ICD-10-CM

## 2020-08-03 DIAGNOSIS — Z98.890 OTHER SPECIFIED POSTPROCEDURAL STATES: Chronic | ICD-10-CM

## 2020-08-03 PROCEDURE — 99213 OFFICE O/P EST LOW 20 MIN: CPT | Mod: GE

## 2020-08-03 RX ORDER — POLYETHYLENE GLYCOL 3350, SODIUM SULFATE ANHYDROUS, SODIUM BICARBONATE, SODIUM CHLORIDE, POTASSIUM CHLORIDE 227.1; 21.5; 6.36; 5.53; .754 G/L; G/L; G/L; G/L; G/L
227.1 POWDER, FOR SOLUTION ORAL
Qty: 1 | Refills: 0 | Status: DISCONTINUED | COMMUNITY
Start: 2020-03-12 | End: 2020-08-03

## 2020-08-10 NOTE — REVIEW OF SYSTEMS
[Fatigue] : fatigue [Cough] : cough [Muscle Pain] : muscle pain [Joint Pain] : joint pain [Back Pain] : back pain [Negative] : Heme/Lymph [Fever] : no fever [Chills] : no chills [Hot Flashes] : no hot flashes [Recent Change In Weight] : ~T no recent weight change [Night Sweats] : no night sweats [Dyspnea on Exertion] : no dyspnea on exertion [Shortness Of Breath] : no shortness of breath [Wheezing] : no wheezing [Suicidal] : not suicidal [FreeTextEntry6] : At baseline [FreeTextEntry9] : chronic

## 2020-08-10 NOTE — ASSESSMENT
[FreeTextEntry4] : 66 y/o F current smoker (1 PPD) with asthma/COPD, HTN, HLD, fibromyalgia, inflammatory arthritis (followed by Rheum), b/l knee OA, R sternoclavicular arthritis, NAFLD, depression (followed by outpatient psychiatry), pre-diabetes (A1c 5.7% in 5/2019), colonic polyps presenting for preop evaluation for colonoscopy. Based on RCRI patient has a 3.9% risk of a cardiac event, with a METs <4 limited by pain. Low risk, necessary procedure. Would highly recommend that patient continue with colonoscopy and, if warranted, endoscopy. \par \par 1. active cardiac conditions: No ACS, no decompensated HF, no threatening or sig arrhythmia, no sig valvular disease, no recent stent placement\par 2. Major risk factors:no hx of sxs of CAD, no CHF hx, no sig renal insuficiency, no TIA/strok hx, no diabetes\par 3. Exercise tolerance = <4 mets\par 4. risk of procedure: low risk\par \par Recommendations:\par 1. no further cardiac evaluation indicated\par 2. no further risk reduction required\par 3. Continue current medications

## 2020-08-10 NOTE — HISTORY OF PRESENT ILLNESS
[Asthma] : asthma [COPD] : COPD [Smoker] : smoker [____ METs%] : [unfilled] METs% [(Patient denies any chest pain, claudication, dyspnea on exertion, orthopnea, palpitations or syncope)] : Patient denies any chest pain, claudication, dyspnea on exertion, orthopnea, palpitations or syncope [Poor (<4 METs)] : Poor (<4 METs) [Aortic Stenosis] : no aortic stenosis [Atrial Fibrillation] : no atrial fibrillation [Coronary Artery Disease] : no coronary artery disease [Recent Myocardial Infarction] : no recent myocardial infarction [Implantable Device/Pacemaker] : no implantable device/pacemaker [Family Member] : no family member with adverse anesthesia reaction/sudden death [Sleep Apnea] : no sleep apnea [Self] : no previous adverse anesthesia reaction [Chronic Anticoagulation] : no chronic anticoagulation [Chronic Kidney Disease] : no chronic kidney disease [Diabetes] : no diabetes [FreeTextEntry1] : Colonoscopy/Endoscopy [FreeTextEntry2] : 7/21/20 [FreeTextEntry4] : 64 y/o F current smoker (1 PPD) with asthma/COPD, HTN, HLD, fibromyalgia, inflammatory arthritis (followed by Rheum), b/l knee OA, R sternoclavicular arthritis, NAFLD, depression (followed by outpatient psychiatry), pre-diabetes (A1c 5.7% in 5/2019), colonic polyps presenting for preop clearance prior to colonscopy/endoscopy. \par \par Pt very tearful, scared because of sensation of heaviness in back. Explained that she is scheduled for CT chest due ot prior known lung nodule and wants to have that prior to further procedures. Tearful and had long conversation regarding how she recently had 6 family members pass since Jan and cannot emotionally handle the colonoscopy/endoscopy at this time, requesting to be rescheduled for after CT chest on 7/28/20.  [FreeTextEntry7] : Last Echo in 2015 w EF 63%, \par \par EKG 7/10 sinus, LBBB, unchanged from prior EKG [FreeTextEntry8] : Limited by pain

## 2020-08-10 NOTE — PHYSICAL EXAM
[Normal Rate] : normal rate  [Normal S1, S2] : normal S1 and S2 [Regular Rhythm] : with a regular rhythm [Normal] : no rash [de-identified] : Mallampati class 2 [de-identified] : 2+systolic murmur [de-identified] : +pain paravertebral in thoracic region [de-identified] : trace bilat LE edema, bliat knee swelling (chronic) [de-identified] : Tearful affect, anxious

## 2020-08-17 DIAGNOSIS — F32.9 MAJOR DEPRESSIVE DISORDER, SINGLE EPISODE, UNSPECIFIED: ICD-10-CM

## 2020-08-17 DIAGNOSIS — Z12.2 ENCOUNTER FOR SCREENING FOR MALIGNANT NEOPLASM OF RESPIRATORY ORGANS: ICD-10-CM

## 2020-08-17 DIAGNOSIS — D12.6 BENIGN NEOPLASM OF COLON, UNSPECIFIED: ICD-10-CM

## 2020-08-17 DIAGNOSIS — F17.200 NICOTINE DEPENDENCE, UNSPECIFIED, UNCOMPLICATED: ICD-10-CM

## 2020-08-17 DIAGNOSIS — F41.9 ANXIETY DISORDER, UNSPECIFIED: ICD-10-CM

## 2020-08-17 DIAGNOSIS — H53.8 OTHER VISUAL DISTURBANCES: ICD-10-CM

## 2020-08-17 DIAGNOSIS — J45.909 UNSPECIFIED ASTHMA, UNCOMPLICATED: ICD-10-CM

## 2020-08-17 NOTE — PHYSICAL EXAM
[Normal] : affect was normal and insight and judgment were intact [de-identified] : trace pitting edema b/l LE [de-identified] : diffuse wheezy, bronchial breath sounds anterior and posteriorly [de-identified] : SALAZAR FUNES [de-identified] : point tenderness over left chest

## 2020-08-17 NOTE — REVIEW OF SYSTEMS
[Fatigue] : fatigue [Vision Problems] : vision problems [Lower Ext Edema] : lower extremity edema [Shortness Of Breath] : shortness of breath [Wheezing] : wheezing [Joint Stiffness] : joint stiffness [Joint Pain] : joint pain [Cough] : cough [Muscle Pain] : muscle pain [Anxiety] : anxiety [Back Pain] : back pain [Depression] : depression [Negative] : Heme/Lymph [Chills] : no chills [Hot Flashes] : no hot flashes [Fever] : no fever [Night Sweats] : no night sweats [Recent Change In Weight] : ~T no recent weight change [Discharge] : no discharge [Pain] : no pain [Itching] : no itching [Redness] : no redness [Dryness] : no dryness  [Palpitations] : no palpitations [Chest Pain] : no chest pain [Leg Claudication] : no leg claudication [Paroxysmal Nocturnal Dyspnea] : no paroxysmal nocturnal dyspnea [Muscle Weakness] : no muscle weakness [Orthopnea] : no orthopnea [Joint Swelling] : no joint swelling

## 2020-08-17 NOTE — ASSESSMENT
[FreeTextEntry1] : 64 y/o F current smoker (1 PPD) with asthma/COPD, HTN, HLD, fibromyalgia, inflammatory arthritis (followed by Rheum), b/l knee OA, R sternoclavicular arthritis, NAFLD, depression (followed by outpatient psychiatry), pre-diabetes (A1c 5.7% in 5/2019), colonic polyps who presents for f/u chronic conditions.\par \par # Blurry vision\par - unclear etiology of blurry vision, temporary strabismus, EOMI on exam\par - will refer to opthalmology for screening\par \par #Anxiety/depression\par - pt cancelled procedure d/t anxiety and feeling "emotionally wasn't ready" for procedure\par - will provide frequent, short-interval follow ups for reassurance\par - c/w outpatient therapy and psychology\par - c/w duloxetine (also for fibromyalgia)\par \par Fibromyalgia - with diffuse pains in her upper body\par - c/w duloxetine\par - previously took gabapentin, however, made her feel drowsy and high so would not like to try again\par - follows regularly with rheum\par \par #COPD/asthma/lung ca screening\par - discussed results of 7/2020 low dose CT scan - stable nodules\par \par # Active smoker\par - discussed smoking cessation strategies including nicotine patch and gum; pt previously had success with gum, patches do not stick to her skin\par - will rx nicotine gum\par - discussed starting buproprion for smoking cessation in conjunction with nicotine gum; will rx for 30 day trial and assess at next visit\par \par # Colon cancer screening\par - encouraged f/u with GI especially in light of polyp history; pt agreeable\par \par # HTN - currently well controlled\par - c/w amlodipine, lisinopril\par \par # HCM\par - last colonoscopy 2015 with multiple polyps, 1TA due in 2018, will call for reschedule\par - last mammo 7/2019 wnl, referral given\par - bone scan 7/2019 normal\par - low dose CT done 7/2020 with stable lung nodules since 2013\par - last pap/hpv neg in 2018, due 2023 however will be >65\par - flu shot, pneumo vacc, Tdap UTD\par - zoster vax sent to pharmacy at last visit, however, pt still declining; will continue to encourage vaccination\par \par Zane Castro, PGY-2\par [employer] [facility]\par \par CDW Dr. Mejía\par Return to clinic in 1 month

## 2020-08-17 NOTE — HISTORY OF PRESENT ILLNESS
[de-identified] : 66 y/o F current smoker (1 PPD) with asthma/COPD, HTN, HLD, fibromyalgia, inflammatory arthritis (followed by Rheum), b/l knee OA, R sternoclavicular arthritis, NAFLD, anxiety, depression (followed by outpatient psychiatry), pre-diabetes (A1c 5.7% in 5/2019), colonic polyps who presents for f/u chronic conditions. Pt seen 1 month ago. In the interim:\par \par Pt missed her surveillance colonoscopy due to not feeling emotionally ready to attend the appointment.\par \par Pt underwent low-dose CT scan for lung cancer screening, showing stable multiple 2-3mm nodules since 2013. No new nodules.\par \par Pt experienced temporary double vision/strabismus along with black floating specs in visual fields that self-resolved. Pt saw an optometrist who wanted basic blood work. \par \par Pt is otherwise in her usual state of health. Anxiety and depression are ongoing issues for her. Her symptoms are well controlled and routinely sees her outpatient psychologist. \par \par Of note, at her last visit, she complained of a viral prodrome. Pt describes baseline shortness of breath and cough due to continued smoking and COPD. Pt demonstrated incorrect use of inhaler; corrected on proper technique. \par \par Pt c/o diffuse body aches, cough, shortness of breath with exertion, symptoms at baseline. She denies any headaches, fevers, chills, nausea, vomiting, blurry vision at this visit.  [FreeTextEntry1] : f/u

## 2020-09-10 ENCOUNTER — OUTPATIENT (OUTPATIENT)
Dept: OUTPATIENT SERVICES | Facility: HOSPITAL | Age: 65
LOS: 1 days | End: 2020-09-10

## 2020-09-10 ENCOUNTER — APPOINTMENT (OUTPATIENT)
Dept: INTERNAL MEDICINE | Facility: CLINIC | Age: 65
End: 2020-09-10
Payer: MEDICAID

## 2020-09-10 VITALS — HEIGHT: 67 IN | BODY MASS INDEX: 31.86 KG/M2 | WEIGHT: 203 LBS

## 2020-09-10 DIAGNOSIS — Z98.890 OTHER SPECIFIED POSTPROCEDURAL STATES: Chronic | ICD-10-CM

## 2020-09-10 PROCEDURE — ZZZZZ: CPT

## 2020-09-10 NOTE — REVIEW OF SYSTEMS
[Fatigue] : fatigue [Vision Problems] : vision problems [Joint Pain] : joint pain [Shortness Of Breath] : shortness of breath [Joint Stiffness] : joint stiffness [Back Pain] : back pain [Muscle Pain] : muscle pain [Anxiety] : anxiety [Depression] : depression [Negative] : Neurological

## 2020-09-13 NOTE — HISTORY OF PRESENT ILLNESS
[Home] : at home, [unfilled] , at the time of the visit. [Verbal consent obtained from patient] : the patient, [unfilled] [FreeTextEntry1] : f/u [de-identified] : 64 y/o F current smoker (1 PPD) with asthma/COPD, HTN, HLD, fibromyalgia, inflammatory arthritis (followed by rheum), b/l knee OA, R sternoclavicular arthritis, NAFLD, depression (followed by outpatient psychiatry), pre-diabetes (A1c 5.7% in 5/2019), colonic polyps who presents for f/u chronic conditions.\par \par Pt is "not good" today, because of her chronic pain. It comes and goes and affects different parts of her body at different times. Today it is her knees that is bothering her. She says she gets great relief from swimming and has been going to the beach often in the last few weeks. She states that she feels tired after swimming but isn't in pain. \par \par Pt talked about her mom and that she continues to struggle emotionally with her death. Pt continues to follow routinely with her psychologist. \par \par Pt was supposed to follow-up with GI for colorectal cancer screening, ophthalmology for episodic blurry vision, and mammogram for breast cancer screening. She claims that the referrals only came in the mail last week and she hasn't had time to schedule these appointments yet. \par \par At the last visit, patient was rx bupropion and nicotine gum for smoking cessation. Pt was scared of the side fx of bupropion and never took it. Pt says pharmacy did not give her the nicotine gum.

## 2020-09-13 NOTE — ASSESSMENT
[FreeTextEntry1] : Telephonic visit time: 30 minutes\par \par 64 y/o F current smoker (1 PPD) with asthma/COPD, HTN, HLD, fibromyalgia, inflammatory arthritis (followed by Rheum), b/l knee OA, R sternoclavicular arthritis, NAFLD, depression (followed by outpatient psychiatry), pre-diabetes (A1c 5.7% in 5/2019), colonic polyps who presents for f/u chronic conditions.\par \par # Blurry vision\par - unclear etiology of blurry vision, temporary strabismus, EOMI on exam\par - encouraged f/u with opthalmology\par \par #Anxiety/depression\par - pt cancelled procedure d/t anxiety and feeling "emotionally wasn't ready" for procedure\par - will provide frequent, short-interval follow ups for reassurance\par - c/w psychology and the\par - c/w duloxetine (also for fibromyalgia)\par \par # Fibromyalgia - with diffuse pains in her upper body\par - c/w duloxetine\par - previously took gabapentin, however, made her feel drowsy and high so would not like to try again\par - follows regularly with rheum\par \par # Active smoker\par - pt open to trying nicotine gum; rx previously sent. pt will call if there is a problem picking up the prescription this time\par - previously rx bupropion but pt refused to take due to concern of side fx. pt previously used nicotine patches but she says they do not stick to her skin\par - counseled on smoking cessation\par \par # Colon cancer screening\par - pt agreeable to follow-up; awaiting pt to schedule appointment\par \par # HTN\par - c/w amlodipine, lisinopril\par \par # HCM\par - needs flu shot - will give at next office visit\par - last colonoscopy 2015 with multiple polyps, 1TA due in 2018, awaiting scheduling\par - last mammo 7/2019 wnl, awaiting scheduling\par - bone scan 7/2019 normal\par - low dose CT done 7/2020 with stable lung nodules since 2013\par - last pap/hpv neg in 2018, due 2023 however will be >65\par - pneumo vacc, Tdap UTD\par - pt continues to refuse zoster vax, will consider at next visit\par \par Zane Castro, PGY-2\par [employer] [facility]\par \par CDW Dr. Cintron\par RTC 5 weeks for flu shot\par

## 2020-09-13 NOTE — ADDENDUM
[FreeTextEntry1] : reviewed with resident via telephone due to covid19 Agree with residents evaluation and planrrosenmd

## 2020-09-14 ENCOUNTER — OUTPATIENT (OUTPATIENT)
Dept: OUTPATIENT SERVICES | Facility: HOSPITAL | Age: 65
LOS: 1 days | End: 2020-09-14

## 2020-09-14 ENCOUNTER — APPOINTMENT (OUTPATIENT)
Dept: RHEUMATOLOGY | Facility: CLINIC | Age: 65
End: 2020-09-14
Payer: MEDICAID

## 2020-09-14 ENCOUNTER — LABORATORY RESULT (OUTPATIENT)
Age: 65
End: 2020-09-14

## 2020-09-14 VITALS
HEIGHT: 67 IN | DIASTOLIC BLOOD PRESSURE: 65 MMHG | HEART RATE: 78 BPM | WEIGHT: 207 LBS | SYSTOLIC BLOOD PRESSURE: 135 MMHG | BODY MASS INDEX: 32.49 KG/M2 | OXYGEN SATURATION: 97 %

## 2020-09-14 DIAGNOSIS — I10 ESSENTIAL (PRIMARY) HYPERTENSION: ICD-10-CM

## 2020-09-14 DIAGNOSIS — F17.210 NICOTINE DEPENDENCE, CIGARETTES, UNCOMPLICATED: ICD-10-CM

## 2020-09-14 DIAGNOSIS — Z98.890 OTHER SPECIFIED POSTPROCEDURAL STATES: Chronic | ICD-10-CM

## 2020-09-14 DIAGNOSIS — F41.9 ANXIETY DISORDER, UNSPECIFIED: ICD-10-CM

## 2020-09-14 DIAGNOSIS — M65.9 SYNOVITIS AND TENOSYNOVITIS, UNSPECIFIED: ICD-10-CM

## 2020-09-14 DIAGNOSIS — H53.8 OTHER VISUAL DISTURBANCES: ICD-10-CM

## 2020-09-14 LAB
CRP SERPL-MCNC: 10.4 MG/L — HIGH
ERYTHROCYTE [SEDIMENTATION RATE] IN BLOOD: 14 MM/HR — SIGNIFICANT CHANGE UP (ref 4–25)

## 2020-09-14 PROCEDURE — 99214 OFFICE O/P EST MOD 30 MIN: CPT | Mod: GC

## 2020-09-16 NOTE — PHYSICAL EXAM
[General Appearance - Alert] : alert [General Appearance - In No Acute Distress] : in no acute distress [General Appearance - Well-Appearing] : healthy appearing [General Appearance - Well Nourished] : well nourished [Extraocular Movements] : extraocular movements were intact [Sclera] : the sclera and conjunctiva were normal [Outer Ear] : the ears and nose were normal in appearance [Hearing Threshold Finger Rub Not Morrill] : hearing was normal [Neck Appearance] : the appearance of the neck was normal [Auscultation Breath Sounds / Voice Sounds] : lungs were clear to auscultation bilaterally [Heart Sounds] : normal S1 and S2 [Edema] : there was no peripheral edema [Murmurs] : no murmurs [Bowel Sounds] : normal bowel sounds [Abdomen Soft] : soft [Abdomen Tenderness] : non-tender [Cervical Lymph Nodes Enlarged Anterior Bilaterally] : anterior cervical [Cervical Lymph Nodes Enlarged Posterior Bilaterally] : posterior cervical [Supraclavicular Lymph Nodes Enlarged Bilaterally] : supraclavicular [] : no rash [Motor Exam] : the motor exam was normal [No Focal Deficits] : no focal deficits [Oriented To Time, Place, And Person] : oriented to person, place, and time [FreeTextEntry1] : 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 tenderness present in almost all joints of b/l hands, no swollen, red joints or limitation of ROM noted though. No swelling, erythema or tenderness noted in sternoclavicular joints. B/l knee joints have normal ROM, crepitus noted. Body wide tender spots appreciated.

## 2020-09-16 NOTE — ASSESSMENT
[FreeTextEntry1] : 65 F is being seen in the Rheumatology clinic for seronegative inflammatory arthritis, fibromyalgia and b/l knee pain from OA. \par \par Problem List:\par # Seronegative inflammatory arthritis.\par # B/l OA of knees.\par # FM\par \par Impression/Plan:\par \par # Seronegative inflammatory arthritis.\par On this visit the patient has b/l hand pains which are getting worse. \par Sternoclavicular joint pain is stable. \par Currently on 20 MTX weekly. \par We explained to her that we might need to escalate therapy with a TNF inhibitor now.\par Hepatitis B serologies up to date but Quantiferon indeterminate this year, will repeat today. \par Will check ESR and CRP. \par For symptoms we asked the patient to take prescribed Naproxen which she was not taking at home. \par Continue MTX. \par We will decide future course of TNF initiation on next clinic visit. \par \par # B/l OA of knees.\par Ortho offered TKR on 6/19, but patient refused.\par Advised pt to lose weight and take Naproxen and Tylenol symptoms (she is not taking any pain medications consistently). \par \par # FM\par Symptoms under control.\par Continue Cymbalta 60mg\par PT\par \par # HCM\par Influenza vaccine 3/19, pneumovax 2015; prevnar 7/19\par DEXA scan normal 6/19, repeat in 2 years \par \par RTC in 2 weeks\par \par d/w Dr. Dias\par \par Nitesh Candelario MD\par Rheumatology fellow\par

## 2020-09-16 NOTE — HISTORY OF PRESENT ILLNESS
[FreeTextEntry1] : Interval Hx:\par \par Today she presents for a regular office visit.\par She says her b/l hand pain has increased, associated with morning stiffness that lasts the whole day?\par She is also complaining of b/l knee pains with walking. \par No episodes of red/hot/tender joints.\par Sternal pain under control.\par Denies CP, SOB, n/v/d, abdominal pain, fever or chills.

## 2020-09-21 ENCOUNTER — APPOINTMENT (OUTPATIENT)
Dept: OPHTHALMOLOGY | Facility: CLINIC | Age: 65
End: 2020-09-21
Payer: MEDICAID

## 2020-09-21 ENCOUNTER — NON-APPOINTMENT (OUTPATIENT)
Age: 65
End: 2020-09-21

## 2020-09-21 PROCEDURE — 92004 COMPRE OPH EXAM NEW PT 1/>: CPT

## 2020-09-21 PROCEDURE — 92015 DETERMINE REFRACTIVE STATE: CPT

## 2020-09-28 ENCOUNTER — APPOINTMENT (OUTPATIENT)
Dept: RHEUMATOLOGY | Facility: CLINIC | Age: 65
End: 2020-09-28

## 2020-10-05 ENCOUNTER — RESULT REVIEW (OUTPATIENT)
Age: 65
End: 2020-10-05

## 2020-10-05 ENCOUNTER — APPOINTMENT (OUTPATIENT)
Dept: MAMMOGRAPHY | Facility: IMAGING CENTER | Age: 65
End: 2020-10-05
Payer: MEDICAID

## 2020-10-05 ENCOUNTER — OUTPATIENT (OUTPATIENT)
Dept: OUTPATIENT SERVICES | Facility: HOSPITAL | Age: 65
LOS: 1 days | End: 2020-10-05
Payer: MEDICAID

## 2020-10-05 DIAGNOSIS — Z98.890 OTHER SPECIFIED POSTPROCEDURAL STATES: Chronic | ICD-10-CM

## 2020-10-05 DIAGNOSIS — Z00.00 ENCOUNTER FOR GENERAL ADULT MEDICAL EXAMINATION WITHOUT ABNORMAL FINDINGS: ICD-10-CM

## 2020-10-05 PROCEDURE — 77067 SCR MAMMO BI INCL CAD: CPT | Mod: 26

## 2020-10-05 PROCEDURE — 77067 SCR MAMMO BI INCL CAD: CPT

## 2020-10-05 PROCEDURE — 77063 BREAST TOMOSYNTHESIS BI: CPT

## 2020-10-05 PROCEDURE — 77063 BREAST TOMOSYNTHESIS BI: CPT | Mod: 26

## 2020-10-14 ENCOUNTER — APPOINTMENT (OUTPATIENT)
Dept: INTERNAL MEDICINE | Facility: CLINIC | Age: 65
End: 2020-10-14

## 2020-10-19 ENCOUNTER — RX RENEWAL (OUTPATIENT)
Age: 65
End: 2020-10-19

## 2020-10-26 ENCOUNTER — LABORATORY RESULT (OUTPATIENT)
Age: 65
End: 2020-10-26

## 2020-10-26 ENCOUNTER — OUTPATIENT (OUTPATIENT)
Dept: OUTPATIENT SERVICES | Facility: HOSPITAL | Age: 65
LOS: 1 days | End: 2020-10-26

## 2020-10-26 ENCOUNTER — APPOINTMENT (OUTPATIENT)
Dept: RHEUMATOLOGY | Facility: CLINIC | Age: 65
End: 2020-10-26
Payer: MEDICAID

## 2020-10-26 VITALS — TEMPERATURE: 97.6 F

## 2020-10-26 VITALS
SYSTOLIC BLOOD PRESSURE: 130 MMHG | HEART RATE: 76 BPM | WEIGHT: 207 LBS | HEIGHT: 72 IN | BODY MASS INDEX: 28.04 KG/M2 | DIASTOLIC BLOOD PRESSURE: 80 MMHG | OXYGEN SATURATION: 99 %

## 2020-10-26 VITALS
OXYGEN SATURATION: 98 % | DIASTOLIC BLOOD PRESSURE: 73 MMHG | RESPIRATION RATE: 16 BRPM | TEMPERATURE: 98 F | HEART RATE: 68 BPM | SYSTOLIC BLOOD PRESSURE: 135 MMHG | HEIGHT: 65 IN | WEIGHT: 201.94 LBS

## 2020-10-26 DIAGNOSIS — Z98.890 OTHER SPECIFIED POSTPROCEDURAL STATES: Chronic | ICD-10-CM

## 2020-10-26 DIAGNOSIS — D12.6 BENIGN NEOPLASM OF COLON, UNSPECIFIED: ICD-10-CM

## 2020-10-26 LAB
ALBUMIN SERPL ELPH-MCNC: 4.2 G/DL — SIGNIFICANT CHANGE UP (ref 3.3–5)
ALP SERPL-CCNC: 109 U/L — SIGNIFICANT CHANGE UP (ref 40–120)
ALT FLD-CCNC: 17 U/L — SIGNIFICANT CHANGE UP (ref 4–33)
ANION GAP SERPL CALC-SCNC: 11 MMO/L — SIGNIFICANT CHANGE UP (ref 7–14)
ANION GAP SERPL CALC-SCNC: 11 MMO/L — SIGNIFICANT CHANGE UP (ref 7–14)
AST SERPL-CCNC: 16 U/L — SIGNIFICANT CHANGE UP (ref 4–32)
BASOPHILS # BLD AUTO: 0.03 K/UL — SIGNIFICANT CHANGE UP (ref 0–0.2)
BASOPHILS NFR BLD AUTO: 0.4 % — SIGNIFICANT CHANGE UP (ref 0–2)
BILIRUB SERPL-MCNC: 0.3 MG/DL — SIGNIFICANT CHANGE UP (ref 0.2–1.2)
BUN SERPL-MCNC: 11 MG/DL — SIGNIFICANT CHANGE UP (ref 7–23)
BUN SERPL-MCNC: 11 MG/DL — SIGNIFICANT CHANGE UP (ref 7–23)
CALCIUM SERPL-MCNC: 9.2 MG/DL — SIGNIFICANT CHANGE UP (ref 8.4–10.5)
CALCIUM SERPL-MCNC: 9.6 MG/DL — SIGNIFICANT CHANGE UP (ref 8.4–10.5)
CHLORIDE SERPL-SCNC: 104 MMOL/L — SIGNIFICANT CHANGE UP (ref 98–107)
CHLORIDE SERPL-SCNC: 105 MMOL/L — SIGNIFICANT CHANGE UP (ref 98–107)
CO2 SERPL-SCNC: 26 MMOL/L — SIGNIFICANT CHANGE UP (ref 22–31)
CO2 SERPL-SCNC: 26 MMOL/L — SIGNIFICANT CHANGE UP (ref 22–31)
CREAT SERPL-MCNC: 0.54 MG/DL — SIGNIFICANT CHANGE UP (ref 0.5–1.3)
CREAT SERPL-MCNC: 0.56 MG/DL — SIGNIFICANT CHANGE UP (ref 0.5–1.3)
CRP SERPL-MCNC: 8 MG/L — HIGH
EOSINOPHIL # BLD AUTO: 0.14 K/UL — SIGNIFICANT CHANGE UP (ref 0–0.5)
EOSINOPHIL NFR BLD AUTO: 1.7 % — SIGNIFICANT CHANGE UP (ref 0–6)
ERYTHROCYTE [SEDIMENTATION RATE] IN BLOOD: 14 MM/HR — SIGNIFICANT CHANGE UP (ref 4–25)
GLUCOSE SERPL-MCNC: 120 MG/DL — HIGH (ref 70–99)
GLUCOSE SERPL-MCNC: 120 MG/DL — HIGH (ref 70–99)
HCT VFR BLD CALC: 39.2 % — SIGNIFICANT CHANGE UP (ref 34.5–45)
HCT VFR BLD CALC: 41.1 % — SIGNIFICANT CHANGE UP (ref 34.5–45)
HGB BLD-MCNC: 13 G/DL — SIGNIFICANT CHANGE UP (ref 11.5–15.5)
HGB BLD-MCNC: 13.4 G/DL — SIGNIFICANT CHANGE UP (ref 11.5–15.5)
IMM GRANULOCYTES NFR BLD AUTO: 0.4 % — SIGNIFICANT CHANGE UP (ref 0–1.5)
LYMPHOCYTES # BLD AUTO: 2.09 K/UL — SIGNIFICANT CHANGE UP (ref 1–3.3)
LYMPHOCYTES # BLD AUTO: 25.8 % — SIGNIFICANT CHANGE UP (ref 13–44)
MCHC RBC-ENTMCNC: 29.7 PG — SIGNIFICANT CHANGE UP (ref 27–34)
MCHC RBC-ENTMCNC: 29.8 PG — SIGNIFICANT CHANGE UP (ref 27–34)
MCHC RBC-ENTMCNC: 32.6 % — SIGNIFICANT CHANGE UP (ref 32–36)
MCHC RBC-ENTMCNC: 33.2 % — SIGNIFICANT CHANGE UP (ref 32–36)
MCV RBC AUTO: 89.5 FL — SIGNIFICANT CHANGE UP (ref 80–100)
MCV RBC AUTO: 91.3 FL — SIGNIFICANT CHANGE UP (ref 80–100)
MONOCYTES # BLD AUTO: 0.69 K/UL — SIGNIFICANT CHANGE UP (ref 0–0.9)
MONOCYTES NFR BLD AUTO: 8.5 % — SIGNIFICANT CHANGE UP (ref 2–14)
NEUTROPHILS # BLD AUTO: 5.13 K/UL — SIGNIFICANT CHANGE UP (ref 1.8–7.4)
NEUTROPHILS NFR BLD AUTO: 63.2 % — SIGNIFICANT CHANGE UP (ref 43–77)
NRBC # FLD: 0 K/UL — SIGNIFICANT CHANGE UP (ref 0–0)
NRBC # FLD: 0 K/UL — SIGNIFICANT CHANGE UP (ref 0–0)
PLATELET # BLD AUTO: 340 K/UL — SIGNIFICANT CHANGE UP (ref 150–400)
PLATELET # BLD AUTO: 356 K/UL — SIGNIFICANT CHANGE UP (ref 150–400)
PMV BLD: 10.6 FL — SIGNIFICANT CHANGE UP (ref 7–13)
PMV BLD: 10.9 FL — SIGNIFICANT CHANGE UP (ref 7–13)
POTASSIUM SERPL-MCNC: 3.6 MMOL/L — SIGNIFICANT CHANGE UP (ref 3.5–5.3)
POTASSIUM SERPL-MCNC: 4 MMOL/L — SIGNIFICANT CHANGE UP (ref 3.5–5.3)
POTASSIUM SERPL-SCNC: 3.6 MMOL/L — SIGNIFICANT CHANGE UP (ref 3.5–5.3)
POTASSIUM SERPL-SCNC: 4 MMOL/L — SIGNIFICANT CHANGE UP (ref 3.5–5.3)
PROT SERPL-MCNC: 6.6 G/DL — SIGNIFICANT CHANGE UP (ref 6–8.3)
RBC # BLD: 4.38 M/UL — SIGNIFICANT CHANGE UP (ref 3.8–5.2)
RBC # BLD: 4.5 M/UL — SIGNIFICANT CHANGE UP (ref 3.8–5.2)
RBC # FLD: 14.4 % — SIGNIFICANT CHANGE UP (ref 10.3–14.5)
RBC # FLD: 14.6 % — HIGH (ref 10.3–14.5)
SODIUM SERPL-SCNC: 141 MMOL/L — SIGNIFICANT CHANGE UP (ref 135–145)
SODIUM SERPL-SCNC: 142 MMOL/L — SIGNIFICANT CHANGE UP (ref 135–145)
WBC # BLD: 8.11 K/UL — SIGNIFICANT CHANGE UP (ref 3.8–10.5)
WBC # BLD: 9.59 K/UL — SIGNIFICANT CHANGE UP (ref 3.8–10.5)
WBC # FLD AUTO: 8.11 K/UL — SIGNIFICANT CHANGE UP (ref 3.8–10.5)
WBC # FLD AUTO: 9.59 K/UL — SIGNIFICANT CHANGE UP (ref 3.8–10.5)

## 2020-10-26 PROCEDURE — 99213 OFFICE O/P EST LOW 20 MIN: CPT | Mod: GC

## 2020-10-26 RX ORDER — FOLIC ACID 0.8 MG
1 TABLET ORAL
Qty: 0 | Refills: 0 | DISCHARGE

## 2020-10-26 RX ORDER — SIMETHICONE 80 MG/1
1 TABLET, CHEWABLE ORAL
Qty: 0 | Refills: 0 | DISCHARGE

## 2020-10-26 RX ORDER — AMLODIPINE BESYLATE 2.5 MG/1
1 TABLET ORAL
Qty: 0 | Refills: 0 | DISCHARGE

## 2020-10-26 RX ORDER — METHOTREXATE 2.5 MG/1
1 TABLET ORAL
Qty: 0 | Refills: 0 | DISCHARGE

## 2020-10-26 RX ORDER — ALBUTEROL 90 UG/1
2 AEROSOL, METERED ORAL
Qty: 0 | Refills: 0 | DISCHARGE

## 2020-10-26 RX ORDER — ACETAMINOPHEN 500 MG
1 TABLET ORAL
Qty: 0 | Refills: 0 | DISCHARGE

## 2020-10-26 RX ORDER — CHOLECALCIFEROL (VITAMIN D3) 125 MCG
1 CAPSULE ORAL
Qty: 0 | Refills: 0 | DISCHARGE

## 2020-10-26 RX ORDER — LISINOPRIL 2.5 MG/1
1 TABLET ORAL
Qty: 0 | Refills: 0 | DISCHARGE

## 2020-10-26 RX ORDER — VITAMIN E 100 UNIT
1 CAPSULE ORAL
Qty: 0 | Refills: 0 | DISCHARGE

## 2020-10-26 RX ORDER — DULOXETINE HYDROCHLORIDE 30 MG/1
1 CAPSULE, DELAYED RELEASE ORAL
Qty: 0 | Refills: 0 | DISCHARGE

## 2020-10-26 NOTE — H&P PST ADULT - NEGATIVE ENMT SYMPTOMS
no nasal congestion/no vertigo/no hearing difficulty/no dysphagia/no throat pain/no ear pain/no tinnitus/no dry mouth/no sinus symptoms

## 2020-10-26 NOTE — H&P PST ADULT - NSICDXPASTMEDICALHX_GEN_ALL_CORE_FT
PAST MEDICAL HISTORY:  Arthritis pt reports need  bilateral TKR    Asthma     Colon polyps "benign"    Depression     Endometriosis     Fibromyalgia     H/O benign neoplasm of colon     History of COPD     Hypercholesteremia     Hyperlipidemia     Hypertension     OA (osteoarthritis)     Osteoporosis     Pneumonia 2/2020    Pre-diabetes

## 2020-10-26 NOTE — H&P PST ADULT - RS GEN PE MLT RESP DETAILS PC
respirations non-labored/clear to auscultation bilaterally/no rales/breath sounds equal/no wheezes/no rhonchi/good air movement

## 2020-10-26 NOTE — H&P PST ADULT - NSICDXPASTSURGICALHX_GEN_ALL_CORE_FT
PAST SURGICAL HISTORY:  Endometriosis [id number]    H/O hand surgery Left childhood    S/P arthroscopy of knee Left    Tubal ligation status

## 2020-10-26 NOTE — H&P PST ADULT - HISTORY OF PRESENT ILLNESS
Pt is a 65 y.o. female with PMH asthma/ COPD, HTN, HLD, fibromyalgia, arthritis Hx of Polypectomy x 9 in 2017; Reports (1) episode of rectal bleeding and hemorrhoids, now scheduled for Endoscopy/ Colonoscopy

## 2020-10-26 NOTE — H&P PST ADULT - ASSESSMENT
Problem: benign neoplasm of colon  Assessment and Plan: Patient scheduled for surgery on 11/3/2020  Patient provided with verbal and written presurgical instructions; verbalized understanding  with teach back.    Patient provided with famotidine for GI prophylaxis; verbalized understanding.    Patient confirmed COVID appointment     STOP BANG score met, OR booking notified  OR booking notified of JASMIN and allergies     Medical evaluation requested by PST for low METS, chest pain, SOB, patient verbalized understanding, will obtain    Problem: Hypertension  Assessment and Plan: Patient instructed to take lisinopril, amlodipine on day of procedure, verbalized understanding.  Patient instructed to hold Vitamin E today     Problem: Asthma   Plan: Instructed to take medications as usual, verbalized understanding    Problem: benign neoplasm of colon  Assessment and Plan: Patient scheduled for surgery on 11/3/2020  Patient provided with verbal and written presurgical instructions; verbalized understanding  with teach back.    Patient provided with famotidine for GI prophylaxis; verbalized understanding.    Patient confirmed COVID appointment     STOP BANG score met, OR booking notified  OR booking notified of JASMIN and allergies     Medical evaluation requested by PST for low METS, chest pain, SOB, carotid bruit, patient verbalized understanding, will obtain    Problem: Hypertension  Assessment and Plan: Patient instructed to take lisinopril, amlodipine on day of procedure, verbalized understanding.  Patient instructed to hold Vitamin E today     Problem: Asthma   Plan: Instructed to take medications as usual, verbalized understanding

## 2020-10-26 NOTE — H&P PST ADULT - NSICDXFAMILYHX_GEN_ALL_CORE_FT
FAMILY HISTORY:  FH: breast cancer, mother, sister, aunts  FH: colon cancer, father  FH: heart disease, father  FH: type 2 diabetes, mother

## 2020-10-27 LAB
SARS-COV-2 IGG SERPL QL IA: NEGATIVE — SIGNIFICANT CHANGE UP
SARS-COV-2 IGM SERPL IA-ACNC: 0.09 INDEX — SIGNIFICANT CHANGE UP

## 2020-10-27 NOTE — HISTORY OF PRESENT ILLNESS
[FreeTextEntry1] : Interval Hx:\par 10/26/2020\par Today she presents for a regular office visit.\par She says her b/l hand pain have remained the same since the last visit, associated with morning stiffness.\par She is also complaining of b/l knee pains with walking. \par No episodes of red/hot/tender joints.\par Sternal pain under control.\par Denies CP, SOB, n/v/d, abdominal pain, fever or chills.

## 2020-10-27 NOTE — PHYSICAL EXAM
[General Appearance - Alert] : alert [General Appearance - In No Acute Distress] : in no acute distress [General Appearance - Well Nourished] : well nourished [General Appearance - Well-Appearing] : healthy appearing [Sclera] : the sclera and conjunctiva were normal [Extraocular Movements] : extraocular movements were intact [Outer Ear] : the ears and nose were normal in appearance [Hearing Threshold Finger Rub Not [place]] : hearing was normal [Neck Appearance] : the appearance of the neck was normal [Auscultation Breath Sounds / Voice Sounds] : lungs were clear to auscultation bilaterally [Heart Sounds] : normal S1 and S2 [Murmurs] : no murmurs [Edema] : there was no peripheral edema [Bowel Sounds] : normal bowel sounds [Abdomen Soft] : soft [Abdomen Tenderness] : non-tender [Cervical Lymph Nodes Enlarged Posterior Bilaterally] : posterior cervical [Cervical Lymph Nodes Enlarged Anterior Bilaterally] : anterior cervical [Supraclavicular Lymph Nodes Enlarged Bilaterally] : supraclavicular [] : no rash [Motor Exam] : the motor exam was normal [No Focal Deficits] : no focal deficits [Oriented To Time, Place, And Person] : oriented to person, place, and time [FreeTextEntry1] : 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 Tenderness present in almost all joints of b/l hands, no swollen, red joints or limitation of ROM noted though. No swelling, erythema or tenderness noted in sternoclavicular joints. B/l knee joints have normal ROM, crepitus noted. Body wide tender spots appreciated.

## 2020-10-27 NOTE — ASSESSMENT
[FreeTextEntry1] : 65 F is being seen in the Rheumatology clinic for seronegative inflammatory arthritis, fibromyalgia and b/l knee pain from OA. \par \par Problem List:\par # Seronegative inflammatory arthritis.\par # B/l OA of knees.\par # FM\par \par Impression/Plan:\par \par # Seronegative inflammatory arthritis.\par Recurrent symptoms on MTX 20 mg weekly. \par On last visit we discussed adding Enbrel, pt agreeable, risks and benefits explained to the pt in detail. \par Will start Enbrel in addition to MTX 20 mg weekly. \par Sternoclavicular joint pain is stable. \par Currently on 20 MTX weekly. \par Upto date on Quantiferon and Hep B studies. \par \par # B/l OA of knees.\par Ortho offered TKR on 6/19, but patient refused.\par Advised pt to lose weight and take Naproxen and Tylenol symptoms (she is not taking any pain medications consistently). \par \par # FM\par Symptoms under control.\par Continue Cymbalta 60mg\par PT\par \par # HCM\par Influenza vaccine 3/19, pneumovax 2015; prevnar 7/19\par DEXA scan normal 6/19, repeat in 2 years \par \par RTC in 6 weeks\par \par d/w Dr. Jauregui\par \par Nitesh Candelario MD\par Rheumatology fellow\par

## 2020-10-28 DIAGNOSIS — M13.80 OTHER SPECIFIED ARTHRITIS, UNSPECIFIED SITE: ICD-10-CM

## 2020-10-31 ENCOUNTER — APPOINTMENT (OUTPATIENT)
Dept: DISASTER EMERGENCY | Facility: CLINIC | Age: 65
End: 2020-10-31

## 2020-10-31 DIAGNOSIS — Z01.818 ENCOUNTER FOR OTHER PREPROCEDURAL EXAMINATION: ICD-10-CM

## 2020-11-01 LAB — SARS-COV-2 N GENE NPH QL NAA+PROBE: NOT DETECTED

## 2020-11-02 ENCOUNTER — OUTPATIENT (OUTPATIENT)
Dept: OUTPATIENT SERVICES | Facility: HOSPITAL | Age: 65
LOS: 1 days | End: 2020-11-02

## 2020-11-02 ENCOUNTER — APPOINTMENT (OUTPATIENT)
Dept: INTERNAL MEDICINE | Facility: CLINIC | Age: 65
End: 2020-11-02
Payer: MEDICAID

## 2020-11-02 VITALS
SYSTOLIC BLOOD PRESSURE: 150 MMHG | BODY MASS INDEX: 32.18 KG/M2 | OXYGEN SATURATION: 99 % | WEIGHT: 205 LBS | HEART RATE: 79 BPM | DIASTOLIC BLOOD PRESSURE: 90 MMHG | HEIGHT: 67 IN

## 2020-11-02 VITALS — TEMPERATURE: 97.5 F

## 2020-11-02 VITALS — SYSTOLIC BLOOD PRESSURE: 138 MMHG | DIASTOLIC BLOOD PRESSURE: 84 MMHG

## 2020-11-02 DIAGNOSIS — Z01.818 ENCOUNTER FOR OTHER PREPROCEDURAL EXAMINATION: ICD-10-CM

## 2020-11-02 DIAGNOSIS — Z98.890 OTHER SPECIFIED POSTPROCEDURAL STATES: Chronic | ICD-10-CM

## 2020-11-02 PROBLEM — M19.90 UNSPECIFIED OSTEOARTHRITIS, UNSPECIFIED SITE: Chronic | Status: ACTIVE | Noted: 2020-10-26

## 2020-11-02 PROCEDURE — 99214 OFFICE O/P EST MOD 30 MIN: CPT

## 2020-11-02 NOTE — REVIEW OF SYSTEMS
[Fatigue] : fatigue [Shortness Of Breath] : shortness of breath [Wheezing] : no wheezing [Cough] : cough [Dyspnea on Exertion] : dyspnea on exertion [Joint Pain] : joint pain [Headache] : no headache [Dizziness] : no dizziness [Fainting] : no fainting [Memory Loss] : no memory loss [Negative] : Heme/Lymph [FreeTextEntry6] : Unchanged from baseline, for at least the past 6 months [FreeTextEntry7] : Has hernia, denies obstruction symptoms [FreeTextEntry9] : History of inflammatory arthritis, unchanged [de-identified] : Uses Rollator for ambulation [de-identified] : Depression is improved

## 2020-11-02 NOTE — PHYSICAL EXAM
[No Acute Distress] : no acute distress [Well Nourished] : well nourished [Well Developed] : well developed [Well-Appearing] : well-appearing [Normal Voice/Communication] : normal voice/communication [Normal Sclera/Conjunctiva] : normal sclera/conjunctiva [EOMI] : extraocular movements intact [Normal Outer Ear/Nose] : the outer ears and nose were normal in appearance [No JVD] : no jugular venous distention [Supple] : supple [No Respiratory Distress] : no respiratory distress  [No Accessory Muscle Use] : no accessory muscle use [Normal Rate] : normal rate  [Regular Rhythm] : with a regular rhythm [Normal S1, S2] : normal S1 and S2 [No Varicosities] : no varicosities [Pedal Pulses Present] : the pedal pulses are present [No Extremity Clubbing/Cyanosis] : no extremity clubbing/cyanosis [Soft] : abdomen soft [Non Tender] : non-tender [Non-distended] : non-distended [No Masses] : no abdominal mass palpated [Normal Bowel Sounds] : normal bowel sounds [No Rash] : no rash [Coordination Grossly Intact] : coordination grossly intact [No Focal Deficits] : no focal deficits [Speech Grossly Normal] : speech grossly normal [Memory Grossly Normal] : memory grossly normal [Normal Affect] : the affect was normal [Normal Mood] : the mood was normal [Normal Insight/Judgement] : insight and judgment were intact [de-identified] : Clear to auscultation bilaterally anteriorly and posteriorly [de-identified] : Trace bilateral lower extremity edema, symmetric [de-identified] : Slow, coordinated gait with Rollator

## 2020-11-02 NOTE — ASSESSMENT
[High Risk Surgery - Intraperitoneal, Intrathoracic or Supringuinal Vascular Procedures] : High Risk Surgery - Intraperitoneal, Intrathoracic or Supringuinal Vascular Procedures - No (0) [Ischemic Heart Disease] : Ischemic Heart Disease - No (0) [Congestive Heart Failure] : Congestive Heart Failure - No (0) [Prior Cerebrovascular Accident or TIA] : Prior Cerebrovascular Accident or TIA - No (0) [Creatinine >= 2mg/dL (1 Point)] : Creatinine >= 2mg/dL - No (0) [Insulin-dependent Diabetic (1 Point)] : Insulin-dependent Diabetic - No (0) [0] : 0 , RCRI Class: I, Risk of Post-Op Cardiac Complications: 3.9%, 95% CI for Risk Estimate: 2.8% - 5.4% [Patient Optimized for Surgery] : Patient optimized for surgery [No Further Testing Recommended] : no further testing recommended [As per surgery] : as per surgery [FreeTextEntry4] : 65-year-old woman who is a current smoker with a history of COPD, hypertension, hyperlipidemia, inflammatory arthritis and prediabetes with known history of colonic polyps who presents for preoperative evaluation prior to colonoscopy and endoscopy.  She has shortness of breath and cough due to her COPD, unchanged from baseline for over 6 months.  She has no active cardiac conditions.  She has no major risk factors.  Her exercise tolerance is low due to her arthritic pain, with likely contribution of her COPD.  The procedure is a low risk procedure.\par Patient is optimized for procedure at this time, no further cardiac evaluation indicated prior to procedure.  We discussed holding NSAIDs and vitamin E prior to procedure.\par She is up-to-date on her influenza vaccination.  [FreeTextEntry7] : Advised to continue holding vitamin E and naproxen until after procedure

## 2020-11-02 NOTE — HISTORY OF PRESENT ILLNESS
[Aortic Stenosis] : no aortic stenosis [Atrial Fibrillation] : no atrial fibrillation [Coronary Artery Disease] : no coronary artery disease [Recent Myocardial Infarction] : no recent myocardial infarction [Implantable Device/Pacemaker] : no implantable device/pacemaker [Asthma] : asthma [COPD] : COPD [Sleep Apnea] : no sleep apnea [Smoker] : smoker [No Adverse Anesthesia Reaction] : no adverse anesthesia reaction in self or family member [Chronic Anticoagulation] : no chronic anticoagulation [Chronic Kidney Disease] : no chronic kidney disease [Diabetes] : no diabetes [Poor (<4 METs)] : Poor (<4 METs) [NSAIDs: _____] : NSAIDs: [unfilled] [FreeTextEntry1] : Colonoscopy/Endoscopy [FreeTextEntry2] : 11/3/20 (tomorrow) [FreeTextEntry3] : Gastroenterology [FreeTextEntry4] : 65-year-old woman who is a current smoker (1 pack/day) with COPD, hypertension, hyperlipidemia, fibromyalgia, inflammatory arthritis, bilateral knee osteoarthritis, right sternoclavicular arthritis, NAFLD, depression (followed by outpatient psychiatry), prediabetes, history of colon polyps who presents today for preoperative evaluation prior to colonoscopy/endoscopy tomorrow.\par Patient reports that her emotional state is much improved since July when she had prior preoperative evaluation but was unable emotionally to undergo colonoscopy and endoscopy.  She denies any recent changes in her symptoms, she has chronic shortness of breath and cough from COPD neither of which are changed, including phlegm is unchanged.  Denies fevers or chills.  Denies chest pain.  Has bilateral shoulder pain and is concerned about lying on her side.  She still smoking 1 pack/day.  She sometimes itches when exposed to latex.  She stopped taking her vitamin E pills a few days ago.  She has not taken naproxen in about 3 weeks.  She has been taking Tylenol for pain control.  She is able to walk less than 1 block before she has to stop for joint pain.  No other limitations, but her pain limits her from exerting herself significantly.  COVID-19 PCR negative on 10/31/2020. [FreeTextEntry6] : Patient denies any chest pain, claudication, orthopnea, syncope, palpitations.  She has chronic dyspnea on exertion, unchanged. [FreeTextEntry7] : EKG on 7/13/2020 with normal sinus rhythm, left bundle branch block.  Unchanged from prior EKG in 2017.\par Last echocardiogram in 2015 with normal ejection fraction. [FreeTextEntry8] : Functional status limited by joint pain and COPD.

## 2020-11-02 NOTE — RESULTS/DATA
[] : results reviewed [de-identified] : CBC normal 10/26/2020 [de-identified] : BMP normal 10/26/2020 except slightly elevated glucose, known prediabetes [de-identified] : Stable, known LBBB 7/13/2020, unchanged from July 2017

## 2020-11-03 ENCOUNTER — OUTPATIENT (OUTPATIENT)
Dept: OUTPATIENT SERVICES | Facility: HOSPITAL | Age: 65
LOS: 1 days | Discharge: HOME | End: 2020-11-03
Payer: MEDICAID

## 2020-11-03 ENCOUNTER — RESULT REVIEW (OUTPATIENT)
Age: 65
End: 2020-11-03

## 2020-11-03 VITALS
TEMPERATURE: 98 F | DIASTOLIC BLOOD PRESSURE: 62 MMHG | WEIGHT: 205.03 LBS | RESPIRATION RATE: 18 BRPM | HEART RATE: 72 BPM | SYSTOLIC BLOOD PRESSURE: 113 MMHG | HEIGHT: 66 IN | OXYGEN SATURATION: 98 %

## 2020-11-03 VITALS
HEART RATE: 71 BPM | DIASTOLIC BLOOD PRESSURE: 52 MMHG | SYSTOLIC BLOOD PRESSURE: 119 MMHG | OXYGEN SATURATION: 98 % | RESPIRATION RATE: 20 BRPM

## 2020-11-03 DIAGNOSIS — D12.6 BENIGN NEOPLASM OF COLON, UNSPECIFIED: ICD-10-CM

## 2020-11-03 DIAGNOSIS — Z98.890 OTHER SPECIFIED POSTPROCEDURAL STATES: Chronic | ICD-10-CM

## 2020-11-03 PROCEDURE — 88305 TISSUE EXAM BY PATHOLOGIST: CPT | Mod: 26

## 2020-11-03 PROCEDURE — 43239 EGD BIOPSY SINGLE/MULTIPLE: CPT | Mod: GC

## 2020-11-03 PROCEDURE — 45380 COLONOSCOPY AND BIOPSY: CPT | Mod: GC,59

## 2020-11-03 PROCEDURE — 45385 COLONOSCOPY W/LESION REMOVAL: CPT | Mod: GC

## 2020-11-03 PROCEDURE — 88312 SPECIAL STAINS GROUP 1: CPT | Mod: 26

## 2020-11-03 RX ORDER — SODIUM CHLORIDE 9 MG/ML
1000 INJECTION INTRAMUSCULAR; INTRAVENOUS; SUBCUTANEOUS
Refills: 0 | Status: DISCONTINUED | OUTPATIENT
Start: 2020-11-03 | End: 2020-11-17

## 2020-11-03 RX ADMIN — SODIUM CHLORIDE 30 MILLILITER(S): 9 INJECTION INTRAMUSCULAR; INTRAVENOUS; SUBCUTANEOUS at 13:42

## 2020-11-03 NOTE — ASU PREOP CHECKLIST - NS PREOP CHK MONITOR ANESTHESIA CONSENT

## 2020-11-05 LAB — SURGICAL PATHOLOGY STUDY: SIGNIFICANT CHANGE UP

## 2020-11-10 ENCOUNTER — NON-APPOINTMENT (OUTPATIENT)
Age: 65
End: 2020-11-10

## 2020-11-18 ENCOUNTER — APPOINTMENT (OUTPATIENT)
Dept: INTERNAL MEDICINE | Facility: CLINIC | Age: 65
End: 2020-11-18
Payer: MEDICAID

## 2020-11-18 ENCOUNTER — OUTPATIENT (OUTPATIENT)
Dept: OUTPATIENT SERVICES | Facility: HOSPITAL | Age: 65
LOS: 1 days | End: 2020-11-18

## 2020-11-18 VITALS
WEIGHT: 205 LBS | OXYGEN SATURATION: 97 % | SYSTOLIC BLOOD PRESSURE: 140 MMHG | BODY MASS INDEX: 32.18 KG/M2 | HEIGHT: 67 IN | DIASTOLIC BLOOD PRESSURE: 78 MMHG | HEART RATE: 88 BPM

## 2020-11-18 VITALS — TEMPERATURE: 95.7 F

## 2020-11-18 DIAGNOSIS — Z98.890 OTHER SPECIFIED POSTPROCEDURAL STATES: Chronic | ICD-10-CM

## 2020-11-18 DIAGNOSIS — H53.8 OTHER VISUAL DISTURBANCES: ICD-10-CM

## 2020-11-18 PROCEDURE — 99213 OFFICE O/P EST LOW 20 MIN: CPT | Mod: GE

## 2020-11-20 PROBLEM — H53.8 BLURRY VISION: Status: ACTIVE | Noted: 2020-08-03

## 2020-11-23 DIAGNOSIS — R09.89 OTHER SPECIFIED SYMPTOMS AND SIGNS INVOLVING THE CIRCULATORY AND RESPIRATORY SYSTEMS: ICD-10-CM

## 2020-11-23 DIAGNOSIS — F41.9 ANXIETY DISORDER, UNSPECIFIED: ICD-10-CM

## 2020-11-23 DIAGNOSIS — M54.12 RADICULOPATHY, CERVICAL REGION: ICD-10-CM

## 2020-11-23 DIAGNOSIS — H53.8 OTHER VISUAL DISTURBANCES: ICD-10-CM

## 2020-11-23 DIAGNOSIS — J44.9 CHRONIC OBSTRUCTIVE PULMONARY DISEASE, UNSPECIFIED: ICD-10-CM

## 2020-11-23 DIAGNOSIS — R00.2 PALPITATIONS: ICD-10-CM

## 2020-11-23 NOTE — REVIEW OF SYSTEMS
[Fatigue] : fatigue [Vision Problems] : vision problems [Lower Ext Edema] : lower extremity edema [Shortness Of Breath] : shortness of breath [Wheezing] : wheezing [Cough] : cough [Joint Pain] : joint pain [Joint Stiffness] : joint stiffness [Muscle Pain] : muscle pain [Back Pain] : back pain [Anxiety] : anxiety [Depression] : depression [Negative] : Heme/Lymph [Dizziness] : dizziness [Fever] : no fever [Chills] : no chills [Hot Flashes] : no hot flashes [Night Sweats] : no night sweats [Recent Change In Weight] : ~T no recent weight change [Discharge] : no discharge [Pain] : no pain [Redness] : no redness [Dryness] : no dryness  [Itching] : no itching [Chest Pain] : no chest pain [Palpitations] : no palpitations [Leg Claudication] : no leg claudication [Orthopnea] : no orthopnea [Paroxysmal Nocturnal Dyspnea] : no paroxysmal nocturnal dyspnea [Joint Swelling] : no joint swelling [Muscle Weakness] : no muscle weakness

## 2020-11-23 NOTE — PHYSICAL EXAM
[Normal] : no joint swelling and grossly normal strength and tone [No Accessory Muscle Use] : no accessory muscle use [de-identified] : diffuse rhonchi, bronchial breath sounds anterior and posteriorly [de-identified] : trace pitting edema b/l LE

## 2020-11-23 NOTE — HISTORY OF PRESENT ILLNESS
[FreeTextEntry1] : f/u [de-identified] : 66 y/o F current smoker (1 PPD) with asthma/COPD, HTN, HLD, fibromyalgia, inflammatory arthritis (followed by Rheum), b/l knee OA, R sternoclavicular arthritis, NAFLD, anxiety, depression (followed by outpatient psychiatry), pre-diabetes (A1c 5.7% in 5/2019), colonic polyps who presents for f/u chronic conditions.  \par \par  Colonoscopy completed, now on therapy for h. pylori. At her colonoscopy, an anesthesiologist told her she had a blockage or blood clot in her neck and that she needed an ultrasound.   \par \par Pt also has intermittent dizziness associated with feeling like her heart is skipping beats. This happened twice in one week about a week ago, and has been happening once every few months. She denies any syncope or falls. These episodes only happen for a few seconds and is not associated with activity. \par \par \par   Pt complaining of increasing numbness in hands and fingers on both sides. She has a history of multilevel cervical spondylosis, canal and foraminal narrowing. Also with a history of carpal tunnel syndrome. She previously followed with ortho spine for back pain but has not followed up. She describes the numbness as most severe in the 4th digit on both sides.     \par \azucena Pt brought in an albuterol HFA and is the only inhaler she uses. She does not use her incruse ellipta inhaler because her friend told her she has to snort the powder which she did not want to do. On average she uses her albuterol inhaler 1-2x every other day.

## 2020-12-10 ENCOUNTER — RX RENEWAL (OUTPATIENT)
Age: 65
End: 2020-12-10

## 2020-12-14 ENCOUNTER — APPOINTMENT (OUTPATIENT)
Dept: RHEUMATOLOGY | Facility: CLINIC | Age: 65
End: 2020-12-14

## 2020-12-23 ENCOUNTER — OUTPATIENT (OUTPATIENT)
Dept: OUTPATIENT SERVICES | Facility: HOSPITAL | Age: 65
LOS: 1 days | End: 2020-12-23

## 2020-12-23 ENCOUNTER — APPOINTMENT (OUTPATIENT)
Dept: INTERNAL MEDICINE | Facility: CLINIC | Age: 65
End: 2020-12-23
Payer: MEDICAID

## 2020-12-23 VITALS
SYSTOLIC BLOOD PRESSURE: 136 MMHG | BODY MASS INDEX: 32.18 KG/M2 | HEIGHT: 67 IN | DIASTOLIC BLOOD PRESSURE: 72 MMHG | HEART RATE: 80 BPM | WEIGHT: 205 LBS

## 2020-12-23 DIAGNOSIS — Z98.890 OTHER SPECIFIED POSTPROCEDURAL STATES: Chronic | ICD-10-CM

## 2020-12-23 DIAGNOSIS — R00.2 PALPITATIONS: ICD-10-CM

## 2020-12-23 PROCEDURE — ZZZZZ: CPT

## 2020-12-23 RX ORDER — AMOXICILLIN 500 MG/1
500 TABLET, FILM COATED ORAL TWICE DAILY
Qty: 56 | Refills: 0 | Status: DISCONTINUED | COMMUNITY
Start: 2020-11-10 | End: 2020-12-23

## 2020-12-23 RX ORDER — BISACODYL 5 MG/1
5 TABLET ORAL
Qty: 4 | Refills: 0 | Status: DISCONTINUED | COMMUNITY
Start: 2020-11-02 | End: 2020-12-23

## 2020-12-23 NOTE — ASSESSMENT
[FreeTextEntry1] : 65F current smoker (1 PPD) with asthma/COPD, HTN, HLD, fibromyalgia, inflammatory arthritis (followed by Rheum), b/l knee OA, R sternoclavicular arthritis, NAFLD, anxiety, depression (followed by outpatient psychiatry), pre-diabetes (A1c 5.7% in 2/2020), colonic polyps, recent dx of Hpylori who presents for f/u chronic conditions.  \par \par # Numbness in hands\par - cervical radiculopathy vs carpal tunnel syndrome vs arthritis \par - will need further eval for wrist compression during next FTF visit\par - symptom distribution w/ numbness of entire fingers favors cervical origin\par - referral to neuro and ortho/hand spine for further management\par - trial of lyrica 25BID given prior improvement with gabapentin (did not wish to restart 2/2 prior AE of dizziness)\par - wrist splint\par - trial diclofenac gel \par \par # Dizziness with palpitations\par -c/f carotid artery stenosis vs arrythmia\par - sinus rhythm on latest EKG from this year, pulse is regular\par - referral given for cardiology for evaluation; may benefit from long term cardiac monitor if continues to be symptomatic - reminded to schedule appt\par - reminded to schedule carotid duplex\par \par # R/o carotid artery stenosis\par - was told by another provider there may be a blockage? or blood clot in neck\par - pending carotid duplex\par \par # Blurry vision\par - saw optometrist, prescribed eye glasses, which have been filled\par \par # Anxiety/depression\par - continuing to provide frequent, short-interval follow ups for reassurance\par - c/w outpatient therapy and psychology\par - c/w duloxetine (also for fibromyalgia)\par \par # COPD/asthma/lung ca screening\par - 7/2020 LDCT scan - stable nodules\par - pt nonadherent to incruse ellipta, using albuterol inhaler PRN daily or every other day\par - f/u with pulm encouraged \par \par # Active smoker\par - smoking cessation referral given\par - rx nicotine gum\par - discussed starting buproprion for smoking cessation in conjunction with nicotine gum. previously rx but never picked up. \par \par # HTN\par - c/w amlodipine, lisinopril\par \par # H. pylori\par - completed therapy without bismuth due to interaction with methotrexate\par - f/u with GI for resolution 1/14/21\par \par # HCM\par - last colonoscopy 11/3/2020 w/ TAs. follow-up 5 years. \par - last mammo 10/2020 birads 2, f/u 1 year w/ breast MRI due to increased lifetime risk 20.1%\par - bone scan 7/2019 normal\par - low dose CT done 7/2020 with stable lung nodules since 2013\par - last pap/hpv neg in 2018, due 2023 however will be >65\par - flu shot, pneumo vacc, Tdap UTD\par - shingrix vax sent to pharmacy previously, pt declining\par \par SSH, PGY3\par [facility]\par \par CDW Dr. Cintron\par FTF visit in 1 mth w/ PCP

## 2020-12-23 NOTE — PHYSICAL EXAM
[Normal] : affect was normal and insight and judgment were intact [No Acute Distress] : no acute distress [Well Nourished] : well nourished [No Respiratory Distress] : no respiratory distress  [Normal Affect] : the affect was normal [Normal Mood] : the mood was normal [Normal Insight/Judgement] : insight and judgment were intact [de-identified] : unable to perform full physical exam due to telephonic visit [de-identified] : margaret

## 2020-12-23 NOTE — ADDENDUM
[FreeTextEntry1] : reviewed with resident via telephone due to covid19 Agree with residents evaluation and plan delmar edgar

## 2020-12-23 NOTE — REVIEW OF SYSTEMS
[Joint Pain] : joint pain [Joint Stiffness] : joint stiffness [Muscle Pain] : muscle pain [Back Pain] : back pain [Dizziness] : dizziness [Anxiety] : anxiety [Depression] : depression [Negative] : Heme/Lymph [Fever] : no fever [Chills] : no chills [Fatigue] : no fatigue [Hot Flashes] : no hot flashes [Night Sweats] : no night sweats [Recent Change In Weight] : ~T no recent weight change [Discharge] : no discharge [Pain] : no pain [Redness] : no redness [Dryness] : no dryness  [Vision Problems] : no vision problems [Itching] : no itching [Chest Pain] : no chest pain [Palpitations] : no palpitations [Leg Claudication] : no leg claudication [Lower Ext Edema] : no lower extremity edema [Orthopnea] : no orthopnea [Paroxysmal Nocturnal Dyspnea] : no paroxysmal nocturnal dyspnea [Wheezing] : no wheezing [Cough] : no cough [Joint Swelling] : no joint swelling [Muscle Weakness] : no muscle weakness

## 2020-12-23 NOTE — HISTORY OF PRESENT ILLNESS
[Home] : at home, [unfilled] , at the time of the visit. [Other Location: e.g. Home (Enter Location, City,State)___] : at [unfilled] [Verbal consent obtained from patient] : the patient, [unfilled] [FreeTextEntry1] : f/u of chronic medical problems [de-identified] : 65F current smoker (1 PPD) with asthma/COPD, HTN, HLD, fibromyalgia, inflammatory arthritis (followed by Rheum), b/l knee OA, R sternoclavicular arthritis, NAFLD, anxiety, depression (followed by outpatient psychiatry), pre-diabetes (A1c 5.7% in 2/2020), colonic polyps, recent dx of Hpylori who presents for f/u chronic conditions.  \par \par Pt's primary complaint today is pain/numbness in her b/l fingers. At last visit 11/18,  she was complaining of increasing numbness in hands and fingers on both sides. She has a history of multilevel cervical spondylosis, canal and foraminal narrowing, as well as carpal tunnel syndrome. She previously followed with ortho spine for back pain but has not followed up. She describes the numbness as most severe in the 4th digit on both sides. She was referred to neuro and ortho spine but has not had a chance to followup. She reports that she has had a lot of trouble with her granddaughter who is now "finally out of the house" but had made it difficult for her to schedule appts or keep up with her health. She does report that neurontin, which was previously prescribed was very helpful for the pain but that she felt very "woozy" with it so she ultimately discontinued it. Otherwise, has been applying icy hot packs with mild improvement. \par \par  Completed therapy for h. pylori x2wks. Currently on pantoprazole BID and has f/u appt with GI on 1/14/21 for further f/u. Denies abd symptoms. \par \par Of note, pt also has intermittent dizziness associated with feeling like her heart is skipping beats. Now lasts ~15secs. Of note, during her presurgical testing for colonoscopy, she reports that she was told by the anesthesiologist that she has a "clot in her neck." she was given referral to cards for loop recorder and carotid duplex to eval for carotid artery stenosis; however, she has not yet scheduled her appts.

## 2020-12-23 NOTE — COUNSELING
[Fall prevention counseling provided] : Fall prevention counseling provided [Adequate lighting] : Adequate lighting [No throw rugs] : No throw rugs [Behavioral health counseling provided] : Behavioral health counseling provided [Risk of tobacco use and health benefits of smoking cessation discussed] : Risk of tobacco use and health benefits of smoking cessation discussed [Cessation strategies including cessation program discussed] : Cessation strategies including cessation program discussed

## 2020-12-28 DIAGNOSIS — J45.909 UNSPECIFIED ASTHMA, UNCOMPLICATED: ICD-10-CM

## 2020-12-28 DIAGNOSIS — F32.9 MAJOR DEPRESSIVE DISORDER, SINGLE EPISODE, UNSPECIFIED: ICD-10-CM

## 2020-12-28 DIAGNOSIS — R20.0 ANESTHESIA OF SKIN: ICD-10-CM

## 2020-12-28 DIAGNOSIS — R00.2 PALPITATIONS: ICD-10-CM

## 2020-12-28 DIAGNOSIS — F41.9 ANXIETY DISORDER, UNSPECIFIED: ICD-10-CM

## 2021-01-08 ENCOUNTER — APPOINTMENT (OUTPATIENT)
Dept: ORTHOPEDIC SURGERY | Facility: CLINIC | Age: 66
End: 2021-01-08
Payer: MEDICAID

## 2021-01-08 VITALS
DIASTOLIC BLOOD PRESSURE: 80 MMHG | BODY MASS INDEX: 32.18 KG/M2 | WEIGHT: 205 LBS | HEART RATE: 66 BPM | SYSTOLIC BLOOD PRESSURE: 134 MMHG | HEIGHT: 67 IN

## 2021-01-08 DIAGNOSIS — M65.352 TRIGGER FINGER, LEFT LITTLE FINGER: ICD-10-CM

## 2021-01-08 DIAGNOSIS — M65.311 TRIGGER THUMB, RIGHT THUMB: ICD-10-CM

## 2021-01-08 DIAGNOSIS — M65.312 TRIGGER THUMB, LEFT THUMB: ICD-10-CM

## 2021-01-08 PROCEDURE — 99072 ADDL SUPL MATRL&STAF TM PHE: CPT

## 2021-01-08 PROCEDURE — 99205 OFFICE O/P NEW HI 60 MIN: CPT

## 2021-01-08 PROCEDURE — 73130 X-RAY EXAM OF HAND: CPT | Mod: 50

## 2021-01-12 PROBLEM — M65.311 TRIGGER THUMB OF RIGHT HAND: Status: ACTIVE | Noted: 2021-01-12

## 2021-01-12 PROBLEM — M65.352 TRIGGER LITTLE FINGER OF LEFT HAND: Status: ACTIVE | Noted: 2021-01-12

## 2021-01-12 PROBLEM — M65.312 TRIGGER THUMB OF LEFT HAND: Status: ACTIVE | Noted: 2021-01-12

## 2021-01-14 ENCOUNTER — OUTPATIENT (OUTPATIENT)
Dept: OUTPATIENT SERVICES | Facility: HOSPITAL | Age: 66
LOS: 1 days | End: 2021-01-14

## 2021-01-14 ENCOUNTER — APPOINTMENT (OUTPATIENT)
Dept: GASTROENTEROLOGY | Facility: CLINIC | Age: 66
End: 2021-01-14
Payer: MEDICAID

## 2021-01-14 VITALS
SYSTOLIC BLOOD PRESSURE: 130 MMHG | OXYGEN SATURATION: 96 % | BODY MASS INDEX: 31.86 KG/M2 | DIASTOLIC BLOOD PRESSURE: 70 MMHG | HEIGHT: 67 IN | WEIGHT: 203 LBS | HEART RATE: 85 BPM

## 2021-01-14 VITALS — TEMPERATURE: 97.7 F

## 2021-01-14 DIAGNOSIS — Z98.890 OTHER SPECIFIED POSTPROCEDURAL STATES: Chronic | ICD-10-CM

## 2021-01-14 DIAGNOSIS — R13.12 DYSPHAGIA, OROPHARYNGEAL PHASE: ICD-10-CM

## 2021-01-14 DIAGNOSIS — K20.0 EOSINOPHILIC ESOPHAGITIS: ICD-10-CM

## 2021-01-14 PROCEDURE — 99214 OFFICE O/P EST MOD 30 MIN: CPT

## 2021-01-15 NOTE — PHYSICAL EXAM
[General Appearance - Alert] : alert [General Appearance - In No Acute Distress] : in no acute distress [Sclera] : the sclera and conjunctiva were normal [] : no respiratory distress [Exaggerated Use Of Accessory Muscles For Inspiration] : no accessory muscle use [Heart Rate And Rhythm] : heart rate was normal and rhythm regular [Abdomen Soft] : soft [Bowel Sounds] : normal bowel sounds [Abdomen Tenderness] : non-tender [External Hemorrhoid] : external hemorrhoids [No CVA Tenderness] : no ~M costovertebral angle tenderness [Oriented To Time, Place, And Person] : oriented to person, place, and time [FreeTextEntry1] : pain with palpation under right breast on ribs and in right flank on ribs

## 2021-01-15 NOTE — REVIEW OF SYSTEMS
[Earache] : earache [As Noted in HPI] : as noted in HPI [Negative] : Genitourinary [Fever] : no fever [Chills] : no chills [Recent Weight Loss (___ Lbs)] : no recent weight loss [Nasal Discharge] : no nasal discharge [FreeTextEntry9] : right sided rib pain

## 2021-01-15 NOTE — ASSESSMENT
[FreeTextEntry1] : 65 year old female with HTN, HLD, COPD, fibromyalgia, obesity, colonic polyps (tubular adenoma x2 on last colonoscopy), recent EGD with +Hpylori and eosinophils presenting for follow-up.\par \par \par Impression:\par # Hpylori - s/p Levaquin based therapy x2 weeks; patient reports compliance, rare dyspepsia symptoms at this time\par # Eosinophils on biopsy of esophagus - no esophageal dysphagia symptoms, likely related to GERD and Hpylori infection\par # Oropharyngeal dysphagia - coughs/chokes as described above\par # Rib pain - only exacerbating factor is movement, no change with eating or bowel movements\par # Tubular adenoma x2 on colonoscopy 11/2020 \par \par \par Plan:\par - Modified barium swallow to assess for oropharyngeal dysphagia; if abnormality noted will refer to SLP\par - Will repeat EGD for Hpylori clearance with stomach biopsy as well as eosinophil monitoring with biopsies\par - patient at this time has stopped PPI, will monitor symptoms off treatment\par - advised patient on trialing lidocaine patch for current rib pain - will task PCP regarding new ear pains/msk pain\par - increase dietary fiber, miralax prn for constipation\par - Repeat colonoscopy 2025 for surveillance\par - RTC after MBS and EGD\par \par d/w Dr Mcbride

## 2021-01-15 NOTE — END OF VISIT
[] : Fellow [FreeTextEntry3] : 66 yo F pmh GERD, h/o colon polyps, h/o HP s/p treatment with LQ based therapy completed presents for follow up. 2 TA on colonoscopy - f/u in 5 years.  Some constipation with rare BRBPR and hemorrhoids on exam.  Will treat with fiber +/- Miralax.  Can use PRN suppository if needed.  Patient with vague orophargyneal dysphagia - will plan for MBS.  Lastly she needs confirmation of HP and needs f/u of eos in esophagus. She has no dysphagia and had no evidence of EoE on Endoscopy - increased eos can be seen in HP infection.  Would plan for repeat EGD to confirm HP eradication AND reassess Eos on Esophagus.  If persistent, then may need to consider further evaluation [Time Spent: ___ minutes] : I have spent [unfilled] minutes of time on the encounter.

## 2021-01-19 ENCOUNTER — APPOINTMENT (OUTPATIENT)
Dept: CARDIOLOGY | Facility: CLINIC | Age: 66
End: 2021-01-19
Payer: MEDICAID

## 2021-01-19 ENCOUNTER — NON-APPOINTMENT (OUTPATIENT)
Age: 66
End: 2021-01-19

## 2021-01-19 VITALS
SYSTOLIC BLOOD PRESSURE: 142 MMHG | HEART RATE: 70 BPM | OXYGEN SATURATION: 98 % | BODY MASS INDEX: 31.86 KG/M2 | HEIGHT: 67 IN | WEIGHT: 203 LBS | DIASTOLIC BLOOD PRESSURE: 79 MMHG | TEMPERATURE: 96.8 F | RESPIRATION RATE: 16 BRPM

## 2021-01-19 DIAGNOSIS — D12.6 BENIGN NEOPLASM OF COLON, UNSPECIFIED: ICD-10-CM

## 2021-01-19 DIAGNOSIS — K20.0 EOSINOPHILIC ESOPHAGITIS: ICD-10-CM

## 2021-01-19 DIAGNOSIS — A04.8 OTHER SPECIFIED BACTERIAL INTESTINAL INFECTIONS: ICD-10-CM

## 2021-01-19 DIAGNOSIS — R10.9 UNSPECIFIED ABDOMINAL PAIN: ICD-10-CM

## 2021-01-19 DIAGNOSIS — R06.00 DYSPNEA, UNSPECIFIED: ICD-10-CM

## 2021-01-19 PROCEDURE — 93000 ELECTROCARDIOGRAM COMPLETE: CPT

## 2021-01-19 PROCEDURE — 99214 OFFICE O/P EST MOD 30 MIN: CPT

## 2021-01-19 PROCEDURE — 99072 ADDL SUPL MATRL&STAF TM PHE: CPT

## 2021-01-19 RX ORDER — LEVOFLOXACIN 500 MG/1
500 TABLET, FILM COATED ORAL DAILY
Qty: 14 | Refills: 0 | Status: DISCONTINUED | COMMUNITY
Start: 2020-11-10 | End: 2021-01-19

## 2021-01-20 ENCOUNTER — APPOINTMENT (OUTPATIENT)
Dept: CV DIAGNOSITCS | Facility: HOSPITAL | Age: 66
End: 2021-01-20

## 2021-01-26 NOTE — PHYSICAL EXAM
[Normal Appearance] : normal appearance [Heart Sounds] : normal S1 and S2 [Murmurs] : no murmurs present [Arterial Pulses Normal] : the arterial pulses were normal [Edema] : no peripheral edema present [Auscultation Breath Sounds / Voice Sounds] : lungs were clear to auscultation bilaterally [Abdomen Soft] : soft [Abdomen Tenderness] : non-tender [Nail Clubbing] : no clubbing of the fingernails [] : no rash [Oriented To Time, Place, And Person] : oriented to person, place, and time [FreeTextEntry1] : uses walker

## 2021-01-26 NOTE — REVIEW OF SYSTEMS
[Shortness Of Breath] : shortness of breath [Chest Pain] : chest pain [Joint Pain] : joint pain [Negative] : Heme/Lymph

## 2021-01-26 NOTE — REASON FOR VISIT
[FreeTextEntry1] : Ms. Joaquin is a 67 yo F with PMH of HTN, COPD, Diverticulosis. OA, Fibromyalgia, LBBB presenting with shortness of breath after walking half a block, as well as chest pain under her left chest, and palpitations.  She has chronic COPD which causes shortness of breath, however she feels as if her symptoms are worsening during the past few weeks. Of note, she was found to have an incidental carotid bruit. She is pending workup.  She continues to smoke, has been smoking up to one pack per day for the past forty years. She was advised to have a knee replacement but has decided to not pursue it for now. \par \par \par

## 2021-01-26 NOTE — DISCUSSION/SUMMARY
[FreeTextEntry1] : 65 yo F with PMH of HTN, COPD, Diverticulosis. OA, Fibromyalgia, LBBB presenting with JEFFERS and chest pain\par \par ---Will obtain pharmacologic nuclear stress test and echo to evaluate for cardiac cause of symptoms. In addition, will proceed with carotid US as ordered by PCP\par ---BP somewhat elevated today, continue current medications for now, pending cardiac workup\par ---Will check lipid profile next visit, last checked Feb 2020, was optimal

## 2021-01-28 ENCOUNTER — OUTPATIENT (OUTPATIENT)
Dept: OUTPATIENT SERVICES | Facility: HOSPITAL | Age: 66
LOS: 1 days | End: 2021-01-28

## 2021-01-28 ENCOUNTER — APPOINTMENT (OUTPATIENT)
Dept: INTERNAL MEDICINE | Facility: CLINIC | Age: 66
End: 2021-01-28
Payer: MEDICAID

## 2021-01-28 ENCOUNTER — APPOINTMENT (OUTPATIENT)
Dept: INTERNAL MEDICINE | Facility: CLINIC | Age: 66
End: 2021-01-28

## 2021-01-28 VITALS
HEART RATE: 72 BPM | SYSTOLIC BLOOD PRESSURE: 145 MMHG | BODY MASS INDEX: 32.18 KG/M2 | DIASTOLIC BLOOD PRESSURE: 80 MMHG | OXYGEN SATURATION: 98 % | WEIGHT: 205 LBS | HEIGHT: 67 IN

## 2021-01-28 VITALS — TEMPERATURE: 96.7 F

## 2021-01-28 DIAGNOSIS — Z98.890 OTHER SPECIFIED POSTPROCEDURAL STATES: Chronic | ICD-10-CM

## 2021-01-28 DIAGNOSIS — J30.89 OTHER ALLERGIC RHINITIS: ICD-10-CM

## 2021-01-28 PROCEDURE — 99213 OFFICE O/P EST LOW 20 MIN: CPT | Mod: GE

## 2021-01-28 RX ORDER — DICLOFENAC SODIUM 1% 10 MG/G
1 GEL TOPICAL
Qty: 1 | Refills: 5 | Status: DISCONTINUED | COMMUNITY
Start: 2020-12-23 | End: 2021-01-28

## 2021-01-28 RX ORDER — UMECLIDINIUM 62.5 UG/1
62.5 AEROSOL, POWDER ORAL DAILY
Qty: 5 | Refills: 5 | Status: DISCONTINUED | COMMUNITY
Start: 2020-02-12 | End: 2021-01-28

## 2021-01-28 RX ORDER — NAPROXEN 500 MG/1
500 TABLET ORAL
Qty: 42 | Refills: 0 | Status: DISCONTINUED | COMMUNITY
Start: 2020-06-22 | End: 2021-01-28

## 2021-01-28 RX ORDER — PANTOPRAZOLE 40 MG/1
40 TABLET, DELAYED RELEASE ORAL TWICE DAILY
Qty: 120 | Refills: 0 | Status: DISCONTINUED | COMMUNITY
Start: 2020-11-13 | End: 2021-01-28

## 2021-01-28 RX ORDER — PREDNISONE 2.5 MG/1
2.5 TABLET ORAL DAILY
Qty: 10 | Refills: 1 | Status: DISCONTINUED | COMMUNITY
Start: 2019-06-17 | End: 2021-01-28

## 2021-01-28 RX ORDER — ETANERCEPT 50 MG/ML
50 SOLUTION SUBCUTANEOUS
Qty: 4 | Refills: 0 | Status: DISCONTINUED | COMMUNITY
Start: 2020-09-21 | End: 2021-01-28

## 2021-01-29 DIAGNOSIS — F17.210 NICOTINE DEPENDENCE, CIGARETTES, UNCOMPLICATED: ICD-10-CM

## 2021-01-29 DIAGNOSIS — I10 ESSENTIAL (PRIMARY) HYPERTENSION: ICD-10-CM

## 2021-01-29 DIAGNOSIS — H60.501 UNSPECIFIED ACUTE NONINFECTIVE OTITIS EXTERNA, RIGHT EAR: ICD-10-CM

## 2021-01-29 DIAGNOSIS — R09.89 OTHER SPECIFIED SYMPTOMS AND SIGNS INVOLVING THE CIRCULATORY AND RESPIRATORY SYSTEMS: ICD-10-CM

## 2021-01-29 DIAGNOSIS — A04.8 OTHER SPECIFIED BACTERIAL INTESTINAL INFECTIONS: ICD-10-CM

## 2021-01-29 DIAGNOSIS — G56.02 CARPAL TUNNEL SYNDROME, LEFT UPPER LIMB: ICD-10-CM

## 2021-01-29 DIAGNOSIS — J30.89 OTHER ALLERGIC RHINITIS: ICD-10-CM

## 2021-01-29 DIAGNOSIS — J44.9 CHRONIC OBSTRUCTIVE PULMONARY DISEASE, UNSPECIFIED: ICD-10-CM

## 2021-01-29 DIAGNOSIS — E78.5 HYPERLIPIDEMIA, UNSPECIFIED: ICD-10-CM

## 2021-01-29 RX ORDER — CIPROFLOXACIN HYDROCHLORIDE AND HYDROCORTISONE 2; 10 MG/ML; MG/ML
0.2-1 SUSPENSION/ DROPS AURICULAR (OTIC) DAILY
Qty: 1 | Refills: 0 | Status: DISCONTINUED | COMMUNITY
Start: 2021-01-28 | End: 2021-01-29

## 2021-01-29 NOTE — REVIEW OF SYSTEMS
[Earache] : earache [Sore Throat] : sore throat [Postnasal Drip] : postnasal drip [Palpitations] : palpitations [Shortness Of Breath] : shortness of breath [Cough] : cough [Joint Pain] : joint pain [Joint Stiffness] : joint stiffness [Muscle Pain] : muscle pain [Back Pain] : back pain [Itching] : Itching [Dizziness] : dizziness [Anxiety] : anxiety [Depression] : depression [Fever] : no fever [Chills] : no chills [Discharge] : no discharge [Pain] : no pain [Vision Problems] : no vision problems [Hearing Loss] : no hearing loss [Hoarseness] : no hoarseness [Chest Pain] : no chest pain [Lower Ext Edema] : no lower extremity edema [Wheezing] : no wheezing [Abdominal Pain] : no abdominal pain [Nausea] : no nausea [Diarrhea] : diarrhea [Vomiting] : no vomiting [Dysuria] : no dysuria [Hematuria] : no hematuria [Joint Swelling] : no joint swelling [Muscle Weakness] : no muscle weakness [Skin Rash] : no skin rash [Headache] : no headache [Fainting] : no fainting [Unsteady Walking] : no ataxia [Easy Bleeding] : no easy bleeding [Easy Bruising] : no easy bruising

## 2021-01-29 NOTE — ASSESSMENT
[FreeTextEntry1] : 65 y/o F current smoker (1 PPD) with asthma/COPD, HTN, HLD, fibromyalgia, inflammatory arthritis (followed by Rheum), b/l knee OA, R sternoclavicular arthritis, NAFLD, anxiety, depression (followed by outpatient psychiatry), pre-diabetes (A1c 5.7% in 2/2020), colonic polyps, LBBB, recent tx of Hpylori who presents for f/u chronic conditions and acute R ear pain.\par \par #R ear pain/sore throat - examine c/w mild otitis externa, TMs normal, posterior oropharynx w/ mild erythema/cobblestoning, no exudates or swelling, possibly 2/2 nasal congestion/postnasal drip\par - Will start cipro-HC gtt x1 week\par - Start flonase 2 puffs per nostril daily x1 week, then 1 puff per nostril daily\par - Advised to contact clinic if sxs are worsening\par \par # Numbness/pain in hands\par - seen by ortho hand Dr. Peña 1/8/21, b/l hand xrays wnl, sxs possibly 2/2 L trigger finger and b/l CTS, pt declined steroid injection\par - EMG scheduled for 3/4/21, f/u w/ ortho hand 3/5/21\par - c/w Tylenol 1000mg q8h prn, alternate with ibuprofen 600mg BID prn if pain uncontrolled with tylenol\par - pt prescribed trial of lyrica 25mg BID on prior visit but was unaware of Rx, resent Rx today with plan to start\par - c/w b/l wrist splints\par - neuro appt on 2/17/21 for further evaluation as well\par - rheum f/u on 3/1/21\par \par # Dizziness with palpitations\par - c/f carotid artery stenosis vs arrythmia, evaluated by cardiology on 1/19/21\par - sinus rhythm on latest EKG from this year, pulse is regular, LBBB is old\par - pt scheduled for echo, b/l carotid duplex, nuclear stress test on 2/1/21\par \par # R/o carotid artery stenosis\par - was told by another provider there may be a blockage? or blood clot in neck\par - pending carotid duplex as above\par \par # Anxiety/depression\par - continuing to provide frequent, short-interval follow ups for reassurance\par - c/w outpatient therapy and psychology\par - c/w duloxetine (also for fibromyalgia)\par \par # COPD/asthma/lung ca screening\par - 7/2020 LDCT scan - stable nodules, next due 7/2021\par - pt taking old breo ellipta for the past 3 days, using albuterol inhaler PRN 3-4x/week\par - Rx sent for breo ellipta and albuterol inhaler prn\par - f/u pulm appt on 3/3/21\par \par # Active smoker\par - continues 1 PPD, motivated to quit, failed prior nicotine patches, rx for nicotine gum not covered by insurance\par - discussed starting buproprion for smoking cessation which patient agrees with, previously rx but never picked up, resent rx today\par \par # HTN\par - /80 today, 130s-140s/70s-80s on prior visits\par - c/w amlodipine 10mg qday, lisinopril 40mg qday\par - will discuss further BP management pending cardiac w/u as above\par \par # H. pylori\par - completed therapy without bismuth due to interaction with methotrexate, GERD symptoms controlled without PPI currently\par - f/u GI in March for repeat EGD\par \par #Pre-DM\par - A1c 5.7 Feb 2020, will repeat at next visit\par \par #HLD\par - /HDL 52/ Feb 2020\par - c/w pravastatin 40mg daily\par - repeat lipid panel at next visit\par \par # HCM\par - last colonoscopy 11/3/2020 w/ TAs. follow-up 2025\par - last mammo 10/2020 birads 2, f/u 1 year w/ breast MRI due to increased lifetime risk 20.1%\par - bone scan 7/2019 normal, next due 2021 per last rheum note\par - low dose CT done 7/2020 with stable lung nodules since 2013, follow-up LDCT 2021\par - last pap/hpv neg in 2018, due 2023 however will be >65\par - flu shot, pneumo vacc, Tdap UTD\par - shingrix vax sent to pharmacy previously, pt declining\par \par F/u with PMD Dr. Zane Castro in 2 months for follow-up of specialist visits and further evaluation/management of chronic medical issues and preventative care \par \par Jermaine Mark\par EM/IM PGY3\par \par Case d/w Dr. Knutson

## 2021-01-29 NOTE — PHYSICAL EXAM
[No Acute Distress] : no acute distress [Well Nourished] : well nourished [Well Developed] : well developed [Well-Appearing] : well-appearing [Normal Sclera/Conjunctiva] : normal sclera/conjunctiva [PERRL] : pupils equal round and reactive to light [EOMI] : extraocular movements intact [Normal TMs] : both tympanic membranes were normal [No JVD] : no jugular venous distention [No Lymphadenopathy] : no lymphadenopathy [Supple] : supple [No Respiratory Distress] : no respiratory distress  [No Accessory Muscle Use] : no accessory muscle use [Clear to Auscultation] : lungs were clear to auscultation bilaterally [Normal Rate] : normal rate  [Regular Rhythm] : with a regular rhythm [Normal S1, S2] : normal S1 and S2 [No Murmur] : no murmur heard [No Carotid Bruits] : no carotid bruits [No Extremity Clubbing/Cyanosis] : no extremity clubbing/cyanosis [Pedal Pulses Present] : the pedal pulses are present [Soft] : abdomen soft [Non Tender] : non-tender [Non-distended] : non-distended [Normal Posterior Cervical Nodes] : no posterior cervical lymphadenopathy [Normal Anterior Cervical Nodes] : no anterior cervical lymphadenopathy [No CVA Tenderness] : no CVA  tenderness [No Spinal Tenderness] : no spinal tenderness [No Joint Swelling] : no joint swelling [Grossly Normal Strength/Tone] : grossly normal strength/tone [No Rash] : no rash [Coordination Grossly Intact] : coordination grossly intact [No Focal Deficits] : no focal deficits [Normal Gait] : normal gait [Normal Affect] : the affect was normal [Normal Insight/Judgement] : insight and judgment were intact [de-identified] : Mild erythema R EAC, normal L EAC, mild erythema/cobblestoning posterior oropharynx, no purulence, no swelling, uvula midline [de-identified] : trace pitting edema b/l LE

## 2021-01-29 NOTE — HISTORY OF PRESENT ILLNESS
[FreeTextEntry1] : b/l hand pain, R ear pain [de-identified] : 67 y/o F current smoker (1 PPD) with asthma/COPD, HTN, HLD, fibromyalgia, inflammatory arthritis (followed by Rheum), b/l knee OA, R sternoclavicular arthritis, NAFLD, anxiety, depression (followed by outpatient psychiatry), pre-diabetes (A1c 5.7% in 2/2020), colonic polyps, LBBB, recent tx of Hpylori who presents for f/u chronic conditions and acute R ear pain. \par \par Pt reports 1 week of intermittent waxing and waning dull R ear ache. Also notes 1 week of mild sore throat, and slight episodes of L ear pain however this has since resolved on its own. Notes always suffers from environmental allergies, a/w nasal congestion, but also reports recent post nasal drip.Pt also notes she frequently is itching the inside of her ears with her fingernails due to itchiness related to allergies. She denies any fevers/chills, HA, vision changes, hearing loss, abd pain, n/v/d, urinary sxs, swelling or rashes.\par \par With regard to b/l hand/finger pain and numbness, symptoms have been unchanged since prior visit. Was recently seen by ortho hand who dx L trigger finger and b/l carpal tunnel syndrome, ordered EMG and f/u MRI of L wrist. Pt experienced no relief of symptoms with diclofenac gel. Symptoms moderately controlled with tylenol 500mg 2-3x/day. Was prescribed lyrica on last visit but was unaware and has not picked up this prescription. No new worsening pain, focal numbness or weakness.\par \par Pt was also referred to cardiology for intermittent dizziness and palpitations. Pt reports dizziness lasts ~10-15 seconds, no particular provoking or relieving factors, not associated with chest pain or SOB. Describes palpitations as occurring ~once every 2 weeks which last for 2-3 seconds and resolve on their own with no particular associated symptoms. Of note, during her presurgical testing for colonoscopy, she reports that she was told by the anesthesiologist that she has a "clot in her neck." Was evaluated by cardiology on 1/19/21 and is scheduled for echocardiogram, b/l carotid duplex and nuclear stress test on 2/1/21.\par \par H. Pylori/GERD - s/p 2 week abx course, GERD symptoms improved now controlled on simethicone prn, off PPI. Is to follow-up with GI in March for repeat EGD\par \par COPD - pt has been using old breo ellipta for the past 4 days, has been using albuterol 3-4x/week prn for SOB/wheezing. No fevers/chills, productive cough or worsening cough from baseline. Has appointment with pulm for further f/u on 3/3/21.\par \par Pt notes she still smokes 1PPD. Nicotine gum not covered by her insurance but is working to get that fixed. Has tried nicotine patches in the past with no improvement. Is motivated to quit smoking, and is willing to start bupropion offered at prior visit.

## 2021-02-02 RX ORDER — FLUTICASONE FUROATE AND VILANTEROL TRIFENATATE 100; 25 UG/1; UG/1
100-25 POWDER RESPIRATORY (INHALATION)
Qty: 1 | Refills: 3 | Status: DISCONTINUED | COMMUNITY
Start: 2021-01-28 | End: 2021-02-02

## 2021-02-08 ENCOUNTER — APPOINTMENT (OUTPATIENT)
Dept: CV DIAGNOSITCS | Facility: HOSPITAL | Age: 66
End: 2021-02-08
Payer: MEDICAID

## 2021-02-08 ENCOUNTER — OUTPATIENT (OUTPATIENT)
Dept: OUTPATIENT SERVICES | Facility: HOSPITAL | Age: 66
LOS: 1 days | End: 2021-02-08

## 2021-02-08 ENCOUNTER — APPOINTMENT (OUTPATIENT)
Dept: CV DIAGNOSTICS | Facility: HOSPITAL | Age: 66
End: 2021-02-08
Payer: MEDICAID

## 2021-02-08 DIAGNOSIS — R06.00 DYSPNEA, UNSPECIFIED: ICD-10-CM

## 2021-02-08 DIAGNOSIS — Z98.890 OTHER SPECIFIED POSTPROCEDURAL STATES: Chronic | ICD-10-CM

## 2021-02-08 DIAGNOSIS — R09.89 OTHER SPECIFIED SYMPTOMS AND SIGNS INVOLVING THE CIRCULATORY AND RESPIRATORY SYSTEMS: ICD-10-CM

## 2021-02-08 PROCEDURE — 93016 CV STRESS TEST SUPVJ ONLY: CPT | Mod: GC

## 2021-02-08 PROCEDURE — 93880 EXTRACRANIAL BILAT STUDY: CPT | Mod: 26

## 2021-02-08 PROCEDURE — 93018 CV STRESS TEST I&R ONLY: CPT | Mod: GC

## 2021-02-08 PROCEDURE — 78452 HT MUSCLE IMAGE SPECT MULT: CPT | Mod: 26

## 2021-02-08 PROCEDURE — 93306 TTE W/DOPPLER COMPLETE: CPT | Mod: 26

## 2021-02-22 ENCOUNTER — EMERGENCY (EMERGENCY)
Facility: HOSPITAL | Age: 66
LOS: 1 days | Discharge: HOME | End: 2021-02-22
Attending: EMERGENCY MEDICINE | Admitting: EMERGENCY MEDICINE
Payer: MEDICAID

## 2021-02-22 VITALS
SYSTOLIC BLOOD PRESSURE: 156 MMHG | OXYGEN SATURATION: 100 % | DIASTOLIC BLOOD PRESSURE: 69 MMHG | HEART RATE: 75 BPM | RESPIRATION RATE: 17 BRPM | TEMPERATURE: 99 F

## 2021-02-22 VITALS
OXYGEN SATURATION: 100 % | DIASTOLIC BLOOD PRESSURE: 55 MMHG | RESPIRATION RATE: 18 BRPM | TEMPERATURE: 98 F | HEIGHT: 66 IN | HEART RATE: 80 BPM | SYSTOLIC BLOOD PRESSURE: 119 MMHG

## 2021-02-22 DIAGNOSIS — Z98.890 OTHER SPECIFIED POSTPROCEDURAL STATES: Chronic | ICD-10-CM

## 2021-02-22 LAB
ALBUMIN SERPL ELPH-MCNC: 4.1 G/DL — SIGNIFICANT CHANGE UP (ref 3.3–5)
ALP SERPL-CCNC: 122 U/L — HIGH (ref 40–120)
ALT FLD-CCNC: 13 U/L — SIGNIFICANT CHANGE UP (ref 4–33)
ANION GAP SERPL CALC-SCNC: 10 MMOL/L — SIGNIFICANT CHANGE UP (ref 7–14)
AST SERPL-CCNC: 12 U/L — SIGNIFICANT CHANGE UP (ref 4–32)
BASOPHILS # BLD AUTO: 0.04 K/UL — SIGNIFICANT CHANGE UP (ref 0–0.2)
BASOPHILS NFR BLD AUTO: 0.3 % — SIGNIFICANT CHANGE UP (ref 0–2)
BILIRUB SERPL-MCNC: 0.2 MG/DL — SIGNIFICANT CHANGE UP (ref 0.2–1.2)
BUN SERPL-MCNC: 8 MG/DL — SIGNIFICANT CHANGE UP (ref 7–23)
CALCIUM SERPL-MCNC: 8.9 MG/DL — SIGNIFICANT CHANGE UP (ref 8.4–10.5)
CHLORIDE SERPL-SCNC: 105 MMOL/L — SIGNIFICANT CHANGE UP (ref 98–107)
CO2 SERPL-SCNC: 26 MMOL/L — SIGNIFICANT CHANGE UP (ref 22–31)
CREAT SERPL-MCNC: 0.55 MG/DL — SIGNIFICANT CHANGE UP (ref 0.5–1.3)
EOSINOPHIL # BLD AUTO: 0.13 K/UL — SIGNIFICANT CHANGE UP (ref 0–0.5)
EOSINOPHIL NFR BLD AUTO: 1.1 % — SIGNIFICANT CHANGE UP (ref 0–6)
GLUCOSE SERPL-MCNC: 98 MG/DL — SIGNIFICANT CHANGE UP (ref 70–99)
HCT VFR BLD CALC: 37.9 % — SIGNIFICANT CHANGE UP (ref 34.5–45)
HGB BLD-MCNC: 12.5 G/DL — SIGNIFICANT CHANGE UP (ref 11.5–15.5)
IANC: 7.87 K/UL — SIGNIFICANT CHANGE UP (ref 1.5–8.5)
IMM GRANULOCYTES NFR BLD AUTO: 0.2 % — SIGNIFICANT CHANGE UP (ref 0–1.5)
LIDOCAIN IGE QN: 20 U/L — SIGNIFICANT CHANGE UP (ref 7–60)
LYMPHOCYTES # BLD AUTO: 25.5 % — SIGNIFICANT CHANGE UP (ref 13–44)
LYMPHOCYTES # BLD AUTO: 3.09 K/UL — SIGNIFICANT CHANGE UP (ref 1–3.3)
MCHC RBC-ENTMCNC: 29.9 PG — SIGNIFICANT CHANGE UP (ref 27–34)
MCHC RBC-ENTMCNC: 33 GM/DL — SIGNIFICANT CHANGE UP (ref 32–36)
MCV RBC AUTO: 90.7 FL — SIGNIFICANT CHANGE UP (ref 80–100)
MONOCYTES # BLD AUTO: 0.94 K/UL — HIGH (ref 0–0.9)
MONOCYTES NFR BLD AUTO: 7.8 % — SIGNIFICANT CHANGE UP (ref 2–14)
NEUTROPHILS # BLD AUTO: 7.87 K/UL — HIGH (ref 1.8–7.4)
NEUTROPHILS NFR BLD AUTO: 65.1 % — SIGNIFICANT CHANGE UP (ref 43–77)
NRBC # BLD: 0 /100 WBCS — SIGNIFICANT CHANGE UP
NRBC # FLD: 0 K/UL — SIGNIFICANT CHANGE UP
PLATELET # BLD AUTO: 326 K/UL — SIGNIFICANT CHANGE UP (ref 150–400)
POTASSIUM SERPL-MCNC: 3.8 MMOL/L — SIGNIFICANT CHANGE UP (ref 3.5–5.3)
POTASSIUM SERPL-SCNC: 3.8 MMOL/L — SIGNIFICANT CHANGE UP (ref 3.5–5.3)
PROT SERPL-MCNC: 6.4 G/DL — SIGNIFICANT CHANGE UP (ref 6–8.3)
RBC # BLD: 4.18 M/UL — SIGNIFICANT CHANGE UP (ref 3.8–5.2)
RBC # FLD: 15.8 % — HIGH (ref 10.3–14.5)
SODIUM SERPL-SCNC: 141 MMOL/L — SIGNIFICANT CHANGE UP (ref 135–145)
WBC # BLD: 12.1 K/UL — HIGH (ref 3.8–10.5)
WBC # FLD AUTO: 12.1 K/UL — HIGH (ref 3.8–10.5)

## 2021-02-22 PROCEDURE — 93010 ELECTROCARDIOGRAM REPORT: CPT

## 2021-02-22 PROCEDURE — 99285 EMERGENCY DEPT VISIT HI MDM: CPT | Mod: 25

## 2021-02-22 PROCEDURE — 76705 ECHO EXAM OF ABDOMEN: CPT | Mod: 26

## 2021-02-22 PROCEDURE — 71046 X-RAY EXAM CHEST 2 VIEWS: CPT | Mod: 26

## 2021-02-22 NOTE — ED PROVIDER NOTE - CLINICAL SUMMARY MEDICAL DECISION MAKING FREE TEXT BOX
67yo F hx asthma/ COPD not on home oxygen, current smoker, HTN, HLD, fibromyalgia, arthritis Hx of Polypectomy p/w chronic RUQ worsening over the last few days. Pain is postprandial and positional. Denies worsening of chronic sob. Patient is well appearing, VSS, with RUQ ttp, neg Mcpherson. Possible cholelithiasis vs cholecystitis. Low suspicion for ACS, recent neg echo/stress, but will eval with EKG given risk factors. Plan for labs, ekg, cxr, ruq us and dispo pending results.

## 2021-02-22 NOTE — ED PROVIDER NOTE - PRO INTERPRETER NEED 2
[facility]    7/21/2020 4:00 PM        Name: Simin Salcedo . Admitted: 7/18/2020  Primary Care Provider: Summer Nick MD (Tel: [phone])    Brief Course:        CC weakness    Subjective:  .   Patient of the pressors  Denies any new complaints    Reviewed interval ancillary notes    Current Medications  sodium bicarbonate 75 mEq in sodium chloride 0.45 % 1,000 mL infusion, Continuous  dextran 70-hypromellose (TEARS NATURALE) 0.1-0.3 % opthalmic solution 1 drop, TID  atorvastatin (LIPITOR) tablet 40 mg, Nightly  clobetasol (TEMOVATE) 0.05 % cream, Q12H PRN  insulin glargine (LANTUS;BASAGLAR) injection pen 16 Units, Nightly  magnesium oxide (MAG-OX) tablet 400 mg, Daily  oxybutynin (DITROPAN-XL) extended release tablet 15 mg, Nightly  pantoprazole (PROTONIX) tablet 40 mg, BID  sertraline (ZOLOFT) tablet 12.5 mg, Daily  vitamin D (CHOLECALCIFEROL) tablet 2,000 Units, Daily  sodium chloride flush 0.9 % injection 10 mL, 2 times per day  sodium chloride flush 0.9 % injection 10 mL, PRN  acetaminophen (TYLENOL) tablet 650 mg, Q6H PRN    Or  acetaminophen (TYLENOL) suppository 650 mg, Q6H PRN  polyethylene glycol (GLYCOLAX) packet 17 g, Daily PRN  promethazine (PHENERGAN) tablet 12.5 mg, Q6H PRN    Or  ondansetron (ZOFRAN) injection 4 mg, Q6H PRN  enoxaparin (LOVENOX) injection 40 mg, Daily  glucose (GLUTOSE) 40 % oral gel 15 g, PRN  dextrose 50 % IV solution, PRN  glucagon (rDNA) injection 1 mg, PRN  dextrose 5 % solution, PRN  insulin lispro (1 Unit Dial) 0-6 Units, TID WC  insulin lispro (1 Unit Dial) 0-3 Units, Nightly  norepinephrine (LEVOPHED) 16 mg in dextrose 5 % 250 mL infusion, Continuous  linezolid (ZYVOX) IVPB 600 mg, Q12H  meropenem (MERREM) 1 g in sodium chloride 0.9 % 100 mL IVPB (mini-bag), Q12H  lidocaine (XYLOCAINE) 2 % uro-jet, PRN        Objective:  BP (!) 102/54   Pulse 90 junction of this    superior vena cava and the right atrium. CT ABDOMEN PELVIS WO CONTRAST Additional Contrast? None   Final Result      1. Resolution of bilateral hydronephrosis status post ureteral stent placement. Right ureteral stent placed into the lower pole moiety of the dilated collecting system; no residual dilatation of the upper pole moiety. 2.  Diffuse bladder wall thickening likely underdistention, but correlate for cystitis. 3.  Stable small nodules likely adenomas. 4.  Small left pleural effusion and basilar airspace disease likely scarring. XR CHEST PORTABLE   Final Result      Persistent left basilar pleural-parenchymal opacity could represent a chronic pleural effusion and scarring/atelectasis, but superimposed pneumonia is not excluded. Problem List  Active Problems:    Sepsis ([address]  Resolved Problems:    * No resolved hospital problems.  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

## 2021-02-22 NOTE — ED PROVIDER NOTE - PATIENT PORTAL LINK FT
You can access the FollowMyHealth Patient Portal offered by [facility] by registering at the following website: [email or web address]. By joining [employer]’s FollowMyHealth portal, you will also be able to view your health information using other applications (apps) compatible with our system.

## 2021-02-22 NOTE — ED PROVIDER NOTE - PSH
Endometriosis  [id number]  H/O hand surgery  Left childhood  S/P arthroscopy of knee  Left  Tubal ligation status

## 2021-02-22 NOTE — ED PROVIDER NOTE - PHYSICAL EXAMINATION
Gen: Patient is well-appearing, NAD, AAOx3, ambulates with walker.   HEENT: NCAT, EOMI, ZAIN, normal conjunctiva, tongue midline, oral mucosa moist.   Lung: CTAB, no respiratory distress, no wheezes/rhonchi/rales B/L, speaking in full sentences.   CV: RRR, no murmurs, rubs or gallops, distal pulses 2+ b/l  Abd: RUQ ttp, negative Mcpherson. ND, no guarding, no rigidity, no rebound tenderness, no CVA tenderness.  MSK: no visible deformities, ROM normal in UE/LE, no back TTP.  Neuro: No focal sensory or motor deficits.  Skin: Warm, well perfused, no rash, no leg swelling.  Psych: normal affect, calm.

## 2021-02-22 NOTE — ED PROVIDER NOTE - NSFOLLOWUPINSTRUCTIONS_ED_ALL_ED_FT
You were evaluated in the Emergency Department for ABDOMINAL PAIN.     There are no signs of emergency conditions requiring admission to the hospital on today's workup.      Further evaluation may be required by your primary care doctor or specialist for a definitive diagnosis.  Therefore, follow up as directed and if symptoms change/worsen or any emergency conditions, please return to the ER.    We recommend that you:  1. See your primary care physician within the next 1-3 DAYS for follow up.  Bring a copy of your discharge paperwork (including any test results) to your doctor.    2. Continue to take your medications as prescribed. For pain you can take ibuprofen (Motrin, Advil) or acetaminophen (Tylenol) as needed, as directed on packaging.        *** Return immediately if you have any other new/concerning symptoms. ***

## 2021-02-22 NOTE — ED PROVIDER NOTE - CHIEF COMPLAINT
The patient is a 66y Female complaining of  The patient is a 66y Female complaining of sob, chest pain

## 2021-02-22 NOTE — ED ADULT NURSE NOTE - OBJECTIVE STATEMENT
Received pt to room 10 A&OX4 ambulatory with a walker at baseline reports worsening SOB with exertion for several days and right rib cage pain. On assessment, pt ambulating without difficulty and completing full sentences without difficulty. Pt also reports constipation for several weeks. denies fever/chills, cough, cold, other symptoms. PMH COPD not on home O2. VS as documented, will continue to monitor.

## 2021-02-22 NOTE — ED PROVIDER NOTE - OBJECTIVE STATEMENT
67 yo F with hx of COPD (not on home O2), endometriosis, fibromyalgia, HLD, HTN p/w 65 yo F with hx of COPD (not on home O2), endometriosis, fibromyalgia, HLD, HTN p/w RUQ/R lower chest pain x 1 year worsening over the last few days. Patient reports pressure like pain worse with movement and after eating. Patient reports SOB unchanged from baseline. Patient has a history of falls, most recently 1 month ago, unsure of side. Denies head strike, LOC.

## 2021-02-22 NOTE — ED PROVIDER NOTE - PMH
Arthritis  pt reports need  bilateral TKR  Asthma    Colon polyps  "benign"  Depression    Endometriosis    Fibromyalgia    H/O benign neoplasm of colon    History of COPD    Hypercholesteremia    Hyperlipidemia    Hypertension    OA (osteoarthritis)    Osteoporosis    Pneumonia  2/2020  Pre-diabetes

## 2021-02-22 NOTE — ED PROVIDER NOTE - ATTENDING CONTRIBUTION TO CARE
Attending Statement: I have personally seen and examined this patient. I have fully participated in the care of this patient. I have reviewed all pertinent clinical information, including history physical exam, plan and the Resident's note and agree except as noted  67yo F hx asthma/ COPD not on home oxygen, current smoker, HTN, HLD, fibromyalgia, arthritis Hx of Polypectomy pw pain of the right lateral upper abdomen/chest x "long time" Intermittent, varies with position and after eating. SOB "always" no worsening JEFFERS, nonproductive cough. no fever. Denies chest pain, pain "on my right side" (points to lateral right side) no calf pain endorse bl lower ext swelling.  no travel. hx of falls at home, hx unsteady gait uses walker. Denies LOC no neck pain. not on AC. Had recent stress and echo on 2-8-21   Vital signs noted. sitting up, nontoxic. no sign of head/facial trauma supple neck. mmm. non icteric. no juandice. normal S1-S2 nontender chest wall. no crepitus. obese female tender at the right upper quad, no rebound. no bruise no rash. bl pedal edema no calf tenderness bl. coarse bs bl. pulse ox 100RA   plan labs, ekg, cxr, RUQ US, pain med, review echo/stress test results

## 2021-02-22 NOTE — ED ADULT NURSE NOTE - NSIMPLEMENTINTERV_GEN_ALL_ED
Implemented All Universal Safety Interventions:  [place] to call system. Call bell, personal items and telephone within reach. Instruct patient to call for assistance. Room bathroom lighting operational. Non-slip footwear when patient is off stretcher. Physically safe environment: no spills, clutter or unnecessary equipment. Stretcher in lowest position, wheels locked, appropriate side rails in place.

## 2021-02-22 NOTE — ED ADULT TRIAGE NOTE - CHIEF COMPLAINT QUOTE
pt here for worsening shortness of breath x 5 days, pt also c/o intermittent chest pain as well. pt has copd, not on home O2

## 2021-02-26 DIAGNOSIS — Z01.818 ENCOUNTER FOR OTHER PREPROCEDURAL EXAMINATION: ICD-10-CM

## 2021-02-27 ENCOUNTER — APPOINTMENT (OUTPATIENT)
Dept: DISASTER EMERGENCY | Facility: CLINIC | Age: 66
End: 2021-02-27

## 2021-03-04 ENCOUNTER — APPOINTMENT (OUTPATIENT)
Dept: ORTHOPEDIC SURGERY | Facility: HOSPITAL | Age: 66
End: 2021-03-04

## 2021-03-04 ENCOUNTER — APPOINTMENT (OUTPATIENT)
Dept: ORTHOPEDIC SURGERY | Facility: CLINIC | Age: 66
End: 2021-03-04
Payer: MEDICAID

## 2021-03-04 PROCEDURE — 95911 NRV CNDJ TEST 9-10 STUDIES: CPT

## 2021-03-04 PROCEDURE — 99214 OFFICE O/P EST MOD 30 MIN: CPT | Mod: 25

## 2021-03-04 PROCEDURE — 99072 ADDL SUPL MATRL&STAF TM PHE: CPT

## 2021-03-04 NOTE — HISTORY OF PRESENT ILLNESS
[de-identified] : Mayelin is a 66F who presents with bilateral hand pain and numbness. He is referred by Dr. Peña for EMG/NCS Pain is located primarily in the palmar aspect of the hands in digits 1-3, but he has pain and numbness in the entire hand.  She reports weakness with gripping, which causes her to drop items.  She reports having an EMG >1 year ago but the procedure had to be aborted due to pain.  \par

## 2021-03-04 NOTE — PHYSICAL EXAM
[de-identified] : Constitutional: Well-nourished, well-developed, No acute distress\par Respiratory:  Good respiratory effort, no SOB\par Lymphatic: No regional lymphadenopathy, no lymphedema\par Psychiatric: Pleasant and normal affect, alert and oriented x3\par Skin: Clean dry and intact B/L UE/LE\par Musculoskeletal: normal except where as noted in regional exam\par \par bilateral Hands:\par APPEARANCE: + atrophy APB bilaterally\par POSITIVE TENDERNESS: right flexor tendons\par ROM: full & painless in CMC, MCP, and IP joints. \par RESISTIVE TESTING: painless resisted testing. \par Vasc: 2+ radial pulse, normal capillary refill\par Neuro: decreased sensation palmar digits 1-3\par APB 4/5 bilaterally\par ADM/FDI 5/5\par wrist extensors and finger flexors 5/5 bilaterally\par + tinels at carpal tunnel and - cubital tunnel\par AIN, PIN intact

## 2021-03-04 NOTE — CONSULT LETTER
[Dear  ___] : Dear  [unfilled], [Consult Letter:] : I had the pleasure of evaluating your patient, [unfilled]. [Consult Closing:] : Thank you very much for allowing me to participate in the care of this patient.  If you have any questions, please do not hesitate to contact me. [Sincerely,] : Sincerely, [FreeTextEntry1] : Please see clinic note dated 3/4/21 [FreeTextEntry3] : Guerline Quick MD, EdM\par Sports Medicine [facility]\par Department of Orthopedics

## 2021-03-04 NOTE — DISCUSSION/SUMMARY
[de-identified] : Mayelin presents for NCS/EMG to evaluate for bilateral carpal tunnel syndrome\par Testing limited by patient's tolerance of exam\par - Studies demonstrate evidence of bilateral severe carpal tunnel syndrome\par - Patient to follow up with Dr. Peña for further treatment.\par \par Guerline Quick MD, EdM\par Sports Medicine [facility]

## 2021-03-09 ENCOUNTER — APPOINTMENT (OUTPATIENT)
Dept: CARDIOLOGY | Facility: CLINIC | Age: 66
End: 2021-03-09
Payer: MEDICAID

## 2021-03-09 ENCOUNTER — NON-APPOINTMENT (OUTPATIENT)
Age: 66
End: 2021-03-09

## 2021-03-09 VITALS
BODY MASS INDEX: 31.39 KG/M2 | DIASTOLIC BLOOD PRESSURE: 73 MMHG | SYSTOLIC BLOOD PRESSURE: 119 MMHG | HEART RATE: 75 BPM | RESPIRATION RATE: 16 BRPM | OXYGEN SATURATION: 98 % | HEIGHT: 67 IN | TEMPERATURE: 96 F | WEIGHT: 200 LBS

## 2021-03-09 PROCEDURE — 99072 ADDL SUPL MATRL&STAF TM PHE: CPT

## 2021-03-09 PROCEDURE — 93000 ELECTROCARDIOGRAM COMPLETE: CPT

## 2021-03-09 PROCEDURE — 99214 OFFICE O/P EST MOD 30 MIN: CPT

## 2021-03-13 DIAGNOSIS — Z01.818 ENCOUNTER FOR OTHER PREPROCEDURAL EXAMINATION: ICD-10-CM

## 2021-03-14 ENCOUNTER — APPOINTMENT (OUTPATIENT)
Dept: DISASTER EMERGENCY | Facility: CLINIC | Age: 66
End: 2021-03-14

## 2021-03-15 ENCOUNTER — APPOINTMENT (OUTPATIENT)
Dept: NEUROLOGY | Facility: CLINIC | Age: 66
End: 2021-03-15

## 2021-03-15 LAB — SARS-COV-2 N GENE NPH QL NAA+PROBE: NOT DETECTED

## 2021-03-15 NOTE — PHYSICAL EXAM
[Normal Appearance] : normal appearance [Auscultation Breath Sounds / Voice Sounds] : lungs were clear to auscultation bilaterally [Heart Sounds] : normal S1 and S2 [Murmurs] : no murmurs present [Arterial Pulses Normal] : the arterial pulses were normal [Edema] : no peripheral edema present [Abdomen Soft] : soft [Abdomen Tenderness] : non-tender [Nail Clubbing] : no clubbing of the fingernails [] : no rash [Oriented To Time, Place, And Person] : oriented to person, place, and time [FreeTextEntry1] : uses walker

## 2021-03-15 NOTE — DISCUSSION/SUMMARY
[FreeTextEntry1] : 65 yo F with PMH of HTN, COPD, Diverticulosis. OA, Fibromyalgia, LBBB presenting with JEFFERS and chest pain\par \par ---Pharmacologic nuclear stress test reveals no ischemia or infarct and Echo with no significant abnormalities. Advised that chest pain and dyspnea may be related to COPD, she is not using any maintenance inhalers, would like to restart Breo Ellipta, has an appt with Pulmonologist in upcoming weeks. \par ---BP optimal, continue Lisinopril and Amlodipine\par ---Continue ASA and Statin, Carotid Duplex with mild bilateral disease\par ---Smoking cessation discussed she has just restarted Nicorette gum

## 2021-03-15 NOTE — REASON FOR VISIT
[FreeTextEntry1] : Ms. Joaquin is a 65 yo F with PMH of HTN, COPD, Diverticulosis. OA, Fibromyalgia, LBBB presenting for follow up. Since her last visit, she underwent an Echo and Stress test as well as Carotid Dopplers. She continues to have a twinge in her left chest sometimes with shortness of breath when she walks fast. (She uses a walker). She continues to smoke 1/2- 1 PPD. \par \par \par

## 2021-03-17 ENCOUNTER — OUTPATIENT (OUTPATIENT)
Dept: OUTPATIENT SERVICES | Facility: HOSPITAL | Age: 66
LOS: 1 days | Discharge: HOME | End: 2021-03-17
Payer: MEDICAID

## 2021-03-17 ENCOUNTER — RESULT REVIEW (OUTPATIENT)
Age: 66
End: 2021-03-17

## 2021-03-17 VITALS
TEMPERATURE: 97 F | RESPIRATION RATE: 18 BRPM | WEIGHT: 205.03 LBS | OXYGEN SATURATION: 98 % | HEIGHT: 68 IN | SYSTOLIC BLOOD PRESSURE: 133 MMHG | HEART RATE: 69 BPM | DIASTOLIC BLOOD PRESSURE: 83 MMHG

## 2021-03-17 VITALS
SYSTOLIC BLOOD PRESSURE: 117 MMHG | DIASTOLIC BLOOD PRESSURE: 63 MMHG | OXYGEN SATURATION: 96 % | RESPIRATION RATE: 17 BRPM | HEART RATE: 70 BPM

## 2021-03-17 DIAGNOSIS — K20.0 EOSINOPHILIC ESOPHAGITIS: ICD-10-CM

## 2021-03-17 DIAGNOSIS — Z98.890 OTHER SPECIFIED POSTPROCEDURAL STATES: Chronic | ICD-10-CM

## 2021-03-17 PROCEDURE — 43239 EGD BIOPSY SINGLE/MULTIPLE: CPT | Mod: GC

## 2021-03-17 PROCEDURE — 88305 TISSUE EXAM BY PATHOLOGIST: CPT | Mod: 26

## 2021-03-17 NOTE — ASU PREOP CHECKLIST - HEIGHT IN CM
Plan: 11-14-19\\nbetamethasone oint to both ears QHS prn itch
Detail Level: Zone
172.72
Plan: 11-14-19\\nIlK # 3 today:  repeat 5/5

## 2021-03-19 LAB — SURGICAL PATHOLOGY STUDY: SIGNIFICANT CHANGE UP

## 2021-03-22 ENCOUNTER — NON-APPOINTMENT (OUTPATIENT)
Age: 66
End: 2021-03-22

## 2021-03-22 RX ORDER — METRONIDAZOLE 500 MG/1
500 TABLET ORAL 3 TIMES DAILY
Qty: 42 | Refills: 0 | Status: DISCONTINUED | COMMUNITY
Start: 2021-03-22 | End: 2021-03-22

## 2021-04-06 ENCOUNTER — NON-APPOINTMENT (OUTPATIENT)
Age: 66
End: 2021-04-06

## 2021-04-08 ENCOUNTER — OUTPATIENT (OUTPATIENT)
Dept: OUTPATIENT SERVICES | Facility: HOSPITAL | Age: 66
LOS: 1 days | End: 2021-04-08

## 2021-04-08 ENCOUNTER — APPOINTMENT (OUTPATIENT)
Dept: GASTROENTEROLOGY | Facility: CLINIC | Age: 66
End: 2021-04-08
Payer: MEDICAID

## 2021-04-08 DIAGNOSIS — Z98.890 OTHER SPECIFIED POSTPROCEDURAL STATES: Chronic | ICD-10-CM

## 2021-04-08 PROCEDURE — ZZZZZ: CPT

## 2021-04-08 NOTE — ASSESSMENT
[FreeTextEntry1] : 66 year old female with HTN, HLD, COPD, fibromyalgia, obesity, colonic polyps (tubular adenoma x2 on last colonoscopy), recent EGD with +H. pylori  presenting for follow-up.\par \par Impression:\par # H. pylori - s/p Levaquin based therapy suspect non compliance \par # Tubular adenoma x2 on colonoscopy 11/2020 \par \par \par Plan:\par - Continue non bismuth regimen - encourage her to be more compliant \par - H. Pylori to be checked in 6 weeks after completing abx. \par - Repeat colonoscopy 2025 for surveillance\par - Siblings should have colonoscopy for high risk screening

## 2021-04-08 NOTE — END OF VISIT
[Time Spent: ___ minutes] : I have spent [unfilled] minutes of time on the encounter. [] : Fellow [FreeTextEntry3] : As modified and discussed with patient\par MD EDWIGE Jimenez [facility], [place]\par Associate Professor of Medicine\par [facility] [employer]\par

## 2021-04-08 NOTE — REASON FOR VISIT
[Home] : at home, [unfilled] , at the time of the visit. [Verbal consent obtained from patient] : the patient, [unfilled] [Follow-Up: _____] : a [unfilled] follow-up visit [Medical Office: ([facility])___] : at the medical office located in

## 2021-04-09 DIAGNOSIS — D12.6 BENIGN NEOPLASM OF COLON, UNSPECIFIED: ICD-10-CM

## 2021-04-09 DIAGNOSIS — A04.8 OTHER SPECIFIED BACTERIAL INTESTINAL INFECTIONS: ICD-10-CM

## 2021-04-11 ENCOUNTER — APPOINTMENT (OUTPATIENT)
Dept: DISASTER EMERGENCY | Facility: CLINIC | Age: 66
End: 2021-04-11

## 2021-04-11 DIAGNOSIS — Z01.818 ENCOUNTER FOR OTHER PREPROCEDURAL EXAMINATION: ICD-10-CM

## 2021-04-12 ENCOUNTER — NON-APPOINTMENT (OUTPATIENT)
Age: 66
End: 2021-04-12

## 2021-04-23 ENCOUNTER — APPOINTMENT (OUTPATIENT)
Dept: ORTHOPEDIC SURGERY | Facility: CLINIC | Age: 66
End: 2021-04-23
Payer: MEDICAID

## 2021-04-23 PROCEDURE — 99214 OFFICE O/P EST MOD 30 MIN: CPT

## 2021-04-23 PROCEDURE — 99072 ADDL SUPL MATRL&STAF TM PHE: CPT

## 2021-04-29 RX ORDER — SIMETHICONE 80 MG/1
80 TABLET, CHEWABLE ORAL
Qty: 90 | Refills: 1 | Status: ACTIVE | COMMUNITY
Start: 2017-10-26 | End: 1900-01-01

## 2021-05-03 ENCOUNTER — APPOINTMENT (OUTPATIENT)
Dept: RHEUMATOLOGY | Facility: CLINIC | Age: 66
End: 2021-05-03

## 2021-05-07 ENCOUNTER — RX RENEWAL (OUTPATIENT)
Age: 66
End: 2021-05-07

## 2021-05-12 ENCOUNTER — APPOINTMENT (OUTPATIENT)
Dept: INTERNAL MEDICINE | Facility: CLINIC | Age: 66
End: 2021-05-12
Payer: MEDICAID

## 2021-05-12 ENCOUNTER — OUTPATIENT (OUTPATIENT)
Dept: OUTPATIENT SERVICES | Facility: HOSPITAL | Age: 66
LOS: 1 days | End: 2021-05-12

## 2021-05-12 VITALS
BODY MASS INDEX: 31.39 KG/M2 | HEART RATE: 73 BPM | WEIGHT: 200 LBS | OXYGEN SATURATION: 95 % | HEIGHT: 67 IN | SYSTOLIC BLOOD PRESSURE: 122 MMHG | DIASTOLIC BLOOD PRESSURE: 66 MMHG

## 2021-05-12 VITALS — TEMPERATURE: 97.3 F

## 2021-05-12 DIAGNOSIS — R73.03 PREDIABETES.: ICD-10-CM

## 2021-05-12 DIAGNOSIS — Z98.890 OTHER SPECIFIED POSTPROCEDURAL STATES: Chronic | ICD-10-CM

## 2021-05-12 DIAGNOSIS — Z87.19 PERSONAL HISTORY OF OTHER DISEASES OF THE DIGESTIVE SYSTEM: ICD-10-CM

## 2021-05-12 PROCEDURE — 99214 OFFICE O/P EST MOD 30 MIN: CPT | Mod: GC

## 2021-05-12 RX ORDER — NICOTINE POLACRILEX 2 MG/1
2 GUM, CHEWING ORAL
Qty: 300 | Refills: 1 | Status: DISCONTINUED | COMMUNITY
Start: 2020-08-03 | End: 2021-05-12

## 2021-05-12 RX ORDER — CLARITHROMYCIN 500 MG/1
500 TABLET, FILM COATED ORAL TWICE DAILY
Qty: 28 | Refills: 0 | Status: COMPLETED | COMMUNITY
Start: 2021-03-22 | End: 2021-04-05

## 2021-05-12 RX ORDER — METRONIDAZOLE 500 MG/1
500 TABLET ORAL TWICE DAILY
Qty: 28 | Refills: 0 | Status: COMPLETED | COMMUNITY
Start: 2021-03-22 | End: 2021-04-05

## 2021-05-12 RX ORDER — OMEPRAZOLE 20 MG/1
20 CAPSULE, DELAYED RELEASE ORAL TWICE DAILY
Qty: 28 | Refills: 0 | Status: COMPLETED | COMMUNITY
Start: 2021-03-22 | End: 2021-04-05

## 2021-05-12 RX ORDER — AMOXICILLIN 500 MG/1
500 TABLET, FILM COATED ORAL TWICE DAILY
Qty: 56 | Refills: 0 | Status: COMPLETED | COMMUNITY
Start: 2021-03-22 | End: 2021-04-05

## 2021-05-13 PROBLEM — Z87.19 HISTORY OF BLOODY STOOLS: Status: RESOLVED | Noted: 2018-02-15 | Resolved: 2021-05-13

## 2021-05-13 RX ORDER — CIPROFLOXACIN AND DEXAMETHASONE 3; 1 MG/ML; MG/ML
0.3-0.1 SUSPENSION/ DROPS AURICULAR (OTIC) TWICE DAILY
Qty: 1 | Refills: 0 | Status: DISCONTINUED | COMMUNITY
Start: 2021-01-29 | End: 2021-05-13

## 2021-05-13 RX ORDER — FLUTICASONE FUROATE AND VILANTEROL TRIFENATATE 100; 25 UG/1; UG/1
100-25 POWDER RESPIRATORY (INHALATION) DAILY
Qty: 2 | Refills: 0 | Status: DISCONTINUED | COMMUNITY
Start: 2021-03-09 | End: 2021-05-13

## 2021-05-13 RX ORDER — BUPROPION HYDROCHLORIDE 150 MG/1
150 TABLET, EXTENDED RELEASE ORAL TWICE DAILY
Qty: 63 | Refills: 0 | Status: DISCONTINUED | COMMUNITY
Start: 2020-08-03 | End: 2021-05-13

## 2021-05-13 RX ORDER — FLUTICASONE FUROATE 27.5 UG/1
27.5 SPRAY, METERED NASAL DAILY
Qty: 1 | Refills: 0 | Status: DISCONTINUED | COMMUNITY
Start: 2021-01-28 | End: 2021-05-13

## 2021-05-13 NOTE — REVIEW OF SYSTEMS
[Earache] : earache [Sore Throat] : sore throat [Shortness Of Breath] : shortness of breath [Joint Pain] : joint pain [Joint Stiffness] : joint stiffness [Joint Swelling] : joint swelling [Headache] : headache [Muscle Pain] : muscle pain [Negative] : Heme/Lymph

## 2021-05-14 DIAGNOSIS — E78.5 HYPERLIPIDEMIA, UNSPECIFIED: ICD-10-CM

## 2021-05-14 DIAGNOSIS — I65.23 OCCLUSION AND STENOSIS OF BILATERAL CAROTID ARTERIES: ICD-10-CM

## 2021-05-14 DIAGNOSIS — J02.9 ACUTE PHARYNGITIS, UNSPECIFIED: ICD-10-CM

## 2021-05-14 DIAGNOSIS — J44.9 CHRONIC OBSTRUCTIVE PULMONARY DISEASE, UNSPECIFIED: ICD-10-CM

## 2021-05-14 DIAGNOSIS — A04.8 OTHER SPECIFIED BACTERIAL INTESTINAL INFECTIONS: ICD-10-CM

## 2021-05-14 DIAGNOSIS — F32.9 MAJOR DEPRESSIVE DISORDER, SINGLE EPISODE, UNSPECIFIED: ICD-10-CM

## 2021-05-14 DIAGNOSIS — I10 ESSENTIAL (PRIMARY) HYPERTENSION: ICD-10-CM

## 2021-05-14 DIAGNOSIS — R73.03 PREDIABETES: ICD-10-CM

## 2021-05-14 DIAGNOSIS — F17.200 NICOTINE DEPENDENCE, UNSPECIFIED, UNCOMPLICATED: ICD-10-CM

## 2021-05-14 DIAGNOSIS — J45.909 UNSPECIFIED ASTHMA, UNCOMPLICATED: ICD-10-CM

## 2021-05-14 NOTE — ASSESSMENT
[FreeTextEntry1] : 67 y/o F current smoker (1 PPD) with asthma/COPD, HTN, HLD, fibromyalgia, inflammatory arthritis (followed by Rheum), b/l knee OA, R sternoclavicular arthritis, NAFLD, anxiety, depression (followed by outpatient psychiatry), pre-diabetes (A1c 5.7% in 2/2020), colonic polyps, LBBB, recent tx of recurrent h. pylori here for follow-up.\par \par #Sore throat \par - several month history of sore throat, worsened after endoscopy in March, symptoms persistent, 2/2 postnasal drip?\par - pt requesting ENT evaluation; referral sent\par \par # Carotid artery stenosis\par - carotid artery duplex 2021 with mild heterogenous plaques within proximinal right and left ICA 16-49% stenosis\par - holding aspirin for now pending h. pylori eradication\par \par # Anxiety/depression\par - continuing to provide frequent, short-interval follow ups for reassurance\par - c/w outpatient therapy and psychology\par - c/w duloxetine (also for fibromyalgia)\par \par # COPD/asthma/lung ca screening\par - 7/2020 LDCT scan - stable nodules, next due 7/2021\par - symptoms stable, rescue inhaler 3-4x/week\par - counseled on proper usage of symbicort, rinsing mouth after use\par - c/w symbicort, albuterol PRN\par - f/u pulm appt on 5/25\par \par # Active smoker\par - down to 0.5-1 PPD (baseline 1 ppd), motivated to quit, failed prior nicotine patches, offered nicotine gum and bupropion in past but has not adhered to treatment\par - continued to motivate pt to quit smoking\par \par # HTN\par - BP at goal \par - c/w amlodipine 10mg qday, lisinopril 40mg qday\par \par # H. pylori\par - repeat EGD 03/2021 with Asmi class III ulcers and h. pylori on biopsy, s/p bismuth-free quad therapy, off PPI > 2 weeks\par - f/u repeat h. pylori stool antigen test (sample cup provided) for eradication\par \par #Pre-DM\par - A1c 5.7 Feb 2020\par - pt defers blood test, will obtain a1c at next visit \par \par #HLD\par - /HDL 52/ Feb 2020\par - c/w pravastatin 40mg daily\par - given evidence of atherosclerotic disease on carotid US, pt would benefit from high intensity statin, however, was previously on lipitor with MSK complaints (unclear if statin related or from fibromyalgia/arthritis)\par - repeat lipid panel at next visit and discuss switching to high intensity statin\par \par # HCM\par - last colonoscopy 11/3/2020 w/ TAs. follow-up 2025\par - last mammo 10/2020 birads 2, f/u 1 year w/ breast MRI due to increased lifetime risk 20.1%\par - bone scan 7/2019 normal, next due 2021 per last rheum note\par - low dose CT done 7/2020 with stable lung nodules since 2013, follow-up LDCT 2021\par - last pap/hpv neg in 2018, due 2023 however will be > 65\par - flu shot, pneumo vacc, Tdap UTD\par - shingrix vax sent to pharmacy previously, pt declining\par - covid19 vax - moderna 1st dose 5/7, 2nd dose scheduled 6/4\par \par Zane Castro, PGY2\par [employer]\par \par CDW Dr. Brantley\par RTC 2 months. a1c, lipid panel, annual ldct at next visit

## 2021-05-14 NOTE — PHYSICAL EXAM
[Normal TMs] : both tympanic membranes were normal [Normal] : affect was normal and insight and judgment were intact [de-identified] : p [de-identified] : l

## 2021-05-14 NOTE — HISTORY OF PRESENT ILLNESS
[FreeTextEntry1] : f/u [de-identified] : 66F current smoker with asthma/COPD, HTN, HLD, fibromyalgia, inflammatory arthritis on methotrexate (followed by rheum), b/l knee OA, R sternoclavicular arthritis, hepatic steatosis, anxiety, depression (follows outpatient psychiatry and psychology), colonic polyps, LBBB, recent tx for recurrent h. pylori here for f/u of chronic conditions.\par \par In the interm, pt underwent repeat EGD on 3/2021 significant for Sami Class III gastric ulcers and h. pylori on biopsy. Pt was treated with non-bismuth based quad therapy which she completed in early April. \par \par Pt has been having "sore throat" for months, preceding EGD. EGD exacerbated symptoms and they have persisted. She denies odynophagia, difficulty swallowing, globus sensation, weight loss, or cough. In a prior visit, pt did have mild posterior oropharyngeal edema and cobblestoning thought to be 2/2 from postnasal drip. She was prescribed a trial of Flonase but it is unclear if she was adherent to medication. Pt is requesting ENT evaluation. \par \par COPD/asthma - patient continues to use symbicort regularly and albuterol PRN about 3-4x a week. Pt was prescribed breo ellipta but has not been using it. \par \par Pt has been following with ortho hand regarding b/l carpel tunnel. She is expected to have surgical carpal tunnel release in the near future. \par \par Pt received her first dose of COVID moderna vaccine 5/7 and next dose is scheduled for 6/4. She continues to defer shingrix vaccine. \par \par Pt continues to follow with her psychologist every other Wednesday. She has not seen her psychiatrist "in a while". She reports her symptoms of depression are well controlled.

## 2021-05-14 NOTE — END OF VISIT
[] : Resident [FreeTextEntry3] : 66F current smoker with asthma/COPD, HTN, HLD, fibromyalgia, inflammatory arthritis on methotrexate (followed by rheum), b/l knee OA, R sternoclavicular arthritis, hepatic steatosis, anxiety, depression (follows outpatient psychiatry and psychology), colonic polyps, LBBB, recent tx for recurrent h. pylori here for f/u of chronic conditions.\par \par #H Pylori\par Recently found to have H Pylori again on EGD, s/p triple therapy. Off PPI, will send stool antigen.\par \par #Throat Irritation \par C/o throat irritation, not worsening w/ food, no dysphagia, odynophagia, wt loss. Possibly 2/2 post nasal drip, pt tried Fluticasone w/o improvement. Requesting ENT c/s.\par \par #OA\par Pt to have carpal tunnel release surgery w/ ortho \par hx of inflammatory arthritis \par Rheum f/u, c/w MTX\par \par #HTN\par BP well controlled today, in 120s\par c/w lisinopril, amlodipine\par \par #Pre-DM\par A1c last 5.7, unable to check A1c today d/t lab closing, will check next visit \par \par #HLD\par Not at goal, pt on pravastatin 40 mg, pt was on high intensity statin in past, but unable to tolerate. \par Will check lipids again with the hope that pt may be understanding to try another statin \par \par #Vitreous detachment \par Per pt, has vitreous detachment, following w/ opthalmology. No vision loss\par \par F/u in 10 weeks for bloodwork [Time Spent: ___ minutes] : I have spent [unfilled] minutes of time on the encounter.

## 2021-05-18 NOTE — DISCUSSION/SUMMARY
[FreeTextEntry1] : 64-year-old woman, current smoker, previously normal pulmonary function testing in the past, but now showing response to bronchodilators by FVC, and was in the emergency department with an exacerbation in February. Using her friend's nebulizer machine.\par \par Importance of smoking cessation once again strongly advised. Patient says she has done it before she can do it again, she will consider nicotine replacement.\par \par Begin Breo daily. Proventil or albuterol p.r.n. Prescription for nebulizers. Written instructions given.\par \par Patient needs CT low-dose lung cancer screening. I have ordered it again.\par \par Followup one to 2 months
18-May-2021 00:46

## 2021-05-20 DIAGNOSIS — Z01.818 ENCOUNTER FOR OTHER PREPROCEDURAL EXAMINATION: ICD-10-CM

## 2021-05-21 ENCOUNTER — APPOINTMENT (OUTPATIENT)
Dept: DISASTER EMERGENCY | Facility: CLINIC | Age: 66
End: 2021-05-21

## 2021-05-22 LAB — SARS-COV-2 N GENE NPH QL NAA+PROBE: NOT DETECTED

## 2021-05-24 ENCOUNTER — APPOINTMENT (OUTPATIENT)
Dept: MRI IMAGING | Facility: IMAGING CENTER | Age: 66
End: 2021-05-24

## 2021-05-25 ENCOUNTER — APPOINTMENT (OUTPATIENT)
Dept: PULMONOLOGY | Facility: CLINIC | Age: 66
End: 2021-05-25
Payer: MEDICAID

## 2021-05-25 VITALS
RESPIRATION RATE: 16 BRPM | HEART RATE: 101 BPM | WEIGHT: 200 LBS | BODY MASS INDEX: 31.39 KG/M2 | HEIGHT: 67 IN | DIASTOLIC BLOOD PRESSURE: 74 MMHG | SYSTOLIC BLOOD PRESSURE: 150 MMHG | OXYGEN SATURATION: 96 % | TEMPERATURE: 97.6 F

## 2021-05-25 VITALS
HEART RATE: 88 BPM | WEIGHT: 200 LBS | HEIGHT: 67 IN | TEMPERATURE: 97.9 F | BODY MASS INDEX: 31.39 KG/M2 | OXYGEN SATURATION: 99 %

## 2021-05-25 PROCEDURE — 99213 OFFICE O/P EST LOW 20 MIN: CPT | Mod: 25

## 2021-05-25 PROCEDURE — 94726 PLETHYSMOGRAPHY LUNG VOLUMES: CPT

## 2021-05-25 PROCEDURE — 94729 DIFFUSING CAPACITY: CPT

## 2021-05-25 PROCEDURE — 94010 BREATHING CAPACITY TEST: CPT

## 2021-05-25 PROCEDURE — 99406 BEHAV CHNG SMOKING 3-10 MIN: CPT

## 2021-05-25 PROCEDURE — ZZZZZ: CPT

## 2021-05-25 NOTE — HISTORY OF PRESENT ILLNESS
[TextBox_4] : 66 woman here for follow-up, last seen in May 2019.  Current smoker, history of mild COPD/asthma overlap syndrome.\par \par Patient continues to smoke about a pack per day.  Except every other weekend when she has her.  Grandchildren visiting, then she smokes much less.\par \par She is on Symbicort 160, but admits she usually only uses it 2 puffs once a day not twice a day.  She is using her albuterol as needed, sometimes once a Day, sometimes less.\par \par Otherwise no change in her medical history.  Continues to use a walker, hypertension,\par \par she has received 1 dose of Covid vaccine, second dose is due on June 7

## 2021-05-25 NOTE — DISCUSSION/SUMMARY
[FreeTextEntry1] : Pulmonary function testing done today was normal, however bronchodilator testing was not done, it has been positive in the past.\par \par 66-year-old woman, obesity, diabetes, musculoskeletal issues chronic walker, who is a current smoker of 1 pack/day, mild COPD/asthma overlap syndrome.\par \par Continue Symbicort, patient advised to use it twice a day as prescribed.  Continue-year-old as needed\par \par Smoking cessation again counseled and discussed.  She does not want any outside help, just feels that she needs to be more busy.  Also feels that if she goes to [place], where she has more grandchildren she will stop smoking.  She is planning to go in August.\par \par She is due for low-dose CT in July, ordered.  For follow-up with results\par \par 2nd dose Covid vaccine scheduled for June 7

## 2021-05-25 NOTE — PHYSICAL EXAM
[No Acute Distress] : no acute distress [Normal Oropharynx] : normal oropharynx [Normal Appearance] : normal appearance [Normal Rate/Rhythm] : normal rate/rhythm [No Resp Distress] : no resp distress [Benign] : benign [No Clubbing] : no clubbing [No Edema] : no edema [Normal Color/ Pigmentation] : normal color/ pigmentation [Oriented x3] : oriented x3 [Normal Affect] : normal affect [TextBox_2] : obese [TextBox_99] : walker (0) independent

## 2021-05-28 ENCOUNTER — NON-APPOINTMENT (OUTPATIENT)
Age: 66
End: 2021-05-28

## 2021-06-11 ENCOUNTER — NON-APPOINTMENT (OUTPATIENT)
Age: 66
End: 2021-06-11

## 2021-06-11 ENCOUNTER — APPOINTMENT (OUTPATIENT)
Dept: OPHTHALMOLOGY | Facility: CLINIC | Age: 66
End: 2021-06-11
Payer: MEDICAID

## 2021-06-11 PROCEDURE — 99199 UNLISTED SPECIAL SVC PX/RPRT: CPT | Mod: NC

## 2021-06-14 ENCOUNTER — APPOINTMENT (OUTPATIENT)
Dept: RHEUMATOLOGY | Facility: CLINIC | Age: 66
End: 2021-06-14

## 2021-06-14 ENCOUNTER — RESULT REVIEW (OUTPATIENT)
Age: 66
End: 2021-06-14

## 2021-06-14 ENCOUNTER — OUTPATIENT (OUTPATIENT)
Dept: OUTPATIENT SERVICES | Facility: HOSPITAL | Age: 66
LOS: 1 days | End: 2021-06-14

## 2021-06-14 VITALS
DIASTOLIC BLOOD PRESSURE: 80 MMHG | WEIGHT: 199 LBS | OXYGEN SATURATION: 98 % | HEIGHT: 67 IN | SYSTOLIC BLOOD PRESSURE: 118 MMHG | HEART RATE: 72 BPM | BODY MASS INDEX: 31.23 KG/M2

## 2021-06-14 VITALS — TEMPERATURE: 97.1 F

## 2021-06-14 DIAGNOSIS — Z98.890 OTHER SPECIFIED POSTPROCEDURAL STATES: Chronic | ICD-10-CM

## 2021-06-14 DIAGNOSIS — M17.0 BILATERAL PRIMARY OSTEOARTHRITIS OF KNEE: ICD-10-CM

## 2021-06-16 ENCOUNTER — APPOINTMENT (OUTPATIENT)
Dept: MRI IMAGING | Facility: CLINIC | Age: 66
End: 2021-06-16

## 2021-06-16 LAB — H PYLORI AG STL QL: SIGNIFICANT CHANGE UP

## 2021-06-17 PROBLEM — M17.0 PRIMARY OSTEOARTHRITIS OF KNEES, BILATERAL: Status: ACTIVE | Noted: 2017-04-05

## 2021-06-17 NOTE — PHYSICAL EXAM
[General Appearance - Alert] : alert [General Appearance - In No Acute Distress] : in no acute distress [General Appearance - Well Nourished] : well nourished [General Appearance - Well-Appearing] : healthy appearing [Extraocular Movements] : extraocular movements were intact [Sclera] : the sclera and conjunctiva were normal [Outer Ear] : the ears and nose were normal in appearance [Hearing Threshold Finger Rub Not [place]] : hearing was normal [Neck Appearance] : the appearance of the neck was normal [Auscultation Breath Sounds / Voice Sounds] : lungs were clear to auscultation bilaterally [Murmurs] : no murmurs [Heart Sounds] : normal S1 and S2 [Edema] : there was no peripheral edema [Bowel Sounds] : normal bowel sounds [Abdomen Soft] : soft [Abdomen Tenderness] : non-tender [Cervical Lymph Nodes Enlarged Posterior Bilaterally] : posterior cervical [Cervical Lymph Nodes Enlarged Anterior Bilaterally] : anterior cervical [Supraclavicular Lymph Nodes Enlarged Bilaterally] : supraclavicular [] : no rash [Motor Exam] : the motor exam was normal [No Focal Deficits] : no focal deficits [Oriented To Time, Place, And Person] : oriented to person, place, and time [FreeTextEntry1] : 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 Tenderness present in almost all joints of b/l hands, no swollen, red joints or limitation of ROM noted though. Erythema or tenderness noted in sternoclavicular joints. B/l knee joints have normal ROM, crepitus noted. Body wide tender spots appreciated.

## 2021-06-17 NOTE — HISTORY OF PRESENT ILLNESS
[FreeTextEntry1] : Interval Hx:\par 6/14/21\par Today she presents for a regular office visit.\par She says her b/l hand pains associated with morning stiffness.\par Had b/l sternoclavicular joint tenderness pain. \par She is also complaining of b/l knee pains with walking. \par No episodes of joint swelling. \par Denies CP, SOB, n/v/d, abdominal pain, fever or chills.

## 2021-06-17 NOTE — ASSESSMENT
[FreeTextEntry1] : 65 F is being seen in the Rheumatology clinic for seronegative inflammatory arthritis, fibromyalgia and b/l knee pain from OA. \par \par Problem List:\par # Seronegative inflammatory arthritis.\par # B/l OA of knees.\par # FM\par \par Impression/Plan:\par \par # Seronegative inflammatory arthritis\par Hands, wrists and sternoclavicular joint involvement. \par Recurrent symptoms on MTX 20 mg weekly. \par Disease uncontrolled, we wish to escalate therapy but patient not willing to start injectible biologics. \par Pt agreed to start Tofacitinib as its oral, risks and benefits discussed in detail.\par Pt says she will start after she returns from her vacation in July.\par Prescription sent to the pharmacy, will start medication after she comes back from vacation and she understands that we will be following up closely in the initial phases of the new drug regimen.  \par Keep 20 MTX weekly. \par \par # B/l OA of knees.\par Ortho offered TKR on 6/19, but patient refused.\par Advised pt to lose weight and take Naproxen and Tylenol symptoms (she is not taking any pain medications consistently). \par \par # FM\par Symptoms under control.\par Continue Cymbalta 60mg\par PT\par \par # HCM\par Influenza vaccine 3/19, pneumovax 2015; prevnar 7/19\par DEXA scan normal 6/19, repeat this year. \par S/p Covod vaccine. \par \par RTC after she returns from vacation in July. \par \par d/w Dr. Dias\par \par Nitesh Candelario MD\par Rheumatology fellow\par

## 2021-06-18 DIAGNOSIS — M13.89 OTHER SPECIFIED ARTHRITIS, MULTIPLE SITES: ICD-10-CM

## 2021-06-18 DIAGNOSIS — M17.0 BILATERAL PRIMARY OSTEOARTHRITIS OF KNEE: ICD-10-CM

## 2021-06-18 DIAGNOSIS — M79.7 FIBROMYALGIA: ICD-10-CM

## 2021-06-18 DIAGNOSIS — Z79.899 OTHER LONG TERM (CURRENT) DRUG THERAPY: ICD-10-CM

## 2021-07-16 ENCOUNTER — NON-APPOINTMENT (OUTPATIENT)
Age: 66
End: 2021-07-16

## 2021-07-16 ENCOUNTER — APPOINTMENT (OUTPATIENT)
Dept: OPHTHALMOLOGY | Facility: CLINIC | Age: 66
End: 2021-07-16
Payer: MEDICAID

## 2021-07-16 PROCEDURE — 92012 INTRM OPH EXAM EST PATIENT: CPT

## 2021-07-26 ENCOUNTER — OUTPATIENT (OUTPATIENT)
Dept: OUTPATIENT SERVICES | Facility: HOSPITAL | Age: 66
LOS: 1 days | Discharge: HOME | End: 2021-07-26

## 2021-07-26 ENCOUNTER — APPOINTMENT (OUTPATIENT)
Dept: OTOLARYNGOLOGY | Facility: CLINIC | Age: 66
End: 2021-07-26
Payer: MEDICAID

## 2021-07-26 VITALS
HEART RATE: 86 BPM | SYSTOLIC BLOOD PRESSURE: 138 MMHG | WEIGHT: 206.8 LBS | HEIGHT: 67 IN | BODY MASS INDEX: 32.46 KG/M2 | DIASTOLIC BLOOD PRESSURE: 78 MMHG

## 2021-07-26 DIAGNOSIS — Z98.890 OTHER SPECIFIED POSTPROCEDURAL STATES: Chronic | ICD-10-CM

## 2021-07-26 DIAGNOSIS — R13.10 DYSPHAGIA, UNSPECIFIED: ICD-10-CM

## 2021-07-26 DIAGNOSIS — Z83.3 FAMILY HISTORY OF DIABETES MELLITUS: ICD-10-CM

## 2021-07-26 PROCEDURE — 31575 DIAGNOSTIC LARYNGOSCOPY: CPT

## 2021-07-26 PROCEDURE — 99204 OFFICE O/P NEW MOD 45 MIN: CPT | Mod: 25

## 2021-07-26 RX ORDER — POLYETHYLENE GLYCOL-3350 AND ELECTROLYTES 236; 6.74; 5.86; 2.97; 22.74 G/274.31G; G/274.31G; G/274.31G; G/274.31G; G/274.31G
236 POWDER, FOR SOLUTION ORAL
Qty: 1 | Refills: 0 | Status: COMPLETED | COMMUNITY
Start: 2020-11-02 | End: 2021-07-26

## 2021-07-26 RX ORDER — PREGABALIN 25 MG/1
25 CAPSULE ORAL
Qty: 60 | Refills: 0 | Status: COMPLETED | COMMUNITY
Start: 2020-12-23 | End: 2021-07-26

## 2021-07-26 RX ORDER — SIMETHICONE 125 MG/1
125 CAPSULE, LIQUID FILLED ORAL
Refills: 0 | Status: ACTIVE | COMMUNITY

## 2021-07-26 RX ORDER — TOFACITINIB 5 MG/1
5 TABLET, FILM COATED ORAL
Qty: 60 | Refills: 1 | Status: COMPLETED | COMMUNITY
Start: 2021-06-14 | End: 2021-07-26

## 2021-07-26 NOTE — PHYSICAL EXAM
[Midline] : trachea located in midline position [Normal] : normal appearance, well groomed, well nourished, and in no acute distress [de-identified] : Has upper and lower dentures

## 2021-07-26 NOTE — REASON FOR VISIT
[Initial Consultation] : an initial consultation for [FreeTextEntry2] : referred by Dr Zane Castro for sore throat

## 2021-07-26 NOTE — HISTORY OF PRESENT ILLNESS
[de-identified] : 66 year old female referred by Dr Zane Castro for sore throat. States odynophagia, dysphagia with solids and liquids, choking for the past 1 year with worse progression in the past 4 months. Patient has endoscopy and colonoscopy done with GI ~4 months ago was treated for H. Pylori with antibiotics. Previous Barium swallow was ordered but not completed \par \par Denies recent throat infections, dysphonia or dyspnea or unintentional weight loss. Currently taking a chewable antacid daily. Current smoker, smokes a pack a day. States bilateral otalgia, and dizziness. Denies otorrhea, ear infections, vertigo, or hearing loss, or weight loss\par \par

## 2021-07-28 ENCOUNTER — APPOINTMENT (OUTPATIENT)
Dept: ORTHOPEDIC SURGERY | Facility: CLINIC | Age: 66
End: 2021-07-28
Payer: MEDICAID

## 2021-07-28 VITALS
DIASTOLIC BLOOD PRESSURE: 78 MMHG | WEIGHT: 203 LBS | HEIGHT: 67 IN | BODY MASS INDEX: 31.86 KG/M2 | SYSTOLIC BLOOD PRESSURE: 123 MMHG | OXYGEN SATURATION: 96 % | HEART RATE: 69 BPM

## 2021-07-28 PROCEDURE — 99214 OFFICE O/P EST MOD 30 MIN: CPT

## 2021-08-18 DIAGNOSIS — R13.10 DYSPHAGIA, UNSPECIFIED: ICD-10-CM

## 2021-08-18 DIAGNOSIS — K21.9 GASTRO-ESOPHAGEAL REFLUX DISEASE WITHOUT ESOPHAGITIS: ICD-10-CM

## 2021-08-18 DIAGNOSIS — J02.9 ACUTE PHARYNGITIS, UNSPECIFIED: ICD-10-CM

## 2021-08-25 ENCOUNTER — APPOINTMENT (OUTPATIENT)
Dept: OBGYN | Facility: HOSPITAL | Age: 66
End: 2021-08-25

## 2021-08-27 ENCOUNTER — OUTPATIENT (OUTPATIENT)
Dept: OUTPATIENT SERVICES | Facility: HOSPITAL | Age: 66
LOS: 1 days | End: 2021-08-27

## 2021-08-27 ENCOUNTER — RESULT REVIEW (OUTPATIENT)
Age: 66
End: 2021-08-27

## 2021-08-27 ENCOUNTER — APPOINTMENT (OUTPATIENT)
Dept: INTERNAL MEDICINE | Facility: CLINIC | Age: 66
End: 2021-08-27
Payer: MEDICAID

## 2021-08-27 VITALS
OXYGEN SATURATION: 98 % | HEIGHT: 67 IN | BODY MASS INDEX: 32.18 KG/M2 | SYSTOLIC BLOOD PRESSURE: 140 MMHG | DIASTOLIC BLOOD PRESSURE: 60 MMHG | WEIGHT: 205 LBS | HEART RATE: 79 BPM

## 2021-08-27 VITALS — SYSTOLIC BLOOD PRESSURE: 128 MMHG | DIASTOLIC BLOOD PRESSURE: 75 MMHG

## 2021-08-27 VITALS — TEMPERATURE: 97.6 F

## 2021-08-27 DIAGNOSIS — Z98.890 OTHER SPECIFIED POSTPROCEDURAL STATES: Chronic | ICD-10-CM

## 2021-08-27 LAB
A1C WITH ESTIMATED AVERAGE GLUCOSE RESULT: 5.8 % — HIGH (ref 4–5.6)
CHOLEST SERPL-MCNC: 158 MG/DL — SIGNIFICANT CHANGE UP
ESTIMATED AVERAGE GLUCOSE: 120 — SIGNIFICANT CHANGE UP
HDLC SERPL-MCNC: 36 MG/DL — LOW
LIPID PNL WITH DIRECT LDL SERPL: 68 MG/DL — SIGNIFICANT CHANGE UP
NON HDL CHOLESTEROL: 122 MG/DL — SIGNIFICANT CHANGE UP
TRIGL SERPL-MCNC: 269 MG/DL — HIGH

## 2021-08-27 PROCEDURE — 99213 OFFICE O/P EST LOW 20 MIN: CPT | Mod: GE,25

## 2021-08-27 NOTE — COUNSELING
[Risk of tobacco use and health benefits of smoking cessation discussed] : Risk of tobacco use and health benefits of smoking cessation discussed [Use of nicotine replacement therapies and other medications discussed] : Use of nicotine replacement therapies and other medications discussed [Willing to Quit Smoking] : Willing to quit smoking [ - Annual Lung Cancer Screening/Share Decision Making Discussion] : Annual Lung Cancer Screening/Share Decision Making Discussion. (I have advised this patient to have a Low Dose CT (LDCT) scan of the lungs and have discussed the following with the patient in a shared decision making discussion:   Benefits of Detection and Early Treatment: There is adequate evidence that annual screening for lung cancer with LDCT in a population of high-risk persons can prevent a substantial number of lung cancer–related deaths. The magnitude of benefit depends on the individual patient's risk for lung cancer, as those who are at highest risk are most likely to benefit. Screening cannot prevent most lung cancer–related deaths, and does not replace smoking cessation. Harms of Detection and Early Intervention and Treatment: The harms associated with LDCT screening include false-negative and false-positive results, incidental findings, over diagnosis, and radiation exposure. False-positive LDCT results occur in a substantial proportion of screened persons; 95% of all positive results do not lead to a diagnosis of cancer. In a high-quality screening program, further imaging can resolve most false-positive results; however, some patients may require invasive procedures. Radiation harms, including cancer resulting from cumulative exposure to radiation, vary depending on the age at the start of screening; the number of scans received; and the person's exposure to other sources of radiation, particularly other medical imaging.)

## 2021-08-29 NOTE — PLAN
[FreeTextEntry1] : 67 y/o F current smoker (1 PPD) with asthma/COPD, HTN, HLD, fibromyalgia, inflammatory arthritis (followed by Rheum), b/l knee OA, R sternoclavicular arthritis, NAFLD, anxiety, depression (followed by outpatient psychiatry), pre-diabetes (A1c 5.7% in 2/2020), colonic polyps, LBBB, recent tx of recurrent h. pylori here for follow-up.\par \par #Sore throat \par - s/p ENT eval, no concnerning findings on laryngoscopy\par -f/u barium swallow and f/u GI as likely related to acid reflux\par \par # Carotid artery stenosis\par - carotid artery duplex 2021 with mild heterogenous plaques within proximinal right and left ICA 16-49% stenosis\par - can restart ASA given negative H. pylori stool test and s/p therapy\par \par # Anxiety/depression\par - continuing to provide frequent, short-interval follow ups for reassurance\par - c/w outpatient therapy and psychology\par - c/w duloxetine (also for fibromyalgia)\par \par # COPD/asthma/lung ca screening\par - 7/2020 LDCT scan - stable nodules, order placed for new LDCT\par - symptoms stable, rescue inhaler 2-3x/week\par - counseled on proper usage of symbicort, rinsing mouth after use\par - c/w symbicort, albuterol PRN\par \par # Active smoker\par - down to 0.5-1 PPD (baseline 1 ppd), motivated to quit, failed prior nicotine patches, offered nicotine gum and bupropion in past but has not adhered to treatment\par - continued to motivate pt to quit smoking--not interested in cessation counseling at this time. \par \par # HTN\par - BP at goal \par - c/w amlodipine 10mg qday, lisinopril 40mg qday\par \par # H. pylori\par - recent stool test neg \par \par #Pre-DM\par - A1c 5.7 Feb 2020\par - f/u repeat a1c today\par \par #HLD\par - /HDL 52/ Feb 2020\par - f/u repeat lipid panel today\par - given evidence of atherosclerotic disease on carotid US, pt would benefit from high intensity statin, however, was previously on lipitor with MSK complaints (unclear if statin related or from fibromyalgia/arthritis)--will recommend rosuvastatin\par - d/c pravastatin and start rosuvastatin 40 mg\par \par # HCM\par - last colonoscopy 11/3/2020 w/ TAs. follow-up 2025\par - last mammo 10/2020 birads 2, f/u 1 mammo ordered for this year\par - bone scan 7/2019 normal, next due 2021 per last rheum note\par - low dose CT done 7/2020 with stable lung nodules since 2013, follow-up LDCT 2021\par - last pap/hpv neg in 2018, due 2023 however will be > 65\par - flu shot, pneumo vacc, Tdap UTD\par - shingrix vax sent to pharmacy previously, pt declining still\par - covid19 vax -UTD \par \par Case discussed with Dr. Conde. \par \par Justus Aldridge MD  EM/IM PGY2

## 2021-08-29 NOTE — HISTORY OF PRESENT ILLNESS
[de-identified] : 66F current smoker with asthma/COPD, HTN, HLD, fibromyalgia, inflammatory arthritis on methotrexate (followed by rheum), b/l knee OA, R sternoclavicular arthritis, hepatic steatosis, anxiety, depression (follows outpatient psychiatry and psychology), colonic polyps, LBBB, recent tx for recurrent h. pylori (now resolved) here for f/u of chronic conditions.\par \par In the interim pt has followed up with GI and had a negative stool antigen test s/p quadruple therapy for H. pylori. She also followed up with ENT for a "sore throat" which she had a laryngoscopy for that showed no concerning tissue changes, masses or lesions in the oropharynx or larynx, she has a barium swallow pending and will then be following back up with GI per her ENT rec as they believe this to be related to her GERD. She denies any denies odynophagia, difficulty swallowing, globus sensation, nausea, vomiting, or weight loss, or cough.\par \par In regards to her COPD, patient continues to use symbicort regularly and albuterol PRN about 2-3x a week. She denies any recent exacerbations, episodes of SOB/JEFFERS/chest pain/productive cough/fevers/chills/URI symptoms. \par \par She has been seeing ortho hand specialist and has a surgery planned for b/l carpal tunnel release and removal of a ganglion cyst on her right hand. \par \par She completed her COVID vaccination in June of this year with her second moderna dose having been completed on 6/4/21.\par \par Otherwise she denies any other complaints or issues at this time. She just requests medication refills. \par

## 2021-08-30 ENCOUNTER — NON-APPOINTMENT (OUTPATIENT)
Age: 66
End: 2021-08-30

## 2021-09-02 ENCOUNTER — NON-APPOINTMENT (OUTPATIENT)
Age: 66
End: 2021-09-02

## 2021-09-02 ENCOUNTER — RX RENEWAL (OUTPATIENT)
Age: 66
End: 2021-09-02

## 2021-09-02 VITALS — WEIGHT: 205 LBS | HEIGHT: 67 IN | BODY MASS INDEX: 32.18 KG/M2

## 2021-09-02 NOTE — ASSESSMENT
[Discussed Risks and Advised to Quit Smoking] : Discussed risks and advised to quit smoking [Discussed Cessation Strategies] : cessation strategies were discussed [Declined] : Patient has declined cessation intervention [Pre-contemplation] : Pre-contemplation: The patient is not thinking about quitting smoking in the next 6 months

## 2021-09-02 NOTE — PLAN
[Smoking Cessation Guidance Provided] : Smoking cessation guidance was provided to patient [Smoking Cessation] : smoking cessation [FreeTextEntry1] : Her LDCT is scheduled for 9/7/21 at the [facility] location.  She will follow up with Dr. Lisker for the results.

## 2021-09-10 ENCOUNTER — APPOINTMENT (OUTPATIENT)
Dept: SPEECH THERAPY | Facility: HOSPITAL | Age: 66
End: 2021-09-10
Payer: MEDICAID

## 2021-09-11 DIAGNOSIS — G56.01 CARPAL TUNNEL SYNDROME, RIGHT UPPER LIMB: ICD-10-CM

## 2021-09-11 DIAGNOSIS — I10 ESSENTIAL (PRIMARY) HYPERTENSION: ICD-10-CM

## 2021-09-11 DIAGNOSIS — G56.02 CARPAL TUNNEL SYNDROME, LEFT UPPER LIMB: ICD-10-CM

## 2021-09-11 DIAGNOSIS — K21.9 GASTRO-ESOPHAGEAL REFLUX DISEASE WITHOUT ESOPHAGITIS: ICD-10-CM

## 2021-09-11 DIAGNOSIS — J45.909 UNSPECIFIED ASTHMA, UNCOMPLICATED: ICD-10-CM

## 2021-09-11 DIAGNOSIS — M19.90 UNSPECIFIED OSTEOARTHRITIS, UNSPECIFIED SITE: ICD-10-CM

## 2021-09-11 DIAGNOSIS — R05 COUGH: ICD-10-CM

## 2021-09-11 DIAGNOSIS — J44.9 CHRONIC OBSTRUCTIVE PULMONARY DISEASE, UNSPECIFIED: ICD-10-CM

## 2021-09-11 DIAGNOSIS — A04.8 OTHER SPECIFIED BACTERIAL INTESTINAL INFECTIONS: ICD-10-CM

## 2021-09-11 DIAGNOSIS — F17.210 NICOTINE DEPENDENCE, CIGARETTES, UNCOMPLICATED: ICD-10-CM

## 2021-09-11 DIAGNOSIS — E78.5 HYPERLIPIDEMIA, UNSPECIFIED: ICD-10-CM

## 2021-09-14 ENCOUNTER — APPOINTMENT (OUTPATIENT)
Dept: CARDIOLOGY | Facility: CLINIC | Age: 66
End: 2021-09-14
Payer: MEDICAID

## 2021-09-14 ENCOUNTER — NON-APPOINTMENT (OUTPATIENT)
Age: 66
End: 2021-09-14

## 2021-09-14 VITALS
BODY MASS INDEX: 32.11 KG/M2 | HEART RATE: 72 BPM | DIASTOLIC BLOOD PRESSURE: 64 MMHG | HEIGHT: 67 IN | SYSTOLIC BLOOD PRESSURE: 123 MMHG | OXYGEN SATURATION: 96 %

## 2021-09-14 VITALS — BODY MASS INDEX: 32.11 KG/M2 | WEIGHT: 205 LBS

## 2021-09-14 PROCEDURE — 93000 ELECTROCARDIOGRAM COMPLETE: CPT

## 2021-09-14 PROCEDURE — 99213 OFFICE O/P EST LOW 20 MIN: CPT

## 2021-09-15 ENCOUNTER — APPOINTMENT (OUTPATIENT)
Dept: ORTHOPEDIC SURGERY | Facility: CLINIC | Age: 66
End: 2021-09-15
Payer: MEDICAID

## 2021-09-15 VITALS
DIASTOLIC BLOOD PRESSURE: 75 MMHG | WEIGHT: 205 LBS | OXYGEN SATURATION: 95 % | SYSTOLIC BLOOD PRESSURE: 117 MMHG | HEIGHT: 67 IN | BODY MASS INDEX: 32.18 KG/M2 | HEART RATE: 79 BPM

## 2021-09-15 PROCEDURE — 99214 OFFICE O/P EST MOD 30 MIN: CPT | Mod: 25

## 2021-09-21 ENCOUNTER — APPOINTMENT (OUTPATIENT)
Dept: CT IMAGING | Facility: IMAGING CENTER | Age: 66
End: 2021-09-21
Payer: MEDICAID

## 2021-09-21 ENCOUNTER — OUTPATIENT (OUTPATIENT)
Dept: OUTPATIENT SERVICES | Facility: HOSPITAL | Age: 66
LOS: 1 days | End: 2021-09-21
Payer: MEDICAID

## 2021-09-21 DIAGNOSIS — Z00.8 ENCOUNTER FOR OTHER GENERAL EXAMINATION: ICD-10-CM

## 2021-09-21 DIAGNOSIS — F17.200 NICOTINE DEPENDENCE, UNSPECIFIED, UNCOMPLICATED: ICD-10-CM

## 2021-09-21 DIAGNOSIS — Z98.890 OTHER SPECIFIED POSTPROCEDURAL STATES: Chronic | ICD-10-CM

## 2021-09-21 PROCEDURE — 71271 CT THORAX LUNG CANCER SCR C-: CPT

## 2021-09-21 PROCEDURE — 71271 CT THORAX LUNG CANCER SCR C-: CPT | Mod: 26

## 2021-09-24 ENCOUNTER — APPOINTMENT (OUTPATIENT)
Dept: ORTHOPEDIC SURGERY | Facility: CLINIC | Age: 66
End: 2021-09-24

## 2021-09-27 ENCOUNTER — RX RENEWAL (OUTPATIENT)
Age: 66
End: 2021-09-27

## 2021-10-21 ENCOUNTER — OUTPATIENT (OUTPATIENT)
Dept: OUTPATIENT SERVICES | Facility: HOSPITAL | Age: 66
LOS: 1 days | End: 2021-10-21

## 2021-10-21 ENCOUNTER — APPOINTMENT (OUTPATIENT)
Dept: GASTROENTEROLOGY | Facility: CLINIC | Age: 66
End: 2021-10-21
Payer: MEDICAID

## 2021-10-21 VITALS
SYSTOLIC BLOOD PRESSURE: 120 MMHG | OXYGEN SATURATION: 98 % | BODY MASS INDEX: 31.39 KG/M2 | WEIGHT: 200 LBS | HEART RATE: 84 BPM | TEMPERATURE: 96.5 F | RESPIRATION RATE: 16 BRPM | DIASTOLIC BLOOD PRESSURE: 68 MMHG | HEIGHT: 67 IN

## 2021-10-21 DIAGNOSIS — Z98.890 OTHER SPECIFIED POSTPROCEDURAL STATES: Chronic | ICD-10-CM

## 2021-10-21 PROCEDURE — 99213 OFFICE O/P EST LOW 20 MIN: CPT

## 2021-10-21 NOTE — END OF VISIT
[] : Fellow [FreeTextEntry3] : 66F here today with oropharyngeal dysphagia, mild heartburn/acid reflux, constipatoin. \par - EGD reviewed, recommend continued follow up w/ ENT. If negative, will consider manometry studies to r/o underlying motility issues. \par - Continue fiber supplement, can add miralax or metamucil as well. Increase dietary fiber\par RTC after ENT workup

## 2021-10-21 NOTE — REVIEW OF SYSTEMS
[As Noted in HPI] : as noted in HPI [Fever] : no fever [Chills] : no chills [Eye Pain] : no eye pain [Eyesight Problems] : no eyesight problems [Nosebleeds] : no nosebleeds [Sore Throat] : no sore throat [Hoarseness] : no hoarseness [Chest Pain] : no chest pain [Lower Ext Edema] : no lower extremity edema [Shortness Of Breath] : no shortness of breath [Wheezing] : no wheezing [Abdominal Pain] : no abdominal pain [Arthralgias] : no arthralgias [Limb Pain] : no limb pain [Skin Lesions] : no skin lesions [Skin Wound] : no skin wound [Confused] : no confusion [Dizziness] : no dizziness [Muscle Weakness] : no muscle weakness

## 2021-10-21 NOTE — ASSESSMENT
[FreeTextEntry1] :  65 yo F with COPD, HTN, Obesity, hemorrhoids, h/o HP s/p non bismuth quadruple therapy (eradication confirmed 4/2021), non obstructive schatzki ring lower esophagus 3/2021, NB duodenal ulcers and prepyloric polyp 3/2021, TA x2 colonoscopy 11/2020 and presents today as a follow up with complaints of discomfort with swallowing, heartburn, and constipation.\par \par 1. Reflux\par 2. Oropharyngeal dysphagia\par 3. Constipation\par 4. h/o TA x2 colon 11/2020\par \par Plan\par - Start pantoprazole 40 mg po daily\par - Proceed with swallowing evaluation with ENT\par - Consider motility study if negative work up and sx persist\par - Start fiber at home, if unsuccessful, trial Miralax daily or metamucil\par - Repeat colonoscopy 11/2025\par \par RTC in November after ENT studies and bring report\par \par Seen and discussed with Dr. Root.\par \par Ayla Rich MD\par GI/Hepatology Fellow, PGY-V

## 2021-10-21 NOTE — PHYSICAL EXAM
[General Appearance - Alert] : alert [General Appearance - In No Acute Distress] : in no acute distress [Extraocular Movements] : extraocular movements were intact [Hearing Threshold Finger Rub Not [place]] : hearing was normal [Examination Of The Oral Cavity] : the lips and gums were normal [Exaggerated Use Of Accessory Muscles For Inspiration] : no accessory muscle use [Heart Rate And Rhythm] : heart rate was normal and rhythm regular [Edema] : there was no peripheral edema [Abdomen Soft] : soft [Abdomen Tenderness] : non-tender [] : no hepato-splenomegaly [Nail Clubbing] : no clubbing  or cyanosis of the fingernails [Skin Lesions] : no skin lesions [Oriented To Time, Place, And Person] : oriented to person, place, and time [FreeTextEntry1] : A

## 2021-10-27 DIAGNOSIS — K21.9 GASTRO-ESOPHAGEAL REFLUX DISEASE WITHOUT ESOPHAGITIS: ICD-10-CM

## 2021-10-28 ENCOUNTER — OUTPATIENT (OUTPATIENT)
Dept: OUTPATIENT SERVICES | Facility: HOSPITAL | Age: 66
LOS: 1 days | End: 2021-10-28

## 2021-10-28 ENCOUNTER — APPOINTMENT (OUTPATIENT)
Dept: SPEECH THERAPY | Facility: HOSPITAL | Age: 66
End: 2021-10-28

## 2021-10-28 ENCOUNTER — OUTPATIENT (OUTPATIENT)
Dept: OUTPATIENT SERVICES | Facility: HOSPITAL | Age: 66
LOS: 1 days | Discharge: HOME | End: 2021-10-28

## 2021-10-28 ENCOUNTER — APPOINTMENT (OUTPATIENT)
Dept: RADIOLOGY | Facility: HOSPITAL | Age: 66
End: 2021-10-28

## 2021-10-28 DIAGNOSIS — R13.12 DYSPHAGIA, OROPHARYNGEAL PHASE: ICD-10-CM

## 2021-10-28 DIAGNOSIS — Z98.890 OTHER SPECIFIED POSTPROCEDURAL STATES: Chronic | ICD-10-CM

## 2021-10-28 PROCEDURE — 74230 X-RAY XM SWLNG FUNCJ C+: CPT | Mod: 26

## 2021-11-01 ENCOUNTER — APPOINTMENT (OUTPATIENT)
Dept: RHEUMATOLOGY | Facility: CLINIC | Age: 66
End: 2021-11-01

## 2021-11-01 ENCOUNTER — NON-APPOINTMENT (OUTPATIENT)
Age: 66
End: 2021-11-01

## 2021-11-05 ENCOUNTER — APPOINTMENT (OUTPATIENT)
Dept: ORTHOPEDIC SURGERY | Facility: CLINIC | Age: 66
End: 2021-11-05
Payer: MEDICAID

## 2021-11-05 VITALS
SYSTOLIC BLOOD PRESSURE: 139 MMHG | HEART RATE: 78 BPM | DIASTOLIC BLOOD PRESSURE: 71 MMHG | WEIGHT: 200 LBS | HEIGHT: 67 IN | BODY MASS INDEX: 31.39 KG/M2 | OXYGEN SATURATION: 93 %

## 2021-11-05 PROCEDURE — 25066 BIOPSY FOREARM SOFT TISSUES: CPT

## 2021-11-08 NOTE — PROCEDURE
[de-identified] : Consent obtained. Local anesthesia obtained with 1% lidocaine with epinephrine and bicarbonate.  Left upper extremity prepped and draped in the usual sterile fashion.  Timeout obtained confirming correct patient and correct procedure.  Patient participated in timeout.  1 centimeter longitudinal incision was made over the mass.  Sharp dissection was made through skin and 1x1cm portion of mass was biopsied and sent to pathology.  Wound was irrigated.  Hemostasis achieved.  Skin was closed with 4-0 nylon and Dermabond.  Sterile dressings were applied.  Patient tolerated procedure without any complications.

## 2021-11-11 LAB — CORE LAB BIOPSY: NORMAL

## 2021-11-17 ENCOUNTER — APPOINTMENT (OUTPATIENT)
Dept: ORTHOPEDIC SURGERY | Facility: CLINIC | Age: 66
End: 2021-11-17
Payer: MEDICAID

## 2021-11-17 PROCEDURE — 99024 POSTOP FOLLOW-UP VISIT: CPT

## 2021-11-18 ENCOUNTER — APPOINTMENT (OUTPATIENT)
Dept: GASTROENTEROLOGY | Facility: CLINIC | Age: 66
End: 2021-11-18
Payer: MEDICAID

## 2021-11-18 ENCOUNTER — OUTPATIENT (OUTPATIENT)
Dept: OUTPATIENT SERVICES | Facility: HOSPITAL | Age: 66
LOS: 1 days | End: 2021-11-18

## 2021-11-18 VITALS
OXYGEN SATURATION: 97 % | DIASTOLIC BLOOD PRESSURE: 70 MMHG | WEIGHT: 200 LBS | HEART RATE: 84 BPM | BODY MASS INDEX: 31.39 KG/M2 | SYSTOLIC BLOOD PRESSURE: 125 MMHG | HEIGHT: 67 IN

## 2021-11-18 VITALS — TEMPERATURE: 98 F

## 2021-11-18 DIAGNOSIS — R13.10 DYSPHAGIA, UNSPECIFIED: ICD-10-CM

## 2021-11-18 DIAGNOSIS — Z98.890 OTHER SPECIFIED POSTPROCEDURAL STATES: Chronic | ICD-10-CM

## 2021-11-18 PROCEDURE — 99213 OFFICE O/P EST LOW 20 MIN: CPT

## 2021-11-18 NOTE — HISTORY OF PRESENT ILLNESS
[Heartburn] : stable heartburn [Nausea] : denies nausea [Vomiting] : denies vomiting [Diarrhea] : denies diarrhea [Constipation] : stable constipation [Yellow Skin Or Eyes (Jaundice)] : denies jaundice [Abdominal Pain] : denies abdominal pain [Abdominal Swelling] : denies abdominal swelling [de-identified] : 67 yo F with COPD, HTN, Obesity, hemorrhoids, h/o HP s/p non bismuth quadruple therapy (eradication confirmed 4/2021), non obstructive schatzki ring lower esophagus 3/2021, NB duodenal ulcers and prepyloric polyp 3/2021, TA x2 colonoscopy 11/2020 and presents today as a follow up with complaints of discomfort with swallowing, heartburn, and constipation.\par \par On last visit, patient was started on PPI, fiber, and was told to see ENT.\par \par Patient reported seeing ENT and told everything was okay. She had modified barium and barium tablet which was normal, no aspiration or obstruction noted. Overall, patient reports having intermittent episodes of sensation of liquid/saliva going the wrong pipe. \par \par Regarding her constipation, patient not taking fiber supp and reports having pain with BM 2/2 fissure (patient felt a small tear) and some blood/spotting in paper. \par \par Patient underwent EGD/colonoscopy on 11/3/2020. She had 2 TAs and EGD bx positive for Hpylori. She was prescribed levaquin based therapy due to interaction with methotrexate and bismuth. \par \par \par 03/17/2021\par Findings:\par  A non-obstructing Schatzki ring was found in the lower third of the \par  esophagus.\par  A small hiatal hernia was present.\par  The exam of the esophagus was otherwise normal.\par  A single 4 mm mucosal papule (nodule) with no bleeding and no stigmata \par  of recent bleeding was found in the prepyloric region of the stomach. \par  Biopsies were taken with a cold forceps for histology.\par  Patchy mildly erythematous mucosa without bleeding was found in the \par  gastric antrum. Biopsies were taken with a cold forceps for Helicobacter \par  pylori testing.\par  The exam of the stomach was otherwise normal.\par  Few non-bleeding superficial duodenal ulcers with a clean ulcer base \par  (Sami Class III) were found in the duodenal bulb. The largest lesion \par  was 3 mm in largest dimension.\par  The exam of the duodenum was otherwise normal.\par  \par Impression: - Non-obstructing Schatzki ring.\par  - Small hiatal hernia.\par  - A single mucosal papule (nodule) found in the stomach. \par  Biopsied.\par  - Erythematous mucosa in the antrum. Biopsied.\par  - Multiple non-bleeding duodenal ulcers with a clean \par  ulcer base (Sami Class III).\par \par **Stool H pylori negative in June.

## 2021-11-18 NOTE — ASSESSMENT
[FreeTextEntry1] : \par 67 yo F with COPD, HTN, Obesity, hemorrhoids, h/o HP s/p non bismuth quadruple therapy (eradication confirmed 4/2021), non obstructive schatzki ring lower esophagus 3/2021, NB duodenal ulcers and prepyloric polyp 3/2021, TA x2 colonoscopy 11/2020 and presents today as a follow up with complaints of discomfort with swallowing, heartburn, and constipation.\par \par 1. Reflux\par 2. Oropharyngeal dysphagia: normal work up, including ENT. No further work up unless sxs worsen\par 3. Constipation with suspected fissure\par 4. h/o TA x2 colon 11/2020\par \par Plan\par - resent pantoprazole 40 mg po daily\par - Start Miralax at night, educated to increase water intake, fiber, and increase mobility at home\par - Repeat colonoscopy 11/2025\par - RTC in 2 months. \par \par Discussed with Dr. Farah\par \par MD Jose A\par GI Fellow

## 2021-11-18 NOTE — END OF VISIT
[] : Fellow [FreeTextEntry3] : Agree w above. 67 yo GERD/dysphagia s/p EGD and chronic constipation. Agree with PPI tx for GERD and Miralax for constipation.

## 2021-11-18 NOTE — PHYSICAL EXAM
[General Appearance - Alert] : alert [General Appearance - In No Acute Distress] : in no acute distress [Sclera] : the sclera and conjunctiva were normal [PERRL With Normal Accommodation] : pupils were equal in size, round, and reactive to light [Extraocular Movements] : extraocular movements were intact [Outer Ear] : the ears and nose were normal in appearance [Oropharynx] : the oropharynx was normal [Auscultation Breath Sounds / Voice Sounds] : lungs were clear to auscultation bilaterally [Heart Rate And Rhythm] : heart rate was normal and rhythm regular [Heart Sounds] : normal S1 and S2 [Heart Sounds Gallop] : no gallops [Murmurs] : no murmurs [Heart Sounds Pericardial Friction Rub] : no pericardial rub [Bowel Sounds] : normal bowel sounds [Abdomen Soft] : soft [Abdomen Tenderness] : non-tender [] : no hepato-splenomegaly [Abdomen Mass (___ Cm)] : no abdominal mass palpated [Abnormal Walk] : normal gait [Nail Clubbing] : no clubbing  or cyanosis of the fingernails [Musculoskeletal - Swelling] : no joint swelling seen [Motor Tone] : muscle strength and tone were normal [Deep Tendon Reflexes (DTR)] : deep tendon reflexes were 2+ and symmetric [Sensation] : the sensory exam was normal to light touch and pinprick [No Focal Deficits] : no focal deficits [Oriented To Time, Place, And Person] : oriented to person, place, and time [Impaired Insight] : insight and judgment were intact [Affect] : the affect was normal

## 2021-11-22 ENCOUNTER — APPOINTMENT (OUTPATIENT)
Dept: INTERNAL MEDICINE | Facility: CLINIC | Age: 66
End: 2021-11-22

## 2021-11-24 DIAGNOSIS — K59.00 CONSTIPATION, UNSPECIFIED: ICD-10-CM

## 2021-11-24 DIAGNOSIS — K21.9 GASTRO-ESOPHAGEAL REFLUX DISEASE WITHOUT ESOPHAGITIS: ICD-10-CM

## 2021-11-24 DIAGNOSIS — A04.8 OTHER SPECIFIED BACTERIAL INTESTINAL INFECTIONS: ICD-10-CM

## 2021-11-27 ENCOUNTER — APPOINTMENT (OUTPATIENT)
Dept: DISASTER EMERGENCY | Facility: CLINIC | Age: 66
End: 2021-11-27

## 2021-11-30 ENCOUNTER — APPOINTMENT (OUTPATIENT)
Dept: INTERNAL MEDICINE | Facility: CLINIC | Age: 66
End: 2021-11-30

## 2021-11-30 NOTE — ED PROVIDER NOTE - NS ED MD DISPO DISCHARGE
11/30/2021 8:29 AM  Service: Urology  Group: [facility] (Jayesh/Wilbert/Isaac)    Alexandra Quigley  [id number]    Subjective:    No new complaints  Dixon [facility]

## 2021-12-01 ENCOUNTER — APPOINTMENT (OUTPATIENT)
Dept: INTERNAL MEDICINE | Facility: CLINIC | Age: 66
End: 2021-12-01

## 2021-12-01 NOTE — REASON FOR VISIT
[FreeTextEntry1] : Ms. Joaquin is a 67 yo F with PMH of HTN, COPD, Diverticulosis. OA, Fibromyalgia, LBBB, chronic smoker presenting for follow up. Since her last visit, she underwent an Echo and Stress test as well as Carotid Dopplers. She has been issues with stiffness and joint pain but denies chest pain, shortness of breath or palpitations. She continues to smoke, can go through one pack in some day and much less on other days. She is compliant with her medications.

## 2021-12-01 NOTE — ADDENDUM
[FreeTextEntry1] : Patient with no cardiac contraindication to proceeding with hand surgery with general anesthesia if needed. No further cardiac workup is necessary. Continue all cardiac medications.

## 2021-12-01 NOTE — DISCUSSION/SUMMARY
[FreeTextEntry1] : 67 yo F with PMH of HTN, COPD, Diverticulosis. OA, Fibromyalgia, LBBB presenting with JEFFERS and chest pain\par \par ---Pharmacologic nuclear stress test reveals no ischemia or infarct and Echo with no significant abnormalities. Advised that chest pain and dyspnea may be related to COPD, she is not using any maintenance inhalers, would like to restart Breo Ellipta, has an appt with Pulmonologist in upcoming weeks. \par ---BP optimal, continue Lisinopril and Amlodipine; will clarify Lisinopril dose with Pharmacy\par ---Continue ASA and Statin, Carotid Duplex with mild bilateral disease\par ---Smoking cessation discussed, she does not think that a Cessation program would be realistic at this time but is aware of long and short term risk of Nicotine

## 2021-12-06 ENCOUNTER — NON-APPOINTMENT (OUTPATIENT)
Age: 66
End: 2021-12-06

## 2021-12-07 ENCOUNTER — APPOINTMENT (OUTPATIENT)
Dept: INTERNAL MEDICINE | Facility: CLINIC | Age: 66
End: 2021-12-07

## 2021-12-08 DIAGNOSIS — Z01.818 ENCOUNTER FOR OTHER PREPROCEDURAL EXAMINATION: ICD-10-CM

## 2021-12-09 ENCOUNTER — OUTPATIENT (OUTPATIENT)
Dept: OUTPATIENT SERVICES | Facility: HOSPITAL | Age: 66
LOS: 1 days | End: 2021-12-09

## 2021-12-09 DIAGNOSIS — Z98.890 OTHER SPECIFIED POSTPROCEDURAL STATES: Chronic | ICD-10-CM

## 2021-12-09 DIAGNOSIS — Z00.00 ENCOUNTER FOR GENERAL ADULT MEDICAL EXAMINATION WITHOUT ABNORMAL FINDINGS: ICD-10-CM

## 2021-12-09 DIAGNOSIS — Z00.8 ENCOUNTER FOR OTHER GENERAL EXAMINATION: ICD-10-CM

## 2021-12-11 ENCOUNTER — APPOINTMENT (OUTPATIENT)
Dept: DISASTER EMERGENCY | Facility: CLINIC | Age: 66
End: 2021-12-11

## 2021-12-14 ENCOUNTER — APPOINTMENT (OUTPATIENT)
Dept: ORTHOPEDIC SURGERY | Facility: HOSPITAL | Age: 66
End: 2021-12-14

## 2021-12-20 ENCOUNTER — APPOINTMENT (OUTPATIENT)
Dept: RHEUMATOLOGY | Facility: CLINIC | Age: 66
End: 2021-12-20

## 2022-01-10 ENCOUNTER — APPOINTMENT (OUTPATIENT)
Dept: RHEUMATOLOGY | Facility: CLINIC | Age: 67
End: 2022-01-10

## 2022-01-14 ENCOUNTER — OUTPATIENT (OUTPATIENT)
Dept: OUTPATIENT SERVICES | Facility: HOSPITAL | Age: 67
LOS: 1 days | End: 2022-01-14

## 2022-01-14 ENCOUNTER — APPOINTMENT (OUTPATIENT)
Dept: INTERNAL MEDICINE | Facility: CLINIC | Age: 67
End: 2022-01-14
Payer: MEDICARE

## 2022-01-14 VITALS — WEIGHT: 195 LBS | HEIGHT: 67 IN | BODY MASS INDEX: 30.61 KG/M2

## 2022-01-14 DIAGNOSIS — Z98.890 OTHER SPECIFIED POSTPROCEDURAL STATES: Chronic | ICD-10-CM

## 2022-01-14 DIAGNOSIS — Z87.19 PERSONAL HISTORY OF OTHER DISEASES OF THE DIGESTIVE SYSTEM: ICD-10-CM

## 2022-01-14 PROCEDURE — 99443: CPT

## 2022-01-22 NOTE — REVIEW OF SYSTEMS
[Fever] : fever [Fatigue] : fatigue [Discharge] : discharge [Redness] : redness [Itching] : itching [Hoarseness] : hoarseness [Nasal Discharge] : nasal discharge [Sore Throat] : sore throat [Cough] : cough [Dyspnea on Exertion] : dyspnea on exertion [Joint Pain] : joint pain [Joint Stiffness] : joint stiffness [Muscle Pain] : muscle pain [Headache] : headache [Chills] : no chills [Night Sweats] : no night sweats [Recent Change In Weight] : ~T no recent weight change [Pain] : no pain [Dryness] : no dryness  [Earache] : no earache [Hearing Loss] : no hearing loss [Nosebleed] : no nosebleeds [Chest Pain] : no chest pain [Palpitations] : no palpitations [Lower Ext Edema] : no lower extremity edema [Shortness Of Breath] : no shortness of breath [Wheezing] : no wheezing [Abdominal Pain] : no abdominal pain [Nausea] : no nausea [Constipation] : no constipation [Diarrhea] : diarrhea [Vomiting] : no vomiting [Dysuria] : no dysuria [Skin Rash] : no skin rash [Dizziness] : no dizziness [Fainting] : no fainting [Confusion] : no confusion [Unsteady Walking] : no ataxia [Easy Bleeding] : no easy bleeding [Easy Bruising] : no easy bruising

## 2022-01-22 NOTE — HISTORY OF PRESENT ILLNESS
[Home] : at home, [unfilled] , at the time of the visit. [Other Location: e.g. Home (Enter Location, City,State)___] : at [unfilled] [Verbal consent obtained from patient] : the patient, [unfilled] [FreeTextEntry1] : recent covid dx [de-identified] : 66F current smoker with asthma/COPD, HTN, HLD, fibromyalgia, inflammatory arthritis on methotrexate (followed by rheum), b/l knee OA, R sternoclavicular arthritis, hepatic steatosis, anxiety, depression (follows outpatient psychiatry and psychology), colonic polyps, LBBB, recently diagnosed with COVID19 on 12/31 here for telephonic follow-up visit.\par \par Pt has not been feeling well since after Thanksgiving of last year. She went to urgent care early December folr sore throat, lethargy, and productive cough. She tested negative for COVID19 at the time, and was diagnosed with both sinusitis and pink eye. She was treated with antibiotics, nasal spray, and eye drops. Her symptoms imrpoved, but she then began experiencing fatigue, body aches, headache, and productive cough at the end of December, and was diagnosed with COVID19 on 12/31. She did not receive any therapeutics for COVID19 at the time and now presents for follow-up.\par \par She endorses continued fatigue and cough, but improving. She also experiences dyspnea on exertion, which is not unusual for her given her COPD history. She denies any SOB at rest, chest pain, tachycardia. She is otherwise comfortable and speaking in full sentences. \par \par Due to her recently multiple illnesses, this has given her the motivation to stop smoking, and she has last smoked 5 days ago. She endorses frequent nicotine cravings and has been using prior nicotine gums, but would like a refill.

## 2022-01-22 NOTE — ASSESSMENT
[FreeTextEntry1] : 66F current smoker with asthma/COPD, HTN, HLD, fibromyalgia, inflammatory arthritis on methotrexate (followed by rheum), b/l knee OA, R sternoclavicular arthritis, hepatic steatosis, anxiety, depression (follows outpatient psychiatry and psychology), colonic polyps, LBBB, recently diagnosed with COVID19 on 12/31 here for telephonic follow-up visit.\par \par # COVID19\par - With mild to moderate symptoms, out of therapeutic window for antivirals or monoclonal antibodies\par - Continue to monitor symptoms; counseled to go to ED if experiencing chest pain, difficulty breathing, worsening SOB or JEFFERS from baseline\par \par # Smoking cessation\par - Pt last smoked 1/09/2022\par - Rx nicotine gum per patient request to aid in cessation\par - Counseled on "chew and park" method\par \par # COPD\par - c/w albuterol, symbicort\par \par # Inflammatory arthritis\par - c/w methotrexate\par - Continue to follow with rheumatology\par \par # HCM\par - Colonoscopy 11/3/2020 w/ TAs. Repeat in 2025\par - Mammo 10/2020 birads 2, pt with mammo scheduled 02/2022\par - LDCT 9/2021 w/ stable lung nodules since 2013, repeat annual LDCT 09/2022\par - Pap/hpv neg in 2018, due 2023 however will be > 65\par - Pneumo vacc, Tdap UTD\par - Pt yet to receive annual flu vaccine for [id number] flu season, agreeable to get at pharmacy\par - Recommended shingrix, but pt not yet ready to receive\par - Covid19 moderna second dose 06/2021. Pt amenable to receive booster.\par \par Telephonic time: 25 minutes\par \par CDW Dr. Almanzar\par Rtc 3-6 months

## 2022-01-24 DIAGNOSIS — J44.9 CHRONIC OBSTRUCTIVE PULMONARY DISEASE, UNSPECIFIED: ICD-10-CM

## 2022-01-24 DIAGNOSIS — M19.90 UNSPECIFIED OSTEOARTHRITIS, UNSPECIFIED SITE: ICD-10-CM

## 2022-01-24 DIAGNOSIS — F17.210 NICOTINE DEPENDENCE, CIGARETTES, UNCOMPLICATED: ICD-10-CM

## 2022-01-24 DIAGNOSIS — U07.1 COVID-19: ICD-10-CM

## 2022-02-01 ENCOUNTER — OUTPATIENT (OUTPATIENT)
Dept: OUTPATIENT SERVICES | Facility: HOSPITAL | Age: 67
LOS: 1 days | End: 2022-02-01
Payer: MEDICARE

## 2022-02-01 ENCOUNTER — APPOINTMENT (OUTPATIENT)
Dept: MAMMOGRAPHY | Facility: IMAGING CENTER | Age: 67
End: 2022-02-01
Payer: MEDICARE

## 2022-02-01 ENCOUNTER — APPOINTMENT (OUTPATIENT)
Dept: ULTRASOUND IMAGING | Facility: IMAGING CENTER | Age: 67
End: 2022-02-01
Payer: MEDICARE

## 2022-02-01 ENCOUNTER — RESULT REVIEW (OUTPATIENT)
Age: 67
End: 2022-02-01

## 2022-02-01 DIAGNOSIS — Z00.8 ENCOUNTER FOR OTHER GENERAL EXAMINATION: ICD-10-CM

## 2022-02-01 DIAGNOSIS — Z98.890 OTHER SPECIFIED POSTPROCEDURAL STATES: Chronic | ICD-10-CM

## 2022-02-01 PROCEDURE — 77067 SCR MAMMO BI INCL CAD: CPT

## 2022-02-01 PROCEDURE — 77067 SCR MAMMO BI INCL CAD: CPT | Mod: 26

## 2022-02-01 PROCEDURE — 77063 BREAST TOMOSYNTHESIS BI: CPT | Mod: 26

## 2022-02-01 PROCEDURE — 77063 BREAST TOMOSYNTHESIS BI: CPT

## 2022-02-17 ENCOUNTER — OUTPATIENT (OUTPATIENT)
Dept: OUTPATIENT SERVICES | Facility: HOSPITAL | Age: 67
LOS: 1 days | End: 2022-02-17

## 2022-02-17 ENCOUNTER — APPOINTMENT (OUTPATIENT)
Dept: GASTROENTEROLOGY | Facility: CLINIC | Age: 67
End: 2022-02-17
Payer: MEDICARE

## 2022-02-17 VITALS
DIASTOLIC BLOOD PRESSURE: 64 MMHG | RESPIRATION RATE: 16 BRPM | HEIGHT: 67 IN | BODY MASS INDEX: 31.39 KG/M2 | SYSTOLIC BLOOD PRESSURE: 122 MMHG | WEIGHT: 200 LBS | OXYGEN SATURATION: 98 % | HEART RATE: 74 BPM

## 2022-02-17 DIAGNOSIS — D12.6 BENIGN NEOPLASM OF COLON, UNSPECIFIED: ICD-10-CM

## 2022-02-17 DIAGNOSIS — K59.00 CONSTIPATION, UNSPECIFIED: ICD-10-CM

## 2022-02-17 DIAGNOSIS — R10.11 RIGHT UPPER QUADRANT PAIN: ICD-10-CM

## 2022-02-17 DIAGNOSIS — Z98.890 OTHER SPECIFIED POSTPROCEDURAL STATES: Chronic | ICD-10-CM

## 2022-02-17 DIAGNOSIS — K21.9 GASTRO-ESOPHAGEAL REFLUX DISEASE WITHOUT ESOPHAGITIS: ICD-10-CM

## 2022-02-17 PROCEDURE — 99213 OFFICE O/P EST LOW 20 MIN: CPT | Mod: GC

## 2022-02-17 NOTE — PHYSICAL EXAM
[General Appearance - Alert] : alert [General Appearance - In No Acute Distress] : in no acute distress [Sclera] : the sclera and conjunctiva were normal [PERRL With Normal Accommodation] : pupils were equal in size, round, and reactive to light [Extraocular Movements] : extraocular movements were intact [Outer Ear] : the ears and nose were normal in appearance [Oropharynx] : the oropharynx was normal [Auscultation Breath Sounds / Voice Sounds] : lungs were clear to auscultation bilaterally [Heart Rate And Rhythm] : heart rate was normal and rhythm regular [Heart Sounds] : normal S1 and S2 [Heart Sounds Gallop] : no gallops [Murmurs] : no murmurs [Heart Sounds Pericardial Friction Rub] : no pericardial rub [Bowel Sounds] : normal bowel sounds [Abdomen Soft] : soft [Abdomen Tenderness] : non-tender [Abdomen Mass (___ Cm)] : no abdominal mass palpated [Nail Clubbing] : no clubbing  or cyanosis of the fingernails [Musculoskeletal - Swelling] : no joint swelling seen [Motor Tone] : muscle strength and tone were normal [Skin Color & Pigmentation] : normal skin color and pigmentation [Skin Turgor] : normal skin turgor [] : no rash [Deep Tendon Reflexes (DTR)] : deep tendon reflexes were 2+ and symmetric [Sensation] : the sensory exam was normal to light touch and pinprick [No Focal Deficits] : no focal deficits [Oriented To Time, Place, And Person] : oriented to person, place, and time [Impaired Insight] : insight and judgment were intact [Affect] : the affect was normal

## 2022-02-17 NOTE — ASSESSMENT
[FreeTextEntry1] : \par 66 yo F with COPD, HTN, Obesity, hemorrhoids, h/o HP s/p non bismuth quadruple therapy (eradication confirmed 4/2021), non obstructive schatzki ring lower esophagus 3/2021, NB duodenal ulcers and prepyloric polyp 3/2021, TA x2 colonoscopy 11/2020 and presents today as a follow up with complaints of discomfort with swallowing, heartburn, and constipation.\par \par 1. Reflux: improved on PPI daily\par 2. Concern for ? aspiration: normal work up, including ENT, modified and barium tablet. No further work up unless sxs worsen. No evidence of oropharyngeal dysphagia. \par 3. Constipation: resolved on Fiber\par 4. h/o TA x2 colon 11/2020\par 5. RUQ and Rt sided rib pain: pain for years, mostly in the rib cage. Pain worst with movement. No association with food intake. Suspect costochondritis. \par \par Plan\par - continue pantoprazole 40 mg po daily\par - Continue fiber, increase water intake, and increase mobility at home\par - Repeat colonoscopy 11/2025\par - RUQ US\par - RTC in 6 months. \par \par Discussed with Dr. Farah\par \par MD Jose A\par GI Fellow. \par \par

## 2022-03-01 ENCOUNTER — NON-APPOINTMENT (OUTPATIENT)
Age: 67
End: 2022-03-01

## 2022-03-04 ENCOUNTER — APPOINTMENT (OUTPATIENT)
Dept: ULTRASOUND IMAGING | Facility: IMAGING CENTER | Age: 67
End: 2022-03-04
Payer: MEDICARE

## 2022-03-04 ENCOUNTER — OUTPATIENT (OUTPATIENT)
Dept: OUTPATIENT SERVICES | Facility: HOSPITAL | Age: 67
LOS: 1 days | End: 2022-03-04
Payer: MEDICARE

## 2022-03-04 DIAGNOSIS — R10.11 RIGHT UPPER QUADRANT PAIN: ICD-10-CM

## 2022-03-04 DIAGNOSIS — Z98.890 OTHER SPECIFIED POSTPROCEDURAL STATES: Chronic | ICD-10-CM

## 2022-03-04 DIAGNOSIS — M54.12 RADICULOPATHY, CERVICAL REGION: ICD-10-CM

## 2022-03-04 PROCEDURE — 76700 US EXAM ABDOM COMPLETE: CPT

## 2022-03-04 PROCEDURE — 76700 US EXAM ABDOM COMPLETE: CPT | Mod: 26

## 2022-03-07 ENCOUNTER — RESULT REVIEW (OUTPATIENT)
Age: 67
End: 2022-03-07

## 2022-03-07 ENCOUNTER — APPOINTMENT (OUTPATIENT)
Dept: RHEUMATOLOGY | Facility: CLINIC | Age: 67
End: 2022-03-07
Payer: MEDICARE

## 2022-03-07 ENCOUNTER — OUTPATIENT (OUTPATIENT)
Dept: OUTPATIENT SERVICES | Facility: HOSPITAL | Age: 67
LOS: 1 days | End: 2022-03-07

## 2022-03-07 VITALS
BODY MASS INDEX: 31.92 KG/M2 | TEMPERATURE: 98.1 F | DIASTOLIC BLOOD PRESSURE: 70 MMHG | WEIGHT: 203.38 LBS | HEIGHT: 67 IN | OXYGEN SATURATION: 99 % | HEART RATE: 79 BPM | SYSTOLIC BLOOD PRESSURE: 130 MMHG

## 2022-03-07 DIAGNOSIS — M79.672 PAIN IN RIGHT FOOT: ICD-10-CM

## 2022-03-07 DIAGNOSIS — Z98.890 OTHER SPECIFIED POSTPROCEDURAL STATES: Chronic | ICD-10-CM

## 2022-03-07 DIAGNOSIS — G89.29 PAIN IN RIGHT FOOT: ICD-10-CM

## 2022-03-07 DIAGNOSIS — M79.671 PAIN IN RIGHT FOOT: ICD-10-CM

## 2022-03-07 PROCEDURE — 99214 OFFICE O/P EST MOD 30 MIN: CPT | Mod: GC

## 2022-03-07 NOTE — REVIEW OF SYSTEMS
[Joint Pain] : joint pain [Joint Swelling] : joint swelling [Joint Stiffness] : joint stiffness [Fever] : no fever [Chills] : no chills [Eye Pain] : no eye pain [Chest Pain] : no chest pain [Cough] : no cough [Shortness Of Breath] : no shortness of breath [Abdominal Pain] : no abdominal pain [Vomiting] : no vomiting [Diarrhea] : no diarrhea [Dizziness] : no dizziness [Anxiety] : no anxiety [Muscle Weakness] : no muscle weakness [Swollen Glands] : no swollen glands

## 2022-03-07 NOTE — HISTORY OF PRESENT ILLNESS
[FreeTextEntry1] : Interval Hx 3-7-2022:\azucena Presents today for a follow up visit after a gap of 9 months. \azucena Says she had Covid in November last year, recovered without complications.\azucena Plans for CTS surgery was cancelled due to Covid. \azucena Currently has 1 hour of AM stiffness in left hand wrist joints, says pain has increased in left hand wrist, sternoclavicular joint is not bothering her, b/l knee pain with activity is about the same. \azucena Denies CP, SOB, n/v/d, abdominal pain, fever or chills.

## 2022-03-07 NOTE — PHYSICAL EXAM
[General Appearance - Alert] : alert [General Appearance - In No Acute Distress] : in no acute distress [General Appearance - Well Nourished] : well nourished [General Appearance - Well-Appearing] : healthy appearing [Extraocular Movements] : extraocular movements were intact [Sclera] : the sclera and conjunctiva were normal [Outer Ear] : the ears and nose were normal in appearance [Hearing Threshold Finger Rub Not [place]] : hearing was normal [Neck Appearance] : the appearance of the neck was normal [Auscultation Breath Sounds / Voice Sounds] : lungs were clear to auscultation bilaterally [Heart Sounds] : normal S1 and S2 [Murmurs] : no murmurs [Edema] : there was no peripheral edema [Bowel Sounds] : normal bowel sounds [Abdomen Soft] : soft [Abdomen Tenderness] : non-tender [Cervical Lymph Nodes Enlarged Posterior Bilaterally] : posterior cervical [Cervical Lymph Nodes Enlarged Anterior Bilaterally] : anterior cervical [Supraclavicular Lymph Nodes Enlarged Bilaterally] : supraclavicular [] : no rash [Motor Exam] : the motor exam was normal [No Focal Deficits] : no focal deficits [Oriented To Time, Place, And Person] : oriented to person, place, and time [FreeTextEntry1] : Left hand soft tissue swelling noted over dorsum aspect. Tenderness present in almost all joints of b/l hands, no swollen, red joints or limitation of ROM noted though. B/l knee joint crepitus noted. Body wide tender spots appreciated.

## 2022-03-07 NOTE — ASSESSMENT
[FreeTextEntry1] : 65 F is being seen in the Rheumatology clinic for seronegative inflammatory arthritis, fibromyalgia and b/l knee pain from OA. \par \par Impression:\par \par # Seronegative inflammatory arthritis\par Hands, wrists and sternoclavicular joint involvement. \par Recurrent symptoms on MTX 20 mg weekly in left wrist. \par Disease uncontrolled, evidenced by MRI in 2021 which shows effusions and synovitis in left wrist. \par We wish to escalate therapy.\par Pt has refused Biologics and ASIM inhibitors before. \par Today pt is finally agreeing to start TNF therapy with Simponi infusion. \par Will coordinate start of TNF therapy with hand surgery team as they are planning CTS surgery for her. \par Keep 20 MTX weekly for now. \par Get labs today. \par \par # B/l OA of knees.\par Ortho offered TKR on 6/19, but patient refused.\par Advised pt to lose weight and take Naproxen and Tylenol symptoms (she is not taking any pain medications consistently). \par \par # FM\par Continue Cymbalta 60mg\par PT\par \par # HCM\par Influenza vaccine 3/19, pneumovax 2015; prevnar 7/19\par DEXA scan normal 6/19, repeat this year. \par S/p Covid vaccine primary series. \par \par Follow up depends of surgery teams plans for surgery and our own infusion plan, will communicate with pt on phone. \par \par D/w Dr. Dias\par \par Nitesh Candelario MD\par Rheumatology fellow\par

## 2022-03-08 DIAGNOSIS — M79.671 PAIN IN RIGHT FOOT: ICD-10-CM

## 2022-03-08 DIAGNOSIS — Z79.899 OTHER LONG TERM (CURRENT) DRUG THERAPY: ICD-10-CM

## 2022-03-08 DIAGNOSIS — M13.89 OTHER SPECIFIED ARTHRITIS, MULTIPLE SITES: ICD-10-CM

## 2022-03-08 DIAGNOSIS — M06.00 RHEUMATOID ARTHRITIS WITHOUT RHEUMATOID FACTOR, UNSPECIFIED SITE: ICD-10-CM

## 2022-03-10 ENCOUNTER — NON-APPOINTMENT (OUTPATIENT)
Age: 67
End: 2022-03-10

## 2022-03-11 ENCOUNTER — APPOINTMENT (OUTPATIENT)
Dept: RADIOLOGY | Facility: IMAGING CENTER | Age: 67
End: 2022-03-11
Payer: MEDICARE

## 2022-03-11 ENCOUNTER — OUTPATIENT (OUTPATIENT)
Dept: OUTPATIENT SERVICES | Facility: HOSPITAL | Age: 67
LOS: 1 days | End: 2022-03-11
Payer: MEDICARE

## 2022-03-11 DIAGNOSIS — Z00.8 ENCOUNTER FOR OTHER GENERAL EXAMINATION: ICD-10-CM

## 2022-03-11 DIAGNOSIS — Z98.890 OTHER SPECIFIED POSTPROCEDURAL STATES: Chronic | ICD-10-CM

## 2022-03-11 DIAGNOSIS — M79.671 PAIN IN RIGHT FOOT: ICD-10-CM

## 2022-03-11 PROCEDURE — 73600 X-RAY EXAM OF ANKLE: CPT

## 2022-03-11 PROCEDURE — 73620 X-RAY EXAM OF FOOT: CPT

## 2022-03-11 PROCEDURE — 73630 X-RAY EXAM OF FOOT: CPT | Mod: 26,50

## 2022-03-11 PROCEDURE — 73660 X-RAY EXAM OF TOE(S): CPT

## 2022-03-11 PROCEDURE — 73630 X-RAY EXAM OF FOOT: CPT

## 2022-03-11 PROCEDURE — 73610 X-RAY EXAM OF ANKLE: CPT | Mod: 26,50

## 2022-03-11 PROCEDURE — 73610 X-RAY EXAM OF ANKLE: CPT

## 2022-03-21 ENCOUNTER — APPOINTMENT (OUTPATIENT)
Dept: INTERNAL MEDICINE | Facility: CLINIC | Age: 67
End: 2022-03-21
Payer: MEDICARE

## 2022-03-21 ENCOUNTER — RX RENEWAL (OUTPATIENT)
Age: 67
End: 2022-03-21

## 2022-03-21 ENCOUNTER — OUTPATIENT (OUTPATIENT)
Dept: OUTPATIENT SERVICES | Facility: HOSPITAL | Age: 67
LOS: 1 days | End: 2022-03-21

## 2022-03-21 VITALS
DIASTOLIC BLOOD PRESSURE: 70 MMHG | TEMPERATURE: 97 F | SYSTOLIC BLOOD PRESSURE: 120 MMHG | HEART RATE: 75 BPM | OXYGEN SATURATION: 98 % | WEIGHT: 204 LBS | BODY MASS INDEX: 32.02 KG/M2 | HEIGHT: 67 IN

## 2022-03-21 DIAGNOSIS — Z98.890 OTHER SPECIFIED POSTPROCEDURAL STATES: Chronic | ICD-10-CM

## 2022-03-21 PROCEDURE — 99214 OFFICE O/P EST MOD 30 MIN: CPT | Mod: GC

## 2022-03-21 RX ORDER — CYANOCOBALAMIN (VITAMIN B-12) 500 MCG
400 LOZENGE ORAL DAILY
Qty: 30 | Refills: 5 | Status: DISCONTINUED | COMMUNITY
Start: 2020-02-12 | End: 2022-03-21

## 2022-03-21 RX ORDER — LISINOPRIL 30 MG/1
TABLET ORAL
Refills: 0 | Status: DISCONTINUED | COMMUNITY
End: 2022-03-21

## 2022-03-22 RX ORDER — ACETAMINOPHEN 500 MG/1
500 TABLET, COATED ORAL
Qty: 50 | Refills: 4 | Status: DISCONTINUED | COMMUNITY
Start: 2021-06-14 | End: 2022-03-22

## 2022-03-22 NOTE — PHYSICAL EXAM
[No Acute Distress] : no acute distress [Well-Appearing] : well-appearing [Normal Sclera/Conjunctiva] : normal sclera/conjunctiva [PERRL] : pupils equal round and reactive to light [Normal Outer Ear/Nose] : the outer ears and nose were normal in appearance [Normal Oropharynx] : the oropharynx was normal [No Respiratory Distress] : no respiratory distress  [No Accessory Muscle Use] : no accessory muscle use [Clear to Auscultation] : lungs were clear to auscultation bilaterally [Normal Rate] : normal rate  [Regular Rhythm] : with a regular rhythm [Soft] : abdomen soft [Non Tender] : non-tender [No Rash] : no rash [Normal Affect] : the affect was normal [Normal Insight/Judgement] : insight and judgment were intact

## 2022-03-23 DIAGNOSIS — Z23 ENCOUNTER FOR IMMUNIZATION: ICD-10-CM

## 2022-03-23 DIAGNOSIS — F32.A DEPRESSION, UNSPECIFIED: ICD-10-CM

## 2022-03-23 DIAGNOSIS — I10 ESSENTIAL (PRIMARY) HYPERTENSION: ICD-10-CM

## 2022-03-23 DIAGNOSIS — K21.9 GASTRO-ESOPHAGEAL REFLUX DISEASE WITHOUT ESOPHAGITIS: ICD-10-CM

## 2022-03-23 DIAGNOSIS — E78.5 HYPERLIPIDEMIA, UNSPECIFIED: ICD-10-CM

## 2022-03-23 DIAGNOSIS — M19.90 UNSPECIFIED OSTEOARTHRITIS, UNSPECIFIED SITE: ICD-10-CM

## 2022-03-23 DIAGNOSIS — J44.9 CHRONIC OBSTRUCTIVE PULMONARY DISEASE, UNSPECIFIED: ICD-10-CM

## 2022-03-23 NOTE — ASSESSMENT
[FreeTextEntry1] : 67F current smoker with asthma/COPD, HTN, HLD, fibromyalgia, inflammatory arthritis on methotrexate (followed by rheum), b/l knee OA, R sternoclavicular arthritis, hepatic steatosis, anxiety, depression (follows outpatient psychiatry and psychology), colonic polyps, LBBB, recently diagnosed with COVID19 on 12/31 here for follow-up visit.\par \par # Depression\par - worsened by recent illnesses\par - c/w duloxetine 60 mg daily\par - emotional support provided\par \par # Inflammatory arthritis\par - Symptoms not controlled on MTX 20 mg qweekly\par - Will start Simponi as outpatient per rheumatology\par - Pain control with tylenol 975 or 1000 mg q8 hrs PRN and ibuprofen 400-600 mg q8 PRN\par \par # COPD\par - No exacerbations requiring hospitalizations in the interim; inconsistently uses inhalers\par - refills given, counseled on daily use of symbicort and albuterol PRN\par \par # HTN\par BP at goal\par - c/w lisinopril 40 mg daily, amlodipine 10 mg daily\par \par # HLD \par - c/w pravastatin 40 mg qhs\par \par # GERD\par - c/w PPI 40 mg daily\par - if symptoms severe, pt counseled that she can take 40 mg q12 PRN\par \par Zane Castro, PGY3\par [employer] [facility]\par \par CDW Dr. Pimentel\par RTC 3-6 months

## 2022-03-23 NOTE — REVIEW OF SYSTEMS
[Nasal Discharge] : nasal discharge [Chest Pain] : chest pain [Cough] : cough [Joint Pain] : joint pain [Joint Stiffness] : joint stiffness [Muscle Pain] : muscle pain [Back Pain] : back pain [Insomnia] : insomnia [Anxiety] : anxiety [Depression] : depression [Fever] : no fever [Chills] : no chills [Recent Change In Weight] : ~T no recent weight change [Discharge] : no discharge [Pain] : no pain [Earache] : no earache [Hearing Loss] : no hearing loss [Sore Throat] : no sore throat [Palpitations] : no palpitations [Orthopnea] : no orthopnea [Shortness Of Breath] : no shortness of breath [Wheezing] : no wheezing [Abdominal Pain] : no abdominal pain [Nausea] : no nausea [Vomiting] : no vomiting [Dysuria] : no dysuria [Hematuria] : no hematuria [Itching] : no itching [Skin Rash] : no skin rash [Headache] : no headache [Suicidal] : not suicidal

## 2022-03-23 NOTE — HEALTH RISK ASSESSMENT
[Independent] : managing finances [Some assistance needed] : housekeeping [Two or more falls in past year] : Patient reported two or more falls in the past year [FreeTextEntry8] : walker [de-identified] : sowmya

## 2022-03-23 NOTE — HISTORY OF PRESENT ILLNESS
[FreeTextEntry1] : f/u [de-identified] : 67F former smoker (quit 01/2022) with asthma/COPD, HTN, HLD, fibromyalgia, inflammatory arthritis on methotrexate (followed by rheum), b/l knee OA, R sternoclavicular arthritis, hepatic steatosis, anxiety, depression (follows outpatient psychiatry and psychology), colonic polyps, LBBB, recently diagnosed with COVID19 on 12/31 here for follow-up visit.\par \par In the interim, pt's URI symptoms have improved. She has persistent joint/musculoskeletal/nerve pain. She was supposed to undergo L carpal tunnel release surgery, however this was delayed due to illness. Pt states the surgery was then cancelled until her other chronic pain issues were addressed. Pt felt she had developed new pain which extended upward from her L wrist to her shoulder and back. Pt was then seen by rheumatology who recommended starting Simponi (golimumab) for seronegative inflammatory arthritis. \par \par In terms of smoking, pt "fell off the wagon for seven days" around her birthday, and then stopped smoking again. She uses nicotine gum regularly. \par \par In terms of her depression, pt has an elevated PHQ9 as noted. Pt denies self-harm thoughts or behaviors. \par \par ROS positive for diffuse musculoskeletal pain and tenderness, limiting mobility.

## 2022-03-30 ENCOUNTER — NON-APPOINTMENT (OUTPATIENT)
Age: 67
End: 2022-03-30

## 2022-04-01 ENCOUNTER — APPOINTMENT (OUTPATIENT)
Dept: OPHTHALMOLOGY | Facility: CLINIC | Age: 67
End: 2022-04-01
Payer: MEDICARE

## 2022-04-01 ENCOUNTER — NON-APPOINTMENT (OUTPATIENT)
Age: 67
End: 2022-04-01

## 2022-04-01 PROCEDURE — 92012 INTRM OPH EXAM EST PATIENT: CPT

## 2022-04-13 ENCOUNTER — RX RENEWAL (OUTPATIENT)
Age: 67
End: 2022-04-13

## 2022-04-14 ENCOUNTER — APPOINTMENT (OUTPATIENT)
Dept: OBGYN | Facility: HOSPITAL | Age: 67
End: 2022-04-14

## 2022-04-18 ENCOUNTER — APPOINTMENT (OUTPATIENT)
Dept: RHEUMATOLOGY | Facility: CLINIC | Age: 67
End: 2022-04-18
Payer: MEDICARE

## 2022-04-18 ENCOUNTER — OUTPATIENT (OUTPATIENT)
Dept: OUTPATIENT SERVICES | Facility: HOSPITAL | Age: 67
LOS: 1 days | End: 2022-04-18

## 2022-04-18 VITALS
SYSTOLIC BLOOD PRESSURE: 126 MMHG | OXYGEN SATURATION: 96 % | TEMPERATURE: 96.8 F | HEIGHT: 67 IN | BODY MASS INDEX: 31.86 KG/M2 | DIASTOLIC BLOOD PRESSURE: 58 MMHG | RESPIRATION RATE: 16 BRPM | HEART RATE: 76 BPM | WEIGHT: 203 LBS

## 2022-04-18 DIAGNOSIS — M71.20 SYNOVIAL CYST OF POPLITEAL SPACE [BAKER], UNSPECIFIED KNEE: ICD-10-CM

## 2022-04-18 DIAGNOSIS — Z98.890 OTHER SPECIFIED POSTPROCEDURAL STATES: Chronic | ICD-10-CM

## 2022-04-18 PROCEDURE — 99214 OFFICE O/P EST MOD 30 MIN: CPT | Mod: GC

## 2022-04-18 NOTE — HISTORY OF PRESENT ILLNESS
[FreeTextEntry1] : Interval Hx 4-:\azucena Presents today for a follow up visit. \azucena Still has 'hours' of AM stiffness in left hand wrist joints.\par Sternoclavicular joint is not bothering her, b/l knee pain with activity is about the same. \par New complain of left ankle, left calf and left back of the knee pain since 1 week. \par Denies CP, SOB, n/v/d, abdominal pain, fever or chills.

## 2022-04-18 NOTE — PHYSICAL EXAM
[General Appearance - Alert] : alert [General Appearance - In No Acute Distress] : in no acute distress [General Appearance - Well Nourished] : well nourished [General Appearance - Well-Appearing] : healthy appearing [Sclera] : the sclera and conjunctiva were normal [Extraocular Movements] : extraocular movements were intact [Outer Ear] : the ears and nose were normal in appearance [Hearing Threshold Finger Rub Not Waller] : hearing was normal [Neck Appearance] : the appearance of the neck was normal [Auscultation Breath Sounds / Voice Sounds] : lungs were clear to auscultation bilaterally [Heart Sounds] : normal S1 and S2 [Murmurs] : no murmurs [Edema] : there was no peripheral edema [Bowel Sounds] : normal bowel sounds [Abdomen Soft] : soft [Abdomen Tenderness] : non-tender [Cervical Lymph Nodes Enlarged Posterior Bilaterally] : posterior cervical [Cervical Lymph Nodes Enlarged Anterior Bilaterally] : anterior cervical [Supraclavicular Lymph Nodes Enlarged Bilaterally] : supraclavicular [] : no rash [Motor Exam] : the motor exam was normal [No Focal Deficits] : no focal deficits [Oriented To Time, Place, And Person] : oriented to person, place, and time [FreeTextEntry1] : Left hand soft tissue swelling noted over dorsum aspect. Tenderness present in almost all joints of b/l hands, no swollen, red joints or limitation of ROM noted though. B/l knee joint crepitus noted. Body wide tender spots appreciated. Possible left baker cyst noted. Left calf swelling noted. Tenderness over left achilles noted.  no

## 2022-04-18 NOTE — REVIEW OF SYSTEMS
[Joint Pain] : joint pain [Joint Swelling] : joint swelling [Joint Stiffness] : joint stiffness [Fever] : no fever [Chills] : no chills [Eye Pain] : no eye pain [Chest Pain] : no chest pain [Shortness Of Breath] : no shortness of breath [Cough] : no cough [Abdominal Pain] : no abdominal pain [Vomiting] : no vomiting [Diarrhea] : no diarrhea [Dizziness] : no dizziness [Anxiety] : no anxiety [Muscle Weakness] : no muscle weakness [Swollen Glands] : no swollen glands

## 2022-04-18 NOTE — ASSESSMENT
[FreeTextEntry1] : 65 F is being seen in the Rheumatology clinic for seronegative inflammatory arthritis, fibromyalgia and b/l knee pain from OA. \par \par Impression:\par \par # Seronegative inflammatory arthritis\par Hands, wrists and sternoclavicular joint involvement. \par Recurrent symptoms on MTX 20 mg weekly in left wrist. \par Disease uncontrolled, evidenced by MRI in 2021 which shows effusions and synovitis in left wrist. \par We wish to escalate therapy.\par Pt is finally agreeing to start TNF therapy with Simponi infusion, infusions set up for April, ay and June 2022. \par Will coordinate start of TNF therapy with hand surgery team as they are planning CTS surgery for her. \par Keep 20 MTX weekly for now. \par \par # Left calf pain.\par Will rule out DVT with Doppler. \par Pt could also have baker cysts, will get US and possible US guided aspiration. \par \par # B/l OA of knees.\par Ortho offered TKR on 6/19, but patient refused.\par Advised pt to lose weight and take Naproxen and Tylenol symptoms (she is not taking any pain medications consistently). \par \par # FM\par Continue Cymbalta 60mg\par PT\par \par # HCM\par Influenza vaccine 3/19, pneumovax 2015; prevnar 7/19\par DEXA scan normal 6/19, repeat this year. \par S/p Covid vaccine primary series. \par \par Depending on response to Simponi, we will coordinate with surgery team about CTS in the future. \par \par D/w Dr. Dias\par \par RTC in 1.5 months. \par \par Nitesh Candelario MD\par Rheumatology fellow\par

## 2022-04-19 DIAGNOSIS — M71.20 SYNOVIAL CYST OF POPLITEAL SPACE [BAKER], UNSPECIFIED KNEE: ICD-10-CM

## 2022-04-19 DIAGNOSIS — M13.80 OTHER SPECIFIED ARTHRITIS, UNSPECIFIED SITE: ICD-10-CM

## 2022-04-19 DIAGNOSIS — M79.669 PAIN IN UNSPECIFIED LOWER LEG: ICD-10-CM

## 2022-04-19 DIAGNOSIS — M19.90 UNSPECIFIED OSTEOARTHRITIS, UNSPECIFIED SITE: ICD-10-CM

## 2022-04-19 DIAGNOSIS — Z79.899 OTHER LONG TERM (CURRENT) DRUG THERAPY: ICD-10-CM

## 2022-04-22 ENCOUNTER — APPOINTMENT (OUTPATIENT)
Dept: OPHTHALMOLOGY | Facility: CLINIC | Age: 67
End: 2022-04-22
Payer: MEDICARE

## 2022-04-22 ENCOUNTER — NON-APPOINTMENT (OUTPATIENT)
Age: 67
End: 2022-04-22

## 2022-04-22 PROCEDURE — 92083 EXTENDED VISUAL FIELD XM: CPT

## 2022-04-22 PROCEDURE — 99214 OFFICE O/P EST MOD 30 MIN: CPT

## 2022-04-27 ENCOUNTER — LABORATORY RESULT (OUTPATIENT)
Age: 67
End: 2022-04-27

## 2022-04-27 ENCOUNTER — APPOINTMENT (OUTPATIENT)
Dept: RHEUMATOLOGY | Facility: CLINIC | Age: 67
End: 2022-04-27
Payer: MEDICARE

## 2022-04-27 PROCEDURE — 96374 THER/PROPH/DIAG INJ IV PUSH: CPT | Mod: 59

## 2022-04-27 PROCEDURE — 96413 CHEMO IV INFUSION 1 HR: CPT

## 2022-04-27 PROCEDURE — 36415 COLL VENOUS BLD VENIPUNCTURE: CPT

## 2022-05-04 ENCOUNTER — APPOINTMENT (OUTPATIENT)
Dept: ULTRASOUND IMAGING | Facility: CLINIC | Age: 67
End: 2022-05-04

## 2022-05-06 ENCOUNTER — APPOINTMENT (OUTPATIENT)
Dept: ULTRASOUND IMAGING | Facility: CLINIC | Age: 67
End: 2022-05-06

## 2022-05-17 ENCOUNTER — APPOINTMENT (OUTPATIENT)
Dept: ULTRASOUND IMAGING | Facility: CLINIC | Age: 67
End: 2022-05-17
Payer: MEDICARE

## 2022-05-17 ENCOUNTER — OUTPATIENT (OUTPATIENT)
Dept: OUTPATIENT SERVICES | Facility: HOSPITAL | Age: 67
LOS: 1 days | End: 2022-05-17
Payer: MEDICARE

## 2022-05-17 DIAGNOSIS — Z98.890 OTHER SPECIFIED POSTPROCEDURAL STATES: Chronic | ICD-10-CM

## 2022-05-17 DIAGNOSIS — M79.669 PAIN IN UNSPECIFIED LOWER LEG: ICD-10-CM

## 2022-05-17 DIAGNOSIS — Z00.8 ENCOUNTER FOR OTHER GENERAL EXAMINATION: ICD-10-CM

## 2022-05-17 PROCEDURE — 93971 EXTREMITY STUDY: CPT | Mod: 26

## 2022-05-17 PROCEDURE — 93971 EXTREMITY STUDY: CPT

## 2022-05-18 ENCOUNTER — NON-APPOINTMENT (OUTPATIENT)
Age: 67
End: 2022-05-18

## 2022-05-20 ENCOUNTER — APPOINTMENT (OUTPATIENT)
Dept: ULTRASOUND IMAGING | Facility: CLINIC | Age: 67
End: 2022-05-20

## 2022-05-25 ENCOUNTER — APPOINTMENT (OUTPATIENT)
Dept: RHEUMATOLOGY | Facility: CLINIC | Age: 67
End: 2022-05-25
Payer: MEDICARE

## 2022-05-25 ENCOUNTER — LABORATORY RESULT (OUTPATIENT)
Age: 67
End: 2022-05-25

## 2022-05-25 PROCEDURE — 96374 THER/PROPH/DIAG INJ IV PUSH: CPT | Mod: 59

## 2022-05-25 PROCEDURE — 96413 CHEMO IV INFUSION 1 HR: CPT

## 2022-05-27 ENCOUNTER — NON-APPOINTMENT (OUTPATIENT)
Age: 67
End: 2022-05-27

## 2022-05-27 ENCOUNTER — APPOINTMENT (OUTPATIENT)
Dept: OPHTHALMOLOGY | Facility: CLINIC | Age: 67
End: 2022-05-27
Payer: MEDICARE

## 2022-05-27 PROCEDURE — 99214 OFFICE O/P EST MOD 30 MIN: CPT

## 2022-06-06 ENCOUNTER — APPOINTMENT (OUTPATIENT)
Dept: RHEUMATOLOGY | Facility: CLINIC | Age: 67
End: 2022-06-06

## 2022-06-07 ENCOUNTER — NON-APPOINTMENT (OUTPATIENT)
Age: 67
End: 2022-06-07

## 2022-06-15 RX ORDER — GOLIMUMAB 50 MG/4ML
50 SOLUTION INTRAVENOUS
Qty: 0 | Refills: 0 | Status: COMPLETED | OUTPATIENT
Start: 2022-06-15 | End: 1900-01-01

## 2022-06-15 RX ORDER — HYDROCORTISONE SODIUM SUCCINATE 100 MG/2ML
100 INJECTION, POWDER, FOR SOLUTION INTRAMUSCULAR; INTRAVENOUS
Qty: 0 | Refills: 0 | Status: COMPLETED | OUTPATIENT
Start: 2022-06-15 | End: 1900-01-01

## 2022-06-15 RX ORDER — DIPHENHYDRAMINE HCL 25 MG/1
25 TABLET ORAL
Qty: 0 | Refills: 0 | Status: COMPLETED | OUTPATIENT
Start: 2022-06-15 | End: 1900-01-01

## 2022-06-21 ENCOUNTER — APPOINTMENT (OUTPATIENT)
Dept: RHEUMATOLOGY | Facility: CLINIC | Age: 67
End: 2022-06-21

## 2022-06-29 ENCOUNTER — NON-APPOINTMENT (OUTPATIENT)
Age: 67
End: 2022-06-29

## 2022-07-11 ENCOUNTER — APPOINTMENT (OUTPATIENT)
Dept: RHEUMATOLOGY | Facility: CLINIC | Age: 67
End: 2022-07-11

## 2022-07-11 ENCOUNTER — OUTPATIENT (OUTPATIENT)
Dept: OUTPATIENT SERVICES | Facility: HOSPITAL | Age: 67
LOS: 1 days | End: 2022-07-11

## 2022-07-11 VITALS
HEART RATE: 71 BPM | WEIGHT: 188 LBS | HEIGHT: 67 IN | SYSTOLIC BLOOD PRESSURE: 148 MMHG | BODY MASS INDEX: 29.51 KG/M2 | DIASTOLIC BLOOD PRESSURE: 70 MMHG | TEMPERATURE: 97.2 F | OXYGEN SATURATION: 96 %

## 2022-07-11 DIAGNOSIS — Z98.890 OTHER SPECIFIED POSTPROCEDURAL STATES: Chronic | ICD-10-CM

## 2022-07-11 PROCEDURE — 99214 OFFICE O/P EST MOD 30 MIN: CPT | Mod: GC

## 2022-07-12 ENCOUNTER — NON-APPOINTMENT (OUTPATIENT)
Age: 67
End: 2022-07-12

## 2022-07-12 LAB
ALBUMIN SERPL ELPH-MCNC: 4.2 G/DL
ALP BLD-CCNC: 120 U/L
ALT SERPL-CCNC: 14 U/L
ANION GAP SERPL CALC-SCNC: 11 MMOL/L
AST SERPL-CCNC: 14 U/L
BASOPHILS # BLD AUTO: 0.03 K/UL
BASOPHILS NFR BLD AUTO: 0.4 %
BILIRUB SERPL-MCNC: 0.4 MG/DL
BUN SERPL-MCNC: 12 MG/DL
CALCIUM SERPL-MCNC: 9 MG/DL
CHLORIDE SERPL-SCNC: 106 MMOL/L
CO2 SERPL-SCNC: 27 MMOL/L
CREAT SERPL-MCNC: 0.72 MG/DL
CRP SERPL-MCNC: 5 MG/L
EGFR: 93 ML/MIN/1.73M2
EOSINOPHIL # BLD AUTO: 0.17 K/UL
EOSINOPHIL NFR BLD AUTO: 2.1 %
ERYTHROCYTE [SEDIMENTATION RATE] IN BLOOD BY WESTERGREN METHOD: 14 MM/HR
GLUCOSE SERPL-MCNC: 78 MG/DL
HCT VFR BLD CALC: 40.5 %
HGB BLD-MCNC: 12.9 G/DL
IMM GRANULOCYTES NFR BLD AUTO: 0.2 %
LYMPHOCYTES # BLD AUTO: 2.69 K/UL
LYMPHOCYTES NFR BLD AUTO: 33 %
MAN DIFF?: NORMAL
MCHC RBC-ENTMCNC: 29.7 PG
MCHC RBC-ENTMCNC: 31.9 GM/DL
MCV RBC AUTO: 93.3 FL
MONOCYTES # BLD AUTO: 0.5 K/UL
MONOCYTES NFR BLD AUTO: 6.1 %
NEUTROPHILS # BLD AUTO: 4.75 K/UL
NEUTROPHILS NFR BLD AUTO: 58.2 %
PLATELET # BLD AUTO: 311 K/UL
POTASSIUM SERPL-SCNC: 4.3 MMOL/L
PROT SERPL-MCNC: 6.4 G/DL
RBC # BLD: 4.34 M/UL
RBC # FLD: 15.7 %
SODIUM SERPL-SCNC: 144 MMOL/L
WBC # FLD AUTO: 8.16 K/UL

## 2022-07-12 RX ORDER — DICLOFENAC POTASSIUM 25 MG/1
25 CAPSULE, LIQUID FILLED ORAL
Qty: 30 | Refills: 3 | Status: DISCONTINUED | COMMUNITY
Start: 2022-07-11 | End: 2022-07-12

## 2022-07-12 NOTE — PHYSICAL EXAM
[General Appearance - Alert] : alert [General Appearance - In No Acute Distress] : in no acute distress [General Appearance - Well Nourished] : well nourished [General Appearance - Well-Appearing] : healthy appearing [Sclera] : the sclera and conjunctiva were normal [Extraocular Movements] : extraocular movements were intact [Outer Ear] : the ears and nose were normal in appearance [Hearing Threshold Finger Rub Not Falls] : hearing was normal [Neck Appearance] : the appearance of the neck was normal [Auscultation Breath Sounds / Voice Sounds] : lungs were clear to auscultation bilaterally [Heart Sounds] : normal S1 and S2 [Murmurs] : no murmurs [Edema] : there was no peripheral edema [Bowel Sounds] : normal bowel sounds [Abdomen Soft] : soft [Abdomen Tenderness] : non-tender [Cervical Lymph Nodes Enlarged Posterior Bilaterally] : posterior cervical [Cervical Lymph Nodes Enlarged Anterior Bilaterally] : anterior cervical [Supraclavicular Lymph Nodes Enlarged Bilaterally] : supraclavicular [] : no rash [Motor Exam] : the motor exam was normal [No Focal Deficits] : no focal deficits [Oriented To Time, Place, And Person] : oriented to person, place, and time [FreeTextEntry1] : Left hand soft tissue swelling noted over dorsum aspect. Tenderness present in almost all joints of b/l hands, no swollen, red joints or limitation of ROM noted though. Left pinky with inability to adduct B/l knee joint crepitus noted. Body wide tender spots appreciated. Possible left baker cyst noted. Left calf swelling noted. Tenderness over left achilles noted.

## 2022-07-12 NOTE — HISTORY OF PRESENT ILLNESS
[FreeTextEntry1] : Interval History\par Presents for follow up\par Patient states she went to 2-3 of her Simponi infusions\par States that her symptoms have not improved\par Complains of pain throughout her body. Complains of pain worst behind the left knee\par Complains of persistent pain in her b/l hands and her neck. Points of the base of her right CMC joint\par Denies fevers/chills. Morning stiffness for 2-3 hours (not improved after Simponi). States pain is 0% changed. Pain increases with activity\par Denies rashes/dysuria/urinary changes/diarrhea\par States she had blurry vision and was told she had age related changes\par Endorses hand swelling\par Endorses weight loss of approximately 20 pounds, intentional by limiting food intake\par \par Interval Hx 4-:\par Presents today for a follow up visit. \par Still has 'hours' of AM stiffness in left hand wrist joints.\par Sternoclavicular joint is not bothering her, b/l knee pain with activity is about the same. \par New complain of left ankle, left calf and left back of the knee pain since 1 week. \par Denies CP, SOB, n/v/d, abdominal pain, fever or chills.

## 2022-07-12 NOTE — ASSESSMENT
[FreeTextEntry1] : 65 F is being seen in the Rheumatology clinic for seronegative inflammatory arthritis, fibromyalgia and b/l knee pain from OA. \par \par Impression:\par \par # Seronegative inflammatory arthritis\par Hands, wrists painful. sternoclavicular joint involvement improved \par Now s/p Simponi infusions on April/May\par Will try to arrange for July infusion as states she was told to wait 2 months between her May infusion and subsequent infusion\par Tolerating Simponi well, although sees 0% improvement in her pain\par Patient sees benefit with MTX since she stopped one week and had worsening pain\par c/w Simponi\par will repeat inflammatory markers\par \par #suspicion for tendon rupture\par Patient with recent inability to adduct left pinky\par to send MR hand to evaluate for tendon rupture/injury\par \par # Left calf pain.\par negative for DVT on duplex\par POCUS negative for Baker's cyst\par rx for diclofenac xent\par \par # B/l OA of knees.\par Ortho offered TKR on 6/19, but patient refused.\par Cymbalta as below\par \par # FM\par Continue Cymbalta 60mg\par PT\par \par # HCM\par Influenza vaccine 3/19, pneumovax 2015; prevnar 7/19\par DEXA scan normal 6/19, repeat this year. \par S/p Covid vaccine primary series. \par \par Depending on response to Simponi, we will coordinate with surgery team about CTS in the future. \par \par D/w Dr. Jauregui\par \par RTC in September with Dr. Song\par \par Asad Song MD, PGY-4\par Rheumatology Fellow\par [facility]\par \par

## 2022-07-14 DIAGNOSIS — S69.90XS UNSPECIFIED INJURY OF UNSPECIFIED WRIST, HAND AND FINGER(S), SEQUELA: ICD-10-CM

## 2022-07-14 DIAGNOSIS — M19.90 UNSPECIFIED OSTEOARTHRITIS, UNSPECIFIED SITE: ICD-10-CM

## 2022-07-14 DIAGNOSIS — M13.80 OTHER SPECIFIED ARTHRITIS, UNSPECIFIED SITE: ICD-10-CM

## 2022-07-20 ENCOUNTER — LABORATORY RESULT (OUTPATIENT)
Age: 67
End: 2022-07-20

## 2022-07-20 ENCOUNTER — APPOINTMENT (OUTPATIENT)
Dept: RHEUMATOLOGY | Facility: CLINIC | Age: 67
End: 2022-07-20

## 2022-07-20 VITALS
OXYGEN SATURATION: 99 % | HEART RATE: 75 BPM | DIASTOLIC BLOOD PRESSURE: 66 MMHG | SYSTOLIC BLOOD PRESSURE: 114 MMHG | RESPIRATION RATE: 16 BRPM

## 2022-07-20 VITALS
TEMPERATURE: 98 F | HEART RATE: 84 BPM | DIASTOLIC BLOOD PRESSURE: 69 MMHG | RESPIRATION RATE: 16 BRPM | OXYGEN SATURATION: 99 % | SYSTOLIC BLOOD PRESSURE: 109 MMHG

## 2022-07-20 PROCEDURE — 96413 CHEMO IV INFUSION 1 HR: CPT

## 2022-07-20 PROCEDURE — 36415 COLL VENOUS BLD VENIPUNCTURE: CPT

## 2022-07-20 PROCEDURE — 96374 THER/PROPH/DIAG INJ IV PUSH: CPT | Mod: 59

## 2022-07-20 RX ORDER — GOLIMUMAB 50 MG/4ML
50 SOLUTION INTRAVENOUS
Qty: 0 | Refills: 0 | Status: COMPLETED | OUTPATIENT
Start: 2022-07-14

## 2022-07-20 RX ORDER — DIPHENHYDRAMINE HCL 25 MG/1
25 TABLET ORAL
Qty: 0 | Refills: 0 | Status: COMPLETED | OUTPATIENT
Start: 2022-07-14

## 2022-07-20 RX ORDER — HYDROCORTISONE SODIUM SUCCINATE 100 MG/2ML
100 INJECTION, POWDER, FOR SOLUTION INTRAMUSCULAR; INTRAVENOUS
Qty: 0 | Refills: 0 | Status: COMPLETED | OUTPATIENT
Start: 2022-07-14

## 2022-07-20 RX ORDER — ACETAMINOPHEN 325 MG/1
325 TABLET ORAL
Qty: 0 | Refills: 0 | Status: COMPLETED | OUTPATIENT
Start: 2022-07-14

## 2022-07-23 ENCOUNTER — NON-APPOINTMENT (OUTPATIENT)
Age: 67
End: 2022-07-23

## 2022-08-04 ENCOUNTER — RX RENEWAL (OUTPATIENT)
Age: 67
End: 2022-08-04

## 2022-08-05 ENCOUNTER — NON-APPOINTMENT (OUTPATIENT)
Age: 67
End: 2022-08-05

## 2022-08-16 NOTE — ASU PATIENT PROFILE, ADULT - ALCOHOL USE HISTORY SINGLE SELECT
August 16, 2022      [facility]  [address]  [place] [address]  Phone: [phone]  Fax: [phone]       Patient: Ezio Marshall   YOB: 2005  Date of Visit: 08/16/2022    To Whom It May Concern:    Alexx Marshall  was at [facility] on 08/16/2022. The patient may return to school on 08/17/2022 with no restrictions. If you have any questions or concerns, or if I can be of further assistance, please do not hesitate to contact me.    Sincerely,    Renea Saucedo MD     
never

## 2022-08-18 RX ORDER — ACETAMINOPHEN 325 MG/1
325 TABLET ORAL
Qty: 0 | Refills: 0 | Status: DISCONTINUED | OUTPATIENT
Start: 2022-06-15 | End: 2022-08-18

## 2022-08-25 ENCOUNTER — APPOINTMENT (OUTPATIENT)
Dept: GASTROENTEROLOGY | Facility: CLINIC | Age: 67
End: 2022-08-25

## 2022-09-07 ENCOUNTER — APPOINTMENT (OUTPATIENT)
Dept: OBGYN | Facility: HOSPITAL | Age: 67
End: 2022-09-07

## 2022-09-09 ENCOUNTER — NON-APPOINTMENT (OUTPATIENT)
Age: 67
End: 2022-09-09

## 2022-09-12 ENCOUNTER — APPOINTMENT (OUTPATIENT)
Dept: INTERNAL MEDICINE | Facility: CLINIC | Age: 67
End: 2022-09-12

## 2022-09-12 ENCOUNTER — RX RENEWAL (OUTPATIENT)
Age: 67
End: 2022-09-12

## 2022-09-12 ENCOUNTER — OUTPATIENT (OUTPATIENT)
Dept: OUTPATIENT SERVICES | Facility: HOSPITAL | Age: 67
LOS: 1 days | End: 2022-09-12

## 2022-09-12 VITALS
TEMPERATURE: 98.2 F | SYSTOLIC BLOOD PRESSURE: 112 MMHG | HEIGHT: 67 IN | DIASTOLIC BLOOD PRESSURE: 66 MMHG | OXYGEN SATURATION: 99 % | WEIGHT: 205 LBS | RESPIRATION RATE: 16 BRPM | HEART RATE: 71 BPM | BODY MASS INDEX: 32.18 KG/M2

## 2022-09-12 DIAGNOSIS — Z98.890 OTHER SPECIFIED POSTPROCEDURAL STATES: Chronic | ICD-10-CM

## 2022-09-12 DIAGNOSIS — F32.A DEPRESSION, UNSPECIFIED: ICD-10-CM

## 2022-09-12 DIAGNOSIS — K21.9 GASTRO-ESOPHAGEAL REFLUX DISEASE W/OUT ESOPHAGITIS: ICD-10-CM

## 2022-09-12 PROCEDURE — 99215 OFFICE O/P EST HI 40 MIN: CPT | Mod: GC

## 2022-09-12 RX ORDER — VARENICLINE TARTRATE 25 MG
0.5 MG X 11 & KIT ORAL
Qty: 1 | Refills: 0 | Status: ACTIVE | COMMUNITY
Start: 2022-09-12

## 2022-09-13 DIAGNOSIS — K21.9 GASTRO-ESOPHAGEAL REFLUX DISEASE WITHOUT ESOPHAGITIS: ICD-10-CM

## 2022-09-13 DIAGNOSIS — F32.A DEPRESSION, UNSPECIFIED: ICD-10-CM

## 2022-09-13 DIAGNOSIS — F17.210 NICOTINE DEPENDENCE, CIGARETTES, UNCOMPLICATED: ICD-10-CM

## 2022-09-13 DIAGNOSIS — M19.90 UNSPECIFIED OSTEOARTHRITIS, UNSPECIFIED SITE: ICD-10-CM

## 2022-09-13 DIAGNOSIS — E78.5 HYPERLIPIDEMIA, UNSPECIFIED: ICD-10-CM

## 2022-09-13 DIAGNOSIS — I10 ESSENTIAL (PRIMARY) HYPERTENSION: ICD-10-CM

## 2022-09-13 DIAGNOSIS — J44.9 CHRONIC OBSTRUCTIVE PULMONARY DISEASE, UNSPECIFIED: ICD-10-CM

## 2022-09-14 ENCOUNTER — LABORATORY RESULT (OUTPATIENT)
Age: 67
End: 2022-09-14

## 2022-09-14 ENCOUNTER — APPOINTMENT (OUTPATIENT)
Dept: RHEUMATOLOGY | Facility: CLINIC | Age: 67
End: 2022-09-14

## 2022-09-14 VITALS
TEMPERATURE: 98.3 F | RESPIRATION RATE: 18 BRPM | SYSTOLIC BLOOD PRESSURE: 143 MMHG | OXYGEN SATURATION: 98 % | DIASTOLIC BLOOD PRESSURE: 72 MMHG | HEART RATE: 73 BPM

## 2022-09-14 VITALS
DIASTOLIC BLOOD PRESSURE: 69 MMHG | RESPIRATION RATE: 16 BRPM | OXYGEN SATURATION: 98 % | HEART RATE: 66 BPM | SYSTOLIC BLOOD PRESSURE: 127 MMHG

## 2022-09-14 PROCEDURE — 36415 COLL VENOUS BLD VENIPUNCTURE: CPT

## 2022-09-14 PROCEDURE — 96413 CHEMO IV INFUSION 1 HR: CPT

## 2022-09-14 PROCEDURE — 96374 THER/PROPH/DIAG INJ IV PUSH: CPT | Mod: 59

## 2022-09-14 RX ORDER — GOLIMUMAB 50 MG/4ML
50 SOLUTION INTRAVENOUS
Qty: 0 | Refills: 0 | Status: COMPLETED | OUTPATIENT
Start: 2022-09-08

## 2022-09-14 RX ORDER — HYDROCORTISONE SODIUM SUCCINATE 100 MG/2ML
100 INJECTION, POWDER, FOR SOLUTION INTRAMUSCULAR; INTRAVENOUS
Qty: 0 | Refills: 0 | Status: COMPLETED | OUTPATIENT
Start: 2022-09-08

## 2022-09-14 RX ORDER — DIPHENHYDRAMINE HCL 25 MG/1
25 TABLET ORAL
Qty: 0 | Refills: 0 | Status: COMPLETED | OUTPATIENT
Start: 2022-09-08

## 2022-09-14 RX ADMIN — ACETAMINOPHEN 2 MG: 325 TABLET ORAL at 00:00

## 2022-09-19 ENCOUNTER — NON-APPOINTMENT (OUTPATIENT)
Age: 67
End: 2022-09-19

## 2022-09-19 ENCOUNTER — RX RENEWAL (OUTPATIENT)
Age: 67
End: 2022-09-19

## 2022-09-20 ENCOUNTER — NON-APPOINTMENT (OUTPATIENT)
Age: 67
End: 2022-09-20

## 2022-09-27 ENCOUNTER — APPOINTMENT (OUTPATIENT)
Dept: CARDIOLOGY | Facility: CLINIC | Age: 67
End: 2022-09-27

## 2022-09-29 ENCOUNTER — NON-APPOINTMENT (OUTPATIENT)
Age: 67
End: 2022-09-29

## 2022-09-29 ENCOUNTER — APPOINTMENT (OUTPATIENT)
Dept: THORACIC SURGERY | Facility: CLINIC | Age: 67
End: 2022-09-29

## 2022-09-29 VITALS — BODY MASS INDEX: 31.39 KG/M2 | HEIGHT: 67 IN | WEIGHT: 200 LBS

## 2022-09-29 PROCEDURE — G0296 VISIT TO DETERM LDCT ELIG: CPT

## 2022-10-03 NOTE — PLAN
[FreeTextEntry1] : Plan:\par \par -Low Dose CT chest for lung cancer screening scheduled for 10/3/22 at [facility]\par \par -Patient to follow up with Dr. Siegel to review results of LDCT\par \par -Encouraged smoking cessation\par \par \par Engaged in shared decision making with MsDede SYED PEDRO . Answered all questions. She verbalized understanding and agreement. She knows to call back with any questions or concerns\par

## 2022-10-03 NOTE — HISTORY OF PRESENT ILLNESS
[TextBox_13] : Referred by Dr. Siegel\par \par Ms. SYED PEDRO is a 67 year old woman with a history of nicotine dependence\par \par  Over the telephone today we reviewed and confirmed that the patient meets screening eligibility criteria:\par \par -Age: 67 years old\par \par Smoking status:\par \par -Current smoker\par \par -Number of pack(s) per day:1\par \par -Number of years smoked: 47\par \par -Number of pack years smokin\par \par \par \par Ms. PEDRO denies any personal history of lung cancer. Denies any s/s of lung cancer. No lung cancer in a 1st degree relative. H/o COPD. Denies any history of occupational exposures\par  [PacksperDay] : 1 [N_Years] : 47 [PacksperYear] : 47

## 2022-10-14 ENCOUNTER — EMERGENCY (EMERGENCY)
Facility: HOSPITAL | Age: 67
LOS: 1 days | Discharge: HOME | End: 2022-10-14
Attending: STUDENT IN AN ORGANIZED HEALTH CARE EDUCATION/TRAINING PROGRAM
Payer: MEDICARE

## 2022-10-14 VITALS
OXYGEN SATURATION: 98 % | HEIGHT: 68 IN | TEMPERATURE: 98 F | RESPIRATION RATE: 18 BRPM | DIASTOLIC BLOOD PRESSURE: 62 MMHG | SYSTOLIC BLOOD PRESSURE: 144 MMHG | WEIGHT: 199.96 LBS | HEART RATE: 72 BPM

## 2022-10-14 DIAGNOSIS — Z98.890 OTHER SPECIFIED POSTPROCEDURAL STATES: Chronic | ICD-10-CM

## 2022-10-14 LAB
ALBUMIN SERPL ELPH-MCNC: 3.2 G/DL — LOW (ref 3.5–5)
ALP SERPL-CCNC: 113 U/L — SIGNIFICANT CHANGE UP (ref 40–120)
ALT FLD-CCNC: 18 U/L DA — SIGNIFICANT CHANGE UP (ref 10–60)
ANION GAP SERPL CALC-SCNC: 7 MMOL/L — SIGNIFICANT CHANGE UP (ref 5–17)
APTT BLD: 35.1 SEC — SIGNIFICANT CHANGE UP (ref 27.5–35.5)
AST SERPL-CCNC: 15 U/L — SIGNIFICANT CHANGE UP (ref 10–40)
BASOPHILS # BLD AUTO: 0.03 K/UL — SIGNIFICANT CHANGE UP (ref 0–0.2)
BASOPHILS NFR BLD AUTO: 0.3 % — SIGNIFICANT CHANGE UP (ref 0–2)
BILIRUB SERPL-MCNC: 0.3 MG/DL — SIGNIFICANT CHANGE UP (ref 0.2–1.2)
BUN SERPL-MCNC: 10 MG/DL — SIGNIFICANT CHANGE UP (ref 7–18)
CALCIUM SERPL-MCNC: 8.9 MG/DL — SIGNIFICANT CHANGE UP (ref 8.4–10.5)
CHLORIDE SERPL-SCNC: 109 MMOL/L — HIGH (ref 96–108)
CO2 SERPL-SCNC: 26 MMOL/L — SIGNIFICANT CHANGE UP (ref 22–31)
CREAT SERPL-MCNC: 0.54 MG/DL — SIGNIFICANT CHANGE UP (ref 0.5–1.3)
EGFR: 102 ML/MIN/1.73M2 — SIGNIFICANT CHANGE UP
EOSINOPHIL # BLD AUTO: 0.36 K/UL — SIGNIFICANT CHANGE UP (ref 0–0.5)
EOSINOPHIL NFR BLD AUTO: 3.8 % — SIGNIFICANT CHANGE UP (ref 0–6)
GLUCOSE SERPL-MCNC: 89 MG/DL — SIGNIFICANT CHANGE UP (ref 70–99)
HCT VFR BLD CALC: 37.2 % — SIGNIFICANT CHANGE UP (ref 34.5–45)
HGB BLD-MCNC: 12.2 G/DL — SIGNIFICANT CHANGE UP (ref 11.5–15.5)
IMM GRANULOCYTES NFR BLD AUTO: 0.3 % — SIGNIFICANT CHANGE UP (ref 0–0.9)
INR BLD: 1.02 RATIO — SIGNIFICANT CHANGE UP (ref 0.88–1.16)
LYMPHOCYTES # BLD AUTO: 2.93 K/UL — SIGNIFICANT CHANGE UP (ref 1–3.3)
LYMPHOCYTES # BLD AUTO: 30.7 % — SIGNIFICANT CHANGE UP (ref 13–44)
MCHC RBC-ENTMCNC: 30.9 PG — SIGNIFICANT CHANGE UP (ref 27–34)
MCHC RBC-ENTMCNC: 32.8 GM/DL — SIGNIFICANT CHANGE UP (ref 32–36)
MCV RBC AUTO: 94.2 FL — SIGNIFICANT CHANGE UP (ref 80–100)
MONOCYTES # BLD AUTO: 0.91 K/UL — HIGH (ref 0–0.9)
MONOCYTES NFR BLD AUTO: 9.5 % — SIGNIFICANT CHANGE UP (ref 2–14)
NEUTROPHILS # BLD AUTO: 5.28 K/UL — SIGNIFICANT CHANGE UP (ref 1.8–7.4)
NEUTROPHILS NFR BLD AUTO: 55.4 % — SIGNIFICANT CHANGE UP (ref 43–77)
NRBC # BLD: 0 /100 WBCS — SIGNIFICANT CHANGE UP (ref 0–0)
NT-PROBNP SERPL-SCNC: 128 PG/ML — HIGH (ref 0–125)
PLATELET # BLD AUTO: 320 K/UL — SIGNIFICANT CHANGE UP (ref 150–400)
POTASSIUM SERPL-MCNC: 4.2 MMOL/L — SIGNIFICANT CHANGE UP (ref 3.5–5.3)
POTASSIUM SERPL-SCNC: 4.2 MMOL/L — SIGNIFICANT CHANGE UP (ref 3.5–5.3)
PROT SERPL-MCNC: 5.9 G/DL — LOW (ref 6–8.3)
PROTHROM AB SERPL-ACNC: 12.1 SEC — SIGNIFICANT CHANGE UP (ref 10.5–13.4)
RAPID RVP RESULT: SIGNIFICANT CHANGE UP
RBC # BLD: 3.95 M/UL — SIGNIFICANT CHANGE UP (ref 3.8–5.2)
RBC # FLD: 14.7 % — HIGH (ref 10.3–14.5)
SARS-COV-2 RNA SPEC QL NAA+PROBE: SIGNIFICANT CHANGE UP
SODIUM SERPL-SCNC: 142 MMOL/L — SIGNIFICANT CHANGE UP (ref 135–145)
TROPONIN I, HIGH SENSITIVITY RESULT: 10.1 NG/L — SIGNIFICANT CHANGE UP
WBC # BLD: 9.54 K/UL — SIGNIFICANT CHANGE UP (ref 3.8–10.5)
WBC # FLD AUTO: 9.54 K/UL — SIGNIFICANT CHANGE UP (ref 3.8–10.5)

## 2022-10-14 PROCEDURE — 93005 ELECTROCARDIOGRAM TRACING: CPT

## 2022-10-14 PROCEDURE — 84484 ASSAY OF TROPONIN QUANT: CPT

## 2022-10-14 PROCEDURE — 83880 ASSAY OF NATRIURETIC PEPTIDE: CPT

## 2022-10-14 PROCEDURE — 80053 COMPREHEN METABOLIC PANEL: CPT

## 2022-10-14 PROCEDURE — 71045 X-RAY EXAM CHEST 1 VIEW: CPT

## 2022-10-14 PROCEDURE — 85610 PROTHROMBIN TIME: CPT

## 2022-10-14 PROCEDURE — 93010 ELECTROCARDIOGRAM REPORT: CPT

## 2022-10-14 PROCEDURE — 0225U NFCT DS DNA&RNA 21 SARSCOV2: CPT

## 2022-10-14 PROCEDURE — 85025 COMPLETE CBC W/AUTO DIFF WBC: CPT

## 2022-10-14 PROCEDURE — 99285 EMERGENCY DEPT VISIT HI MDM: CPT

## 2022-10-14 PROCEDURE — 99285 EMERGENCY DEPT VISIT HI MDM: CPT | Mod: 25

## 2022-10-14 PROCEDURE — 36415 COLL VENOUS BLD VENIPUNCTURE: CPT

## 2022-10-14 PROCEDURE — 85730 THROMBOPLASTIN TIME PARTIAL: CPT

## 2022-10-14 PROCEDURE — 71045 X-RAY EXAM CHEST 1 VIEW: CPT | Mod: 26

## 2022-10-14 RX ORDER — AZITHROMYCIN 500 MG/1
500 TABLET, FILM COATED ORAL ONCE
Refills: 0 | Status: COMPLETED | OUTPATIENT
Start: 2022-10-14 | End: 2022-10-14

## 2022-10-14 RX ADMIN — AZITHROMYCIN 500 MILLIGRAM(S): 500 TABLET, FILM COATED ORAL at 14:46

## 2022-10-14 NOTE — ED PROVIDER NOTE - PATIENT PORTAL LINK FT
You can access the FollowMyHealth Patient Portal offered by [facility] [place] by registering at the following website: [email or web address]. By joining [employer]’s FollowMyHealth portal, you will also be able to view your health information using other applications (apps) compatible with our system.

## 2022-10-14 NOTE — ED PROVIDER NOTE - OBJECTIVE STATEMENT
67 y.o presenting with sob and cough x 1 week. denies n, v, abd pain, fever. endorses also noting throat pain

## 2022-10-14 NOTE — ED PROVIDER NOTE - PROGRESS NOTE DETAILS
patient lab and xray wnl. clinically well. rvp negative, given patient still noting cough, will give trial of abx, instructed to f,u pmd, return precaution instructed, hemodynamically stable on d

## 2022-10-14 NOTE — ED PROVIDER NOTE - CLINICAL SUMMARY MEDICAL DECISION MAKING FREE TEXT BOX
Patient presenting with sob in setting uri symptoms. will obtain lab, xray, assess for infection, pna, ed obs and reassess

## 2022-10-15 RX ORDER — AZITHROMYCIN 500 MG/1
1 TABLET, FILM COATED ORAL
Qty: 4 | Refills: 0
Start: 2022-10-15 | End: 2022-10-18

## 2022-10-21 ENCOUNTER — APPOINTMENT (OUTPATIENT)
Dept: INTERNAL MEDICINE | Facility: CLINIC | Age: 67
End: 2022-10-21

## 2022-10-26 ENCOUNTER — APPOINTMENT (OUTPATIENT)
Dept: INTERNAL MEDICINE | Facility: CLINIC | Age: 67
End: 2022-10-26

## 2022-10-31 ENCOUNTER — APPOINTMENT (OUTPATIENT)
Dept: CT IMAGING | Facility: IMAGING CENTER | Age: 67
End: 2022-10-31

## 2022-10-31 ENCOUNTER — OUTPATIENT (OUTPATIENT)
Dept: OUTPATIENT SERVICES | Facility: HOSPITAL | Age: 67
LOS: 1 days | End: 2022-10-31
Payer: MEDICARE

## 2022-10-31 DIAGNOSIS — J44.9 CHRONIC OBSTRUCTIVE PULMONARY DISEASE, UNSPECIFIED: ICD-10-CM

## 2022-10-31 DIAGNOSIS — F17.210 NICOTINE DEPENDENCE, CIGARETTES, UNCOMPLICATED: ICD-10-CM

## 2022-10-31 DIAGNOSIS — Z98.890 OTHER SPECIFIED POSTPROCEDURAL STATES: Chronic | ICD-10-CM

## 2022-10-31 PROCEDURE — 71271 CT THORAX LUNG CANCER SCR C-: CPT

## 2022-10-31 PROCEDURE — 71271 CT THORAX LUNG CANCER SCR C-: CPT | Mod: 26

## 2022-11-02 ENCOUNTER — APPOINTMENT (OUTPATIENT)
Dept: OBGYN | Facility: HOSPITAL | Age: 67
End: 2022-11-02

## 2022-11-03 ENCOUNTER — APPOINTMENT (OUTPATIENT)
Dept: GASTROENTEROLOGY | Facility: CLINIC | Age: 67
End: 2022-11-03

## 2022-11-03 RX ADMIN — ACETAMINOPHEN 2 MG: 325 TABLET ORAL at 00:00

## 2022-11-09 ENCOUNTER — APPOINTMENT (OUTPATIENT)
Dept: RHEUMATOLOGY | Facility: CLINIC | Age: 67
End: 2022-11-09

## 2022-12-04 ENCOUNTER — EMERGENCY (EMERGENCY)
Facility: HOSPITAL | Age: 67
LOS: 1 days | Discharge: HOME | End: 2022-12-04
Attending: EMERGENCY MEDICINE
Payer: MEDICARE

## 2022-12-04 VITALS
HEART RATE: 82 BPM | SYSTOLIC BLOOD PRESSURE: 107 MMHG | RESPIRATION RATE: 20 BRPM | OXYGEN SATURATION: 93 % | DIASTOLIC BLOOD PRESSURE: 65 MMHG | TEMPERATURE: 99 F

## 2022-12-04 VITALS
HEART RATE: 98 BPM | WEIGHT: 195.99 LBS | TEMPERATURE: 99 F | RESPIRATION RATE: 20 BRPM | HEIGHT: 68 IN | DIASTOLIC BLOOD PRESSURE: 80 MMHG | OXYGEN SATURATION: 95 % | SYSTOLIC BLOOD PRESSURE: 160 MMHG

## 2022-12-04 DIAGNOSIS — Z98.890 OTHER SPECIFIED POSTPROCEDURAL STATES: Chronic | ICD-10-CM

## 2022-12-04 LAB
ALBUMIN SERPL ELPH-MCNC: 3.1 G/DL — LOW (ref 3.5–5)
ALP SERPL-CCNC: 113 U/L — SIGNIFICANT CHANGE UP (ref 40–120)
ALT FLD-CCNC: 29 U/L DA — SIGNIFICANT CHANGE UP (ref 10–60)
ANION GAP SERPL CALC-SCNC: 7 MMOL/L — SIGNIFICANT CHANGE UP (ref 5–17)
AST SERPL-CCNC: 15 U/L — SIGNIFICANT CHANGE UP (ref 10–40)
BASOPHILS # BLD AUTO: 0.02 K/UL — SIGNIFICANT CHANGE UP (ref 0–0.2)
BASOPHILS NFR BLD AUTO: 0.3 % — SIGNIFICANT CHANGE UP (ref 0–2)
BILIRUB SERPL-MCNC: 0.3 MG/DL — SIGNIFICANT CHANGE UP (ref 0.2–1.2)
BUN SERPL-MCNC: 14 MG/DL — SIGNIFICANT CHANGE UP (ref 7–18)
CALCIUM SERPL-MCNC: 8.8 MG/DL — SIGNIFICANT CHANGE UP (ref 8.4–10.5)
CHLORIDE SERPL-SCNC: 107 MMOL/L — SIGNIFICANT CHANGE UP (ref 96–108)
CO2 SERPL-SCNC: 28 MMOL/L — SIGNIFICANT CHANGE UP (ref 22–31)
CREAT SERPL-MCNC: 0.61 MG/DL — SIGNIFICANT CHANGE UP (ref 0.5–1.3)
EGFR: 99 ML/MIN/1.73M2 — SIGNIFICANT CHANGE UP
EOSINOPHIL # BLD AUTO: 0.05 K/UL — SIGNIFICANT CHANGE UP (ref 0–0.5)
EOSINOPHIL NFR BLD AUTO: 0.6 % — SIGNIFICANT CHANGE UP (ref 0–6)
GLUCOSE SERPL-MCNC: 114 MG/DL — HIGH (ref 70–99)
HCT VFR BLD CALC: 37.3 % — SIGNIFICANT CHANGE UP (ref 34.5–45)
HGB BLD-MCNC: 12.7 G/DL — SIGNIFICANT CHANGE UP (ref 11.5–15.5)
IMM GRANULOCYTES NFR BLD AUTO: 0.3 % — SIGNIFICANT CHANGE UP (ref 0–0.9)
LYMPHOCYTES # BLD AUTO: 2.05 K/UL — SIGNIFICANT CHANGE UP (ref 1–3.3)
LYMPHOCYTES # BLD AUTO: 26.5 % — SIGNIFICANT CHANGE UP (ref 13–44)
MCHC RBC-ENTMCNC: 30.6 PG — SIGNIFICANT CHANGE UP (ref 27–34)
MCHC RBC-ENTMCNC: 34 GM/DL — SIGNIFICANT CHANGE UP (ref 32–36)
MCV RBC AUTO: 89.9 FL — SIGNIFICANT CHANGE UP (ref 80–100)
MONOCYTES # BLD AUTO: 0.65 K/UL — SIGNIFICANT CHANGE UP (ref 0–0.9)
MONOCYTES NFR BLD AUTO: 8.4 % — SIGNIFICANT CHANGE UP (ref 2–14)
NEUTROPHILS # BLD AUTO: 4.96 K/UL — SIGNIFICANT CHANGE UP (ref 1.8–7.4)
NEUTROPHILS NFR BLD AUTO: 63.9 % — SIGNIFICANT CHANGE UP (ref 43–77)
NRBC # BLD: 0 /100 WBCS — SIGNIFICANT CHANGE UP (ref 0–0)
NT-PROBNP SERPL-SCNC: 189 PG/ML — HIGH (ref 0–125)
PLATELET # BLD AUTO: 326 K/UL — SIGNIFICANT CHANGE UP (ref 150–400)
POTASSIUM SERPL-MCNC: 3.9 MMOL/L — SIGNIFICANT CHANGE UP (ref 3.5–5.3)
POTASSIUM SERPL-SCNC: 3.9 MMOL/L — SIGNIFICANT CHANGE UP (ref 3.5–5.3)
PROT SERPL-MCNC: 6.8 G/DL — SIGNIFICANT CHANGE UP (ref 6–8.3)
RAPID RVP RESULT: SIGNIFICANT CHANGE UP
RBC # BLD: 4.15 M/UL — SIGNIFICANT CHANGE UP (ref 3.8–5.2)
RBC # FLD: 14.6 % — HIGH (ref 10.3–14.5)
SARS-COV-2 RNA SPEC QL NAA+PROBE: SIGNIFICANT CHANGE UP
SODIUM SERPL-SCNC: 142 MMOL/L — SIGNIFICANT CHANGE UP (ref 135–145)
TROPONIN I, HIGH SENSITIVITY RESULT: 9.3 NG/L — SIGNIFICANT CHANGE UP
WBC # BLD: 7.75 K/UL — SIGNIFICANT CHANGE UP (ref 3.8–10.5)
WBC # FLD AUTO: 7.75 K/UL — SIGNIFICANT CHANGE UP (ref 3.8–10.5)

## 2022-12-04 PROCEDURE — 99285 EMERGENCY DEPT VISIT HI MDM: CPT | Mod: 25

## 2022-12-04 PROCEDURE — 99285 EMERGENCY DEPT VISIT HI MDM: CPT

## 2022-12-04 PROCEDURE — 93005 ELECTROCARDIOGRAM TRACING: CPT

## 2022-12-04 PROCEDURE — 83880 ASSAY OF NATRIURETIC PEPTIDE: CPT

## 2022-12-04 PROCEDURE — 0225U NFCT DS DNA&RNA 21 SARSCOV2: CPT

## 2022-12-04 PROCEDURE — 96374 THER/PROPH/DIAG INJ IV PUSH: CPT

## 2022-12-04 PROCEDURE — 94640 AIRWAY INHALATION TREATMENT: CPT

## 2022-12-04 PROCEDURE — 36415 COLL VENOUS BLD VENIPUNCTURE: CPT

## 2022-12-04 PROCEDURE — 85025 COMPLETE CBC W/AUTO DIFF WBC: CPT

## 2022-12-04 PROCEDURE — 71045 X-RAY EXAM CHEST 1 VIEW: CPT

## 2022-12-04 PROCEDURE — 71045 X-RAY EXAM CHEST 1 VIEW: CPT | Mod: 26

## 2022-12-04 PROCEDURE — 84484 ASSAY OF TROPONIN QUANT: CPT

## 2022-12-04 PROCEDURE — 80053 COMPREHEN METABOLIC PANEL: CPT

## 2022-12-04 RX ORDER — IPRATROPIUM/ALBUTEROL SULFATE 18-103MCG
3 AEROSOL WITH ADAPTER (GRAM) INHALATION
Refills: 0 | Status: DISCONTINUED | OUTPATIENT
Start: 2022-12-04 | End: 2022-12-07

## 2022-12-04 RX ADMIN — Medication 125 MILLIGRAM(S): at 11:40

## 2022-12-04 RX ADMIN — Medication 3 MILLILITER(S): at 11:40

## 2022-12-04 NOTE — ED PROVIDER NOTE - PATIENT PORTAL LINK FT
You can access the FollowMyHealth Patient Portal offered by [facility] by registering at the following website: [email or web address]. By joining [employer]’s FollowMyHealth portal, you will also be able to view your health information using other applications (apps) compatible with our system.

## 2022-12-04 NOTE — ED PROVIDER NOTE - NSFOLLOWUPINSTRUCTIONS_ED_ALL_ED_FT
Please follow up with your primary care doctor and pulmonologist in 1-2 days.  Please use your albuterol and symbicort and prednisone as prescribed.  Please keep well hydrated.  Please use acetaminophen or ibuprofen as needed for pain or fever.  Please return to the emergency department if you have worsening breathing, dizziness/weakness, or any other symptoms.      Shortness of Breath    WHAT YOU NEED TO KNOW:    Shortness of breath is a feeling that you cannot get enough air when you breathe in. You may have this feeling only during activity, or all the time. Your symptoms can range from mild to severe. Shortness of breath may be a sign of a serious health condition that needs immediate care.    DISCHARGE INSTRUCTIONS:    Return to the emergency department if:   •Your signs and symptoms are the same or worse within 24 hours of treatment.   •The skin over your ribs or on your neck sinks in when you breathe.   •You feel confused or dizzy.    Contact your healthcare provider if:   •You have new or worsening symptoms.  •You have questions or concerns about your condition or care.    Medicines:   •Medicines may be used to treat the cause of your symptoms. You may need medicine to treat a bacterial infection or reduce anxiety. Other medicines may be used to open your airway, reduce swelling, or remove extra fluid. If you have a heart condition, you may need medicine to help your heart beat more strongly or regularly.  •Take your medicine as directed. Contact your healthcare provider if you think your medicine is not helping or if you have side effects. Tell him or her if you are allergic to any medicine. Keep a list of the medicines, vitamins, and herbs you take. Include the amounts, and when and why you take them. Bring the list or the pill bottles to follow-up visits. Carry your medicine list with you in case of an emergency.    Manage shortness of breath:   •Create an action plan. You and your healthcare provider can work together to create a plan for how to handle shortness of breath. The plan can include daily activities, treatment changes, and what to do if you have severe breathing problems.  •Lean forward on your elbows when you sit. This helps your lungs expand and may make it easier to breathe.  •Use pursed-lip breathing any time you feel short of breath. Breathe in through your nose and then slowly breathe out through your mouth with your lips slightly puckered. It should take you twice as long to breathe out as it did to breathe in.   •Do not smoke. Nicotine and other chemicals in cigarettes and cigars can cause lung damage and make shortness of breath worse. Ask your healthcare provider for information if you currently smoke and need help to quit. E-cigarettes or smokeless tobacco still contain nicotine. Talk to your healthcare provider before you use these products.  •Reach or maintain a healthy weight. Your healthcare provider can help you create a safe weight loss plan if you are overweight.  •Exercise as directed. Exercise can help your lungs work more easily. Exercise can also help you lose weight if needed. Try to get at least 30 minutes of exercise most days of the week.    Follow up with your healthcare provider or specialist as directed: Write down your questions so you remember to ask them during your visits.

## 2022-12-04 NOTE — ED PROVIDER NOTE - CHIEF COMPLAINT
[Arthralgia] : arthralgia [Negative] : Heme/Lymph The patient is a 67y Female complaining of shortness of breath. No

## 2022-12-04 NOTE — ED PROVIDER NOTE - OBJECTIVE STATEMENT
67-year-old female with history of COPD on Symbicort and albuterol, hypertension, hyperlipidemia, rheumatoid arthritis on methotrexate, presents with cough, congestion, sore throat, x5 to 6 days. Associated shortness of breath secondary to her cold. Uses Symbicort and albuterol with improvement, however states she lost her nebulizer machine while she was cleaning her house and does not want where she placed it. Denies all other symptoms including chest pain, fever, leg pain or swelling, recent long distance travel, vomiting, diarrhea.

## 2022-12-04 NOTE — ED PROVIDER NOTE - CLINICAL SUMMARY MEDICAL DECISION MAKING FREE TEXT BOX
Character low suspicion for PE and no tachycardia, hypoxia, or e/o DVT or new acute risk factors for this. Character low suspicion for ACS and ECG/trop unremarkable in this regard. No e/o PNA, PTX, or fluid overload on exam or CXR. No significant arrhythmia. No significant anemia. Exam and hx c/w COPD exacerbation. Given duo nebs, Solu-Medrol, with Character low suspicion for PE and no tachycardia, hypoxia, or e/o DVT or new acute risk factors for this. Character low suspicion for ACS and ECG/trop unremarkable in this regard. No e/o PNA, PTX, or fluid overload on exam or CXR. No significant arrhythmia. No significant anemia. Exam and hx c/w COPD exacerbation. Given duo nebs, Solu-Medrol, with resolution of symptoms and no more wheezing and great air mvmt on rpt exam. Patient is well appearing, NAD, afebrile, hemodynamically stable. Any available tests and studies were discussed with patient. Discharged with prednisone, instructions in further symptomatic care, return precautions, and need for PMD and pulm f/u.

## 2022-12-04 NOTE — ED PROVIDER NOTE - CARE PLAN
1 Principal Discharge DX:	Acute upper respiratory infection  Secondary Diagnosis:	Shortness of breath

## 2022-12-05 ENCOUNTER — NON-APPOINTMENT (OUTPATIENT)
Age: 67
End: 2022-12-05

## 2022-12-08 RX ORDER — NEBULIZER ACCESSORIES
KIT MISCELLANEOUS
Qty: 2 | Refills: 6 | Status: ACTIVE | COMMUNITY
Start: 2022-01-10 | End: 1900-01-01

## 2022-12-19 ENCOUNTER — APPOINTMENT (OUTPATIENT)
Dept: INTERNAL MEDICINE | Facility: CLINIC | Age: 67
End: 2022-12-19

## 2022-12-19 ENCOUNTER — MED ADMIN CHARGE (OUTPATIENT)
Age: 67
End: 2022-12-19

## 2022-12-19 ENCOUNTER — OUTPATIENT (OUTPATIENT)
Dept: OUTPATIENT SERVICES | Facility: HOSPITAL | Age: 67
LOS: 1 days | End: 2022-12-19

## 2022-12-19 VITALS
HEIGHT: 67 IN | BODY MASS INDEX: 32.65 KG/M2 | DIASTOLIC BLOOD PRESSURE: 70 MMHG | OXYGEN SATURATION: 98 % | SYSTOLIC BLOOD PRESSURE: 110 MMHG | WEIGHT: 208 LBS | HEART RATE: 76 BPM

## 2022-12-19 DIAGNOSIS — J84.01 ALVEOLAR PROTEINOSIS: ICD-10-CM

## 2022-12-19 DIAGNOSIS — D17.9 BENIGN LIPOMATOUS NEOPLASM, UNSPECIFIED: ICD-10-CM

## 2022-12-19 DIAGNOSIS — Z98.890 OTHER SPECIFIED POSTPROCEDURAL STATES: Chronic | ICD-10-CM

## 2022-12-19 DIAGNOSIS — Z12.11 ENCOUNTER FOR SCREENING FOR MALIGNANT NEOPLASM OF COLON: ICD-10-CM

## 2022-12-19 PROCEDURE — 99214 OFFICE O/P EST MOD 30 MIN: CPT | Mod: GC

## 2022-12-21 NOTE — HISTORY OF PRESENT ILLNESS
[FreeTextEntry1] : U [de-identified] : 67F current smoker with asthma/COPD, HTN, HLD, fibromyalgia, inflammatory arthritis on methotrexate, duloxetine (followed by rheum), b/l knee OA, R sternoclavicular arthritis, hepatic steatosis, anxiety, depression (follows outpatient psychiatry and psychology), colonic polyps here for f/u visit.\par \par Patient was recently in the hospital on 12/4 for COPD exacerbation where she couldn't breathe. Patient states she couldn't find her nebulizer machine at home, but now has it. Patient also reports coming down with the flu 1 week prior. Patient states she has been cutting down on smoking which is helping her with her breathing. Patient was given a 5 day course of steroids which she completed. \par \par Patient states she still has cough with beige sputum production. Denies fevers. \par \par Pt reports depression and anxiety wax and wane. Her smoking worsens her depression as it makes her feel "disgusted" about herself. Seeing psychiatrist today. Denies SI. PHQ2 negative. \par \par Pt reports stable inflammatory arthritis pain. Now receiving transfusion with Rheumatologist with mild improvement in symptoms. Symptoms stable. Taking diclofenac 50mg PRN.\par \par Pt reports smoking 1 pack/day. Interested in quitting. Patient walked through quitting plan on SmokeFree.gov. Patient last smoked 2 days ago.

## 2022-12-21 NOTE — PLAN
[FreeTextEntry1] : 67F current smoker with asthma/COPD, HTN, HLD, fibromyalgia, inflammatory arthritis on methotrexate, duloxetine (followed by rheum), b/l knee OA, R sternoclavicular arthritis, hepatic steatosis, anxiety, depression (follows outpatient psychiatry and psychology), colonic polyps here for hospital f/u visit\par \par #COPD Exacerbation \par Pt in hospital 12/4 for COPD exacerbation, reports losing albuterol nebulizer (now has it) and having Flu week prior\par Got steroids by ED, now finished course \par - continue with albuterol inhaler PRN, albuterol nebulizer, and symbicort\par - Low dose CT scan showed unchanged 0.3 cm RML lung nodule and R apical .7 x.4 cm nodule \par [ ] f/u low-dose CT scan in 1 year\par \par #Cigarette smoker (305.1) (F17.210)\par  · Interested in cessation. Created quitting plan on [employer]\par     - start varenicline\par     - use nicotine gum 20min before expected cravings\par   - referred to tobacco cessation program\par - f/u at next visit\par \par #Depression (311) (F32.A)\par  · Stable. Waxes and wanes\par     - c/w duloxetine 60mg daily\par - Denies SI/HI\par - has psychiatry follow up today\par \par Inflammatory arthritis (714.9) (M19.90)\par  · Stable. Now with Simponi. Somewhat relieved by transfusions\par     - C/w simponi transfusion\par     - c/w rheum f/u\par     - c/w methotrexate 2.5mg x8 weekly, and folic acid supplementation\par \par #Lipoma L wrist \par - hand surgery referral given, will help with mobility\par \par Hypertension (401.9) (I10)\par  · BP at goal.\par     - c/w lisinopril 40mg daily, amlodipine 10mg daily\par \par Hyperlipidemia (272.4) (E78.5)\par  · - c/w pravastatin 40mg qhs\par - f/u Lipid panel sent 12/19\par \par #Acid reflux (530.81) (K21.9)\par  · Taking PPI PRN d/t concern for liver toxicity. Symptoms controlled\par     - c/w PPI 40mg PRN for symptom management\par \par #HCM\par - shingles, zoster to be given at next visit\par - Flu shot given 12/19\par - patient instructed to go to [facility] for COVID booster\par - repeat GI referral/colonoscopy given multiple polyps on last scope\par - Mammogram Birads 2\par - Dexa 2019 wnl\par - Pt instructed to take Vitamin D, calcium Vitamin C supplements\par - Women's health referral given for pap testing, and women's health discussion \par \par - RTC in 1 month\par

## 2022-12-21 NOTE — REVIEW OF SYSTEMS
[Lower Ext Edema] : lower extremity edema [Cough] : cough [Fever] : no fever [Chills] : no chills [Pain] : no pain [Vision Problems] : no vision problems [Sore Throat] : no sore throat [Chest Pain] : no chest pain [Wheezing] : no wheezing [Abdominal Pain] : no abdominal pain [Vomiting] : no vomiting [Dysuria] : no dysuria [Hematuria] : no hematuria [Headache] : no headache [FreeTextEntry6] : +little sob in AM

## 2022-12-21 NOTE — PHYSICAL EXAM
[No Acute Distress] : no acute distress [Well Nourished] : well nourished [EOMI] : extraocular movements intact [Supple] : supple [No Respiratory Distress] : no respiratory distress  [Clear to Auscultation] : lungs were clear to auscultation bilaterally [Normal Rate] : normal rate  [Normal S1, S2] : normal S1 and S2 [Soft] : abdomen soft [Non Tender] : non-tender [Non-distended] : non-distended [Coordination Grossly Intact] : coordination grossly intact [No Focal Deficits] : no focal deficits [Normal Affect] : the affect was normal [Alert and Oriented x3] : oriented to person, place, and time [de-identified] : 1+ lower extremity edema

## 2022-12-22 DIAGNOSIS — I10 ESSENTIAL (PRIMARY) HYPERTENSION: ICD-10-CM

## 2022-12-28 ENCOUNTER — APPOINTMENT (OUTPATIENT)
Dept: INTERNAL MEDICINE | Facility: CLINIC | Age: 67
End: 2022-12-28

## 2022-12-29 RX ADMIN — ACETAMINOPHEN 2 MG: 325 TABLET ORAL at 00:00

## 2023-01-05 ENCOUNTER — APPOINTMENT (OUTPATIENT)
Dept: GASTROENTEROLOGY | Facility: CLINIC | Age: 68
End: 2023-01-05

## 2023-01-09 ENCOUNTER — APPOINTMENT (OUTPATIENT)
Dept: OBGYN | Facility: HOSPITAL | Age: 68
End: 2023-01-09

## 2023-01-17 ENCOUNTER — RX RENEWAL (OUTPATIENT)
Age: 68
End: 2023-01-17

## 2023-01-25 RX ORDER — PANTOPRAZOLE 40 MG/1
40 TABLET, DELAYED RELEASE ORAL DAILY
Qty: 60 | Refills: 2 | Status: ACTIVE | COMMUNITY
Start: 2021-10-21 | End: 1900-01-01

## 2023-01-30 ENCOUNTER — OUTPATIENT (OUTPATIENT)
Dept: OUTPATIENT SERVICES | Facility: HOSPITAL | Age: 68
LOS: 1 days | End: 2023-01-30

## 2023-01-30 ENCOUNTER — APPOINTMENT (OUTPATIENT)
Dept: INTERNAL MEDICINE | Facility: CLINIC | Age: 68
End: 2023-01-30
Payer: MEDICARE

## 2023-01-30 VITALS
OXYGEN SATURATION: 97 % | SYSTOLIC BLOOD PRESSURE: 119 MMHG | BODY MASS INDEX: 32.33 KG/M2 | DIASTOLIC BLOOD PRESSURE: 78 MMHG | HEIGHT: 67 IN | HEART RATE: 83 BPM | WEIGHT: 206 LBS

## 2023-01-30 DIAGNOSIS — Z87.09 PERSONAL HISTORY OF OTHER DISEASES OF THE RESPIRATORY SYSTEM: ICD-10-CM

## 2023-01-30 DIAGNOSIS — H60.501 UNSPECIFIED ACUTE NONINFECTIVE OTITIS EXTERNA, RIGHT EAR: ICD-10-CM

## 2023-01-30 DIAGNOSIS — M54.9 DORSALGIA, UNSPECIFIED: ICD-10-CM

## 2023-01-30 DIAGNOSIS — K63.5 POLYP OF COLON: ICD-10-CM

## 2023-01-30 DIAGNOSIS — U07.1 COVID-19: ICD-10-CM

## 2023-01-30 DIAGNOSIS — Z12.72 ENCOUNTER FOR SCREENING FOR MALIGNANT NEOPLASM OF VAGINA: ICD-10-CM

## 2023-01-30 DIAGNOSIS — F41.9 ANXIETY DISORDER, UNSPECIFIED: ICD-10-CM

## 2023-01-30 DIAGNOSIS — K59.00 CONSTIPATION, UNSPECIFIED: ICD-10-CM

## 2023-01-30 DIAGNOSIS — Z01.818 ENCOUNTER FOR OTHER PREPROCEDURAL EXAMINATION: ICD-10-CM

## 2023-01-30 DIAGNOSIS — S69.90XS UNSPECIFIED INJURY OF UNSPECIFIED WRIST, HAND AND FINGER(S), SEQUELA: ICD-10-CM

## 2023-01-30 DIAGNOSIS — Z20.822 CONTACT WITH AND (SUSPECTED) EXPOSURE TO COVID-19: ICD-10-CM

## 2023-01-30 DIAGNOSIS — R13.10 DYSPHAGIA, UNSPECIFIED: ICD-10-CM

## 2023-01-30 DIAGNOSIS — Z01.419 ENCOUNTER FOR GYNECOLOGICAL EXAMINATION (GENERAL) (ROUTINE) W/OUT ABNORMAL FINDINGS: ICD-10-CM

## 2023-01-30 DIAGNOSIS — Z23 ENCOUNTER FOR IMMUNIZATION: ICD-10-CM

## 2023-01-30 DIAGNOSIS — R10.11 RIGHT UPPER QUADRANT PAIN: ICD-10-CM

## 2023-01-30 DIAGNOSIS — Z98.890 OTHER SPECIFIED POSTPROCEDURAL STATES: Chronic | ICD-10-CM

## 2023-01-30 PROCEDURE — 99214 OFFICE O/P EST MOD 30 MIN: CPT | Mod: GC

## 2023-01-30 RX ORDER — ZOSTER VACCINE RECOMBINANT, ADJUVANTED 50 MCG/0.5
50 KIT INTRAMUSCULAR
Qty: 2 | Refills: 0 | Status: ACTIVE | COMMUNITY
Start: 2023-01-30 | End: 1900-01-01

## 2023-01-31 LAB
ALBUMIN SERPL ELPH-MCNC: 4.2 G/DL
ALP BLD-CCNC: 121 U/L
ALT SERPL-CCNC: 12 U/L
ANION GAP SERPL CALC-SCNC: 11 MMOL/L
AST SERPL-CCNC: 13 U/L
BILIRUB SERPL-MCNC: 0.3 MG/DL
BUN SERPL-MCNC: 15 MG/DL
CALCIUM SERPL-MCNC: 9.2 MG/DL
CHLORIDE SERPL-SCNC: 106 MMOL/L
CO2 SERPL-SCNC: 28 MMOL/L
CREAT SERPL-MCNC: 0.68 MG/DL
EGFR: 96 ML/MIN/1.73M2
ESTIMATED AVERAGE GLUCOSE: 117 MG/DL
GLUCOSE SERPL-MCNC: 101 MG/DL
HBA1C MFR BLD HPLC: 5.7 %
POTASSIUM SERPL-SCNC: 4.6 MMOL/L
PROT SERPL-MCNC: 6.1 G/DL
SODIUM SERPL-SCNC: 144 MMOL/L

## 2023-02-02 PROBLEM — U07.1 COVID-19: Status: RESOLVED | Noted: 2022-01-14 | Resolved: 2023-02-02

## 2023-02-02 PROBLEM — H60.501 ACUTE OTITIS EXTERNA OF RIGHT EAR, UNSPECIFIED TYPE: Status: RESOLVED | Noted: 2021-01-28 | Resolved: 2023-02-02

## 2023-02-02 PROBLEM — Z01.818 PRE-OP TESTING: Status: RESOLVED | Noted: 2021-12-08 | Resolved: 2023-02-02

## 2023-02-02 PROBLEM — M54.9 ACUTE BACK PAIN LESS THAN 4 WEEKS DURATION: Status: RESOLVED | Noted: 2018-12-03 | Resolved: 2023-02-02

## 2023-02-02 PROBLEM — R13.10 DYSPHAGIA, UNSPECIFIED TYPE: Noted: 2019-12-02

## 2023-02-02 PROBLEM — Z23 NEED FOR SHINGLES VACCINE: Status: RESOLVED | Noted: 2020-07-02 | Resolved: 2023-02-02

## 2023-02-02 PROBLEM — S69.90XS FINGER INJURY, SEQUELA: Status: ACTIVE | Noted: 2022-07-11

## 2023-02-02 PROBLEM — Z12.72 VAGINAL PAP SMEAR: Status: RESOLVED | Noted: 2022-12-19 | Resolved: 2023-02-02

## 2023-02-02 PROBLEM — Z20.822 SUSPECTED COVID-19 VIRUS INFECTION: Status: RESOLVED | Noted: 2020-04-18 | Resolved: 2023-02-02

## 2023-02-02 PROBLEM — Z87.09 HISTORY OF SORE THROAT: Status: RESOLVED | Noted: 2021-05-12 | Resolved: 2023-02-02

## 2023-02-02 PROBLEM — F41.9 ANXIETY: Noted: 2020-08-03

## 2023-02-02 PROBLEM — K59.00 CONSTIPATION: Noted: 2022-01-14

## 2023-02-02 NOTE — ASSESSMENT
[FreeTextEntry1] : 68F with inflammatory and noninflammatory arthtritis, HTN/HLD, asthma/copd overlap, current smoker, PUD s/p treated HP, NALFD presenting for follow up\par \par CARDS\par # HTN/HLD\par BP today 119\par Last lipid profile ok in 2021\par c/w pravastatin 40\par c/w amlodipine 10\par c/w lisinopril 40\par \par #LE edema\par reported edema but not present on exam. no orthopnea or PND. no JVD. likely related to b/l knee OA rather than volume overload\par \par PULM\par # Asthma/COPD overlap\par most recent PFTs with FEV1/FVC >70% but reportedly with hx of reversibility with bronchodilators\par eosinophil count 0.14\par c/w Symbicort bid and prn symbicort vs albuterol\par f/u pulm\par \par # Smoking\par Low dose CT scan annually (last Lung rads 2 in 10/2022)\par Smoking cessation referral\par c/w nicotine lozenge\par patient worried of side effects of varenicline\par \par GI\par # PUD\par had EGD in 2021 with H pylori gastritis, nonobstructive Schatzki ring, multiple gastric/duodenal ulcers\par H pylori treated (confirmed 04/2021)\par c/w PPI\par \par # colonic polyps\par 7mm sessile colonic polyp in 2015\par per GI note (no reprort in allscripts) had rpt c-scope in 2020 with 2x tubular adenomas\par repeat in 2025\par \par # likely NAFLD\par Fib4 - 0.8\par CMP/CBC\par RUQ US with steatosis\par \par # Constipation\par c/w fiber\par \par RHEUM\par # seronegative inflammatory arthritis\par Patient self d/ester simponi, to discuss with rheum\par Continue with Simponi, Methotrexate\par f/u rheum\par \par # carpal tunnel syndrome\par per rheum might need surgery if no response to simponi\par per rheum f/u MRI r/o tendon rupture\par \par # knee OA\par c/w diclofenac preferably\par c/w ibuprofen, tylenol\par \par #fibromyalgia\par c/w cymbalta\par \par Endo\par #Obesity\par encouraged weight loss\par recheck A1c today, might benefit from GLP1 if meets criteria\par \par PSYCH\par # Anxiety/Depression\par c/w cymbalta\par psych f/u\par \par HCM\par #Vaccines\par -flu UTD\par -COVID s/p primary series, encouraged booster\par -pneumococcal - completed\par -Tdap - UTD\par \par #Cancer\par -CRC - as above (2025)\par -Lung - as above (2023)\par -Mammo BiRADS 2 in 2/2021 - will give referral\par -Cervical wnl 2018 - will give referral\par \par #Others\par -DEXA - rpt 2024\par -PHQ/AUDIT/DAST\par \par RTC 3 Months\par \par Discussed with Dr. Ham\par \par Yuan Torres MD\par PGY3 [employer]

## 2023-02-02 NOTE — PHYSICAL EXAM
[No Acute Distress] : no acute distress [Well Nourished] : well nourished [Well Developed] : well developed [Well-Appearing] : well-appearing [EOMI] : extraocular movements intact [No JVD] : no jugular venous distention [No Respiratory Distress] : no respiratory distress  [No Accessory Muscle Use] : no accessory muscle use [Clear to Auscultation] : lungs were clear to auscultation bilaterally [Normal Rate] : normal rate  [Regular Rhythm] : with a regular rhythm [Normal S1, S2] : normal S1 and S2 [No Murmur] : no murmur heard [No Edema] : there was no peripheral edema [Soft] : abdomen soft [Non Tender] : non-tender [Non-distended] : non-distended [No Masses] : no abdominal mass palpated [No HSM] : no HSM [Normal Bowel Sounds] : normal bowel sounds [No CVA Tenderness] : no CVA  tenderness [No Spinal Tenderness] : no spinal tenderness [No Rash] : no rash [Coordination Grossly Intact] : coordination grossly intact [No Focal Deficits] : no focal deficits [Normal Affect] : the affect was normal [Normal Insight/Judgement] : insight and judgment were intact [de-identified] : obese [de-identified] : R rib area mildly tender to palpation [de-identified] : hand pain without joint swelling. knees swollen bilaterally

## 2023-02-02 NOTE — COUNSELING
[Cessation strategies including cessation program discussed] : Cessation strategies including cessation program discussed [Use of nicotine replacement therapies and other medications discussed] : Use of nicotine replacement therapies and other medications discussed [Smoking Cessation Program Referral] : Smoking Cessation Program Referral  [Yes] : Willing to quit smoking [Potential consequences of obesity discussed] : Potential consequences of obesity discussed [Benefits of weight loss discussed] : Benefits of weight loss discussed [Encouraged to increase physical activity] : Encouraged to increase physical activity [Target Wt Loss Goal ___] : Weight Loss Goals: Target weight loss goal [unfilled] lbs [Weigh Self Weekly] : weigh self weekly [Decrease Portions] : decrease portions [Good understanding] : Patient has a good understanding of disease, goals and obesity follow-up plan [ - Annual Lung Cancer Screening/Share Decision Making Discussion] : Annual Lung Cancer Screening/Share Decision Making Discussion. (I have advised this patient to have a Low Dose CT (LDCT) scan of the lungs and have discussed the following with the patient in a shared decision making discussion:   Benefits of Detection and Early Treatment: There is adequate evidence that annual screening for lung cancer with LDCT in a population of high-risk persons can prevent a substantial number of lung cancer–related deaths. The magnitude of benefit depends on the individual patient's risk for lung cancer, as those who are at highest risk are most likely to benefit. Screening cannot prevent most lung cancer–related deaths, and does not replace smoking cessation. Harms of Detection and Early Intervention and Treatment: The harms associated with LDCT screening include false-negative and false-positive results, incidental findings, over diagnosis, and radiation exposure. False-positive LDCT results occur in a substantial proportion of screened persons; 95% of all positive results do not lead to a diagnosis of cancer. In a high-quality screening program, further imaging can resolve most false-positive results; however, some patients may require invasive procedures. Radiation harms, including cancer resulting from cumulative exposure to radiation, vary depending on the age at the start of screening; the number of scans received; and the person's exposure to other sources of radiation, particularly other medical imaging.) [FreeTextEntry1] : 3

## 2023-02-03 DIAGNOSIS — J45.909 UNSPECIFIED ASTHMA, UNCOMPLICATED: ICD-10-CM

## 2023-02-03 DIAGNOSIS — F41.9 ANXIETY DISORDER, UNSPECIFIED: ICD-10-CM

## 2023-02-03 DIAGNOSIS — R05.3 CHRONIC COUGH: ICD-10-CM

## 2023-02-03 DIAGNOSIS — K63.5 POLYP OF COLON: ICD-10-CM

## 2023-02-03 DIAGNOSIS — M79.7 FIBROMYALGIA: ICD-10-CM

## 2023-02-03 DIAGNOSIS — F17.218 NICOTINE DEPENDENCE, CIGARETTES, WITH OTHER NICOTINE-INDUCED DISORDERS: ICD-10-CM

## 2023-02-03 DIAGNOSIS — R10.11 RIGHT UPPER QUADRANT PAIN: ICD-10-CM

## 2023-02-03 DIAGNOSIS — I10 ESSENTIAL (PRIMARY) HYPERTENSION: ICD-10-CM

## 2023-02-03 DIAGNOSIS — J44.9 CHRONIC OBSTRUCTIVE PULMONARY DISEASE, UNSPECIFIED: ICD-10-CM

## 2023-02-03 DIAGNOSIS — E78.5 HYPERLIPIDEMIA, UNSPECIFIED: ICD-10-CM

## 2023-02-03 DIAGNOSIS — E66.9 OBESITY, UNSPECIFIED: ICD-10-CM

## 2023-02-06 ENCOUNTER — OUTPATIENT (OUTPATIENT)
Dept: OUTPATIENT SERVICES | Facility: HOSPITAL | Age: 68
LOS: 1 days | End: 2023-02-06

## 2023-02-06 ENCOUNTER — RESULT REVIEW (OUTPATIENT)
Age: 68
End: 2023-02-06

## 2023-02-06 ENCOUNTER — APPOINTMENT (OUTPATIENT)
Dept: RHEUMATOLOGY | Facility: CLINIC | Age: 68
End: 2023-02-06
Payer: MEDICARE

## 2023-02-06 VITALS
WEIGHT: 206 LBS | BODY MASS INDEX: 32.33 KG/M2 | SYSTOLIC BLOOD PRESSURE: 150 MMHG | OXYGEN SATURATION: 97 % | HEIGHT: 67 IN | DIASTOLIC BLOOD PRESSURE: 64 MMHG | HEART RATE: 84 BPM

## 2023-02-06 DIAGNOSIS — M85.80 OTHER SPECIFIED DISORDERS OF BONE DENSITY AND STRUCTURE, UNSPECIFIED SITE: ICD-10-CM

## 2023-02-06 DIAGNOSIS — T38.0X5A OTHER SPECIFIED DISORDERS OF BONE DENSITY AND STRUCTURE, UNSPECIFIED SITE: ICD-10-CM

## 2023-02-06 DIAGNOSIS — Z98.890 OTHER SPECIFIED POSTPROCEDURAL STATES: Chronic | ICD-10-CM

## 2023-02-06 PROCEDURE — 99214 OFFICE O/P EST MOD 30 MIN: CPT | Mod: GC

## 2023-02-12 NOTE — ASSESSMENT
[FreeTextEntry1] : 65 F is being seen in the Rheumatology clinic for seronegative inflammatory arthritis, fibromyalgia and b/l knee pain from OA. \par \par Impression:\par \par # Seronegative inflammatory arthritis\par Hands, wrists painful, sternoclavicular joint involvement  \par Serologies:  MITCHELL was 1:320, RF, CCP, HLA- B27, dsDNA were negative. \par Imaging: MRI L hand in 6/17 showed persistent fluid within the extensor carpi radialis brevis and longus and extensor digitorum tendons. In 3/18, pt complained of R neck and R clavicular pain. Pt had US done, which showed sternoclavicular synovitis on 4/18. \par Differential dx: seronegative inflammatory arthritis (SAPHO pattern but did no have acne or pustular lesions)\par Previous treatment: On MTX 20 mg/weekly, and intermittent episode of steroid but patient stopped following. Golimumab 4 sessions 4/27-9/24/22 (stopped due to transportation issue and minimal improvement)  \par Current treatment: MTX 20 mg weekly restarted on 5/19\par \par #Fibromyalgia \par On Cymbalta and refused to take gabapentin \par \par #B/L Carpal tunnel syndrome \par \par #B/L knee OAs\par \par # HCM\par Influenza vaccine 3/19, pneumovax 2015; prevnar 7/19\par DEXA scan normal 6/19, repeat this year. \par S/p Covid vaccine primary series. \par \par Plan\par -Await for MRI hand pending, and recheck XR of hands and knees\par -Discussed with patient to restart Golimumab monthly sub injection  and patient agreed\par -recheck hepatitis panel and QuantiFeron  \par \par \par D/w Dr. Dias\par RTC 1-2 weeks\par Laci Roland MD\par Rheumatology fellow  \par \par

## 2023-02-12 NOTE — HISTORY OF PRESENT ILLNESS
[Joint Swelling] : joint swelling [Joint Pain] : joint pain [Morning Stiffness] : morning stiffness [Fatigue] : fatigue [FreeTextEntry1] : 2/7/23\par Patient is off on Golimumab infusion (last dose 9/14/22) and now the inflammatory joints swelling (b/l wrist swelling, MCPs 2nd, shoulder L>R tenderness, sternoclavicular joint tenderness). Discussed with patient to restart Golimumab and convince to start on subq injection. Patient agreed. \par \par ROS:\par Positive:joint swelling (MCPs 2nd, wrist R>L, B/L shoulder L>R, sternoclavicular (SCs) joint swelling), morning stiffness, b/l hands numbness, and tingling\par Negative: SOB, hematuria, SOB, chest pain, pustular lesions, rash, acnes \par  [RF] : negative rheumatoid factor [CCP] : negative CCP antibody [14-3-3 eta] : negative 14-3-3 eta [SSA] : negative anti-Ro/SSA [SSB] : negative anti-La/SSB [Erosions] : no erosions [Rash] : no rash [Shortness of Breath] : no shortness of breath [Ocular Symptoms] : no ocular symptoms [Dysphonia] : no dysphonia [Chest Pain] : no chest pain [TextBox_2] : 2016 [TextBox_40] : MTX 20 mg weekly  [TextBox_42] : Golimumab 4/2022-9/2022

## 2023-02-12 NOTE — PHYSICAL EXAM
[General Appearance - Alert] : alert [General Appearance - In No Acute Distress] : in no acute distress [General Appearance - Well Nourished] : well nourished [General Appearance - Well-Appearing] : healthy appearing [Sclera] : the sclera and conjunctiva were normal [Extraocular Movements] : extraocular movements were intact [Outer Ear] : the ears and nose were normal in appearance [Hearing Threshold Finger Rub Not [place]] : hearing was normal [Neck Appearance] : the appearance of the neck was normal [Auscultation Breath Sounds / Voice Sounds] : lungs were clear to auscultation bilaterally [Heart Sounds] : normal S1 and S2 [Murmurs] : no murmurs [Edema] : there was no peripheral edema [Bowel Sounds] : normal bowel sounds [Abdomen Soft] : soft [Abdomen Tenderness] : non-tender [Cervical Lymph Nodes Enlarged Posterior Bilaterally] : posterior cervical [Cervical Lymph Nodes Enlarged Anterior Bilaterally] : anterior cervical [Supraclavicular Lymph Nodes Enlarged Bilaterally] : supraclavicular [] : no rash [Motor Exam] : the motor exam was normal [No Focal Deficits] : no focal deficits [Oriented To Time, Place, And Person] : oriented to person, place, and time [FreeTextEntry1] : dry skins palms

## 2023-02-13 ENCOUNTER — OUTPATIENT (OUTPATIENT)
Dept: OUTPATIENT SERVICES | Facility: HOSPITAL | Age: 68
LOS: 1 days | End: 2023-02-13

## 2023-02-13 ENCOUNTER — APPOINTMENT (OUTPATIENT)
Dept: RHEUMATOLOGY | Facility: CLINIC | Age: 68
End: 2023-02-13
Payer: MEDICARE

## 2023-02-13 VITALS
HEART RATE: 99 BPM | DIASTOLIC BLOOD PRESSURE: 60 MMHG | OXYGEN SATURATION: 99 % | WEIGHT: 207 LBS | HEIGHT: 67 IN | BODY MASS INDEX: 32.49 KG/M2 | SYSTOLIC BLOOD PRESSURE: 136 MMHG

## 2023-02-13 DIAGNOSIS — Z79.899 OTHER LONG TERM (CURRENT) DRUG THERAPY: ICD-10-CM

## 2023-02-13 DIAGNOSIS — M13.0 POLYARTHRITIS, UNSPECIFIED: ICD-10-CM

## 2023-02-13 DIAGNOSIS — M85.80 OTHER SPECIFIED DISORDERS OF BONE DENSITY AND STRUCTURE, UNSPECIFIED SITE: ICD-10-CM

## 2023-02-13 PROCEDURE — 99214 OFFICE O/P EST MOD 30 MIN: CPT | Mod: GC

## 2023-02-13 RX ORDER — GOLIMUMAB 100 MG/ML
100 INJECTION, SOLUTION SUBCUTANEOUS
Refills: 0 | Status: COMPLETED | OUTPATIENT
Start: 2023-02-13

## 2023-02-13 RX ADMIN — GOLIMUMAB 0 MG/ML: 100 INJECTION, SOLUTION SUBCUTANEOUS at 00:00

## 2023-02-15 LAB
ALBUMIN SERPL ELPH-MCNC: 4.3 G/DL
ALP BLD-CCNC: 127 U/L
ALT SERPL-CCNC: 13 U/L
AST SERPL-CCNC: 14 U/L
BILIRUB DIRECT SERPL-MCNC: 0.1 MG/DL
BILIRUB INDIRECT SERPL-MCNC: 0.4 MG/DL
BILIRUB SERPL-MCNC: 0.5 MG/DL
HBV CORE IGG+IGM SER QL: NONREACTIVE
M TB IFN-G BLD-IMP: NEGATIVE
PROT SERPL-MCNC: 6.3 G/DL
QUANTIFERON TB PLUS MITOGEN MINUS NIL: 4.03 IU/ML
QUANTIFERON TB PLUS NIL: 0.07 IU/ML
QUANTIFERON TB PLUS TB1 MINUS NIL: 0.12 IU/ML
QUANTIFERON TB PLUS TB2 MINUS NIL: 0.08 IU/ML

## 2023-02-21 NOTE — ASSESSMENT
[FreeTextEntry1] : 65 F is being seen in the Rheumatology clinic for seronegative inflammatory arthritis, fibromyalgia and b/l knee pain from OA. \par \par 2/13/23: Patient presented today and patient was crying due to general pain and anxiety. She brought the Simponi subc injection and patient agreed with the injection. She got the injection today w/o any issue. \par \par # Seronegative inflammatory arthritis\par Hands, wrists painful, sternoclavicular joint involvement \par Serologies: MITCHELL was 1:320, RF, CCP, HLA- B27, dsDNA were negative. \par Imaging: MRI L hand in 6/17 showed persistent fluid within the extensor carpi radialis brevis and longus and extensor digitorum tendons. In 3/18, pt complained of R neck and R clavicular pain. Pt had US done, which showed sternoclavicular synovitis on 4/18. \par Differential dx: seronegative inflammatory arthritis (SAPHO pattern but did no have acne or pustular lesions)\par Previous treatment: On MTX 20 mg/weekly, and intermittent episode of steroid but patient stopped following. Golimumab 4 sessions 4/27-9/24/22 (stopped due to transportation issue and minimal improvement) \par Current treatment: MTX 20 mg weekly restarted on 5/19\par \par #Fibromyalgia \par On Cymbalta and refused to take gabapentin \par \par #B/L Carpal tunnel syndrome \par \par #B/L knee OAs\par \par # HCM\par Influenza vaccine 3/19, pneumovax 2015; prevnar 7/19\par DEXA scan normal 6/19, repeat this year. \par S/p Covid vaccine primary series. \par 2/2023 QuantiFeron -ve, hepatitis panel negative \par \par DW Dr. Dias\par Laci Roland MD Rheumatology fellow

## 2023-02-22 DIAGNOSIS — M19.90 UNSPECIFIED OSTEOARTHRITIS, UNSPECIFIED SITE: ICD-10-CM

## 2023-02-23 ENCOUNTER — NON-APPOINTMENT (OUTPATIENT)
Age: 68
End: 2023-02-23

## 2023-02-23 RX ADMIN — ACETAMINOPHEN 2 MG: 325 TABLET ORAL at 00:00

## 2023-03-05 LAB
ANION GAP SERPL CALC-SCNC: 8 MMOL/L
BASOPHILS # BLD AUTO: 0.03 K/UL
BASOPHILS NFR BLD AUTO: 0.3 %
BUN SERPL-MCNC: 14 MG/DL
CALCIUM SERPL-MCNC: 9 MG/DL
CHLORIDE SERPL-SCNC: 106 MMOL/L
CHOLEST SERPL-MCNC: 170 MG/DL
CO2 SERPL-SCNC: 25 MMOL/L
CREAT SERPL-MCNC: 0.58 MG/DL
EGFR: 100 ML/MIN/1.73M2
EOSINOPHIL # BLD AUTO: 0.14 K/UL
EOSINOPHIL NFR BLD AUTO: 1.4 %
GLUCOSE SERPL-MCNC: 97 MG/DL
HCT VFR BLD CALC: 38.6 %
HDLC SERPL-MCNC: 51 MG/DL
HGB BLD-MCNC: 12.4 G/DL
IMM GRANULOCYTES NFR BLD AUTO: 0.4 %
LDLC SERPL CALC-MCNC: 93 MG/DL
LYMPHOCYTES # BLD AUTO: 2.53 K/UL
LYMPHOCYTES NFR BLD AUTO: 25.3 %
MAN DIFF?: NORMAL
MCHC RBC-ENTMCNC: 30.8 PG
MCHC RBC-ENTMCNC: 32.1 GM/DL
MCV RBC AUTO: 95.8 FL
MONOCYTES # BLD AUTO: 0.85 K/UL
MONOCYTES NFR BLD AUTO: 8.5 %
NEUTROPHILS # BLD AUTO: 6.42 K/UL
NEUTROPHILS NFR BLD AUTO: 64.1 %
NONHDLC SERPL-MCNC: 119 MG/DL
PLATELET # BLD AUTO: 297 K/UL
POTASSIUM SERPL-SCNC: 4.6 MMOL/L
RBC # BLD: 4.03 M/UL
RBC # FLD: 15.3 %
SODIUM SERPL-SCNC: 139 MMOL/L
TRIGL SERPL-MCNC: 129 MG/DL
WBC # FLD AUTO: 10.01 K/UL

## 2023-03-13 ENCOUNTER — APPOINTMENT (OUTPATIENT)
Dept: RHEUMATOLOGY | Facility: CLINIC | Age: 68
End: 2023-03-13
Payer: MEDICARE

## 2023-03-13 ENCOUNTER — OUTPATIENT (OUTPATIENT)
Dept: OUTPATIENT SERVICES | Facility: HOSPITAL | Age: 68
LOS: 1 days | End: 2023-03-13

## 2023-03-13 VITALS
SYSTOLIC BLOOD PRESSURE: 138 MMHG | HEIGHT: 67 IN | OXYGEN SATURATION: 97 % | BODY MASS INDEX: 32.49 KG/M2 | WEIGHT: 207 LBS | HEART RATE: 71 BPM | RESPIRATION RATE: 16 BRPM | DIASTOLIC BLOOD PRESSURE: 68 MMHG

## 2023-03-13 DIAGNOSIS — Z98.890 OTHER SPECIFIED POSTPROCEDURAL STATES: Chronic | ICD-10-CM

## 2023-03-13 PROCEDURE — 99214 OFFICE O/P EST MOD 30 MIN: CPT | Mod: GC

## 2023-03-13 RX ORDER — GOLIMUMAB 100 MG/ML
100 INJECTION, SOLUTION SUBCUTANEOUS
Refills: 0 | Status: COMPLETED | OUTPATIENT
Start: 2023-03-13

## 2023-03-13 RX ADMIN — GOLIMUMAB 0 MG/ML: 100 INJECTION, SOLUTION SUBCUTANEOUS at 00:00

## 2023-03-16 DIAGNOSIS — M06.09 RHEUMATOID ARTHRITIS WITHOUT RHEUMATOID FACTOR, MULTIPLE SITES: ICD-10-CM

## 2023-03-16 NOTE — REASON FOR VISIT
[Procedure: _________] : a [unfilled] procedure visit [FreeTextEntry1] : 68-year-old F being seen for a procedure

## 2023-03-16 NOTE — ASSESSMENT
[FreeTextEntry1] : 65 F is being seen in the Rheumatology clinic for seronegative inflammatory arthritis, fibromyalgia and b/l knee pain from OA. \par \par 3/13/23: Patient presented today for the #2 Simponi subc injection. She got the injection today w/o any issue. \par \par # Seronegative inflammatory arthritis\par Hands, wrists painful, sternoclavicular joint involvement \par Serologies: MITCHELL was 1:320, RF, CCP, HLA- B27, dsDNA were negative. \par Imaging: MRI L hand in 6/17 showed persistent fluid within the extensor carpi radialis brevis and longus and extensor digitorum tendons. In 3/18, pt complained of R neck and R clavicular pain. Pt had US done, which showed sternoclavicular synovitis on 4/18. \par Differential dx: seronegative inflammatory arthritis (SAPHO pattern but did no have acne or pustular lesions)\par Previous treatment: On MTX 20 mg/weekly, and intermittent episode of steroid but patient stopped following. Golimumab 4 sessions 4/27-9/24/22 (stopped due to transportation issue and minimal improvement) \par Current treatment: MTX 20 mg weekly restarted on 5/19\par \par #Fibromyalgia \par On Cymbalta and refused to take gabapentin \par \par #B/L Carpal tunnel syndrome \par \par #B/L knee OAs\par \par # HCM\par Influenza vaccine 3/19, pneumovax 2015; prevnar 7/19\par DEXA scan normal 6/19, repeat this year. \par S/p Covid vaccine primary series. \par 2/2023 QuantiFeron -ve, hepatitis panel negative \par \par DW Dr. Dias\par Laci Roland MD Rheumatology fellow. \par

## 2023-03-17 ENCOUNTER — RX RENEWAL (OUTPATIENT)
Age: 68
End: 2023-03-17

## 2023-03-30 ENCOUNTER — APPOINTMENT (OUTPATIENT)
Dept: PULMONOLOGY | Facility: CLINIC | Age: 68
End: 2023-03-30
Payer: MEDICARE

## 2023-03-30 VITALS
HEART RATE: 67 BPM | RESPIRATION RATE: 15 BRPM | DIASTOLIC BLOOD PRESSURE: 76 MMHG | TEMPERATURE: 97.2 F | SYSTOLIC BLOOD PRESSURE: 156 MMHG | OXYGEN SATURATION: 95 % | WEIGHT: 205 LBS | HEIGHT: 67 IN | BODY MASS INDEX: 32.18 KG/M2

## 2023-03-30 DIAGNOSIS — J44.9 CHRONIC OBSTRUCTIVE PULMONARY DISEASE, UNSPECIFIED: ICD-10-CM

## 2023-03-30 DIAGNOSIS — R05.3 CHRONIC COUGH: ICD-10-CM

## 2023-03-30 DIAGNOSIS — E66.9 OBESITY, UNSPECIFIED: ICD-10-CM

## 2023-03-30 PROCEDURE — 99213 OFFICE O/P EST LOW 20 MIN: CPT | Mod: 25

## 2023-03-30 PROCEDURE — 99407 BEHAV CHNG SMOKING > 10 MIN: CPT

## 2023-03-30 RX ORDER — NICOTINE 4 MG/1
10 INHALANT RESPIRATORY (INHALATION)
Qty: 1 | Refills: 0 | Status: ACTIVE | COMMUNITY
Start: 2023-03-30 | End: 1900-01-01

## 2023-03-30 NOTE — REASON FOR VISIT
[Follow-Up] : a follow-up visit [Asthma] : asthma [COPD] : COPD [Nicotine Dependence] : nicotine dependence

## 2023-03-31 NOTE — HISTORY OF PRESENT ILLNESS
[TextBox_4] : Pt is a 67y/o F, current smoker with asthma/COPD, HTN, HLD, fibromyalgia, inflammatory arthritis on methotrexate, duloxetine (followed by rheum), b/l knee OA, R sternoclavicular arthritis, hepatic steatosis, anxiety, depression (follows outpatient psychiatry and psychology), and colonic polyps, who present in office today for a f/u visit.\par \par Pt reports chronic cough, that she knows is directly related to her smoking 1 pack of cigarettes daily. States she has tried quitting before and was able to stop for 2 years, however she is now distressed by her addition to cigarettes and it's negative affect on her life. Reports recently trying to quit using Nicotine gum, and encouraging her grandchildren to writer her a letter stating why they want her to quit smoking. Reports an episode two weeks ago where she was gasping for air, utilized a nebulizer treatment, and immediately smoked afterwards.  \par \par She does comply with yearly Lung CA screenings, last one in 10/2022 with no significant findings. Currently denies SOB, fever, chest pain, or episodes of coughing up blood. \par \par

## 2023-03-31 NOTE — COUNSELING
[Cessation strategies including cessation program discussed] : Cessation strategies including cessation program discussed [Use of nicotine replacement therapies and other medications discussed] : Use of nicotine replacement therapies and other medications discussed [Encouraged to pick a quit date and identify support needed to quit] : Encouraged to pick a quit date and identify support needed to quit [Yes] : Willing to quit smoking [FreeTextEntry3] : 15

## 2023-03-31 NOTE — REVIEW OF SYSTEMS
[Cough] : cough [Chronic Pain] : chronic pain [Obesity] : obesity [Negative] : Psychiatric [TextBox_30] : Chronic cough [TextBox_94] : Ambulates with a walker

## 2023-03-31 NOTE — PHYSICAL EXAM
[No Acute Distress] : no acute distress [Normal Appearance] : normal appearance [Normal Oropharynx] : normal oropharynx [No Neck Mass] : no neck mass [Normal Rate/Rhythm] : normal rate/rhythm [Normal S1, S2] : normal s1, s2 [No Murmurs] : no murmurs [No Resp Distress] : no resp distress [Clear to Auscultation Bilaterally] : clear to auscultation bilaterally [No Abnormalities] : no abnormalities [Benign] : benign [No Clubbing] : no clubbing [No Cyanosis] : no cyanosis [No Edema] : no edema [FROM] : FROM [Normal Color/ Pigmentation] : normal color/ pigmentation [No Focal Deficits] : no focal deficits [Oriented x3] : oriented x3 [Normal Affect] : normal affect [TextBox_99] : Ambulates with walker

## 2023-04-07 ENCOUNTER — OUTPATIENT (OUTPATIENT)
Dept: OUTPATIENT SERVICES | Facility: HOSPITAL | Age: 68
LOS: 1 days | End: 2023-04-07
Payer: MEDICARE

## 2023-04-07 ENCOUNTER — APPOINTMENT (OUTPATIENT)
Dept: MAMMOGRAPHY | Facility: IMAGING CENTER | Age: 68
End: 2023-04-07
Payer: MEDICARE

## 2023-04-07 ENCOUNTER — APPOINTMENT (OUTPATIENT)
Dept: RADIOLOGY | Facility: IMAGING CENTER | Age: 68
End: 2023-04-07
Payer: MEDICARE

## 2023-04-07 DIAGNOSIS — Z00.8 ENCOUNTER FOR OTHER GENERAL EXAMINATION: ICD-10-CM

## 2023-04-07 DIAGNOSIS — Z98.890 OTHER SPECIFIED POSTPROCEDURAL STATES: Chronic | ICD-10-CM

## 2023-04-07 PROCEDURE — 77067 SCR MAMMO BI INCL CAD: CPT

## 2023-04-07 PROCEDURE — 77063 BREAST TOMOSYNTHESIS BI: CPT | Mod: 26

## 2023-04-07 PROCEDURE — 77080 DXA BONE DENSITY AXIAL: CPT | Mod: 26

## 2023-04-07 PROCEDURE — 73562 X-RAY EXAM OF KNEE 3: CPT

## 2023-04-07 PROCEDURE — 77067 SCR MAMMO BI INCL CAD: CPT | Mod: 26

## 2023-04-07 PROCEDURE — 73562 X-RAY EXAM OF KNEE 3: CPT | Mod: 26,50

## 2023-04-07 PROCEDURE — 73130 X-RAY EXAM OF HAND: CPT

## 2023-04-07 PROCEDURE — 73130 X-RAY EXAM OF HAND: CPT | Mod: 26,50

## 2023-04-07 PROCEDURE — 77080 DXA BONE DENSITY AXIAL: CPT

## 2023-04-07 PROCEDURE — 77063 BREAST TOMOSYNTHESIS BI: CPT

## 2023-04-10 ENCOUNTER — OUTPATIENT (OUTPATIENT)
Dept: OUTPATIENT SERVICES | Facility: HOSPITAL | Age: 68
LOS: 1 days | End: 2023-04-10

## 2023-04-10 ENCOUNTER — APPOINTMENT (OUTPATIENT)
Dept: INTERNAL MEDICINE | Facility: CLINIC | Age: 68
End: 2023-04-10
Payer: MEDICARE

## 2023-04-10 VITALS
WEIGHT: 206 LBS | OXYGEN SATURATION: 98 % | HEART RATE: 69 BPM | BODY MASS INDEX: 32.33 KG/M2 | SYSTOLIC BLOOD PRESSURE: 121 MMHG | DIASTOLIC BLOOD PRESSURE: 65 MMHG | HEIGHT: 67 IN

## 2023-04-10 DIAGNOSIS — R91.1 SOLITARY PULMONARY NODULE: ICD-10-CM

## 2023-04-10 DIAGNOSIS — F32.A ANXIETY DISORDER, UNSPECIFIED: ICD-10-CM

## 2023-04-10 DIAGNOSIS — F41.9 ANXIETY DISORDER, UNSPECIFIED: ICD-10-CM

## 2023-04-10 DIAGNOSIS — R79.89 OTHER SPECIFIED ABNORMAL FINDINGS OF BLOOD CHEMISTRY: ICD-10-CM

## 2023-04-10 DIAGNOSIS — Z98.890 OTHER SPECIFIED POSTPROCEDURAL STATES: Chronic | ICD-10-CM

## 2023-04-10 DIAGNOSIS — K76.0 FATTY (CHANGE OF) LIVER, NOT ELSEWHERE CLASSIFIED: ICD-10-CM

## 2023-04-10 PROCEDURE — 99214 OFFICE O/P EST MOD 30 MIN: CPT | Mod: GC

## 2023-04-10 NOTE — HISTORY OF PRESENT ILLNESS
[FreeTextEntry1] : Follow up [de-identified] : 68-year-old woman with mild asthma/COPD overlap, current smoker, hypertension, hyperlipidemia, fibromyalgia, seronegative inflammatory arthritis, bilateral knee OA, carpal tunnel syndrome, hepatic steatosis/NAFLD, anxiety, depression, colon polyps, peptic ulcer disease on chronic PPI therapy. Presenting for followup\par \par   She reports that she currently has an upper respiratory infection and has symptoms of runny nose, sore throat, cough and states that her obstructive lung disease symptoms are slightly worsened including shortness of breath and wheezing.  She denies  severe symptoms at this time\par \par Reports stable rheum symptoms including pain predominantly in hands and knees as well as right sided rib /abdomen pain and tenderness. No fevers, chills, chest pain, abdominal pain, N/V/D/C. \par \par States that depression is Currently stable (PHQ 7) but her psychiatrist is recommending an EKG and a metabolic panel to assess QTc and kidney/liver function.\par  \par she reports good compliance with her medications and her blood pressures are well controlled at home.\par \par She recently had a mammogram done as well as a DEXA and she has an upcoming appointment with gynecology for cervical cancer screening in May.\par \par Regarding her smoking she reports 1 pack/day, she started smoking 30 to 60 minutes into the day.  She reports that she very much wants to quit smoking and has agreed to set a quit date of June 1.  She states that protective factors include [employer] and her grandchildren but that she is struggles very much with the addictive nature.  She does not want any systemic medications and is happy to continue using the gum but states that she is not sure how effective it will be.  She does report that roughly 2 years ago she she was able to stop smoking but she is not really sure how she was able to do that.  She is interested in signing up for a text program from [employer] and why.

## 2023-04-10 NOTE — ASSESSMENT
[FreeTextEntry1] : 68F with inflammatory and noninflammatory arthtritis, HTN/HLD, asthma/copd overlap, current smoker, PUD s/p treated HP, NALFD presenting for follow up\par \par HCM\par #Vaccines\par -flu UTD\par -COVID s/p primary series, encouraged booster\par -pneumococcal - completed\par -Tdap - UTD\par \par #Cancer\par -CRC - as above (2025)\par -Lung - as above (2023)\par -Mammo recetly done but pending report (last was birads 2 in 2022)\par -Cervical wnl 2018 - has apt in may for repeat\par \par #Others\par -DEXA - still ok in 2023 although with likely falsely elevated spine T score due to osteophytes\par -PHQ/AUDIT/DAST UTD\par \par RTC 3 Months\par Telephonic visit in 5 weeks to reinforce smoking cessation\par \par Discussed with Dr. Pimentel\par \par Yuan Torres MD\par PGY3 [employer]

## 2023-04-10 NOTE — PHYSICAL EXAM
[Normal TMs] : both tympanic membranes were normal [No Respiratory Distress] : no respiratory distress  [No Accessory Muscle Use] : no accessory muscle use [Normal Rate] : normal rate  [Regular Rhythm] : with a regular rhythm [No Edema] : there was no peripheral edema [Soft] : abdomen soft [Non Tender] : non-tender [No Rash] : no rash [No Focal Deficits] : no focal deficits [Normal] : affect was normal and insight and judgment were intact [de-identified] : Pharyngeal erythema present, no tonsillar exudates [de-identified] : mild rhonci present in all quadrants [de-identified] : pain at bilateral knees, and bilateral hands (inc wrist, fingers) [de-identified] : gait impaired by knee pain

## 2023-04-17 ENCOUNTER — APPOINTMENT (OUTPATIENT)
Dept: RHEUMATOLOGY | Facility: CLINIC | Age: 68
End: 2023-04-17

## 2023-04-17 DIAGNOSIS — I10 ESSENTIAL (PRIMARY) HYPERTENSION: ICD-10-CM

## 2023-04-17 DIAGNOSIS — M19.90 UNSPECIFIED OSTEOARTHRITIS, UNSPECIFIED SITE: ICD-10-CM

## 2023-04-17 DIAGNOSIS — R07.9 CHEST PAIN, UNSPECIFIED: ICD-10-CM

## 2023-04-17 DIAGNOSIS — F41.9 ANXIETY DISORDER, UNSPECIFIED: ICD-10-CM

## 2023-04-17 DIAGNOSIS — Z00.00 ENCOUNTER FOR GENERAL ADULT MEDICAL EXAMINATION WITHOUT ABNORMAL FINDINGS: ICD-10-CM

## 2023-04-17 DIAGNOSIS — R79.89 OTHER SPECIFIED ABNORMAL FINDINGS OF BLOOD CHEMISTRY: ICD-10-CM

## 2023-04-17 DIAGNOSIS — E78.5 HYPERLIPIDEMIA, UNSPECIFIED: ICD-10-CM

## 2023-04-17 DIAGNOSIS — K76.0 FATTY (CHANGE OF) LIVER, NOT ELSEWHERE CLASSIFIED: ICD-10-CM

## 2023-04-17 DIAGNOSIS — M79.7 FIBROMYALGIA: ICD-10-CM

## 2023-04-17 DIAGNOSIS — R91.1 SOLITARY PULMONARY NODULE: ICD-10-CM

## 2023-04-17 DIAGNOSIS — J44.9 CHRONIC OBSTRUCTIVE PULMONARY DISEASE, UNSPECIFIED: ICD-10-CM

## 2023-04-20 RX ADMIN — ACETAMINOPHEN 2 MG: 325 TABLET ORAL at 00:00

## 2023-04-24 ENCOUNTER — OUTPATIENT (OUTPATIENT)
Dept: OUTPATIENT SERVICES | Facility: HOSPITAL | Age: 68
LOS: 1 days | End: 2023-04-24

## 2023-04-24 ENCOUNTER — APPOINTMENT (OUTPATIENT)
Dept: RHEUMATOLOGY | Facility: CLINIC | Age: 68
End: 2023-04-24
Payer: MEDICARE

## 2023-04-24 VITALS
SYSTOLIC BLOOD PRESSURE: 120 MMHG | BODY MASS INDEX: 31.23 KG/M2 | OXYGEN SATURATION: 99 % | DIASTOLIC BLOOD PRESSURE: 69 MMHG | HEART RATE: 70 BPM | HEIGHT: 67 IN | WEIGHT: 199 LBS

## 2023-04-24 DIAGNOSIS — Z98.890 OTHER SPECIFIED POSTPROCEDURAL STATES: Chronic | ICD-10-CM

## 2023-04-24 PROCEDURE — 99214 OFFICE O/P EST MOD 30 MIN: CPT | Mod: GC

## 2023-04-24 RX ORDER — GOLIMUMAB 100 MG/ML
100 INJECTION, SOLUTION SUBCUTANEOUS
Refills: 0 | Status: COMPLETED | OUTPATIENT
Start: 2023-04-24

## 2023-04-24 RX ADMIN — GOLIMUMAB 0 MG/ML: 100 INJECTION, SOLUTION SUBCUTANEOUS at 00:00

## 2023-04-26 DIAGNOSIS — M06.09 RHEUMATOID ARTHRITIS WITHOUT RHEUMATOID FACTOR, MULTIPLE SITES: ICD-10-CM

## 2023-05-03 ENCOUNTER — RX RENEWAL (OUTPATIENT)
Age: 68
End: 2023-05-03

## 2023-05-08 ENCOUNTER — APPOINTMENT (OUTPATIENT)
Dept: OBGYN | Facility: HOSPITAL | Age: 68
End: 2023-05-08

## 2023-05-17 ENCOUNTER — APPOINTMENT (OUTPATIENT)
Dept: INTERNAL MEDICINE | Facility: CLINIC | Age: 68
End: 2023-05-17

## 2023-05-22 ENCOUNTER — APPOINTMENT (OUTPATIENT)
Dept: RHEUMATOLOGY | Facility: CLINIC | Age: 68
End: 2023-05-22
Payer: MEDICARE

## 2023-05-22 ENCOUNTER — OUTPATIENT (OUTPATIENT)
Dept: OUTPATIENT SERVICES | Facility: HOSPITAL | Age: 68
LOS: 1 days | End: 2023-05-22

## 2023-05-22 VITALS
WEIGHT: 201 LBS | TEMPERATURE: 97.1 F | DIASTOLIC BLOOD PRESSURE: 60 MMHG | HEART RATE: 74 BPM | SYSTOLIC BLOOD PRESSURE: 132 MMHG | OXYGEN SATURATION: 98 % | HEIGHT: 67 IN | BODY MASS INDEX: 31.55 KG/M2 | RESPIRATION RATE: 18 BRPM

## 2023-05-22 DIAGNOSIS — Z98.890 OTHER SPECIFIED POSTPROCEDURAL STATES: Chronic | ICD-10-CM

## 2023-05-22 PROCEDURE — ZZZZZ: CPT | Mod: GC

## 2023-05-22 RX ORDER — DICLOFENAC POTASSIUM 50 MG/1
50 TABLET, COATED ORAL TWICE DAILY
Qty: 60 | Refills: 3 | Status: DISCONTINUED | COMMUNITY
Start: 2022-07-12 | End: 2023-05-22

## 2023-05-22 RX ORDER — GOLIMUMAB 50 MG/.5ML
50 INJECTION, SOLUTION SUBCUTANEOUS
Refills: 0 | Status: COMPLETED | OUTPATIENT
Start: 2023-05-22

## 2023-05-22 RX ORDER — ACETAMINOPHEN 325 MG/1
325 TABLET ORAL
Qty: 0 | Refills: 0 | Status: DISCONTINUED | OUTPATIENT
Start: 2022-09-14 | End: 2023-05-22

## 2023-05-22 RX ORDER — ACETAMINOPHEN 500 MG/1
500 TABLET, COATED ORAL
Qty: 30 | Refills: 0 | Status: COMPLETED | COMMUNITY
Start: 2022-08-18 | End: 2023-05-22

## 2023-05-22 RX ORDER — GOLIMUMAB 50 MG/4ML
50 SOLUTION INTRAVENOUS
Qty: 4 | Refills: 0 | Status: COMPLETED | COMMUNITY
Start: 2022-03-07 | End: 2023-05-22

## 2023-05-22 RX ORDER — POLYETHYLENE GLYCOL 3350 17 G/17G
17 POWDER, FOR SOLUTION ORAL
Qty: 30 | Refills: 3 | Status: DISCONTINUED | COMMUNITY
Start: 2021-01-14 | End: 2023-05-22

## 2023-05-22 RX ORDER — IBUPROFEN 600 MG/1
600 TABLET, FILM COATED ORAL
Qty: 90 | Refills: 1 | Status: DISCONTINUED | COMMUNITY
Start: 2022-03-17 | End: 2023-05-22

## 2023-05-22 RX ADMIN — GOLIMUMAB 0 MG/0.5ML: 50 INJECTION, SOLUTION SUBCUTANEOUS at 00:00

## 2023-05-22 NOTE — ASSESSMENT
[FreeTextEntry1] : 68 F is being seen in the Rheumatology clinic for seronegative inflammatory arthritis, fibromyalgia and b/l knee pain from OA here for Simponi 50mg subQ injection. Given today in clinic (4/24/23)\par \par Impression:\par \par # Seronegative inflammatory arthritis\par Hands, wrists painful, sternoclavicular joint involvement  \par Serologies:  MITCHELL was 1:320, RF, CCP, HLA- B27, dsDNA were negative. \par Imaging: MRI L hand in 6/17 showed persistent fluid within the extensor carpi radialis brevis and longus and extensor digitorum tendons. In 3/18, pt complained of R neck and R clavicular pain. Pt had US done, which showed sternoclavicular synovitis on 4/18. \par Differential dx: seronegative inflammatory arthritis (SAPHO pattern but did no have acne or pustular lesions)\par Previous treatment: On MTX 20 mg/weekly, and intermittent episode of steroid but patient stopped following. Golimumab 4 sessions 4/27-9/24/22 (stopped due to transportation issue and minimal improvement)  \par Current treatment: MTX 20 mg weekly restarted on 5/19\par \par Gave injection in clinic today of Simponi 50mg (4/24/23) subQ due q 4 weeks\par \par \par patient to follow up in 1 month\par \par Rheumatology Fellow\par PAOLA

## 2023-05-22 NOTE — PROCEDURE
[Today's Date:] : Date: [unfilled] [Therapeutic] : therapeutic [#1 Site: ______] : #1 site identified in the [unfilled] [de-identified] : Golimumab 50mg sub Q given

## 2023-05-22 NOTE — HISTORY OF PRESENT ILLNESS
[Joint Swelling] : joint swelling [Joint Pain] : joint pain [Morning Stiffness] : morning stiffness [Fatigue] : fatigue [FreeTextEntry1] : 23\par Complains of persistent tingling on numbness of UE elbow down and hands and fingers and shoulder pain. Also persistent myalgia. R sternoclavicular swelling and pain . wrist swelling persists\par \par 23\par Patient is off on Golimumab infusion (last dose 22) and now the inflammatory joints swelling (b/l wrist swelling, MCPs 2nd, shoulder L>R tenderness, sternoclavicular joint tenderness). Discussed with patient to restart Golimumab and convince to start on subq injection. Patient agreed. \par \par ROS:\par Positive:joint swelling (MCPs 2nd, wrist R>L, B/L shoulder L>R, sternoclavicular (SCs) joint swelling), morning stiffness, b/l hands numbness, and tingling\par Negative: SOB, hematuria, SOB, chest pain, pustular lesions, rash, acnes \par  [RF] : negative rheumatoid factor [CCP] : negative CCP antibody [14-3-3 eta] : negative 14-3-3 eta [SSA] : negative anti-Ro/SSA [SSB] : negative anti-La/SSB [Erosions] : no erosions [Rash] : no rash [Shortness of Breath] : no shortness of breath [Ocular Symptoms] : no ocular symptoms [Dysphonia] : no dysphonia [Chest Pain] : no chest pain [TextBox_2] : 2016 [TextBox_40] : MTX 20 mg weekly  [TextBox_42] : Golimumab 4/2022-9/2022

## 2023-05-23 DIAGNOSIS — M13.80 OTHER SPECIFIED ARTHRITIS, UNSPECIFIED SITE: ICD-10-CM

## 2023-05-23 DIAGNOSIS — Z79.899 OTHER LONG TERM (CURRENT) DRUG THERAPY: ICD-10-CM

## 2023-05-24 ENCOUNTER — RX RENEWAL (OUTPATIENT)
Age: 68
End: 2023-05-24

## 2023-05-24 NOTE — ASSESSMENT
[FreeTextEntry1] : Seronegative arthropathy of multiple sites (716.89) (M06.09)\par \par 68 F is being seen in the Rheumatology clinic for seronegative inflammatory arthritis, fibromyalgia and b/l knee pain from OA here for Simponi 50mg subQ injection. Given today in clinic (5/22/23) due q 4 weeks\par \par 5/22/2023: Today visit, noticed improvement of sternoclavicular joint tenderness and synovitis. Patient main complains were her b/l hand pain due to carpal tunnel syndrome.  \par \par Impression:\par \par # Seronegative inflammatory arthritis\par Hands, wrists painful, sternoclavicular joint involvement \par Serologies: MITCHELL was 1:320, RF, CCP, HLA- B27, dsDNA were negative. \par Imaging: MRI L hand in 6/17 showed persistent fluid within the extensor carpi radialis brevis and longus and extensor digitorum tendons. In 3/18, pt complained of R neck and R clavicular pain. Pt had US done, which showed sternoclavicular synovitis on 4/18. \par Differential dx: seronegative inflammatory arthritis (SAPHO pattern but did no have acne or pustular lesions)\par Previous treatment: On MTX 20 mg/weekly, and intermittent episode of steroid but patient stopped following. Golimumab 4 sessions 4/27-9/24/22 (stopped due to transportation issue and minimal improvement) \par Current treatment: MTX 20 mg weekly restarted on 5/19, Golimumab restarted on 9/14/2022 50 mg q 4 weeks \par Some improvement on her sternoclavicular joints. We will c/w Simponi. Today injection in clinic of Simponi 50mg (4/24/23) subQ due q 4 weeks\par \par #B/l carpal tunnel syndrome\par Patient is planning to get the carpal tunnel surgery since it did not improve with conservative management such as brace. \par Patient is planning to get vacation and will plan to get the surgery after a month of next Simponi and wait additional 2 weeks before restarting next Simponi.  \par \par #HOCM \par Hepatitis and Quantiferon negative 2/2023\par  DEXA 4/17/2023 spine 3.4, FN -1.1, TH -0.8; 2019 spine 3.2, FN 0.1, TH 0.3\par \par patient to follow up in 1 month for next Simponi injection. \par \par MAXINE Dias\par Laci Roland MD\par Rheumatology fellow

## 2023-05-24 NOTE — PROCEDURE
[Today's Date:] : Date: [unfilled] [Patient] : the patient [Consent Obtained] : written consent was obtained prior to the procedure and is detailed in the patient's record [Therapeutic] : therapeutic [#1 Site: ______] : #1 site identified in the [unfilled] [Alcohol] : alcohol [Tolerated Well] : the patient tolerated the procedure well [de-identified] : Simponi aria 50 mg/5 ml LOT MHSOMMO EXP 7/2025

## 2023-06-22 ENCOUNTER — OUTPATIENT (OUTPATIENT)
Dept: OUTPATIENT SERVICES | Facility: HOSPITAL | Age: 68
LOS: 1 days | End: 2023-06-22

## 2023-06-22 ENCOUNTER — APPOINTMENT (OUTPATIENT)
Dept: INTERNAL MEDICINE | Facility: CLINIC | Age: 68
End: 2023-06-22
Payer: MEDICARE

## 2023-06-22 VITALS
HEART RATE: 72 BPM | BODY MASS INDEX: 31.86 KG/M2 | WEIGHT: 203 LBS | DIASTOLIC BLOOD PRESSURE: 68 MMHG | SYSTOLIC BLOOD PRESSURE: 132 MMHG | OXYGEN SATURATION: 98 % | HEIGHT: 67 IN

## 2023-06-22 DIAGNOSIS — J45.909 UNSPECIFIED ASTHMA, UNCOMPLICATED: ICD-10-CM

## 2023-06-22 DIAGNOSIS — Z98.890 OTHER SPECIFIED POSTPROCEDURAL STATES: Chronic | ICD-10-CM

## 2023-06-22 DIAGNOSIS — Z00.00 ENCOUNTER FOR GENERAL ADULT MEDICAL EXAMINATION W/OUT ABNORMAL FINDINGS: ICD-10-CM

## 2023-06-22 PROCEDURE — 99397 PER PM REEVAL EST PAT 65+ YR: CPT

## 2023-06-23 ENCOUNTER — NON-APPOINTMENT (OUTPATIENT)
Age: 68
End: 2023-06-23

## 2023-06-23 DIAGNOSIS — I10 ESSENTIAL (PRIMARY) HYPERTENSION: ICD-10-CM

## 2023-06-23 DIAGNOSIS — G56.01 CARPAL TUNNEL SYNDROME, RIGHT UPPER LIMB: ICD-10-CM

## 2023-06-23 DIAGNOSIS — R22.32 LOCALIZED SWELLING, MASS AND LUMP, LEFT UPPER LIMB: ICD-10-CM

## 2023-06-23 DIAGNOSIS — E78.5 HYPERLIPIDEMIA, UNSPECIFIED: ICD-10-CM

## 2023-06-23 DIAGNOSIS — G56.02 CARPAL TUNNEL SYNDROME, LEFT UPPER LIMB: ICD-10-CM

## 2023-06-23 DIAGNOSIS — Z00.00 ENCOUNTER FOR GENERAL ADULT MEDICAL EXAMINATION WITHOUT ABNORMAL FINDINGS: ICD-10-CM

## 2023-06-23 DIAGNOSIS — M79.7 FIBROMYALGIA: ICD-10-CM

## 2023-06-23 DIAGNOSIS — M19.90 UNSPECIFIED OSTEOARTHRITIS, UNSPECIFIED SITE: ICD-10-CM

## 2023-06-23 DIAGNOSIS — J44.9 CHRONIC OBSTRUCTIVE PULMONARY DISEASE, UNSPECIFIED: ICD-10-CM

## 2023-06-23 DIAGNOSIS — F17.210 NICOTINE DEPENDENCE, CIGARETTES, UNCOMPLICATED: ICD-10-CM

## 2023-06-23 NOTE — HISTORY OF PRESENT ILLNESS
[FreeTextEntry1] : Follow up [de-identified] : 68-year-old woman with mild asthma/COPD overlap, current smoker, hypertension, hyperlipidemia, fibromyalgia, seronegative inflammatory arthritis, bilateral knee OA, carpal tunnel syndrome, hepatic steatosis/NAFLD, anxiety, depression, colon polyps, peptic ulcer disease presenting for follow up.\par \par She reports stable symptoms of chronic pain related to her rheumatologic conditions. Only exception is carpal tunnel/wrist pain so at suggestion of her rheumatologist she is requesting referral to ortho hand surgeon.\par \par She reports stable breathing symptoms but reports that she is still smoking ~1 ppd. She has strong desire to quit and was not aware that it is ok to use cigartettes and nicotine gum/lozanges at the same time. She missed her last quit date (6/1/23) but is motivated to set new one of 7/30. She is able to go long periods without smoking or urge to smoke such as when with grandchildren or on flights. Furthermore she stopped smoking for 2 years in the past. She is surrounded by family/friends who smoke so it is difficult to socialize without smoking. She is committed to stopping smoking and has the added motivation of upcoming possible hand surgery.

## 2023-06-23 NOTE — PHYSICAL EXAM
[Normal TMs] : both tympanic membranes were normal [No Respiratory Distress] : no respiratory distress  [No Accessory Muscle Use] : no accessory muscle use [Normal Rate] : normal rate  [Regular Rhythm] : with a regular rhythm [No Edema] : there was no peripheral edema [Soft] : abdomen soft [Non Tender] : non-tender [No Rash] : no rash [No Focal Deficits] : no focal deficits [Normal] : affect was normal and insight and judgment were intact [de-identified] : Pharyngeal erythema present, no tonsillar exudates [de-identified] : mild rhonci present in all quadrants [de-identified] : pain at bilateral knees, and bilateral hands (inc wrist, fingers), wrist swelling [de-identified] : gait impaired by knee pain

## 2023-06-23 NOTE — HEALTH RISK ASSESSMENT
[Fair] : ~his/her~ current health as fair  [Good] : ~his/her~  mood as  good [2 - 4 times a month (2 pts)] : 2-4 times a month (2 points) [1 or 2 (0 pts)] : 1 or 2 (0 points) [Never (0 pts)] : Never (0 points) [No] : In the past 12 months have you used drugs other than those required for medical reasons? No [Two or more falls in past year] : Patient reported two or more falls in the past year [Assistive Device] : Patient uses an assistive device [PHQ-2 Negative - No further assessment needed] : PHQ-2 Negative - No further assessment needed [2] : 2 [Reports normal functional visual acuity (ie: able to read med bottle)] : Reports normal functional visual acuity [Smoke Detector] : smoke detector [Carbon Monoxide Detector] : carbon monoxide detector [Seat Belt] :  uses seat belt [Sunscreen] : uses sunscreen [I will adhere to the patient's wishes.] : I will adhere to the patient's wishes. [Current] : Current [Independent] : managing finances [Some assistance needed] : managing medications [Audit-CScore] : 2 [de-identified] : Aury walker [LowDoseCTScan] : 10/22 [Reports changes in hearing] : Reports no changes in hearing [Reports changes in dental health] : Reports no changes in dental health [Guns at Home] : no guns at home [Travel to Developing Areas] : does not  travel to developing areas [TB Exposure] : is not being exposed to tuberculosis [MammogramDate] : 02/22 [de-identified] : mostly functional but has aide that helps (10 hours per week) [de-identified] : has an aide that helps [FreeTextEntry4] : Patient would like to be resuscitated but would not long term organ support (ie mechanical ventilation). Patient wishes to be an organ donor if possible.

## 2023-06-23 NOTE — ASSESSMENT
[FreeTextEntry1] : 68F with inflammatory and noninflammatory arthtritis, HTN/HLD, asthma/copd overlap, current smoker, PUD s/p treated HP, NALFD presenting for follow up\par \par HCM\par #Vaccines\par -flu UTD\par -COVID s/p primary series, encouraged booster\par -pneumococcal - completed\par -Tdap - UTD\par \par #Cancer\par -CRC - as above (2025)\par -Lung - as above (2023)\par -Mammo recetly done but pending report (last was birads 2 in 2022)\par -Cervical wnl 2018 - has apt in may for repeat\par \par #Others\par -DEXA - still ok in 2023 although with likely falsely elevated spine T score due to osteophytes\par -PHQ/AUDIT/DAST UTD\par \par RTC 6 Months\par \par Discussed with Dr. Knutson\par \par Yuan Torres MD\par PGY3 [employer]

## 2023-06-23 NOTE — COUNSELING
[Cessation strategies including cessation program discussed] : Cessation strategies including cessation program discussed [Use of nicotine replacement therapies and other medications discussed] : Use of nicotine replacement therapies and other medications discussed [Encouraged to pick a quit date and identify support needed to quit] : Encouraged to pick a quit date and identify support needed to quit [Smoking Cessation Program Referral] : Smoking Cessation Program Referral  [Yes] : Willing to quit smoking [Participate in Class] : Participate in class [ - Annual Lung Cancer Screening/Share Decision Making Discussion] : Annual Lung Cancer Screening/Share Decision Making Discussion. (I have advised this patient to have a Low Dose CT (LDCT) scan of the lungs and have discussed the following with the patient in a shared decision making discussion:   Benefits of Detection and Early Treatment: There is adequate evidence that annual screening for lung cancer with LDCT in a population of high-risk persons can prevent a substantial number of lung cancer–related deaths. The magnitude of benefit depends on the individual patient's risk for lung cancer, as those who are at highest risk are most likely to benefit. Screening cannot prevent most lung cancer–related deaths, and does not replace smoking cessation. Harms of Detection and Early Intervention and Treatment: The harms associated with LDCT screening include false-negative and false-positive results, incidental findings, over diagnosis, and radiation exposure. False-positive LDCT results occur in a substantial proportion of screened persons; 95% of all positive results do not lead to a diagnosis of cancer. In a high-quality screening program, further imaging can resolve most false-positive results; however, some patients may require invasive procedures. Radiation harms, including cancer resulting from cumulative exposure to radiation, vary depending on the age at the start of screening; the number of scans received; and the person's exposure to other sources of radiation, particularly other medical imaging.) [FreeTextEntry3] : 15

## 2023-07-07 ENCOUNTER — APPOINTMENT (OUTPATIENT)
Dept: ORTHOPEDIC SURGERY | Facility: CLINIC | Age: 68
End: 2023-07-07
Payer: MEDICARE

## 2023-07-07 PROCEDURE — 73110 X-RAY EXAM OF WRIST: CPT | Mod: 50

## 2023-07-07 PROCEDURE — 99214 OFFICE O/P EST MOD 30 MIN: CPT | Mod: 25

## 2023-07-26 ENCOUNTER — APPOINTMENT (OUTPATIENT)
Dept: ORTHOPEDIC SURGERY | Facility: CLINIC | Age: 68
End: 2023-07-26
Payer: MEDICARE

## 2023-07-26 VITALS
SYSTOLIC BLOOD PRESSURE: 147 MMHG | DIASTOLIC BLOOD PRESSURE: 80 MMHG | WEIGHT: 200 LBS | HEIGHT: 67 IN | HEART RATE: 70 BPM | BODY MASS INDEX: 31.39 KG/M2

## 2023-07-26 PROCEDURE — 99214 OFFICE O/P EST MOD 30 MIN: CPT

## 2023-07-26 NOTE — END OF VISIT
[FreeTextEntry3] : This note was written by Zahida Gar on 07/26/2023 acting solely as a scribe for Dr. Umer Nava.\par  \par All medical record entries made by the Scribe were at my, Dr. Umer Nava, direction and personally dictated by me on 07/26/2023. I have personally reviewed the chart and agree that the record accurately reflects my personal performance of the history, physical exam, assessment and plan.

## 2023-07-26 NOTE — DISCUSSION/SUMMARY
[FreeTextEntry1] : She has findings consistent with chronic bilateral carpal tunnel syndrome with previous EMGs demonstrating severe bilateral carpal tunnel syndrome.  She also has a chronic mass along the dorsal aspect of her left wrist which is not particular symptomatic as well as chronic left little finger MCP joint radial collateral ligament instability which is also not particular symptomatic.\par \par I had a discussion with the patient regarding today's visit, the prognosis of this diagnosis, and treatment recommendations and options. With regard to the left little finger, as she denies symptoms in this regard, I recommended observation. She will follow up as needed, according to her symptoms in this regard.\par \par With regard to the left wrist dorsal mass, given her symptoms are rather mild in this regard, I recommended observation.  She has had 2 prior surgeries with recurrence.  If the mass becomes increasingly symptomatic and/or increases in size, she will return to the office to discuss further treatment recommendations. She will otherwise follow up as needed, according to her symptoms. \par \par Finally, with regard to the bilateral hand/wrist numbness and tingling, I reviewed her EMGs with her in great detail. I explained to her that based upon her EMGs, she does have evidence of severe carpal tunnel syndrome. We discussed treatment options, I recommended operative management of surgical release of her carpal tunnel. Given the chronicity and severity of her carpal tunnel syndrome, she does understand that the results of the surgery may be somewhat variable and her symptoms may not fully improve or resolve. She stated she understands and would like to proceed with surgical release of her more symptomatic left carpal tunnel, given the severity of her symptoms. \par \par -  The nature and purposes of endoscopic carpal tunnel release was discussed in detail with the patient. We discussed the surgical procedure in detail, as well as expected postoperative recovery and outcome.\par -  Possible risks, benefits and complications (from known and unknown causes) of the procedure were discussed in detail.  \par -  Possible non-operative alternatives to the proposed treatment were discussed in detail.  \par -  I discussed with the patient that I will be performing an endoscopic carpal tunnel release. We discussed both open and endoscopic carpal tunnel release, and the associated risks and benefits of each of these procedures. The patient understands that it is possible that the endoscopic carpal tunnel release may need to be converted to an open release during the procedure, if the carpal tunnel cannot be well visualized or safely released endoscopically. In addition, the patient does understand that, based upon some of the hand surgery literature, there is a slightly higher risk of iatrogenic nerve or vessel injury associated with endoscopic carpal tunnel release, as compared to open carpal tunnel release.\par -  She was also told that other possible risks/complications include, but are not limited to:  Infection, nerve or vessel injury, stiffness, painful scar, poor outcome, need for additional surgical procedures, and other unforeseen complications.  \par -  In addition, the possibility of an "unsuccessful outcome," despite "successful surgery," was discussed with the patient.  The patient understands that nerve recovery after surgical release can be unpredictable, and there are no ""guarantees"" that the preoperative symptoms will completely resolve.  This is particularly true in her case, as her previous EMGs demonstrated severe bilateral carpal tunnel syndrome.\par -  The patient fully understands these risks and wishes to proceed.  \par -  I had a lengthy discussion with the patient regarding today's visit, the diagnosis, and my surgical treatment recommendations.  The patient has agreed to this plan of management and has expressed full understanding.  All questions were fully answered to the patient's satisfaction. \par \par My cumulative time spent on this visit included: Preparation for the visit, review of the medical records, review of pertinent diagnostic studies, examination and counseling of the patient on the above diagnosis, treatment plan and prognosis, orders of diagnostic tests, medication and/or appropriate procedures and documentation in the medical records of today's visit.

## 2023-07-26 NOTE — HISTORY OF PRESENT ILLNESS
[Right] : right hand dominant [FreeTextEntry1] : She comes in today for evaluation of bilateral hand/wrist numbness and tingling, L>R. She has been followed by Dr. Peña thus far, who recommended surgery however she was unable to undergo the surgery, as her rheumatologist wanted her to undergo an infusion due to an inflammatory condition. She has difficulty with activities of daily living such as when chopping and cutting vegetables. She is woken up at night due to her symptoms. \par \par I reviewed her repeat EMGs from 03/04/2021. These demonstrated evidence of severe median neuropathy at the bilateral wrists, consistent with carpal tunnel syndrome.\par \par I reviewed her EMGs from 07/15/2019. 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 \par \par She is treated by a rheumatologist for seronegative inflammatory arthritis and fibromyalgia. \par \par She is status post left wrist mass biopsy on 11/05/2021 by Dr. Peña. 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 Yarelis Peña.

## 2023-07-26 NOTE — PHYSICAL EXAM
[de-identified] : - Constitutional: This is a female in no obvious distress.  \par - Psych: Patient is alert and oriented to person, place and time.  Patient has a normal mood and affect.\par - Respiratory:  Patient exhibits no evidence of shortness of breath or difficulty breathing.\par - Skin: No rashes, lesions, or other abnormalities are noted in the upper extremities.\par \par --- \par \par Examination of her left wrist and hand demonstrates a multiloculated mass along the dorsal aspect of the wrist and hand.  This is chronic.  There is mild tenderness.  There is no evidence of an extensor tendon rupture.  She has pain and limitation of terminal flexion and extend to the left wrist.  She has an obvious ulnar deviated posture of the left little finger with instability to stress testing of the MCP joint radial collateral ligament.  There is no evidence of a trigger finger.  Provocative signs for carpal tunnel syndrome are markedly positive bilaterally.  There is bilateral thenar atrophy.  She has decreased station to light touch along the median nerve distribution bilaterally with normal sensation along the radial and ulnar nerve distributions bilaterally.

## 2023-07-26 NOTE — ADDENDUM
[FreeTextEntry1] : I, Zahida Gar, acted solely as a scribe for Dr. Nava on this date on 07/26/2023.

## 2023-07-28 ENCOUNTER — NON-APPOINTMENT (OUTPATIENT)
Age: 68
End: 2023-07-28

## 2023-08-09 ENCOUNTER — OUTPATIENT (OUTPATIENT)
Dept: OUTPATIENT SERVICES | Facility: HOSPITAL | Age: 68
LOS: 1 days | End: 2023-08-09

## 2023-08-09 ENCOUNTER — APPOINTMENT (OUTPATIENT)
Dept: INTERNAL MEDICINE | Facility: CLINIC | Age: 68
End: 2023-08-09

## 2023-08-09 DIAGNOSIS — Z98.890 OTHER SPECIFIED POSTPROCEDURAL STATES: Chronic | ICD-10-CM

## 2023-08-10 DIAGNOSIS — M06.09 RHEUMATOID ARTHRITIS WITHOUT RHEUMATOID FACTOR, MULTIPLE SITES: ICD-10-CM

## 2023-08-11 ENCOUNTER — NON-APPOINTMENT (OUTPATIENT)
Age: 68
End: 2023-08-11

## 2023-08-11 LAB
ALBUMIN SERPL ELPH-MCNC: 4.2 G/DL
ALP BLD-CCNC: 133 U/L
ALT SERPL-CCNC: 15 U/L
ANION GAP SERPL CALC-SCNC: 12 MMOL/L
AST SERPL-CCNC: 17 U/L
BILIRUB SERPL-MCNC: 0.4 MG/DL
BUN SERPL-MCNC: 11 MG/DL
CALCIUM SERPL-MCNC: 9.2 MG/DL
CHLORIDE SERPL-SCNC: 107 MMOL/L
CO2 SERPL-SCNC: 25 MMOL/L
CREAT SERPL-MCNC: 0.67 MG/DL
CRP SERPL-MCNC: 10 MG/L
EGFR: 96 ML/MIN/1.73M2
ERYTHROCYTE [SEDIMENTATION RATE] IN BLOOD BY WESTERGREN METHOD: 17 MM/HR
GLUCOSE SERPL-MCNC: 86 MG/DL
POTASSIUM SERPL-SCNC: 4.5 MMOL/L
PROT SERPL-MCNC: 6.4 G/DL
SODIUM SERPL-SCNC: 144 MMOL/L

## 2023-08-19 ENCOUNTER — NON-APPOINTMENT (OUTPATIENT)
Age: 68
End: 2023-08-19

## 2023-08-22 ENCOUNTER — OUTPATIENT (OUTPATIENT)
Dept: OUTPATIENT SERVICES | Facility: HOSPITAL | Age: 68
LOS: 1 days | End: 2023-08-22
Payer: MEDICARE

## 2023-08-22 VITALS
RESPIRATION RATE: 14 BRPM | HEART RATE: 62 BPM | HEIGHT: 65 IN | WEIGHT: 203.93 LBS | OXYGEN SATURATION: 99 % | TEMPERATURE: 98 F

## 2023-08-22 DIAGNOSIS — Z98.890 OTHER SPECIFIED POSTPROCEDURAL STATES: Chronic | ICD-10-CM

## 2023-08-22 DIAGNOSIS — R94.31 ABNORMAL ELECTROCARDIOGRAM [ECG] [EKG]: ICD-10-CM

## 2023-08-22 DIAGNOSIS — Z01.818 ENCOUNTER FOR OTHER PREPROCEDURAL EXAMINATION: ICD-10-CM

## 2023-08-22 DIAGNOSIS — G56.02 CARPAL TUNNEL SYNDROME, LEFT UPPER LIMB: ICD-10-CM

## 2023-08-22 LAB
ANION GAP SERPL CALC-SCNC: 4 MMOL/L — LOW (ref 5–17)
BUN SERPL-MCNC: 15 MG/DL — SIGNIFICANT CHANGE UP (ref 7–23)
CALCIUM SERPL-MCNC: 9.1 MG/DL — SIGNIFICANT CHANGE UP (ref 8.4–10.5)
CHLORIDE SERPL-SCNC: 106 MMOL/L — SIGNIFICANT CHANGE UP (ref 96–108)
CO2 SERPL-SCNC: 32 MMOL/L — HIGH (ref 22–31)
CREAT SERPL-MCNC: 0.6 MG/DL — SIGNIFICANT CHANGE UP (ref 0.5–1.3)
EGFR: 99 ML/MIN/1.73M2 — SIGNIFICANT CHANGE UP
GLUCOSE SERPL-MCNC: 99 MG/DL — SIGNIFICANT CHANGE UP (ref 70–99)
HCT VFR BLD CALC: 43 % — SIGNIFICANT CHANGE UP (ref 34.5–45)
HGB BLD-MCNC: 13.4 G/DL — SIGNIFICANT CHANGE UP (ref 11.5–15.5)
MCHC RBC-ENTMCNC: 29.1 PG — SIGNIFICANT CHANGE UP (ref 27–34)
MCHC RBC-ENTMCNC: 31.2 GM/DL — LOW (ref 32–36)
MCV RBC AUTO: 93.5 FL — SIGNIFICANT CHANGE UP (ref 80–100)
NRBC # BLD: 0 /100 WBCS — SIGNIFICANT CHANGE UP (ref 0–0)
PLATELET # BLD AUTO: 350 K/UL — SIGNIFICANT CHANGE UP (ref 150–400)
POTASSIUM SERPL-MCNC: 4.5 MMOL/L — SIGNIFICANT CHANGE UP (ref 3.5–5.3)
POTASSIUM SERPL-SCNC: 4.5 MMOL/L — SIGNIFICANT CHANGE UP (ref 3.5–5.3)
RBC # BLD: 4.6 M/UL — SIGNIFICANT CHANGE UP (ref 3.8–5.2)
RBC # FLD: 14.7 % — HIGH (ref 10.3–14.5)
SODIUM SERPL-SCNC: 142 MMOL/L — SIGNIFICANT CHANGE UP (ref 135–145)
WBC # BLD: 9.25 K/UL — SIGNIFICANT CHANGE UP (ref 3.8–10.5)
WBC # FLD AUTO: 9.25 K/UL — SIGNIFICANT CHANGE UP (ref 3.8–10.5)

## 2023-08-22 PROCEDURE — 93005 ELECTROCARDIOGRAM TRACING: CPT

## 2023-08-22 PROCEDURE — 80048 BASIC METABOLIC PNL TOTAL CA: CPT

## 2023-08-22 PROCEDURE — G0463: CPT

## 2023-08-22 PROCEDURE — 93010 ELECTROCARDIOGRAM REPORT: CPT

## 2023-08-22 PROCEDURE — 85027 COMPLETE CBC AUTOMATED: CPT

## 2023-08-22 PROCEDURE — 36415 COLL VENOUS BLD VENIPUNCTURE: CPT

## 2023-08-22 NOTE — H&P PST ADULT - MUSCULOSKELETAL COMMENTS
bilateral knee pain bilateral knee pain ; pt ambulates with roller bilateral knee pain ; pt ambulates with roller . left carpal tunnel syndrome; positive tinel and phalen test

## 2023-08-22 NOTE — H&P PST ADULT - LIVES WITH, PROFILE
Patient presents to ED with abdominal pain, vomiting and fever TMax 101.3 x today. Patient awake and alert, easy WOB.   Denies PMHx, SHx, NKDA. IUTD. alone

## 2023-08-22 NOTE — H&P PST ADULT - HISTORY OF PRESENT ILLNESS
Pt is a 68y.o. female with PMH asthma/ COPD, HTN, HLD, fibromyalgia, RA presents with bilateral carpal tunnel syndrome worse symptomes in left  Pt is a 68y.o. female with PMH asthma/ COPD, HTN, HLD, fibromyalgia, RA presents with bilateral carpal tunnel syndrome worse symptoms in left . reports left hand pain , numbness , weakness and tingiling . EMG showed severe carpal tunnel syndrome . scheduled for left carpal tunnel release on 8/29/23

## 2023-08-22 NOTE — H&P PST ADULT - NSICDXPASTSURGICALHX_GEN_ALL_CORE_FT
PAST SURGICAL HISTORY:  Endometriosis [id number]    H/O hand surgery Left childhood    S/P arthroscopy of knee Left    S/P colon polypectomy     S/P lumpectomy, left breast     Tubal ligation status

## 2023-08-22 NOTE — H&P PST ADULT - PROBLEM SELECTOR PLAN 1
scheduled for left carpal tunnel release on 8/29/23  will obtain medical and cardiac clearance   pulmonary note   pre op instructions

## 2023-08-22 NOTE — H&P PST ADULT - NSICDXPASTMEDICALHX_GEN_ALL_CORE_FT
PAST MEDICAL HISTORY:  COPD, mild     Depression     Fibromyalgia     Hyperlipidemia     Hypertension     OA (osteoarthritis)     Rheumatoid arthritis      PAST MEDICAL HISTORY:  COPD, mild     Depression     Fibromyalgia     Hyperlipidemia     Hypertension     LBBB (left bundle branch block)     OA (osteoarthritis)     Rheumatoid arthritis     Tobacco dependence

## 2023-08-23 ENCOUNTER — OUTPATIENT (OUTPATIENT)
Dept: OUTPATIENT SERVICES | Facility: HOSPITAL | Age: 68
LOS: 1 days | End: 2023-08-23

## 2023-08-23 ENCOUNTER — APPOINTMENT (OUTPATIENT)
Dept: INTERNAL MEDICINE | Facility: CLINIC | Age: 68
End: 2023-08-23
Payer: MEDICARE

## 2023-08-23 VITALS
BODY MASS INDEX: 32.49 KG/M2 | SYSTOLIC BLOOD PRESSURE: 102 MMHG | HEART RATE: 74 BPM | RESPIRATION RATE: 18 BRPM | DIASTOLIC BLOOD PRESSURE: 70 MMHG | WEIGHT: 207 LBS | HEIGHT: 67 IN | OXYGEN SATURATION: 99 %

## 2023-08-23 DIAGNOSIS — M67.441 GANGLION, RIGHT HAND: ICD-10-CM

## 2023-08-23 DIAGNOSIS — Z98.890 OTHER SPECIFIED POSTPROCEDURAL STATES: Chronic | ICD-10-CM

## 2023-08-23 DIAGNOSIS — G56.03 CARPAL TUNNEL SYNDROM,BILATERAL UPPER LIMBS: ICD-10-CM

## 2023-08-23 DIAGNOSIS — Z86.19 PERSONAL HISTORY OF OTHER INFECTIOUS AND PARASITIC DISEASES: ICD-10-CM

## 2023-08-23 DIAGNOSIS — I65.23 OCCLUSION AND STENOSIS OF BILATERAL CAROTID ARTERIES: ICD-10-CM

## 2023-08-23 DIAGNOSIS — G56.01 CARPAL TUNNEL SYNDROME, RIGHT UPPER LIMB: ICD-10-CM

## 2023-08-23 DIAGNOSIS — M67.442 GANGLION, RIGHT HAND: ICD-10-CM

## 2023-08-23 DIAGNOSIS — F17.218 NICOTINE DEPENDENCE, CIGARETTES, WITH OTHER NICOTINE-INDUCED DISORDERS: ICD-10-CM

## 2023-08-23 DIAGNOSIS — R09.89 OTHER SPECIFIED SYMPTOMS AND SIGNS INVOLVING THE CIRCULATORY AND RESPIRATORY SYSTEMS: ICD-10-CM

## 2023-08-23 DIAGNOSIS — G56.02 CARPAL TUNNEL SYNDROME, LEFT UPPER LIMB: ICD-10-CM

## 2023-08-23 DIAGNOSIS — F17.200 NICOTINE DEPENDENCE, UNSPECIFIED, UNCOMPLICATED: ICD-10-CM

## 2023-08-23 DIAGNOSIS — R20.0 ANESTHESIA OF SKIN: ICD-10-CM

## 2023-08-23 DIAGNOSIS — R22.32 LOCALIZED SWELLING, MASS AND LUMP, LEFT UPPER LIMB: ICD-10-CM

## 2023-08-23 PROBLEM — M06.9 RHEUMATOID ARTHRITIS, UNSPECIFIED: Chronic | Status: ACTIVE | Noted: 2023-08-22

## 2023-08-23 PROBLEM — I44.7 LEFT BUNDLE-BRANCH BLOCK, UNSPECIFIED: Chronic | Status: ACTIVE | Noted: 2023-08-22

## 2023-08-23 PROBLEM — J44.9 CHRONIC OBSTRUCTIVE PULMONARY DISEASE, UNSPECIFIED: Chronic | Status: ACTIVE | Noted: 2023-08-22

## 2023-08-23 PROCEDURE — 99213 OFFICE O/P EST LOW 20 MIN: CPT | Mod: GE

## 2023-08-24 ENCOUNTER — NON-APPOINTMENT (OUTPATIENT)
Age: 68
End: 2023-08-24

## 2023-08-24 ENCOUNTER — APPOINTMENT (OUTPATIENT)
Dept: CARDIOLOGY | Facility: CLINIC | Age: 68
End: 2023-08-24
Payer: MEDICARE

## 2023-08-24 VITALS
DIASTOLIC BLOOD PRESSURE: 69 MMHG | OXYGEN SATURATION: 94 % | HEIGHT: 67 IN | HEART RATE: 76 BPM | WEIGHT: 207 LBS | BODY MASS INDEX: 32.49 KG/M2 | SYSTOLIC BLOOD PRESSURE: 102 MMHG

## 2023-08-24 PROCEDURE — 99214 OFFICE O/P EST MOD 30 MIN: CPT | Mod: 25

## 2023-08-24 PROCEDURE — 93000 ELECTROCARDIOGRAM COMPLETE: CPT

## 2023-08-25 PROBLEM — G56.01 CARPAL TUNNEL SYNDROME OF RIGHT WRIST: Noted: 2021-01-12

## 2023-08-25 PROBLEM — F17.218 NICOTINE DEPENDENCE, CIGARETTES, WITH OTHER NICOTINE-INDUCED DISORDERS: Noted: 2023-01-30

## 2023-08-25 PROBLEM — R20.0 LEFT ARM NUMBNESS: Noted: 2019-03-25

## 2023-08-25 PROBLEM — R09.89 CAROTID BRUIT: Noted: 2020-11-18

## 2023-08-25 PROBLEM — G56.02 CARPAL TUNNEL SYNDROME OF LEFT WRIST: Noted: 2019-07-15

## 2023-08-25 PROBLEM — Z86.19 HISTORY OF HELICOBACTER PYLORI INFECTION: Status: RESOLVED | Noted: 2020-11-10 | Resolved: 2023-08-25

## 2023-08-25 PROBLEM — G56.03 BILATERAL CARPAL TUNNEL SYNDROME: Status: ACTIVE | Noted: 2023-08-25

## 2023-08-25 PROBLEM — M67.441 GANGLION CYST OF BOTH HANDS: Status: ACTIVE | Noted: 2019-09-16

## 2023-08-25 PROBLEM — I65.23 ASYMPTOMATIC BILATERAL CAROTID ARTERY STENOSIS: Status: ACTIVE | Noted: 2021-05-13

## 2023-08-25 PROBLEM — R22.32 MASS OF LEFT WRIST: Noted: 2021-01-08

## 2023-08-25 RX ORDER — CHLORHEXIDINE GLUCONATE 213 G/1000ML
1 SOLUTION TOPICAL DAILY
Refills: 0 | Status: DISCONTINUED | OUTPATIENT
Start: 2023-08-29 | End: 2023-09-12

## 2023-08-25 NOTE — PHYSICAL EXAM
[No Acute Distress] : no acute distress [Well-Appearing] : well-appearing [Normal Sclera/Conjunctiva] : normal sclera/conjunctiva [PERRL] : pupils equal round and reactive to light [EOMI] : extraocular movements intact [Normal Outer Ear/Nose] : the outer ears and nose were normal in appearance [Supple] : supple [No Respiratory Distress] : no respiratory distress  [No Accessory Muscle Use] : no accessory muscle use [Clear to Auscultation] : lungs were clear to auscultation bilaterally [Normal Rate] : normal rate  [Regular Rhythm] : with a regular rhythm [Normal S1, S2] : normal S1 and S2 [No Murmur] : no murmur heard [No Extremity Clubbing/Cyanosis] : no extremity clubbing/cyanosis [Soft] : abdomen soft [Non Tender] : non-tender [Non-distended] : non-distended [No Masses] : no abdominal mass palpated [No Rash] : no rash [No Focal Deficits] : no focal deficits [Normal Affect] : the affect was normal [Normal Insight/Judgement] : insight and judgment were intact [de-identified] : Chronic mild BLE edema [de-identified] : Uses walker to ambulate

## 2023-08-25 NOTE — PHYSICAL EXAM
TSH 0.29   Continue Levothyroxine 125mcg Patient normotensive today 131/85  -Continue Losartan [Normal Appearance] : normal appearance -Continue Losartan [Auscultation Breath Sounds / Voice Sounds] : lungs were clear to auscultation bilaterally Patient normotensive today 127/81  -Continue Losartan [Heart Sounds] : normal S1 and S2 [Murmurs] : no murmurs present [Arterial Pulses Normal] : the arterial pulses were normal [Edema] : no peripheral edema present [Abdomen Soft] : soft [Abdomen Tenderness] : non-tender [FreeTextEntry1] : uses walker -Continue Losartan [Nail Clubbing] : no clubbing of the fingernails [] : no rash -Continue Losartan [Oriented To Time, Place, And Person] : oriented to person, place, and time TSH 0.29   Continue Levothyroxine 125mcg TSH 0.29   Continue Levothyroxine 125mcg TSH 0.29   Continue Levothyroxine 125mcg

## 2023-08-25 NOTE — DISCUSSION/SUMMARY
[FreeTextEntry1] : 67 yo F with PMH of HTN, COPD, Diverticulosis. OA, Fibromyalgia, LBBB presenting with JEFFERS and chest pain  ---Pharmacologic nuclear stress test (2021) reveals no ischemia or infarct and Echo with no significant abnormalities. Her chest pain has been chronic, will obtain CT coronary for further workup given that she has risk factors including tobacco use. In any case, there is no cardiac contraindication to proceeding with carpal tunnel release surgery.  (Also LBBB has been present for years and is documented on prior EKGs/notes in EMR) ---BP optimal, continue Lisinopril and Amlodipine ---Continue ASA and Statin, Carotid Duplex with mild bilateral disease ---Smoking cessation discussed  [EKG obtained to assist in diagnosis and management of assessed problem(s)] : EKG obtained to assist in diagnosis and management of assessed problem(s)

## 2023-08-25 NOTE — REVIEW OF SYSTEMS
[Dyspnea on exertion] : dyspnea during exertion [Chest Discomfort] : chest discomfort [Joint Pain] : joint pain [Rash] : no rash [Dizziness] : dizziness [Negative] : Psychiatric

## 2023-08-25 NOTE — REASON FOR VISIT
[FreeTextEntry1] : Ms. Joaquin is a 67 yo F with PMH of HTN, COPD, Diverticulosis. OA, Fibromyalgia, LBBB presenting for follow up. Since her last visit, she underwent an Echo and Stress test as well as Carotid Dopplers. She is pending carpal tunnel release surgery next week. Overall she continues to have left sided chest pain with fast walking. This has been occurring for years.   She continues to smoke  about one pack per day. .

## 2023-08-25 NOTE — ASSESSMENT
[Patient Optimized for Surgery] : Patient optimized for surgery [Cardiology consultation] : Cardiology consultation [Modify medications prior to procedure] : Modify medications prior to procedure [FreeTextEntry4] : Mayelin Joaquin is a 67yo female with a hx of asthma, COPD, tobacco use, HTN, HLD, fibromyalgia, OA, NAFLD, PUD, depression, and bilateral carpal tunnel syndrome who presents in clinic for a pre-op visit for a L carpal tunnel release on 8/29.   RCRI: 0 pts Possibly new LBBB shown on recent EKG, patient will see cardiology tmrw 8/24. No acute concern at this time. Discussed smoking cessation prior to procedure and patient will try to quit prior to surgery. Has nicotine gum at home. Pre-op risk stratification pending cardiology evaluation, otherwise, optimized for low-risk L carpal tunnel release on 8/29 from medical standpoint. Does not need changes in medications at this time. [FreeTextEntry7] : Hold home ibuprofen until procedure.

## 2023-08-25 NOTE — HISTORY OF PRESENT ILLNESS
[No Pertinent Cardiac History] : no history of aortic stenosis, atrial fibrillation, coronary artery disease, recent myocardial infarction, or implantable device/pacemaker [Asthma] : asthma [COPD] : COPD [Smoker] : smoker [No Adverse Anesthesia Reaction] : no adverse anesthesia reaction in self or family member [Poor (<4 METs)] : Poor (<4 METs) [NSAIDs: _____] : NSAIDs: [unfilled] [Chronic Anticoagulation] : no chronic anticoagulation [Chronic Kidney Disease] : no chronic kidney disease [Diabetes] : no diabetes [FreeTextEntry1] : L carpal tunnel release [FreeTextEntry2] : 08/29/2023 [FreeTextEntry3] : Dr. Umer Nava [FreeTextEntry4] : Mayelin Joaquin is a 67yo female with a hx of asthma, COPD, tobacco use, HTN, HLD, fibromyalgia, OA, NAFLD, PUD, depression, and bilateral carpal tunnel syndrome who presents in clinic for a pre-op visit for a L carpal tunnel release on 8/29.   No recent acute changes in health. A recent EKG demonstrated possibly a new LBBB for which ortho referred patient to cardiology. Patient notes having L chest pain with exertion which resolves with rest. Also has mild SOB with exertion. Has had CP and SOB for many years. 7yrs ago, patient was admitted for the same L CP and had a negative cath. Most recently saw her cardiologist 2 yrs ago and scheduled to see cardiology tmrw 8/24. Patient walks with a walker and unable to walk very far mostly due to knee pain. Denies palpitations, lightheadedness, abdominal pain, diarrhea, or blurry vision.  Patient currently smokes 1ppd for the last 5-6yrs. In total, has smoked for 40yrs. Has had many discussion regarding cessation. [FreeTextEntry6] : L CP without radiation and SOB with exertion which patient has had for many years.

## 2023-08-28 ENCOUNTER — TRANSCRIPTION ENCOUNTER (OUTPATIENT)
Age: 68
End: 2023-08-28

## 2023-08-28 DIAGNOSIS — G56.03 CARPAL TUNNEL SYNDROME, BILATERAL UPPER LIMBS: ICD-10-CM

## 2023-08-29 ENCOUNTER — TRANSCRIPTION ENCOUNTER (OUTPATIENT)
Age: 68
End: 2023-08-29

## 2023-08-29 ENCOUNTER — OUTPATIENT (OUTPATIENT)
Dept: OUTPATIENT SERVICES | Facility: HOSPITAL | Age: 68
LOS: 1 days | End: 2023-08-29
Payer: MEDICARE

## 2023-08-29 ENCOUNTER — APPOINTMENT (OUTPATIENT)
Dept: ORTHOPEDIC SURGERY | Facility: HOSPITAL | Age: 68
End: 2023-08-29

## 2023-08-29 VITALS
SYSTOLIC BLOOD PRESSURE: 149 MMHG | HEIGHT: 67 IN | DIASTOLIC BLOOD PRESSURE: 68 MMHG | HEART RATE: 67 BPM | WEIGHT: 199.52 LBS | RESPIRATION RATE: 15 BRPM | OXYGEN SATURATION: 97 % | TEMPERATURE: 98 F

## 2023-08-29 VITALS
RESPIRATION RATE: 18 BRPM | HEART RATE: 66 BPM | TEMPERATURE: 98 F | DIASTOLIC BLOOD PRESSURE: 66 MMHG | OXYGEN SATURATION: 98 % | SYSTOLIC BLOOD PRESSURE: 102 MMHG

## 2023-08-29 DIAGNOSIS — Z98.890 OTHER SPECIFIED POSTPROCEDURAL STATES: Chronic | ICD-10-CM

## 2023-08-29 DIAGNOSIS — G56.02 CARPAL TUNNEL SYNDROME, LEFT UPPER LIMB: ICD-10-CM

## 2023-08-29 PROCEDURE — 29848 WRIST ENDOSCOPY/SURGERY: CPT | Mod: LT

## 2023-08-29 RX ORDER — SODIUM CHLORIDE 9 MG/ML
1000 INJECTION, SOLUTION INTRAVENOUS
Refills: 0 | Status: DISCONTINUED | OUTPATIENT
Start: 2023-08-29 | End: 2023-08-29

## 2023-08-29 RX ORDER — OXYCODONE HYDROCHLORIDE 5 MG/1
5 TABLET ORAL ONCE
Refills: 0 | Status: DISCONTINUED | OUTPATIENT
Start: 2023-08-29 | End: 2023-08-29

## 2023-08-29 RX ORDER — ONDANSETRON 8 MG/1
4 TABLET, FILM COATED ORAL ONCE
Refills: 0 | Status: DISCONTINUED | OUTPATIENT
Start: 2023-08-29 | End: 2023-08-29

## 2023-08-29 RX ORDER — HYDROMORPHONE HYDROCHLORIDE 2 MG/ML
0.5 INJECTION INTRAMUSCULAR; INTRAVENOUS; SUBCUTANEOUS
Refills: 0 | Status: DISCONTINUED | OUTPATIENT
Start: 2023-08-29 | End: 2023-08-29

## 2023-08-29 RX ORDER — CEFAZOLIN SODIUM 1 G
2000 VIAL (EA) INJECTION ONCE
Refills: 0 | Status: COMPLETED | OUTPATIENT
Start: 2023-08-29 | End: 2023-08-29

## 2023-08-29 RX ORDER — APREPITANT 80 MG/1
40 CAPSULE ORAL ONCE
Refills: 0 | Status: COMPLETED | OUTPATIENT
Start: 2023-08-29 | End: 2023-08-29

## 2023-08-29 RX ORDER — IBUPROFEN 200 MG
1 TABLET ORAL
Qty: 10 | Refills: 0
Start: 2023-08-29

## 2023-08-29 RX ADMIN — APREPITANT 40 MILLIGRAM(S): 80 CAPSULE ORAL at 11:10

## 2023-08-29 RX ADMIN — SODIUM CHLORIDE 75 MILLILITER(S): 9 INJECTION, SOLUTION INTRAVENOUS at 13:31

## 2023-08-29 RX ADMIN — CHLORHEXIDINE GLUCONATE 1 APPLICATION(S): 213 SOLUTION TOPICAL at 11:10

## 2023-08-29 NOTE — ASU PATIENT PROFILE, ADULT - PATIENT'S GENDER IDENTITY
Nephrology Progress Note      Patient: Daniella Redman               Sex: female            MRN:  [id number]      YOB: 1971      Age:  51 year old           Date: 9/27/2022    Assessment/Plan     # HyperNa   - serum Na 137 on presentation. Serum Na 150 on 9/24 --> 153 on 9/25 --> 161 on 9/26. Serum osm 358 on 9/26   - hypertonic hyperNa in setting of mannitol-induced osmotic diuresis (for management of cerebral edema), dc'd AM of 9/26.    - Impressive UOP, 7L on 9/26. Continue strict I/O   Recs:   - Serum Na 167 this AM, at least 7L free water deficit.  - increase D5 to 200 mL/hr and increase free water flushes to 300 mL q 6 hours   - Goal serum Na 150 at 0000 on 9/28   - advise holding lasix today if ok with CV surgery   - continue q6 BMP      # ALBERTO   - baseline Cr <1   - nonoliguric hemodynamically mediated ALBERTO in setting of cardiogenic shock   - Cr peaked at 1.4 on 9/21, now stable ~1.1 mg/dL  - will continue to monitor Cr trends      # Ascending Aortic Dissection   # Hemopericardium complicated by tamponade  # Cardiogenic shock   - s/p emergent aortic dissection repair and ECMO initiation 9/21/22   - s/p OR chest closure 9/23   - remains on ECMO   - CV surgery and cardiology following        # Multifocal ischemic strokes of bilateral hemispheres with mass edema of 4th ventricle  - thought to be cardioembolic   - s/p mannitol for cerebral edema per NCCU team, stopped AM of 9/26   - NCCU following     # Hypothyroidism  - secondary to Grave's. Endocrine following     Patient seen and discussed with Dr. Heredia. D/w JENNY RN.     Elizabeth Small PA-C  9/27/2022 11:43 AM       I have personally seen and examined the patient, reviewed the PA's history, physical exam and medical decision making.  I agree with the assessment and plan with the following additions:       - chart , vitals/labs/imaging reviewed, patient examined      -  Ok to normalize serumNa per neurointensivist. Has >7L free water deficit.  Will aim to lower to 155  in the next 24hrs .  CXR noted, CVP down to 5-8 this AM -- increase D5W and FWF as above. Would hold PM lasix dose for today. Rechecking lytes and will adjust D5W rate as needed.     Jose Cruz Heredia MD  [facility] / [facility]         Subjective:   09/27/22 :     Seen this AM, RN at bedside. Intubated. Serum Na 167 at 0600. 7L UOP yesterday. CVP ~8. Remains on milrinone and cardene. Plans for decannulation today.     PMHx, FHx, SHx, and review of systems reviewed and otherwise unchanged.    Reviewed on 9/27/2022    Objective:     Visit Vitals  /74   Pulse (!) 124   Temp 99.1 °F (37.3 °C) (PA Catheter)   Resp 16   Ht 5' 6\" (1.676 m)   Wt 74.6 kg (164 lb 7.4 oz)   LMP 01/14/2022 (Approximate) Comment: patient has had her peiod for 3 weeks now   SpO2 100%   BMI 26.55 kg/m²      I/O last 3 completed shifts:  In: 6863.8 [I.V.:5412.6; NG/GT:980; IV Piggyback:471.2]  Out: 5485 [Urine:5465; Chest Tube:20]    Physical Exam:  Constitutional :Patient is Intubated. Vitals noted.   Eyes: Pupils unequal   ENT: Nose not deformed, moist oral mucosa. + NG tube + ET tube   Neck: No obvious mass, no lymphadenopathy.   Cardiovascular : +Tachycardia. Rhythm is regular. No murmur. Trace LE edema; + ECMO   Respiratory: Mechanically ventilated sounds   GI : Abdomen is soft, non tender. There is no obvious ascites.  : External genitalia not examined. There is a Gao catheter in place  Musculoskeletal :No obvious deformity or swelling of joints. There is no ankle edema.  Skin: No rashes noted.  Neuro: Intubated; not moving extremities spontaneously   Pysch: Intubated     Current Facility-Administered Medications   Medication Dose Route Frequency Provider Last Rate Last Admin   • [Held by provider] mannitol (OSMITROL) 20 % 50 g IVPB  0.5 g/kg Intravenous 4 times per day Kaylin Buenrostro, CNP   Completed at 09/26/22 8545   • insulin lispro (ADMELOG,HumaLOG) - Correction Dose   Subcutaneous 4 times  per day Amirah Marques MD   2 Units at 09/27/22 0019   • furosemide (LASIX INJECT) injection 40 mg  40 mg Intravenous 2 times per day Cassandra Soler MD   40 mg at 09/27/22 0923   • levothyroxine (SYNTHROID, LEVOTHROID) tablet 137 mcg  137 mcg Oral QAM AC iWnnie Joseph, CNP   137 mcg at 09/27/22 0613   • [Held by provider] amLODIPine (NORVASC) tablet 5 mg  5 mg Oral Daily Joshua Charles MD       • [Held by provider] labetalol (NORMODYNE) tablet 300 mg  300 mg Oral 2 times per day Joshua Charles MD       • chlorhexidine gluconate (PERIDEX) 0.12 % solution 15 mL  15 mL Swish & Spit 2 times per day Tay Dotson PA-C   15 mL at 09/27/22 0923   • docusate sodium-sennosides (SENOKOT S) 50-8.6 MG 2 tablet  2 tablet Oral Daily Tay Dotson PA-C   2 tablet at 09/27/22 0924   • bisacodyl (DULCOLAX) suppository 10 mg  10 mg Rectal Once Tay Dotson PA-C       • sodium biphosphate (FLEET) enema  1 enema Rectal Once Tay Dotson PA-C       • polyethylene glycol (MIRALAX) packet 17 g  17 g Oral BID Tay Dotson PA-C   17 g at 09/27/22 0924   • sodium chloride (PF) 0.9 % injection 2 mL  2 mL Intracatheter 2 times per day Tay Dotson PA-C   2 mL at 09/27/22 0924   • Magnesium Standard Replacement Protocol   Does not apply See Admin Instructions Tay Dotson PA-C       • heparin (porcine) injection 5,000 Units  5,000 Units Subcutaneous 3 times per day Tay Dotson PA-C       • pantoprazole (PROTONIX INJECT) injection 40 mg  40 mg Intravenous Nightly Tay Dotson PA-C   40 mg at 09/26/22 2018     Lab/Data Reviewed:    CBC:   Recent Labs   Lab 09/27/22  0300 09/26/22  0324 09/25/22  1258 09/25/22  0143 09/24/22  1004 09/24/22  0307   WBC 17.8* 14.1* 13.3* 12.1*   < > 12.6*   RBC 3.11* 3.06* 3.03* 2.88*   < > 2.82*   HGB 8.8* 8.5* 8.6* 8.2*   < > 7.9*   HCT 28.1* 26.6* 26.0* 24.7*   < > 24.1*   MCV 90.4 86.9 85.8 85.8   < > 85.5   MCHC 31.3* 32.0 33.1 33.2   < > 32.8   RDW-CV 21.0* 20.3* 19.9* 19.6*   < > 19.9*    * 114* 107* 104*   < > 97*   Lymphocytes, Percent 9 8  --  9  --  11    < > = values in this interval not displayed.     CMP:  Recent Labs   Lab 09/27/22  0608 09/27/22  0300 09/27/22  0105 09/26/22  1604 09/26/22  1022 09/26/22  0630 09/26/22  0324 09/25/22  0705 09/25/22  0143 09/24/22  1004 09/24/22  0307   SODIUM 167* 166* 166*   < >  --  161* 156*   < > 152*  --  151*   POTASSIUM 3.4 3.6 3.5   < > 3.7 4.1 3.3*   < > 3.4   < > 3.6   CHLORIDE 130* 130* 130*   < >  --  127* 122*   < > 118*  --  117*   CO2 30 31 30   < >  --  30 30   < > 26  --  26   BUN 40* 42* 42*   < >  --  35* 37*   < > 29*  --  16   CREATININE 1.14* 1.34* 1.35*   < >  --  1.03* 1.14*   < > 1.05*  --  1.12*   GLUCOSE 162* 154* 192*   < >  --  196* 176*   < > 146*  --  140*   ALBUMIN  --  3.1*  --   --   --   --  3.2*  --  3.2*  --  2.8*   PHOS  --  3.1  --   --   --   --  1.8*  --   --   --  3.0   GPT  --  442*  --   --   --   --  553*  --  650*  --  707*   ALKPT  --  190*  --   --   --   --  169*  --  115  --  78   BILIRUBIN  --  1.4*  --   --   --   --  2.1*  --  3.4*  --  2.0*   MG  --  2.6*  --   --  2.5* 2.5*  --    < > 2.1   < > 2.1    < > = values in this interval not displayed.     Magnesium   Date Value Ref Range Status   09/27/2022 2.6 (H) 1.7 - 2.4 mg/dL Final   09/26/2022 2.5 (H) 1.7 - 2.4 mg/dL Final   09/26/2022 2.5 (H) 1.7 - 2.4 mg/dL Final     Phosphorus   Date Value Ref Range Status   09/27/2022 3.1 2.4 - 4.7 mg/dL Final   09/26/2022 1.8 (L) 2.4 - 4.7 mg/dL Final   09/24/2022 3.0 2.4 - 4.7 mg/dL Final            Female

## 2023-08-29 NOTE — ASU PATIENT PROFILE, ADULT - FALL HARM RISK - RISK INTERVENTIONS

## 2023-08-29 NOTE — ASU DISCHARGE PLAN (ADULT/PEDIATRIC) - NS MD DC FALL RISK RISK
For information on Fall & Injury Prevention, visit: [email or web address] OR  [email or web address] OR  https://www.cdc.gov/steadi/patient.html

## 2023-08-29 NOTE — ASU PATIENT PROFILE, ADULT - NSICDXPASTMEDICALHX_GEN_ALL_CORE_FT
PAST MEDICAL HISTORY:  COPD, mild     Depression     Fibromyalgia     Hyperlipidemia     Hypertension     LBBB (left bundle branch block)     OA (osteoarthritis)     Rheumatoid arthritis     Tobacco dependence

## 2023-08-29 NOTE — ASU DISCHARGE PLAN (ADULT/PEDIATRIC) - ASU DC SPECIAL INSTRUCTIONSFT
DO NOT wet or remove the dressing.  Elevate the extremity to reduce swelling.  No strenuous activities or heavy lifting.  Sling for comfort.    Please follow Dr. Nava's instructions.    Follow up in the office on 9/6/23.  Please call to confirm the appointment.

## 2023-08-29 NOTE — ASU DISCHARGE PLAN (ADULT/PEDIATRIC) - CARE PROVIDER_API CALL
Umer Nava)  Orthopaedic Surgery; Surgery of the Hand  [address], [facility] [address]  [place] [address]  Phone: [phone]  Fax: [phone]  Follow Up Time:

## 2023-09-06 ENCOUNTER — NON-APPOINTMENT (OUTPATIENT)
Age: 68
End: 2023-09-06

## 2023-09-06 ENCOUNTER — APPOINTMENT (OUTPATIENT)
Dept: PULMONOLOGY | Facility: CLINIC | Age: 68
End: 2023-09-06

## 2023-09-06 ENCOUNTER — APPOINTMENT (OUTPATIENT)
Dept: ORTHOPEDIC SURGERY | Facility: CLINIC | Age: 68
End: 2023-09-06
Payer: MEDICARE

## 2023-09-06 PROCEDURE — 99024 POSTOP FOLLOW-UP VISIT: CPT

## 2023-09-06 NOTE — RETURN TO WORK/SCHOOL
[FreeTextEntry1] : Patient was seen today for post op visit. Patient had left carpal tunnel surgery on 8/29/23. Patient needs and requires surgery for carpal tunnel on right hand. Patient is also required to have home health aide after surgery.

## 2023-09-06 NOTE — END OF VISIT
[FreeTextEntry3] : This note was written by Vik Mari on 09/06/2023 acting solely as a scribe for Dr. Umer Nava.   All medical record entries made by the Scribe were at my, Dr. Umer Nava, direction and personally dictated by me on 09/06/2023. I have personally reviewed the chart and agree that the record accurately reflects my personal performance of the history, physical exam, assessment and plan.

## 2023-09-06 NOTE — HISTORY OF PRESENT ILLNESS
[de-identified] : 8 days postoperative. [de-identified] : 8 days status post endoscopic left carpal tunnel release.  Date of surgery: 8/29/2023.  She is overall doing well but reports some tingling however, the severe pain in her fingers has improved.  She is accompanied today by her personal aid.  [de-identified] : Examination of her left wrist and hand demonstrates her incision to be clean and dry.  There is no drainage or evidence of an infection.  She has full flexion and extension of the digits.  She has improved sensation along the median nerve distribution with normal sensation along the radial and ulnar nerve distributions. [de-identified] : Stable, 8 days postoperative. [de-identified] : The suture ends were cut and Steri-Strips were applied.  She was instructed on local wound care, when to begin scar massage and desensitization, range of motion exercises and will followup in 4 weeks.  The patient was instructed to return before then or to call the office if there are any problems in the interim.  When she returns, we will discuss scheduling her right side.

## 2023-09-11 DIAGNOSIS — R07.9 CHEST PAIN, UNSPECIFIED: ICD-10-CM

## 2023-09-22 ENCOUNTER — NON-APPOINTMENT (OUTPATIENT)
Age: 68
End: 2023-09-22

## 2023-10-01 ENCOUNTER — NON-APPOINTMENT (OUTPATIENT)
Age: 68
End: 2023-10-01

## 2023-10-04 ENCOUNTER — APPOINTMENT (OUTPATIENT)
Dept: ORTHOPEDIC SURGERY | Facility: CLINIC | Age: 68
End: 2023-10-04
Payer: MEDICARE

## 2023-10-09 ENCOUNTER — APPOINTMENT (OUTPATIENT)
Dept: RHEUMATOLOGY | Facility: CLINIC | Age: 68
End: 2023-10-09
Payer: MEDICARE

## 2023-10-09 ENCOUNTER — OUTPATIENT (OUTPATIENT)
Dept: OUTPATIENT SERVICES | Facility: HOSPITAL | Age: 68
LOS: 1 days | End: 2023-10-09

## 2023-10-09 ENCOUNTER — MED ADMIN CHARGE (OUTPATIENT)
Age: 68
End: 2023-10-09

## 2023-10-09 VITALS
SYSTOLIC BLOOD PRESSURE: 114 MMHG | WEIGHT: 207 LBS | RESPIRATION RATE: 18 BRPM | BODY MASS INDEX: 32.49 KG/M2 | DIASTOLIC BLOOD PRESSURE: 68 MMHG | HEIGHT: 67 IN | HEART RATE: 74 BPM | OXYGEN SATURATION: 95 %

## 2023-10-09 DIAGNOSIS — Z98.890 OTHER SPECIFIED POSTPROCEDURAL STATES: Chronic | ICD-10-CM

## 2023-10-09 DIAGNOSIS — M54.12 RADICULOPATHY, CERVICAL REGION: ICD-10-CM

## 2023-10-09 PROCEDURE — 99214 OFFICE O/P EST MOD 30 MIN: CPT | Mod: GC

## 2023-10-09 RX ORDER — ALBUTEROL SULFATE 2.5 MG/3ML
(2.5 MG/3ML) SOLUTION RESPIRATORY (INHALATION)
Qty: 300 | Refills: 2 | Status: COMPLETED | COMMUNITY
Start: 2023-02-26 | End: 2023-10-09

## 2023-10-09 RX ORDER — GOLIMUMAB 50 MG/.5ML
50 INJECTION, SOLUTION SUBCUTANEOUS
Qty: 3 | Refills: 3 | Status: ACTIVE | COMMUNITY
Start: 2023-02-06 | End: 1900-01-01

## 2023-10-09 RX ORDER — ALBUTEROL SULFATE 2.5 MG/3ML
(2.5 MG/3ML) SOLUTION RESPIRATORY (INHALATION)
Qty: 300 | Refills: 2 | Status: COMPLETED | COMMUNITY
Start: 2019-12-05 | End: 2023-10-09

## 2023-10-09 RX ORDER — FOLIC ACID 1 MG/1
1 TABLET ORAL DAILY
Qty: 90 | Refills: 3 | Status: ACTIVE | COMMUNITY
Start: 2023-02-06 | End: 1900-01-01

## 2023-10-10 DIAGNOSIS — Z23 ENCOUNTER FOR IMMUNIZATION: ICD-10-CM

## 2023-10-10 DIAGNOSIS — M06.09 RHEUMATOID ARTHRITIS W/OUT RHEUMATOID FACTOR, MULTIPLE SITES: ICD-10-CM

## 2023-10-10 LAB
ALBUMIN SERPL ELPH-MCNC: 4.2 G/DL
ALP BLD-CCNC: 139 U/L
ALT SERPL-CCNC: 12 U/L
ANION GAP SERPL CALC-SCNC: 9 MMOL/L
AST SERPL-CCNC: 15 U/L
BASOPHILS # BLD AUTO: 0.03 K/UL
BASOPHILS NFR BLD AUTO: 0.3 %
BILIRUB SERPL-MCNC: 0.4 MG/DL
BUN SERPL-MCNC: 14 MG/DL
CALCIUM SERPL-MCNC: 9.1 MG/DL
CHLORIDE SERPL-SCNC: 106 MMOL/L
CO2 SERPL-SCNC: 27 MMOL/L
CREAT SERPL-MCNC: 0.56 MG/DL
CRP SERPL-MCNC: 17 MG/L
EGFR: 101 ML/MIN/1.73M2
EOSINOPHIL # BLD AUTO: 0.21 K/UL
EOSINOPHIL NFR BLD AUTO: 2.1 %
ERYTHROCYTE [SEDIMENTATION RATE] IN BLOOD BY WESTERGREN METHOD: 18 MM/HR
GLUCOSE SERPL-MCNC: 94 MG/DL
HCT VFR BLD CALC: 41.6 %
HGB BLD-MCNC: 13.4 G/DL
IMM GRANULOCYTES NFR BLD AUTO: 0.4 %
LYMPHOCYTES # BLD AUTO: 2.57 K/UL
LYMPHOCYTES NFR BLD AUTO: 26 %
MAN DIFF?: NORMAL
MCHC RBC-ENTMCNC: 30 PG
MCHC RBC-ENTMCNC: 32.2 GM/DL
MCV RBC AUTO: 93.3 FL
MONOCYTES # BLD AUTO: 0.8 K/UL
MONOCYTES NFR BLD AUTO: 8.1 %
NEUTROPHILS # BLD AUTO: 6.22 K/UL
NEUTROPHILS NFR BLD AUTO: 63.1 %
PLATELET # BLD AUTO: 351 K/UL
POTASSIUM SERPL-SCNC: 4.4 MMOL/L
PROT SERPL-MCNC: 6 G/DL
RBC # BLD: 4.46 M/UL
RBC # FLD: 15.4 %
SODIUM SERPL-SCNC: 142 MMOL/L
WBC # FLD AUTO: 9.87 K/UL

## 2023-10-10 RX ORDER — METHOTREXATE 2.5 MG/1
2.5 TABLET ORAL
Qty: 32 | Refills: 0 | Status: DISCONTINUED | COMMUNITY
Start: 2018-10-16 | End: 2023-10-10

## 2023-10-11 ENCOUNTER — APPOINTMENT (OUTPATIENT)
Dept: ORTHOPEDIC SURGERY | Facility: CLINIC | Age: 68
End: 2023-10-11
Payer: MEDICARE

## 2023-10-11 PROCEDURE — 99024 POSTOP FOLLOW-UP VISIT: CPT

## 2023-10-16 DIAGNOSIS — M19.90 UNSPECIFIED OSTEOARTHRITIS, UNSPECIFIED SITE: ICD-10-CM

## 2023-10-16 DIAGNOSIS — M54.12 RADICULOPATHY, CERVICAL REGION: ICD-10-CM

## 2023-10-16 DIAGNOSIS — M06.09 RHEUMATOID ARTHRITIS WITHOUT RHEUMATOID FACTOR, MULTIPLE SITES: ICD-10-CM

## 2023-10-17 ENCOUNTER — APPOINTMENT (OUTPATIENT)
Dept: CT IMAGING | Facility: CLINIC | Age: 68
End: 2023-10-17
Payer: MEDICARE

## 2023-10-17 ENCOUNTER — OUTPATIENT (OUTPATIENT)
Dept: OUTPATIENT SERVICES | Facility: HOSPITAL | Age: 68
LOS: 1 days | End: 2023-10-17
Payer: MEDICARE

## 2023-10-17 DIAGNOSIS — M06.09 RHEUMATOID ARTHRITIS WITHOUT RHEUMATOID FACTOR, MULTIPLE SITES: ICD-10-CM

## 2023-10-17 DIAGNOSIS — Z98.890 OTHER SPECIFIED POSTPROCEDURAL STATES: Chronic | ICD-10-CM

## 2023-10-17 DIAGNOSIS — R07.9 CHEST PAIN, UNSPECIFIED: ICD-10-CM

## 2023-10-17 DIAGNOSIS — F17.210 NICOTINE DEPENDENCE, CIGARETTES, UNCOMPLICATED: ICD-10-CM

## 2023-10-17 PROCEDURE — 75574 CT ANGIO HRT W/3D IMAGE: CPT

## 2023-10-17 PROCEDURE — 75574 CT ANGIO HRT W/3D IMAGE: CPT | Mod: 26

## 2023-10-20 ENCOUNTER — NON-APPOINTMENT (OUTPATIENT)
Age: 68
End: 2023-10-20

## 2023-10-23 ENCOUNTER — OUTPATIENT (OUTPATIENT)
Dept: OUTPATIENT SERVICES | Facility: HOSPITAL | Age: 68
LOS: 1 days | End: 2023-10-23

## 2023-10-23 ENCOUNTER — APPOINTMENT (OUTPATIENT)
Dept: RHEUMATOLOGY | Facility: CLINIC | Age: 68
End: 2023-10-23
Payer: MEDICARE

## 2023-10-23 DIAGNOSIS — Z98.890 OTHER SPECIFIED POSTPROCEDURAL STATES: Chronic | ICD-10-CM

## 2023-10-23 PROCEDURE — ZZZZZ: CPT | Mod: GC

## 2023-10-23 RX ORDER — GOLIMUMAB 50 MG/.5ML
50 INJECTION, SOLUTION SUBCUTANEOUS
Refills: 0 | Status: COMPLETED | OUTPATIENT
Start: 2023-10-23

## 2023-10-23 RX ADMIN — GOLIMUMAB 0 MG/0.5ML: 50 INJECTION, SOLUTION SUBCUTANEOUS at 00:00

## 2023-10-25 DIAGNOSIS — M06.00 RHEUMATOID ARTHRITIS WITHOUT RHEUMATOID FACTOR, UNSPECIFIED SITE: ICD-10-CM

## 2023-10-25 DIAGNOSIS — M06.9 RHEUMATOID ARTHRITIS, UNSPECIFIED: ICD-10-CM

## 2023-10-25 DIAGNOSIS — Z79.899 OTHER LONG TERM (CURRENT) DRUG THERAPY: ICD-10-CM

## 2023-10-27 NOTE — ED ADULT TRIAGE NOTE - CHIEF COMPLAINT QUOTE
Writer talked to dad.    Permission given by dad for patient to have all immunizations that are due.    Patient uses the [facility] in [employer] if needed    Parent states no questions or concerns.   c/o left sided chest pain with no radiation,  RUQ abdominal pain with radiation to right side of back and scapula, with nausea x 2 days.  PMH: HTN, HLD, fibromyalgia, arthritis, c/o left sided chest pain with no radiation,  RUQ abdominal pain with radiation to right side of back and scapula, with nausea x 2 days.  Saw hand surgeon today who instructed patient to go to ED for evaluation.   PMH: HTN, HLD, fibromyalgia, arthritis,

## 2023-10-30 ENCOUNTER — APPOINTMENT (OUTPATIENT)
Dept: RADIOLOGY | Facility: IMAGING CENTER | Age: 68
End: 2023-10-30

## 2023-11-02 ENCOUNTER — APPOINTMENT (OUTPATIENT)
Dept: GASTROENTEROLOGY | Facility: CLINIC | Age: 68
End: 2023-11-02

## 2023-11-09 RX ORDER — IBUPROFEN 600 MG/1
600 TABLET, FILM COATED ORAL 3 TIMES DAILY
Qty: 180 | Refills: 2 | Status: ACTIVE | COMMUNITY
Start: 2023-11-09 | End: 1900-01-01

## 2023-11-17 ENCOUNTER — NON-APPOINTMENT (OUTPATIENT)
Age: 68
End: 2023-11-17

## 2023-11-20 ENCOUNTER — OUTPATIENT (OUTPATIENT)
Dept: OUTPATIENT SERVICES | Facility: HOSPITAL | Age: 68
LOS: 1 days | End: 2023-11-20
Payer: MEDICARE

## 2023-11-20 ENCOUNTER — APPOINTMENT (OUTPATIENT)
Dept: INTERNAL MEDICINE | Facility: CLINIC | Age: 68
End: 2023-11-20

## 2023-11-20 VITALS
HEIGHT: 65 IN | HEART RATE: 83 BPM | DIASTOLIC BLOOD PRESSURE: 88 MMHG | SYSTOLIC BLOOD PRESSURE: 147 MMHG | OXYGEN SATURATION: 98 % | WEIGHT: 205.03 LBS | TEMPERATURE: 97 F | RESPIRATION RATE: 15 BRPM

## 2023-11-20 DIAGNOSIS — Z98.890 OTHER SPECIFIED POSTPROCEDURAL STATES: Chronic | ICD-10-CM

## 2023-11-20 DIAGNOSIS — G56.01 CARPAL TUNNEL SYNDROME, RIGHT UPPER LIMB: ICD-10-CM

## 2023-11-20 DIAGNOSIS — Z01.818 ENCOUNTER FOR OTHER PREPROCEDURAL EXAMINATION: ICD-10-CM

## 2023-11-20 DIAGNOSIS — R94.31 ABNORMAL ELECTROCARDIOGRAM [ECG] [EKG]: ICD-10-CM

## 2023-11-20 DIAGNOSIS — J44.9 CHRONIC OBSTRUCTIVE PULMONARY DISEASE, UNSPECIFIED: ICD-10-CM

## 2023-11-20 DIAGNOSIS — F17.200 NICOTINE DEPENDENCE, UNSPECIFIED, UNCOMPLICATED: ICD-10-CM

## 2023-11-20 LAB
ALBUMIN SERPL ELPH-MCNC: 3.8 G/DL — SIGNIFICANT CHANGE UP (ref 3.3–5)
ALBUMIN SERPL ELPH-MCNC: 3.8 G/DL — SIGNIFICANT CHANGE UP (ref 3.3–5)
ALP SERPL-CCNC: 118 U/L — SIGNIFICANT CHANGE UP (ref 30–120)
ALP SERPL-CCNC: 118 U/L — SIGNIFICANT CHANGE UP (ref 30–120)
ALT FLD-CCNC: 22 U/L — SIGNIFICANT CHANGE UP (ref 10–60)
ALT FLD-CCNC: 22 U/L — SIGNIFICANT CHANGE UP (ref 10–60)
ANION GAP SERPL CALC-SCNC: 7 MMOL/L — SIGNIFICANT CHANGE UP (ref 5–17)
ANION GAP SERPL CALC-SCNC: 7 MMOL/L — SIGNIFICANT CHANGE UP (ref 5–17)
AST SERPL-CCNC: 16 U/L — SIGNIFICANT CHANGE UP (ref 10–40)
AST SERPL-CCNC: 16 U/L — SIGNIFICANT CHANGE UP (ref 10–40)
BILIRUB SERPL-MCNC: 0.3 MG/DL — SIGNIFICANT CHANGE UP (ref 0.2–1.2)
BILIRUB SERPL-MCNC: 0.3 MG/DL — SIGNIFICANT CHANGE UP (ref 0.2–1.2)
BUN SERPL-MCNC: 10 MG/DL — SIGNIFICANT CHANGE UP (ref 7–23)
BUN SERPL-MCNC: 10 MG/DL — SIGNIFICANT CHANGE UP (ref 7–23)
CALCIUM SERPL-MCNC: 9.5 MG/DL — SIGNIFICANT CHANGE UP (ref 8.4–10.5)
CALCIUM SERPL-MCNC: 9.5 MG/DL — SIGNIFICANT CHANGE UP (ref 8.4–10.5)
CHLORIDE SERPL-SCNC: 105 MMOL/L — SIGNIFICANT CHANGE UP (ref 96–108)
CHLORIDE SERPL-SCNC: 105 MMOL/L — SIGNIFICANT CHANGE UP (ref 96–108)
CO2 SERPL-SCNC: 30 MMOL/L — SIGNIFICANT CHANGE UP (ref 22–31)
CO2 SERPL-SCNC: 30 MMOL/L — SIGNIFICANT CHANGE UP (ref 22–31)
CREAT SERPL-MCNC: 0.57 MG/DL — SIGNIFICANT CHANGE UP (ref 0.5–1.3)
CREAT SERPL-MCNC: 0.57 MG/DL — SIGNIFICANT CHANGE UP (ref 0.5–1.3)
EGFR: 100 ML/MIN/1.73M2 — SIGNIFICANT CHANGE UP
EGFR: 100 ML/MIN/1.73M2 — SIGNIFICANT CHANGE UP
GLUCOSE SERPL-MCNC: 84 MG/DL — SIGNIFICANT CHANGE UP (ref 70–99)
GLUCOSE SERPL-MCNC: 84 MG/DL — SIGNIFICANT CHANGE UP (ref 70–99)
HCT VFR BLD CALC: 43.5 % — SIGNIFICANT CHANGE UP (ref 34.5–45)
HCT VFR BLD CALC: 43.5 % — SIGNIFICANT CHANGE UP (ref 34.5–45)
HGB BLD-MCNC: 13.9 G/DL — SIGNIFICANT CHANGE UP (ref 11.5–15.5)
HGB BLD-MCNC: 13.9 G/DL — SIGNIFICANT CHANGE UP (ref 11.5–15.5)
MCHC RBC-ENTMCNC: 29.4 PG — SIGNIFICANT CHANGE UP (ref 27–34)
MCHC RBC-ENTMCNC: 29.4 PG — SIGNIFICANT CHANGE UP (ref 27–34)
MCHC RBC-ENTMCNC: 32 GM/DL — SIGNIFICANT CHANGE UP (ref 32–36)
MCHC RBC-ENTMCNC: 32 GM/DL — SIGNIFICANT CHANGE UP (ref 32–36)
MCV RBC AUTO: 92.2 FL — SIGNIFICANT CHANGE UP (ref 80–100)
MCV RBC AUTO: 92.2 FL — SIGNIFICANT CHANGE UP (ref 80–100)
NRBC # BLD: 0 /100 WBCS — SIGNIFICANT CHANGE UP (ref 0–0)
NRBC # BLD: 0 /100 WBCS — SIGNIFICANT CHANGE UP (ref 0–0)
PLATELET # BLD AUTO: 347 K/UL — SIGNIFICANT CHANGE UP (ref 150–400)
PLATELET # BLD AUTO: 347 K/UL — SIGNIFICANT CHANGE UP (ref 150–400)
POTASSIUM SERPL-MCNC: 3.9 MMOL/L — SIGNIFICANT CHANGE UP (ref 3.5–5.3)
POTASSIUM SERPL-MCNC: 3.9 MMOL/L — SIGNIFICANT CHANGE UP (ref 3.5–5.3)
POTASSIUM SERPL-SCNC: 3.9 MMOL/L — SIGNIFICANT CHANGE UP (ref 3.5–5.3)
POTASSIUM SERPL-SCNC: 3.9 MMOL/L — SIGNIFICANT CHANGE UP (ref 3.5–5.3)
PROT SERPL-MCNC: 7 G/DL — SIGNIFICANT CHANGE UP (ref 6–8.3)
PROT SERPL-MCNC: 7 G/DL — SIGNIFICANT CHANGE UP (ref 6–8.3)
RBC # BLD: 4.72 M/UL — SIGNIFICANT CHANGE UP (ref 3.8–5.2)
RBC # BLD: 4.72 M/UL — SIGNIFICANT CHANGE UP (ref 3.8–5.2)
RBC # FLD: 15.7 % — HIGH (ref 10.3–14.5)
RBC # FLD: 15.7 % — HIGH (ref 10.3–14.5)
SODIUM SERPL-SCNC: 142 MMOL/L — SIGNIFICANT CHANGE UP (ref 135–145)
SODIUM SERPL-SCNC: 142 MMOL/L — SIGNIFICANT CHANGE UP (ref 135–145)
WBC # BLD: 9.29 K/UL — SIGNIFICANT CHANGE UP (ref 3.8–10.5)
WBC # BLD: 9.29 K/UL — SIGNIFICANT CHANGE UP (ref 3.8–10.5)
WBC # FLD AUTO: 9.29 K/UL — SIGNIFICANT CHANGE UP (ref 3.8–10.5)
WBC # FLD AUTO: 9.29 K/UL — SIGNIFICANT CHANGE UP (ref 3.8–10.5)

## 2023-11-20 PROCEDURE — 36415 COLL VENOUS BLD VENIPUNCTURE: CPT

## 2023-11-20 PROCEDURE — G0463: CPT

## 2023-11-20 PROCEDURE — 80053 COMPREHEN METABOLIC PANEL: CPT

## 2023-11-20 PROCEDURE — 85027 COMPLETE CBC AUTOMATED: CPT

## 2023-11-20 RX ORDER — IBUPROFEN 200 MG
1 TABLET ORAL
Refills: 0 | DISCHARGE

## 2023-11-20 NOTE — H&P PST ADULT - PROBLEM SELECTOR PLAN 3
encourage patient to see pulm, but unable to get an appt before the surgery; will see PCP and if a pulm clearance is necessary, the pcp will tell patient

## 2023-11-20 NOTE — H&P PST ADULT - PROBLEM SELECTOR PLAN 1
right endoscopic carpal tunnel release, possible open; preop instructions given; to go for medical clearance, ekg done

## 2023-11-20 NOTE — H&P PST ADULT - NSICDXPASTSURGICALHX_GEN_ALL_CORE_FT
PAST SURGICAL HISTORY:  Endometriosis [id number]    H/O hand surgery Left childhood    S/P arthroscopy of knee Left    S/P carpal tunnel release     S/P colon polypectomy     S/P lumpectomy, left breast     Tubal ligation status

## 2023-11-20 NOTE — H&P PST ADULT - PROBLEM SELECTOR PROBLEM 2
Consult  REFERRED BY:  Debbie Pemberton MD    CHIEF COMPLAINT: New onset of seizures      Subjective:     Ora Fabry is a 82 y.o. right-handed  female we are asked to see as a new patient, for evaluation of new onset of seizures. The patient was brought to the emergency room last evening complaining of confusion and disorientation that started that morning and while being evaluated in the emergency room the patient had a witnessed tonic-clonic seizure with postictal drowsiness. She was given Ativan. She never had a past history of seizures. Her metabolic parameters do not show any major imbalance. Her head CT was normal.  She seems to much back to normal baseline this morning, and has been started on Keppra 500 twice daily. She never had a history of stroke or TIA or unusual head injuries or seizures before. Her medications only include Ultracet for her arthritis in her back, Flexeril for the same, Cozaar and Pravachol and baby aspirin. She does not smoke does not drink and lives independently and walks with a walker. Her toxic screen is negative and her EEG this morning was normal.  She says she is back to baseline.     Past Medical History:   Diagnosis Date    Chronic pain     mid back and left side    Hypercholesterolemia 11/7/2011    Hypertension 11/6/2014      Past Surgical History:   Procedure Laterality Date    HX OTHER SURGICAL      mole removal on right arm     Family History   Problem Relation Age of Onset    Cancer Mother         colon    Heart Attack Father     Kidney Disease Sister         on dialysis      Social History     Tobacco Use    Smoking status: Never    Smokeless tobacco: Never   Substance Use Topics    Alcohol use: No         Current Facility-Administered Medications:     aspirin delayed-release tablet 81 mg, 81 mg, Oral, DAILY, Troy Jacques MD, 81 mg at 07/26/22 0901    pravastatin (PRAVACHOL) tablet 20 mg, 20 mg, Oral, QHS, Troy Jacques MD    losartan (COZAAR) tablet 50 mg, 50 mg, Oral, DAILY, Troy Jacques MD, 50 mg at 07/26/22 0901    sodium chloride (NS) flush 5-40 mL, 5-40 mL, IntraVENous, Q8H, Troy Jacques MD, 10 mL at 07/26/22 0235    sodium chloride (NS) flush 5-40 mL, 5-40 mL, IntraVENous, PRN, Troy Jacques MD    acetaminophen (TYLENOL) tablet 650 mg, 650 mg, Oral, Q6H PRN **OR** acetaminophen (TYLENOL) suppository 650 mg, 650 mg, Rectal, Q6H PRN, Troy Jacques MD    polyethylene glycol (MIRALAX) packet 17 g, 17 g, Oral, DAILY PRN, Troy Jacques MD    ondansetron (ZOFRAN ODT) tablet 4 mg, 4 mg, Oral, Q8H PRN **OR** ondansetron (ZOFRAN) injection 4 mg, 4 mg, IntraVENous, Q6H PRN, Troy Jacques MD    levETIRAcetam (KEPPRA) 500 mg in 0.9% sodium chloride 100 mL IVPB, 500 mg, IntraVENous, Q12H, Troy Jacques MD    0.9% sodium chloride infusion, 100 mL/hr, IntraVENous, CONTINUOUS, Troy Jacques MD, Last Rate: 100 mL/hr at 07/26/22 0235, 100 mL/hr at 07/26/22 0235    diazePAM (VALIUM) injection 2 mg, 2 mg, IntraVENous, Q6H PRN, rToy Jacques MD    heparin (porcine) injection 5,000 Units, 5,000 Units, SubCUTAneous, Q12H, Troy Jacques MD, 5,000 Units at 07/26/22 0902        Allergies   Allergen Reactions    Lisinopril Cough      MRI Results (most recent):  No results found for this or any previous visit. No results found for this or any previous visit.     Review of Systems:  A comprehensive review of systems was negative except for: Constitutional: positive for fatigue and malaise  Musculoskeletal: positive for myalgias, arthralgias, stiff joints, back pain, and muscle weakness  Neurological: positive for seizures, memory problems, coordination problems, gait problems, and weakness  Behvioral/Psych: positive for anxiety   Vitals:    07/26/22 0422 07/26/22 0423 07/26/22 0456 07/26/22 0800   BP: (!) 165/70  (!) 142/63 (!) 187/83   Pulse:  69  60   Resp:  16  19   Temp:  97.9 °F (36.6 °C)  97.4 °F (36.3 °C)   SpO2:         Objective:     I      NEUROLOGICAL EXAM:    Appearance: The patient is poorly developed and nourished, provides a fair history and is in no acute distress. Mental Status: Oriented to time except the day of the month, place and person, and the president, cognitive function is relatively normal for age and speech is fluent and no aphasia or dysarthria. Mood and affect appropriate. Cranial Nerves:   Intact visual fields. Fundi are benign, disc are flat, no lesions seen on funduscopy. MARIAM, EOM's full, no nystagmus, no ptosis. Facial sensation is normal. Corneal reflexes are not tested. Facial movement is symmetric. Hearing is abnormal bilaterally. Palate is midline with normal sternocleidomastoid and trapezius muscles are normal. Tongue is midline. Neck without meningismus or bruits  Temporal arteries are not tender or enlarged  TMJ areas are not tender on palpation   Motor:  4/5 strength in upper and lower proximal and distal muscles. Normal bulk and tone. No fasciculations. Rapid alternating movement is symmetric and intact bilaterally   Reflexes:   Deep tendon reflexes 1+/4 and symmetrical.  No babinski or clonus present   Sensory:   Normal to touch, pinprick and vibration and temperature mildly decreased in both feet. DSS is intact   Gait:  Not testable because patient on stretcher in Doppler lab   Tremor:   No tremor noted. Cerebellar:  Not testable abnormal cerebellar signs present on Romberg and tandem testing and finger-nose-finger exam.   Neurovascular:  Normal heart sounds and regular rhythm, peripheral pulses decreased, and no carotid bruits. Assessment:       ICD-10-CM ICD-9-CM    1. Altered mental status, unspecified altered mental status type  R41.82 780.97       2. New onset seizure ([address]  R56.9 780.39       3.  History of CVA (cerebrovascular accident)  Z86.73 V12.54         Active Problems:    New onset seizure ([address] (7/25/2022)      Complex partial seizures evolving to generalized tonic-clonic seizures ([address] (7/26/2022)      Bilateral carotid artery stenosis (7/26/2022)      Convulsive syncope (7/26/2022)      Acute alteration in mental status (7/26/2022)      Cerebral microvascular disease (7/26/2022)        Plan:     Patient doing well now, and her carotid Dopplers and her EEG are unremarkable, head CT is normal, and she has had no more seizures on Keppra 500 mg twice daily. MRI scan is pending. Metabolic studies are unremarkable and tox screen was negative. No clear structural cause for her seizures yet but will await the MRI. Just continue the [address] for now and follow the levels, and we will adjusted pending the levels. Patient seems more back to normal.  Continue excellent medical    We will follow closely.     Signed By: Isaías Valdez MD     July 26, 2022       CC: Matthew Fitzgerald MD  FAX: [phone] Smoker

## 2023-11-20 NOTE — H&P PST ADULT - MS GEN HX ROS MEA POS PC
Patient position: supine. Patient prepped and draped per unit standard.    Safety straps applied:N/A   shoulders, elbows, hands knees/arthritis/joint pain/neck pain

## 2023-11-20 NOTE — H&P PST ADULT - HISTORY OF PRESENT ILLNESS
this is a 67 y/o female who has had numbness tingling and pain right hand for several yrs; to have right carpal tunnel release

## 2023-11-28 ENCOUNTER — APPOINTMENT (OUTPATIENT)
Dept: CARDIOLOGY | Facility: CLINIC | Age: 68
End: 2023-11-28

## 2023-11-28 ENCOUNTER — APPOINTMENT (OUTPATIENT)
Dept: INTERNAL MEDICINE | Facility: CLINIC | Age: 68
End: 2023-11-28

## 2023-11-29 ENCOUNTER — NON-APPOINTMENT (OUTPATIENT)
Age: 68
End: 2023-11-29

## 2023-12-04 ENCOUNTER — APPOINTMENT (OUTPATIENT)
Dept: RHEUMATOLOGY | Facility: CLINIC | Age: 68
End: 2023-12-04

## 2023-12-08 ENCOUNTER — APPOINTMENT (OUTPATIENT)
Dept: CV DIAGNOSTICS | Facility: HOSPITAL | Age: 68
End: 2023-12-08

## 2023-12-11 ENCOUNTER — NON-APPOINTMENT (OUTPATIENT)
Age: 68
End: 2023-12-11

## 2023-12-13 ENCOUNTER — APPOINTMENT (OUTPATIENT)
Dept: INTERNAL MEDICINE | Facility: CLINIC | Age: 68
End: 2023-12-13

## 2023-12-20 ENCOUNTER — OUTPATIENT (OUTPATIENT)
Dept: OUTPATIENT SERVICES | Facility: HOSPITAL | Age: 68
LOS: 1 days | End: 2023-12-20
Payer: MEDICARE

## 2023-12-20 VITALS
OXYGEN SATURATION: 98 % | DIASTOLIC BLOOD PRESSURE: 76 MMHG | SYSTOLIC BLOOD PRESSURE: 146 MMHG | WEIGHT: 205.03 LBS | HEIGHT: 64 IN | HEART RATE: 73 BPM | TEMPERATURE: 97 F | RESPIRATION RATE: 15 BRPM

## 2023-12-20 DIAGNOSIS — Z87.39 PERSONAL HISTORY OF OTHER DISEASES OF THE MUSCULOSKELETAL SYSTEM AND CONNECTIVE TISSUE: ICD-10-CM

## 2023-12-20 DIAGNOSIS — F17.200 NICOTINE DEPENDENCE, UNSPECIFIED, UNCOMPLICATED: ICD-10-CM

## 2023-12-20 DIAGNOSIS — G56.01 CARPAL TUNNEL SYNDROME, RIGHT UPPER LIMB: ICD-10-CM

## 2023-12-20 DIAGNOSIS — Z98.890 OTHER SPECIFIED POSTPROCEDURAL STATES: Chronic | ICD-10-CM

## 2023-12-20 DIAGNOSIS — M06.9 RHEUMATOID ARTHRITIS, UNSPECIFIED: ICD-10-CM

## 2023-12-20 DIAGNOSIS — Z01.818 ENCOUNTER FOR OTHER PREPROCEDURAL EXAMINATION: ICD-10-CM

## 2023-12-20 LAB
ALBUMIN SERPL ELPH-MCNC: 3.5 G/DL — SIGNIFICANT CHANGE UP (ref 3.3–5)
ALBUMIN SERPL ELPH-MCNC: 3.5 G/DL — SIGNIFICANT CHANGE UP (ref 3.3–5)
ALP SERPL-CCNC: 131 U/L — HIGH (ref 30–120)
ALP SERPL-CCNC: 131 U/L — HIGH (ref 30–120)
ALT FLD-CCNC: 24 U/L — SIGNIFICANT CHANGE UP (ref 10–60)
ALT FLD-CCNC: 24 U/L — SIGNIFICANT CHANGE UP (ref 10–60)
ANION GAP SERPL CALC-SCNC: 5 MMOL/L — SIGNIFICANT CHANGE UP (ref 5–17)
ANION GAP SERPL CALC-SCNC: 5 MMOL/L — SIGNIFICANT CHANGE UP (ref 5–17)
AST SERPL-CCNC: 16 U/L — SIGNIFICANT CHANGE UP (ref 10–40)
AST SERPL-CCNC: 16 U/L — SIGNIFICANT CHANGE UP (ref 10–40)
BILIRUB SERPL-MCNC: 0.3 MG/DL — SIGNIFICANT CHANGE UP (ref 0.2–1.2)
BILIRUB SERPL-MCNC: 0.3 MG/DL — SIGNIFICANT CHANGE UP (ref 0.2–1.2)
BUN SERPL-MCNC: 13 MG/DL — SIGNIFICANT CHANGE UP (ref 7–23)
BUN SERPL-MCNC: 13 MG/DL — SIGNIFICANT CHANGE UP (ref 7–23)
CALCIUM SERPL-MCNC: 9 MG/DL — SIGNIFICANT CHANGE UP (ref 8.4–10.5)
CALCIUM SERPL-MCNC: 9 MG/DL — SIGNIFICANT CHANGE UP (ref 8.4–10.5)
CHLORIDE SERPL-SCNC: 104 MMOL/L — SIGNIFICANT CHANGE UP (ref 96–108)
CHLORIDE SERPL-SCNC: 104 MMOL/L — SIGNIFICANT CHANGE UP (ref 96–108)
CO2 SERPL-SCNC: 32 MMOL/L — HIGH (ref 22–31)
CO2 SERPL-SCNC: 32 MMOL/L — HIGH (ref 22–31)
CREAT SERPL-MCNC: 0.66 MG/DL — SIGNIFICANT CHANGE UP (ref 0.5–1.3)
CREAT SERPL-MCNC: 0.66 MG/DL — SIGNIFICANT CHANGE UP (ref 0.5–1.3)
EGFR: 97 ML/MIN/1.73M2 — SIGNIFICANT CHANGE UP
EGFR: 97 ML/MIN/1.73M2 — SIGNIFICANT CHANGE UP
GLUCOSE SERPL-MCNC: 109 MG/DL — HIGH (ref 70–99)
GLUCOSE SERPL-MCNC: 109 MG/DL — HIGH (ref 70–99)
HCT VFR BLD CALC: 41.2 % — SIGNIFICANT CHANGE UP (ref 34.5–45)
HCT VFR BLD CALC: 41.2 % — SIGNIFICANT CHANGE UP (ref 34.5–45)
HGB BLD-MCNC: 13.4 G/DL — SIGNIFICANT CHANGE UP (ref 11.5–15.5)
HGB BLD-MCNC: 13.4 G/DL — SIGNIFICANT CHANGE UP (ref 11.5–15.5)
MCHC RBC-ENTMCNC: 30.3 PG — SIGNIFICANT CHANGE UP (ref 27–34)
MCHC RBC-ENTMCNC: 30.3 PG — SIGNIFICANT CHANGE UP (ref 27–34)
MCHC RBC-ENTMCNC: 32.5 GM/DL — SIGNIFICANT CHANGE UP (ref 32–36)
MCHC RBC-ENTMCNC: 32.5 GM/DL — SIGNIFICANT CHANGE UP (ref 32–36)
MCV RBC AUTO: 93.2 FL — SIGNIFICANT CHANGE UP (ref 80–100)
MCV RBC AUTO: 93.2 FL — SIGNIFICANT CHANGE UP (ref 80–100)
NRBC # BLD: 0 /100 WBCS — SIGNIFICANT CHANGE UP (ref 0–0)
NRBC # BLD: 0 /100 WBCS — SIGNIFICANT CHANGE UP (ref 0–0)
PLATELET # BLD AUTO: 329 K/UL — SIGNIFICANT CHANGE UP (ref 150–400)
PLATELET # BLD AUTO: 329 K/UL — SIGNIFICANT CHANGE UP (ref 150–400)
POTASSIUM SERPL-MCNC: 3.9 MMOL/L — SIGNIFICANT CHANGE UP (ref 3.5–5.3)
POTASSIUM SERPL-MCNC: 3.9 MMOL/L — SIGNIFICANT CHANGE UP (ref 3.5–5.3)
POTASSIUM SERPL-SCNC: 3.9 MMOL/L — SIGNIFICANT CHANGE UP (ref 3.5–5.3)
POTASSIUM SERPL-SCNC: 3.9 MMOL/L — SIGNIFICANT CHANGE UP (ref 3.5–5.3)
PROT SERPL-MCNC: 6.6 G/DL — SIGNIFICANT CHANGE UP (ref 6–8.3)
PROT SERPL-MCNC: 6.6 G/DL — SIGNIFICANT CHANGE UP (ref 6–8.3)
RBC # BLD: 4.42 M/UL — SIGNIFICANT CHANGE UP (ref 3.8–5.2)
RBC # BLD: 4.42 M/UL — SIGNIFICANT CHANGE UP (ref 3.8–5.2)
RBC # FLD: 15.2 % — HIGH (ref 10.3–14.5)
RBC # FLD: 15.2 % — HIGH (ref 10.3–14.5)
SODIUM SERPL-SCNC: 141 MMOL/L — SIGNIFICANT CHANGE UP (ref 135–145)
SODIUM SERPL-SCNC: 141 MMOL/L — SIGNIFICANT CHANGE UP (ref 135–145)
WBC # BLD: 10.45 K/UL — SIGNIFICANT CHANGE UP (ref 3.8–10.5)
WBC # BLD: 10.45 K/UL — SIGNIFICANT CHANGE UP (ref 3.8–10.5)
WBC # FLD AUTO: 10.45 K/UL — SIGNIFICANT CHANGE UP (ref 3.8–10.5)
WBC # FLD AUTO: 10.45 K/UL — SIGNIFICANT CHANGE UP (ref 3.8–10.5)

## 2023-12-20 PROCEDURE — 80053 COMPREHEN METABOLIC PANEL: CPT

## 2023-12-20 PROCEDURE — 85027 COMPLETE CBC AUTOMATED: CPT

## 2023-12-20 PROCEDURE — 36415 COLL VENOUS BLD VENIPUNCTURE: CPT

## 2023-12-20 PROCEDURE — G0463: CPT

## 2023-12-20 NOTE — H&P PST ADULT - NSICDXPASTSURGICALHX_GEN_ALL_CORE_FT
PAST SURGICAL HISTORY:  Endometriosis [id number]    H/O hand surgery Left childhood    S/P arthroscopy of knee Left    S/P carpal tunnel release     S/P colon polypectomy     S/P lumpectomy, left breast     Tubal ligation status      PAST SURGICAL HISTORY:  Endometriosis [id number]    H/O hand surgery Left childhood    S/P arthroscopy of knee Left    S/P carpal tunnel release     S/P colon polypectomy     S/P lumpectomy, left breast     Tubal ligation status

## 2023-12-20 NOTE — H&P PST ADULT - NSICDXPASTMEDICALHX_GEN_ALL_CORE_FT
PAST MEDICAL HISTORY:  COPD, mild     Depression     Fibromyalgia     Hyperlipidemia     Hypertension     LBBB (left bundle branch block)     OA (osteoarthritis)     Rheumatoid arthritis     Seasonal allergies     Tobacco dependence

## 2023-12-20 NOTE — H&P PST ADULT - HISTORY OF PRESENT ILLNESS
this is a 69 y/o female who has had numbness tingling and pain right hand for several yrs; to have right carpal tunnel release this is a 67 y/o female who has had numbness tingling and pain right hand for several yrs; to have right carpal tunnel release

## 2023-12-20 NOTE — H&P PST ADULT - PROBLEM SELECTOR PLAN 1
right endoscopic carpal tunnel release, possible open; preop instructions given; ekg done; to go to cardio, pulm and PCP for clearance

## 2023-12-21 ENCOUNTER — APPOINTMENT (OUTPATIENT)
Dept: CV DIAGNOSTICS | Facility: HOSPITAL | Age: 68
End: 2023-12-21

## 2023-12-21 PROBLEM — J30.2 OTHER SEASONAL ALLERGIC RHINITIS: Chronic | Status: ACTIVE | Noted: 2023-12-20

## 2023-12-27 ENCOUNTER — NON-APPOINTMENT (OUTPATIENT)
Age: 68
End: 2023-12-27

## 2023-12-27 ENCOUNTER — APPOINTMENT (OUTPATIENT)
Dept: PULMONOLOGY | Facility: CLINIC | Age: 68
End: 2023-12-27
Payer: MEDICARE

## 2023-12-27 VITALS
BODY MASS INDEX: 33.43 KG/M2 | HEIGHT: 66 IN | SYSTOLIC BLOOD PRESSURE: 150 MMHG | WEIGHT: 208 LBS | HEART RATE: 71 BPM | DIASTOLIC BLOOD PRESSURE: 60 MMHG

## 2023-12-27 DIAGNOSIS — F17.210 NICOTINE DEPENDENCE, CIGARETTES, UNCOMPLICATED: ICD-10-CM

## 2023-12-27 PROCEDURE — 94729 DIFFUSING CAPACITY: CPT

## 2023-12-27 PROCEDURE — 94726 PLETHYSMOGRAPHY LUNG VOLUMES: CPT

## 2023-12-27 PROCEDURE — ZZZZZ: CPT

## 2023-12-27 PROCEDURE — 99213 OFFICE O/P EST LOW 20 MIN: CPT | Mod: 25

## 2023-12-27 PROCEDURE — 99407 BEHAV CHNG SMOKING > 10 MIN: CPT

## 2023-12-27 PROCEDURE — 94010 BREATHING CAPACITY TEST: CPT

## 2023-12-27 NOTE — REASON FOR VISIT
[Follow-Up] : a follow-up visit [Cough] : cough [COPD] : COPD [Emphysema] : emphysema [Shortness of Breath] : shortness of breath [Nicotine Dependence] : nicotine dependence

## 2023-12-27 NOTE — PHYSICAL EXAM
[No Acute Distress] : no acute distress [Normal Oropharynx] : normal oropharynx [Normal Appearance] : normal appearance [Normal Rate/Rhythm] : normal rate/rhythm [Normal S1, S2] : normal s1, s2 [No Murmurs] : no murmurs [No Resp Distress] : no resp distress [Clear to Auscultation Bilaterally] : clear to auscultation bilaterally [No Abnormalities] : no abnormalities [Benign] : benign [No Clubbing] : no clubbing [No Cyanosis] : no cyanosis [No Edema] : no edema [Normal Color/ Pigmentation] : normal color/ pigmentation [No Focal Deficits] : no focal deficits [Oriented x3] : oriented x3 [Normal Affect] : normal affect [TextBox_99] : Uses a walker

## 2023-12-27 NOTE — HISTORY OF PRESENT ILLNESS
[Current] : current [>= 20 pack years] : >= 20 pack years [Never] : never [TextBox_4] : Pt is a 67y/o F, current smoker with asthma/COPD, HTN, HLD, fibromyalgia, inflammatory arthritis on methotrexate, duloxetine (followed by rheum), b/l knee OA, R sternoclavicular arthritis, hepatic steatosis, anxiety, depression (follows outpatient psychiatry and psychology), and colonic polyps, who present in office today for a f/u visit.  Endorses chronic cough, that she knows is directly related to her smoking 1 pack of cigarettes daily. States she has tried quitting before and was able to stop for 2 years. Remains distressed by her addiction cigarettes and its negative affect on her life. Previously tried to quit using Nicotine gum, and encouragement from her grandchildren as she does not smoke around them. Continue to use Symbicort and albuterol PRN, teaching provided on daily Symbicort use BID, rinse mouth, and albuterol PRN. Currently denies fever, chills, chest tightness, hoarseness, change in voice, or rhinorrhea.  With carpal tunnel release surgery for left hand done on 8/23, is scheduled for right hand carpal tunnel release on 1/9/2024 at [facility]. B/P in office of 150/60 on left arm and 60/52 on right arm scheduled F/U with cardiologist on 1/3/24.   Last LDCT done 10/2022 with no significant findings.  [TextBox_11] : 1

## 2023-12-27 NOTE — REVIEW OF SYSTEMS
[Cough] : cough [Wheezing] : wheezing [SOB on Exertion] : sob on exertion [Chronic Pain] : chronic pain [Negative] : Endocrine [Hemoptysis] : no hemoptysis [Chest Tightness] : no chest tightness [Frequent URIs] : no frequent URIs [Sputum] : no sputum [Dyspnea] : no dyspnea [Pleuritic Pain] : no pleuritic pain [A.M. Dry Mouth] : no a.m. dry mouth [TextBox_94] : Uses a walker

## 2023-12-27 NOTE — ASSESSMENT
[FreeTextEntry1] :  Plan:  PFT done today with normal findings. -Teaching provided on daily Symbicort use BID, rinse mouth, and albuterol PRN; Symbicort and Albuterol renewed -LDCT scan ordered; last done 10/22 -Clearance letter for right carpal tunnel release scheduled for 1/9/22, faxed to [facility] [phone] -Teaching provided on importance of smoking cessation, especially to wound healing S/P surgery  -With scheduled appointment next week with cardiologist for medical clearance

## 2024-01-02 ENCOUNTER — OUTPATIENT (OUTPATIENT)
Dept: OUTPATIENT SERVICES | Facility: HOSPITAL | Age: 69
LOS: 1 days | End: 2024-01-02

## 2024-01-02 ENCOUNTER — APPOINTMENT (OUTPATIENT)
Dept: INTERNAL MEDICINE | Facility: CLINIC | Age: 69
End: 2024-01-02
Payer: MEDICARE

## 2024-01-02 ENCOUNTER — APPOINTMENT (OUTPATIENT)
Dept: CARDIOLOGY | Facility: CLINIC | Age: 69
End: 2024-01-02
Payer: MEDICARE

## 2024-01-02 ENCOUNTER — NON-APPOINTMENT (OUTPATIENT)
Age: 69
End: 2024-01-02

## 2024-01-02 VITALS — HEART RATE: 84 BPM | HEIGHT: 66 IN | OXYGEN SATURATION: 96 % | BODY MASS INDEX: 33.59 KG/M2 | WEIGHT: 209 LBS

## 2024-01-02 VITALS
WEIGHT: 208 LBS | BODY MASS INDEX: 33.43 KG/M2 | SYSTOLIC BLOOD PRESSURE: 72 MMHG | DIASTOLIC BLOOD PRESSURE: 49 MMHG | HEIGHT: 66 IN

## 2024-01-02 VITALS
HEIGHT: 66 IN | DIASTOLIC BLOOD PRESSURE: 71 MMHG | HEART RATE: 80 BPM | OXYGEN SATURATION: 96 % | SYSTOLIC BLOOD PRESSURE: 144 MMHG | BODY MASS INDEX: 33.43 KG/M2 | WEIGHT: 208 LBS

## 2024-01-02 DIAGNOSIS — Z98.890 OTHER SPECIFIED POSTPROCEDURAL STATES: Chronic | ICD-10-CM

## 2024-01-02 DIAGNOSIS — M79.669 PAIN IN UNSPECIFIED LOWER LEG: ICD-10-CM

## 2024-01-02 DIAGNOSIS — R09.89 OTHER SPECIFIED SYMPTOMS AND SIGNS INVOLVING THE CIRCULATORY AND RESPIRATORY SYSTEMS: ICD-10-CM

## 2024-01-02 DIAGNOSIS — G56.01 CARPAL TUNNEL SYNDROME, RIGHT UPPER LIMB: ICD-10-CM

## 2024-01-02 DIAGNOSIS — R01.1 CARDIAC MURMUR, UNSPECIFIED: ICD-10-CM

## 2024-01-02 PROCEDURE — 99214 OFFICE O/P EST MOD 30 MIN: CPT | Mod: 25

## 2024-01-02 PROCEDURE — 99213 OFFICE O/P EST LOW 20 MIN: CPT | Mod: GE

## 2024-01-02 PROCEDURE — 93000 ELECTROCARDIOGRAM COMPLETE: CPT

## 2024-01-02 NOTE — REVIEW OF SYSTEMS
[Lower Ext Edema] : lower extremity edema [Shortness Of Breath] : shortness of breath [Joint Pain] : joint pain [Joint Swelling] : joint swelling [Negative] : Heme/Lymph

## 2024-01-03 LAB
ANION GAP SERPL CALC-SCNC: 14 MMOL/L
BUN SERPL-MCNC: 16 MG/DL
CALCIUM SERPL-MCNC: 9 MG/DL
CHLORIDE SERPL-SCNC: 106 MMOL/L
CHOLEST SERPL-MCNC: 155 MG/DL
CO2 SERPL-SCNC: 24 MMOL/L
CREAT SERPL-MCNC: 0.62 MG/DL
EGFR: 98 ML/MIN/1.73M2
ESTIMATED AVERAGE GLUCOSE: 114 MG/DL
GLUCOSE SERPL-MCNC: 135 MG/DL
HBA1C MFR BLD HPLC: 5.6 %
HCT VFR BLD CALC: 42.4 %
HDLC SERPL-MCNC: 47 MG/DL
HGB BLD-MCNC: 14 G/DL
LDLC SERPL CALC-MCNC: 76 MG/DL
MCHC RBC-ENTMCNC: 30.7 PG
MCHC RBC-ENTMCNC: 33 GM/DL
MCV RBC AUTO: 93 FL
NONHDLC SERPL-MCNC: 108 MG/DL
PLATELET # BLD AUTO: 336 K/UL
POTASSIUM SERPL-SCNC: 4.2 MMOL/L
RBC # BLD: 4.56 M/UL
RBC # FLD: 15.3 %
SODIUM SERPL-SCNC: 144 MMOL/L
TRIGL SERPL-MCNC: 193 MG/DL
WBC # FLD AUTO: 10.65 K/UL

## 2024-01-04 ENCOUNTER — APPOINTMENT (OUTPATIENT)
Dept: CV DIAGNOSITCS | Facility: HOSPITAL | Age: 69
End: 2024-01-04

## 2024-01-04 ENCOUNTER — NON-APPOINTMENT (OUTPATIENT)
Age: 69
End: 2024-01-04

## 2024-01-04 NOTE — HISTORY OF PRESENT ILLNESS
[de-identified] : 3 days postoperative. [de-identified] : 3 days status post endoscopic right carpal tunnel release.  Date of surgery: 1/9/2023.  She is [de-identified] : Examination of her right wrist and hand after the dressing was removed demonstrates her incision to be [de-identified] : Stable, 3 days postoperative. [de-identified] : The suture ends were cut and Steri-Strips were applied.  She was instructed on local wound care, when to begin scar massage and desensitization, range of motion exercises and will followup in 3 weeks.  The patient was instructed to return before then or to call the office if there are any problems in the interim.

## 2024-01-06 PROBLEM — G56.01 CARPAL TUNNEL SYNDROME OF RIGHT WRIST: Status: ACTIVE | Noted: 2023-08-31

## 2024-01-06 PROBLEM — M79.669 CALF PAIN: Status: ACTIVE | Noted: 2022-04-18

## 2024-01-06 NOTE — PHYSICAL EXAM
[No Acute Distress] : no acute distress [Well-Appearing] : well-appearing [Normal Sclera/Conjunctiva] : normal sclera/conjunctiva [EOMI] : extraocular movements intact [Normal Outer Ear/Nose] : the outer ears and nose were normal in appearance [No JVD] : no jugular venous distention [Supple] : supple [Normal Rate] : normal rate  [Regular Rhythm] : with a regular rhythm [Normal S1, S2] : normal S1 and S2 [Normal] : soft, non-tender, non-distended, no masses palpated, no HSM and normal bowel sounds [No Spinal Tenderness] : no spinal tenderness [No Focal Deficits] : no focal deficits [Normal Affect] : the affect was normal [Normal Insight/Judgement] : insight and judgment were intact [de-identified] : systolic ejection murmur noted in R 2nd ICS  [de-identified] : 1-2 2+ chronic pitting edema, 2+ radial pulse with slight delay of R [de-identified] : Uses walker to ambulate

## 2024-01-06 NOTE — ASSESSMENT
[High Risk Surgery - Intraperitoneal, Intrathoracic or Supringuinal Vascular Procedures] : High Risk Surgery - Intraperitoneal, Intrathoracic or Supringuinal Vascular Procedures - No (0) [Ischemic Heart Disease] : Ischemic Heart Disease - No (0) [Congestive Heart Failure] : Congestive Heart Failure - No (0) [Prior Cerebrovascular Accident or TIA] : Prior Cerebrovascular Accident or TIA - No (0) [Creatinine >= 2mg/dL (1 Point)] : Creatinine >= 2mg/dL - No (0) [Insulin-dependent Diabetic (1 Point)] : Insulin-dependent Diabetic - No (0) [0] : 0 , RCRI Class: I, Risk of Post-Op Cardiac Complications: 3.9%, 95% CI for Risk Estimate: 2.8% - 5.4% [Patient Optimized for Surgery] : Patient optimized for surgery [Echocardiogram] : echocardiogram [Modify medications prior to procedure] : Modify medications prior to procedure [As per surgery] : as per surgery [FreeTextEntry7] : stop ibuprofen  [FreeTextEntry4] : 67yo female with a hx of asthma, COPD, tobacco use, HTN, HLD, fibromyalgia, OA, NAFLD, PUD, depression, and bilateral carpal tunnel syndrome who presents in clinic for a pre-op visit for a R carpal tunnel release. The patient's pulmonary status is at its baseline and received pulmonary clearance on 12/27. Volume status is at its baseline with pt's chronic lower extremity swelling. The patient underwent a previous carpal tunnel release earlier this year and tolerated the procedure well with no significant complications. The patient also visited their cardiologist for clearance and is now pending carotid US and echocardiogram for further cardiac workup for differential blood pressure on upper extremities.   Patient was educated to stop ibuprofen and to take acetaminophen for pain. Advised to take antihypertensives and COPD medication on day of surgery.   The patient is medical optimized pending cardiology clearance with further testing of echocardiogram and carotid US per cardiologist.

## 2024-01-06 NOTE — HISTORY OF PRESENT ILLNESS
[COPD] : COPD [Smoker] : smoker [No Adverse Anesthesia Reaction] : no adverse anesthesia reaction in self or family member [Poor (<4 METs)] : Poor (<4 METs) [NSAIDs: _____] : NSAIDs: [unfilled] [Aortic Stenosis] : no aortic stenosis [Atrial Fibrillation] : no atrial fibrillation [Coronary Artery Disease] : no coronary artery disease [Recent Myocardial Infarction] : no recent myocardial infarction [Implantable Device/Pacemaker] : no implantable device/pacemaker [Chronic Anticoagulation] : no chronic anticoagulation [Chronic Kidney Disease] : no chronic kidney disease [Diabetes] : no diabetes [FreeTextEntry1] : R carpal tunnel [FreeTextEntry2] : 1/9/2024  [FreeTextEntry3] : Dr. Nava  [FreeTextEntry4] :  67yo female with a hx of asthma, COPD, tobacco use, HTN, HLD, fibromyalgia, OA, NAFLD, PUD, depression, and bilateral carpal tunnel syndrome who presents in clinic for a pre-op visit for a R carpal tunnel release. Previously tolerated previous carpal tunnel surgery in August.   No recent acute changes in health. Pt reports having L sided chest pain with exertion and SOB and exertion for many years and feels her current health is at its baseline. Reports lower extremity swelling is chronic for many years with no acute worsening. She had a left heart cath and stress test which did not show any remarkable findings.   The pt walks with a roller walker with walking 1/2 block which is its baseline for the past few years.  Saw pulmonologist on 12/27 with PFTs normal at the time.  Cardiology visit on 1/2 for differential BPs with now pending echo and carotid US.   Smoking history: smoke 1/2 pack-1 ppd for the past 506 years with total 40ppd history   Denies any acute illnesses or recent medication changes.   [FreeTextEntry6] : reports dizziness and some palpitations at least 2-3 times a month for the past 2-3 years. EKG done on 1/2 showing left bundle branch seen by cardiologist on 1/2 for cardiology clearance.  [FreeTextEntry7] : EKG done on 1/2/24 showing left bundle branch  Pending echo and carotid US in the setting of differential BPs noted.

## 2024-01-08 DIAGNOSIS — J44.9 CHRONIC OBSTRUCTIVE PULMONARY DISEASE, UNSPECIFIED: ICD-10-CM

## 2024-01-08 DIAGNOSIS — G56.02 CARPAL TUNNEL SYNDROME, LEFT UPPER LIMB: ICD-10-CM

## 2024-01-08 DIAGNOSIS — I10 ESSENTIAL (PRIMARY) HYPERTENSION: ICD-10-CM

## 2024-01-08 DIAGNOSIS — G56.01 CARPAL TUNNEL SYNDROME, RIGHT UPPER LIMB: ICD-10-CM

## 2024-01-08 DIAGNOSIS — M79.669 PAIN IN UNSPECIFIED LOWER LEG: ICD-10-CM

## 2024-01-08 DIAGNOSIS — I77.1 STRICTURE OF ARTERY: ICD-10-CM

## 2024-01-09 ENCOUNTER — APPOINTMENT (OUTPATIENT)
Dept: ORTHOPEDIC SURGERY | Facility: HOSPITAL | Age: 69
End: 2024-01-09

## 2024-01-12 RX ORDER — ATORVASTATIN CALCIUM 40 MG/1
40 TABLET, FILM COATED ORAL
Qty: 90 | Refills: 3 | Status: ACTIVE | COMMUNITY
Start: 2024-01-12 | End: 1900-01-01

## 2024-01-22 ENCOUNTER — APPOINTMENT (OUTPATIENT)
Dept: RHEUMATOLOGY | Facility: CLINIC | Age: 69
End: 2024-01-22
Payer: MEDICARE

## 2024-01-22 ENCOUNTER — OUTPATIENT (OUTPATIENT)
Dept: OUTPATIENT SERVICES | Facility: HOSPITAL | Age: 69
LOS: 1 days | End: 2024-01-22

## 2024-01-22 VITALS
WEIGHT: 207 LBS | OXYGEN SATURATION: 99 % | BODY MASS INDEX: 33.27 KG/M2 | DIASTOLIC BLOOD PRESSURE: 76 MMHG | HEART RATE: 83 BPM | HEIGHT: 66 IN | SYSTOLIC BLOOD PRESSURE: 96 MMHG

## 2024-01-22 VITALS — SYSTOLIC BLOOD PRESSURE: 154 MMHG | DIASTOLIC BLOOD PRESSURE: 64 MMHG

## 2024-01-22 DIAGNOSIS — Z98.890 OTHER SPECIFIED POSTPROCEDURAL STATES: Chronic | ICD-10-CM

## 2024-01-22 PROCEDURE — 99214 OFFICE O/P EST MOD 30 MIN: CPT | Mod: GC

## 2024-01-23 DIAGNOSIS — M79.7 FIBROMYALGIA: ICD-10-CM

## 2024-01-23 DIAGNOSIS — Z79.899 OTHER LONG TERM (CURRENT) DRUG THERAPY: ICD-10-CM

## 2024-01-23 DIAGNOSIS — M19.90 UNSPECIFIED OSTEOARTHRITIS, UNSPECIFIED SITE: ICD-10-CM

## 2024-01-23 DIAGNOSIS — I65.29 OCCLUSION AND STENOSIS OF UNSPECIFIED CAROTID ARTERY: ICD-10-CM

## 2024-01-24 NOTE — PHYSICAL EXAM
[Sclera] : the sclera and conjunctiva were normal [General Appearance - In No Acute Distress] : in no acute distress [Extraocular Movements] : extraocular movements were intact [Examination Of The Oral Cavity] : the lips and gums were normal [Respiration, Rhythm And Depth] : normal respiratory rhythm and effort [Auscultation Breath Sounds / Voice Sounds] : lungs were clear to auscultation bilaterally [Heart Sounds] : normal S1 and S2 [Heart Rate And Rhythm] : heart rate was normal and rhythm regular [Arterial Pulses Carotid] : carotid pulses were normal with no bruits [] : no rash [No Focal Deficits] : no focal deficits [Oriented To Time, Place, And Person] : oriented to person, place, and time [FreeTextEntry1] : TTP in bilateral sternoclavicular with hypertrophy, no synovitis in bilateral MCPs and PIPs, cyst on dorsum of L hand, bilateral elbow pain and shoulder pain to palpation, bilateral knee crepitus and limited ROM, no effusion

## 2024-01-24 NOTE — ASSESSMENT
[FreeTextEntry1] : 68 F is being seen in the Rheumatology clinic for seronegative inflammatory arthritis, fibromyalgia and b/l knee pain from OA here for follow up  #Bilateral Carotid Stenosis #BP unequal -Following with cards, under active evaluation -CTA H&N pending and CTA chest pending   # Seronegative inflammatory arthritis Hands, wrists painful, sternoclavicular joint involvement Serologies: MITCHELL was 1:320, RF, CCP, HLA- B27, dsDNA were negative. Imaging: MRI L hand in 6/17 showed persistent fluid within the extensor carpi radialis brevis and longus and extensor digitorum tendons. In 3/18, pt complained of R neck and R clavicular pain. Pt had US done, which showed sternoclavicular synovitis on 4/18. Differential dx: seronegative inflammatory arthritis (SAPHO pattern but did no have acne or pustular lesions) Current treatment: MTX 20 mg weekly, Folic acid, Simponi Hepatitis and Quant gold negative Feb 2023 Plan -Will hold further doses of simponi pending cardiology evaluation in case there are findings that require urgent procedural intervention  -Refill for MTX and folic acid sent. CBC from Jan 2 and CMP from Dec 2023 unremarkable  -Recommended against NSAID for pain in setting of cardiac work up. Recommend scheduled tylenol and PRN Voltaren gel   #Cervical neck pain -Pt with stiff neck pain and shooting pain from shoulder to her hand c/f radiculopathy -MRI C spine in 2019 with multilevel disc desiccation and trace endplate edema in c4/c5  RTC 1 months (when pt has completed CT imaging)   Discussed with Dr. Arun Carter MD PGY-4 Rheumatology.

## 2024-01-24 NOTE — HISTORY OF PRESENT ILLNESS
[Joint Swelling] : joint swelling [Joint Pain] : joint pain [Morning Stiffness] : morning stiffness [Fatigue] : fatigue [FreeTextEntry1] : Interval HIstory ________________________________________________________________ Jan 2024: Last seen in clinic Oct 2023 for Simponi injection. Was supposed to come in today for repeat Simponi injection that was held due to R carpal tunnel surgery. However, did not get surgery done. Was not cleared by cardiology- was found to have significant BP discrepancy between RUE and LUE. Currently being worked up.  Has significant smoking hx, currently trying to quit Endorsing significant joint pain in her neck, bilateral shoulders. Also endorsing clavicle pain.  Has chronic knee pain, declining surgery.  [RF] : negative rheumatoid factor [CCP] : negative CCP antibody [14-3-3 eta] : negative 14-3-3 eta [SSA] : negative anti-Ro/SSA [SSB] : negative anti-La/SSB [Erosions] : no erosions [Rash] : no rash [Shortness of Breath] : no shortness of breath [Ocular Symptoms] : no ocular symptoms [Dysphonia] : no dysphonia [Chest Pain] : no chest pain [TextBox_2] : 2016 [TextBox_40] : MTX 20 mg weekly  [TextBox_42] : Golimumab 4/2022-9/2022

## 2024-01-25 RX ORDER — PRAVASTATIN SODIUM 40 MG/1
40 TABLET ORAL
Qty: 1 | Refills: 1 | Status: DISCONTINUED | COMMUNITY
Start: 2019-12-02 | End: 2024-01-25

## 2024-01-26 ENCOUNTER — APPOINTMENT (OUTPATIENT)
Dept: OPHTHALMOLOGY | Facility: CLINIC | Age: 69
End: 2024-01-26
Payer: MEDICARE

## 2024-01-26 ENCOUNTER — NON-APPOINTMENT (OUTPATIENT)
Age: 69
End: 2024-01-26

## 2024-01-26 PROCEDURE — 92014 COMPRE OPH EXAM EST PT 1/>: CPT

## 2024-01-27 ENCOUNTER — NON-APPOINTMENT (OUTPATIENT)
Age: 69
End: 2024-01-27

## 2024-01-27 VITALS — WEIGHT: 207 LBS | HEIGHT: 66 IN | BODY MASS INDEX: 33.27 KG/M2

## 2024-01-27 DIAGNOSIS — F17.200 NICOTINE DEPENDENCE, UNSPECIFIED, UNCOMPLICATED: ICD-10-CM

## 2024-01-30 NOTE — ASSESSMENT
[FreeTextEntry1] : 64 y/o F current smoker (1 PPD) with asthma/COPD, HTN, HLD, fibromyalgia, inflammatory arthritis (followed by Rheum), b/l knee OA, R sternoclavicular arthritis, NAFLD, depression (followed by outpatient psychiatry), pre-diabetes (A1c 5.7% in 5/2019), colonic polyps who presents for f/u chronic conditions.\par \par # Numbness in hands\par - cervical radiculopathy vs carpal tunnel syndrome\par - symptom distribution w/ numbness of entire and fingers favors cervical origin\par - referral to neuro and ortho spine for further management\par \par # Dizziness with palpitations\par - sinus rhythm on latest EKG from this year, pulse is regular\par - will refer to cardiology for evaluation; may benefit from long term cardiac monitor if continues to be symptomatic\par \par # R/o carotid artery stenosis\par - was told by another provider there may be a blockage? or blood clot in neck\par - no bruit on exam but given risk factors will obtain carotid duplex\par \par # Blurry vision\par - saw optometrist, prescribed eye glasses but has not yet filled\par \par # Anxiety/depression\par - continuing to provide frequent, short-interval follow ups for reassurance\par - c/w outpatient therapy and psychology\par - c/w duloxetine (also for fibromyalgia)\par \par # COPD/asthma/lung ca screening\par - 7/2020 LDCT scan - stable nodules\par - pt nonadherent to incruse ellipta, using albuterol inhaler PRN daily or every other day\par - f/u with pulm encouraged \par \par # Active smoker\par - smoking cessation referral given\par - rx nicotine gum\par - discussed starting buproprion for smoking cessation in conjunction with nicotine gum. previously rx but never picked up. \par \par # HTN\par - c/w amlodipine, lisinopril\par \par # H. pylori\par - on therapy without bismuth due to interaction with methotrexate\par - f/u with GI for resolution\par \par # HCM\par - last colonoscopy 11/3/2020 w/ TAs. follow-up 5 years. \par - last mammo 10/2020 birads 2, f/u 1 year w/ breast MRI due to increased lifetime risk 20.1%\par - bone scan 7/2019 normal\par - low dose CT done 7/2020 with stable lung nodules since 2013\par - last pap/hpv neg in 2018, due 2023 however will be >65\par - flu shot, pneumo vacc, Tdap UTD\par - shingrix vax sent to pharmacy previously, pt declining\par \par Zane Castro, PGY-2\par [facility]\par \par CDW Dr. De Leon\par Televisit in 1 month No

## 2024-02-08 ENCOUNTER — OUTPATIENT (OUTPATIENT)
Dept: OUTPATIENT SERVICES | Facility: HOSPITAL | Age: 69
LOS: 1 days | End: 2024-02-08
Payer: MEDICARE

## 2024-02-08 ENCOUNTER — APPOINTMENT (OUTPATIENT)
Dept: CT IMAGING | Facility: IMAGING CENTER | Age: 69
End: 2024-02-08
Payer: MEDICARE

## 2024-02-08 DIAGNOSIS — Z98.890 OTHER SPECIFIED POSTPROCEDURAL STATES: Chronic | ICD-10-CM

## 2024-02-08 DIAGNOSIS — Z00.8 ENCOUNTER FOR OTHER GENERAL EXAMINATION: ICD-10-CM

## 2024-02-08 DIAGNOSIS — F17.210 NICOTINE DEPENDENCE, CIGARETTES, UNCOMPLICATED: ICD-10-CM

## 2024-02-08 PROCEDURE — 71271 CT THORAX LUNG CANCER SCR C-: CPT

## 2024-02-08 PROCEDURE — 71271 CT THORAX LUNG CANCER SCR C-: CPT | Mod: 26

## 2024-02-12 NOTE — PHYSICAL EXAM
[Normal Appearance] : normal appearance [Auscultation Breath Sounds / Voice Sounds] : lungs were clear to auscultation bilaterally [Heart Sounds] : normal S1 and S2 [Edema] : no peripheral edema present [Abdomen Soft] : soft [Abdomen Tenderness] : non-tender [FreeTextEntry1] : uses walker [Nail Clubbing] : no clubbing of the fingernails [] : no rash [Oriented To Time, Place, And Person] : oriented to person, place, and time

## 2024-02-12 NOTE — REASON FOR VISIT
[FreeTextEntry1] : Ms. Joaquin is a 67 yo F with PMH of HTN, COPD, Diverticulosis. OA, Fibromyalgia, LBBB presenting for follow up. She has been having a significant amount of arm and shoulder pain which she attributes to her Fibromyalgia. She denies shortness of breath, chest pain, palpitations. She had a recent stress test (pharmacological), which showed no ischemia. She notes that she was told that day that her BPs were unequal in her arms. Today's BPs are strikingly different as well,

## 2024-02-12 NOTE — DISCUSSION/SUMMARY
[FreeTextEntry1] : 67 yo F with PMH of HTN, COPD, Diverticulosis. OA, Fibromyalgia, LBBB presenting with JEFFERS and chest pain  ---Since last visit, she obtained a Coronary CT which showed possible Lcx disease, due to insurance purposes FFR was not done on this. Decision was made for a stress test, which showed no ischemia in this region. Today however, she has unequal BPs in her upper extrremities and bilateral carotid bruit. In 2021, she had mild carotid disease bilaterally. Will recheck this week,  along with Echo. For now will defer Carpal Tunnel Surgery (second) pending further workup. --Continue ASA and Statin (LDL 76 HDL 47 A1c 5.6) --Extensive discussion regarding smoking cessation and risk of significant arterial disease [EKG obtained to assist in diagnosis and management of assessed problem(s)] : EKG obtained to assist in diagnosis and management of assessed problem(s)

## 2024-02-14 ENCOUNTER — NON-APPOINTMENT (OUTPATIENT)
Age: 69
End: 2024-02-14

## 2024-02-27 ENCOUNTER — APPOINTMENT (OUTPATIENT)
Dept: OPHTHALMOLOGY | Facility: CLINIC | Age: 69
End: 2024-02-27
Payer: MEDICARE

## 2024-02-27 ENCOUNTER — NON-APPOINTMENT (OUTPATIENT)
Age: 69
End: 2024-02-27

## 2024-02-27 PROCEDURE — 99214 OFFICE O/P EST MOD 30 MIN: CPT

## 2024-02-27 PROCEDURE — 92083 EXTENDED VISUAL FIELD XM: CPT

## 2024-02-28 ENCOUNTER — APPOINTMENT (OUTPATIENT)
Dept: CT IMAGING | Facility: IMAGING CENTER | Age: 69
End: 2024-02-28
Payer: MEDICARE

## 2024-02-28 ENCOUNTER — OUTPATIENT (OUTPATIENT)
Dept: OUTPATIENT SERVICES | Facility: HOSPITAL | Age: 69
LOS: 1 days | End: 2024-02-28
Payer: MEDICARE

## 2024-02-28 DIAGNOSIS — Z98.890 OTHER SPECIFIED POSTPROCEDURAL STATES: Chronic | ICD-10-CM

## 2024-02-28 DIAGNOSIS — I77.1 STRICTURE OF ARTERY: ICD-10-CM

## 2024-02-28 DIAGNOSIS — Z00.8 ENCOUNTER FOR OTHER GENERAL EXAMINATION: ICD-10-CM

## 2024-02-28 PROCEDURE — 71275 CT ANGIOGRAPHY CHEST: CPT | Mod: 26

## 2024-02-28 PROCEDURE — 71275 CT ANGIOGRAPHY CHEST: CPT

## 2024-03-01 ENCOUNTER — OUTPATIENT (OUTPATIENT)
Dept: OUTPATIENT SERVICES | Facility: HOSPITAL | Age: 69
LOS: 1 days | End: 2024-03-01

## 2024-03-01 ENCOUNTER — NON-APPOINTMENT (OUTPATIENT)
Age: 69
End: 2024-03-01

## 2024-03-01 ENCOUNTER — APPOINTMENT (OUTPATIENT)
Dept: INTERNAL MEDICINE | Facility: CLINIC | Age: 69
End: 2024-03-01
Payer: MEDICARE

## 2024-03-01 VITALS
HEART RATE: 82 BPM | WEIGHT: 209.38 LBS | OXYGEN SATURATION: 98 % | HEIGHT: 66 IN | SYSTOLIC BLOOD PRESSURE: 143 MMHG | DIASTOLIC BLOOD PRESSURE: 69 MMHG | BODY MASS INDEX: 33.65 KG/M2 | RESPIRATION RATE: 16 BRPM

## 2024-03-01 VITALS — DIASTOLIC BLOOD PRESSURE: 63 MMHG | SYSTOLIC BLOOD PRESSURE: 94 MMHG

## 2024-03-01 DIAGNOSIS — M19.90 UNSPECIFIED OSTEOARTHRITIS, UNSPECIFIED SITE: ICD-10-CM

## 2024-03-01 DIAGNOSIS — Z98.890 OTHER SPECIFIED POSTPROCEDURAL STATES: Chronic | ICD-10-CM

## 2024-03-01 DIAGNOSIS — J44.9 CHRONIC OBSTRUCTIVE PULMONARY DISEASE, UNSPECIFIED: ICD-10-CM

## 2024-03-01 DIAGNOSIS — I10 ESSENTIAL (PRIMARY) HYPERTENSION: ICD-10-CM

## 2024-03-01 DIAGNOSIS — M79.7 FIBROMYALGIA: ICD-10-CM

## 2024-03-01 DIAGNOSIS — E78.5 HYPERLIPIDEMIA, UNSPECIFIED: ICD-10-CM

## 2024-03-01 DIAGNOSIS — G56.02 CARPAL TUNNEL SYNDROME, LEFT UPPER LIMB: ICD-10-CM

## 2024-03-01 PROCEDURE — 99213 OFFICE O/P EST LOW 20 MIN: CPT | Mod: GE

## 2024-03-01 RX ORDER — UBIDECARENONE 200 MG
500 CAPSULE ORAL DAILY
Qty: 90 | Refills: 3 | Status: ACTIVE | COMMUNITY
Start: 2019-12-05 | End: 1900-01-01

## 2024-03-01 RX ORDER — ROSUVASTATIN CALCIUM 20 MG/1
20 TABLET, FILM COATED ORAL
Qty: 90 | Refills: 2 | Status: ACTIVE | COMMUNITY
Start: 2024-01-24 | End: 1900-01-01

## 2024-03-01 RX ORDER — SALINE 7; 19 G/118ML; G/118ML
4 ENEMA RECTAL
Qty: 1 | Refills: 3 | Status: ACTIVE | COMMUNITY
Start: 2022-01-14 | End: 1900-01-01

## 2024-03-01 RX ORDER — ALBUTEROL SULFATE 90 UG/1
108 (90 BASE) INHALANT RESPIRATORY (INHALATION)
Qty: 6.7 | Refills: 3 | Status: ACTIVE | COMMUNITY
Start: 2021-01-28 | End: 1900-01-01

## 2024-03-01 RX ORDER — MULTIVIT-MIN/IRON/FOLIC ACID/K 18-600-40
500 CAPSULE ORAL
Qty: 30 | Refills: 0 | Status: ACTIVE | COMMUNITY
Start: 2023-03-29 | End: 1900-01-01

## 2024-03-04 ENCOUNTER — APPOINTMENT (OUTPATIENT)
Dept: RHEUMATOLOGY | Facility: CLINIC | Age: 69
End: 2024-03-04

## 2024-03-04 ENCOUNTER — NON-APPOINTMENT (OUTPATIENT)
Age: 69
End: 2024-03-04

## 2024-03-04 DIAGNOSIS — J44.9 CHRONIC OBSTRUCTIVE PULMONARY DISEASE, UNSPECIFIED: ICD-10-CM

## 2024-03-04 DIAGNOSIS — M19.90 UNSPECIFIED OSTEOARTHRITIS, UNSPECIFIED SITE: ICD-10-CM

## 2024-03-04 DIAGNOSIS — E78.5 HYPERLIPIDEMIA, UNSPECIFIED: ICD-10-CM

## 2024-03-04 DIAGNOSIS — G56.02 CARPAL TUNNEL SYNDROME, LEFT UPPER LIMB: ICD-10-CM

## 2024-03-04 DIAGNOSIS — M79.7 FIBROMYALGIA: ICD-10-CM

## 2024-03-04 DIAGNOSIS — I10 ESSENTIAL (PRIMARY) HYPERTENSION: ICD-10-CM

## 2024-03-04 NOTE — HISTORY OF PRESENT ILLNESS
[de-identified] : 68-year-old woman with mild asthma/COPD overlap, current smoker, hypertension, hyperlipidemia, fibromyalgia, seronegative inflammatory arthritis, bilateral knee OA, carpal tunnel syndrome, hepatic steatosis/NAFLD, anxiety, depression, colon polyps, peptic ulcer disease presenting for follow up and forms for parking, and care of plan.   #asymmetrical HTN she reports that she recently did CT test for her uneven BP  in the upper extremities, wanting to know her result, which hasn't been read, she denied any headache, endorsed some vision problems more so in the right than on the left, recently did US neck which found stenosis in the carotid arteries in the right 10-49%, in the left 50-79%. a note was shown by her from her cardiologist which mentioned possible role of endarterectomy.   #COPD She reports stable breathing symptoms but reports that she is still smoking ~1 ppd. She has strong desire to quit and  is looking to get the gum for quitting.   #carpel tunnel  in the left side is better s/p surgical release.  [FreeTextEntry1] : Follow up

## 2024-03-04 NOTE — PHYSICAL EXAM
[Normal TMs] : both tympanic membranes were normal [No Respiratory Distress] : no respiratory distress  [No Accessory Muscle Use] : no accessory muscle use [Regular Rhythm] : with a regular rhythm [Normal Rate] : normal rate  [No Edema] : there was no peripheral edema [Non Tender] : non-tender [Soft] : abdomen soft [No Rash] : no rash [No Focal Deficits] : no focal deficits [Normal] : affect was normal and insight and judgment were intact [de-identified] : mild rhonci present in all quadrants [de-identified] : pain at bilateral knees, and right hand tenderness worse the left [de-identified] : gait impaired by knee pain

## 2024-03-04 NOTE — COUNSELING
[Use of nicotine replacement therapies and other medications discussed] : Use of nicotine replacement therapies and other medications discussed [Cessation strategies including cessation program discussed] : Cessation strategies including cessation program discussed [Encouraged to pick a quit date and identify support needed to quit] : Encouraged to pick a quit date and identify support needed to quit [Smoking Cessation Program Referral] : Smoking Cessation Program Referral  [Yes] : Willing to quit smoking [Participate in Class] : Participate in class [Behavioral health counseling provided] : Behavioral health counseling provided [FreeTextEntry3] : 15

## 2024-03-04 NOTE — ASSESSMENT
[FreeTextEntry1] : 68F with inflammatory and noninflammatory arthtritis, HTN/HLD, asthma/copd overlap, current smoker, PUD s/p treated HP, NALFD presenting for follow up  HCM #Vaccines -flu UTD -COVID s/p primary series, encouraged booster -pneumococcal - completed -Tdap - UTD  #Cancer -CRC - as above (2025) -Lung - as above (2023) -Mammo ordered -Cervical wnl 2018 has appt for repeat pap smear  #Others -DEXA - still ok in 2023 although with likely falsely elevated spine T score due to osteophytes -PHQ/AUDIT/DAST UTD  RTC 6 Months  Discussed with Dr. Ameena Hopper MD PGY2 [facility]

## 2024-03-05 NOTE — ED ADULT NURSE NOTE - NS ED NOTE ABUSE RESPONSE YN
For information on Fall & Injury Prevention, visit: [email or web address] OR  [email or web address] OR  https://www.cdc.gov/steadi/patient.html
Yes

## 2024-03-13 RX ORDER — BUDESONIDE AND FORMOTEROL FUMARATE DIHYDRATE 160; 4.5 UG/1; UG/1
160-4.5 AEROSOL RESPIRATORY (INHALATION) TWICE DAILY
Qty: 1 | Refills: 5 | Status: ACTIVE | COMMUNITY
Start: 2021-02-02

## 2024-03-26 ENCOUNTER — RX RENEWAL (OUTPATIENT)
Age: 69
End: 2024-03-26

## 2024-04-02 ENCOUNTER — NON-APPOINTMENT (OUTPATIENT)
Age: 69
End: 2024-04-02

## 2024-04-02 ENCOUNTER — APPOINTMENT (OUTPATIENT)
Dept: CARDIOLOGY | Facility: CLINIC | Age: 69
End: 2024-04-02

## 2024-04-03 ENCOUNTER — OUTPATIENT (OUTPATIENT)
Dept: OUTPATIENT SERVICES | Facility: HOSPITAL | Age: 69
LOS: 1 days | Discharge: HOME | End: 2024-04-03
Payer: MEDICARE

## 2024-04-03 DIAGNOSIS — Z98.890 OTHER SPECIFIED POSTPROCEDURAL STATES: Chronic | ICD-10-CM

## 2024-04-03 DIAGNOSIS — I77.1 STRICTURE OF ARTERY: ICD-10-CM

## 2024-04-03 LAB
ANION GAP SERPL CALC-SCNC: 8 MMOL/L — SIGNIFICANT CHANGE UP (ref 7–14)
BUN SERPL-MCNC: 13 MG/DL — SIGNIFICANT CHANGE UP (ref 7–23)
CALCIUM SERPL-MCNC: 9.1 MG/DL — SIGNIFICANT CHANGE UP (ref 8.4–10.5)
CHLORIDE SERPL-SCNC: 106 MMOL/L — SIGNIFICANT CHANGE UP (ref 98–107)
CO2 SERPL-SCNC: 27 MMOL/L — SIGNIFICANT CHANGE UP (ref 22–31)
CREAT SERPL-MCNC: 0.58 MG/DL — SIGNIFICANT CHANGE UP (ref 0.5–1.3)
EGFR: 99 ML/MIN/1.73M2 — SIGNIFICANT CHANGE UP
GLUCOSE SERPL-MCNC: 100 MG/DL — HIGH (ref 70–99)
HCT VFR BLD CALC: 36.4 % — SIGNIFICANT CHANGE UP (ref 34.5–45)
HGB BLD-MCNC: 12.4 G/DL — SIGNIFICANT CHANGE UP (ref 11.5–15.5)
MAGNESIUM SERPL-MCNC: 2.2 MG/DL — SIGNIFICANT CHANGE UP (ref 1.6–2.6)
MCHC RBC-ENTMCNC: 30.6 PG — SIGNIFICANT CHANGE UP (ref 27–34)
MCHC RBC-ENTMCNC: 34.1 GM/DL — SIGNIFICANT CHANGE UP (ref 32–36)
MCV RBC AUTO: 89.9 FL — SIGNIFICANT CHANGE UP (ref 80–100)
NRBC # BLD: 0 /100 WBCS — SIGNIFICANT CHANGE UP (ref 0–0)
NRBC # FLD: 0 K/UL — SIGNIFICANT CHANGE UP (ref 0–0)
PLATELET # BLD AUTO: 317 K/UL — SIGNIFICANT CHANGE UP (ref 150–400)
POTASSIUM SERPL-MCNC: 4.4 MMOL/L — SIGNIFICANT CHANGE UP (ref 3.5–5.3)
POTASSIUM SERPL-SCNC: 4.4 MMOL/L — SIGNIFICANT CHANGE UP (ref 3.5–5.3)
RBC # BLD: 4.05 M/UL — SIGNIFICANT CHANGE UP (ref 3.8–5.2)
RBC # FLD: 15.3 % — HIGH (ref 10.3–14.5)
SODIUM SERPL-SCNC: 141 MMOL/L — SIGNIFICANT CHANGE UP (ref 135–145)
WBC # BLD: 9.26 K/UL — SIGNIFICANT CHANGE UP (ref 3.8–10.5)
WBC # FLD AUTO: 9.26 K/UL — SIGNIFICANT CHANGE UP (ref 3.8–10.5)

## 2024-04-03 PROCEDURE — 99152 MOD SED SAME PHYS/QHP 5/>YRS: CPT

## 2024-04-03 PROCEDURE — 93458 L HRT ARTERY/VENTRICLE ANGIO: CPT | Mod: 26

## 2024-04-03 PROCEDURE — 93010 ELECTROCARDIOGRAM REPORT: CPT

## 2024-04-03 PROCEDURE — 36221 PLACE CATH THORACIC AORTA: CPT | Mod: 59

## 2024-04-03 RX ORDER — DULOXETINE HYDROCHLORIDE 30 MG/1
1 CAPSULE, DELAYED RELEASE ORAL
Refills: 0 | DISCHARGE

## 2024-04-03 RX ORDER — SODIUM CHLORIDE 9 MG/ML
1000 INJECTION INTRAMUSCULAR; INTRAVENOUS; SUBCUTANEOUS
Refills: 0 | Status: DISCONTINUED | OUTPATIENT
Start: 2024-04-03 | End: 2024-04-17

## 2024-04-03 RX ORDER — BUDESONIDE AND FORMOTEROL FUMARATE DIHYDRATE 160; 4.5 UG/1; UG/1
2 AEROSOL RESPIRATORY (INHALATION)
Refills: 0 | DISCHARGE

## 2024-04-03 RX ORDER — AMLODIPINE BESYLATE 2.5 MG/1
1 TABLET ORAL
Refills: 0 | DISCHARGE

## 2024-04-03 RX ORDER — ALBUTEROL 90 UG/1
2 AEROSOL, METERED ORAL
Refills: 0 | DISCHARGE

## 2024-04-03 RX ORDER — LISINOPRIL 2.5 MG/1
1 TABLET ORAL
Refills: 0 | DISCHARGE

## 2024-04-03 RX ORDER — ASPIRIN/CALCIUM CARB/MAGNESIUM 324 MG
0 TABLET ORAL
Refills: 0 | DISCHARGE

## 2024-04-03 RX ORDER — SODIUM CHLORIDE 9 MG/ML
250 INJECTION INTRAMUSCULAR; INTRAVENOUS; SUBCUTANEOUS ONCE
Refills: 0 | Status: COMPLETED | OUTPATIENT
Start: 2024-04-03 | End: 2024-04-03

## 2024-04-03 RX ORDER — CHOLECALCIFEROL (VITAMIN D3) 125 MCG
1 CAPSULE ORAL
Refills: 0 | DISCHARGE

## 2024-04-03 RX ORDER — ROSUVASTATIN CALCIUM 5 MG/1
1 TABLET ORAL
Refills: 0 | DISCHARGE

## 2024-04-03 RX ORDER — ASCORBIC ACID 60 MG
1 TABLET,CHEWABLE ORAL
Refills: 0 | DISCHARGE

## 2024-04-03 RX ORDER — FOLIC ACID 0.8 MG
1 TABLET ORAL
Refills: 0 | DISCHARGE

## 2024-04-03 RX ORDER — PREGABALIN 225 MG/1
1 CAPSULE ORAL
Refills: 0 | DISCHARGE

## 2024-04-03 RX ORDER — METHOTREXATE 2.5 MG/1
8 TABLET ORAL
Refills: 0 | DISCHARGE

## 2024-04-03 RX ORDER — SODIUM CHLORIDE 9 MG/ML
3 INJECTION INTRAMUSCULAR; INTRAVENOUS; SUBCUTANEOUS EVERY 8 HOURS
Refills: 0 | Status: DISCONTINUED | OUTPATIENT
Start: 2024-04-03 | End: 2024-04-17

## 2024-04-03 RX ADMIN — SODIUM CHLORIDE 500 MILLILITER(S): 9 INJECTION INTRAMUSCULAR; INTRAVENOUS; SUBCUTANEOUS at 09:24

## 2024-04-03 RX ADMIN — SODIUM CHLORIDE 100 MILLILITER(S): 9 INJECTION INTRAMUSCULAR; INTRAVENOUS; SUBCUTANEOUS at 11:38

## 2024-04-03 RX ADMIN — SODIUM CHLORIDE 75 MILLILITER(S): 9 INJECTION INTRAMUSCULAR; INTRAVENOUS; SUBCUTANEOUS at 09:24

## 2024-04-03 NOTE — H&P CARDIOLOGY - NSICDXPASTSURGICALHX_GEN_ALL_CORE_FT
PAST SURGICAL HISTORY:  Endometriosis [id number]    H/O hand surgery Left childhood    S/P arthroscopy of knee Left    S/P carpal tunnel release     S/P colon polypectomy     S/P lumpectomy, left breast     Tubal ligation status

## 2024-04-03 NOTE — H&P CARDIOLOGY - NSSUBSTANCEUSE_GEN_ALL_CORE_SD
----- Message from Shanna Lovett MD sent at 5/4/2023  9:38 AM CDT -----  Cardiology nurses:    Please call parent and advise Holter monitor showed very rare PACs and PVCs.  However, the PVCs occurred at higher heart rates than typical, as we also saw during his stress test.  No medications or activity restrictions are needed, but he does need ongoing follow up.  Please transfer to scheduling to arrange follow up in one year.   never used

## 2024-04-03 NOTE — H&P CARDIOLOGY - HISTORY OF PRESENT ILLNESS
68 y/o F with PMH of HTN, HLD, Chronic smoker, Fibromyalgia, RA, OA, COPD, LBBB presented to [facility] for LHC and subclavian artery imagining due to recent carotid dopplers showing left prox ICA of 60-70% blockage. As per the patient she has been having constant right neck and arm pain with associated left sided chest pain on exertion and at rest with SOB and JEFFERS. Patient endorsed this to the cardiologist who did a carotid doppler and CTA noted abnormalities and was referred for LHC. Patient stated that any movement in her left arm she would feel the pain radiating from the shoulder to the arm with associated neck stiffness. Patient otherwise denied any fevers, chills, N/V/D/C, abdominal pain, dysuria, melena, hematochezia, recent travel, sick contact, cough, body aches, pleuritic or positional chest pain.     Referring: Dr. Neumann

## 2024-04-03 NOTE — H&P CARDIOLOGY - RS GEN PE MLT RESP DETAILS PC
Total Square Area In Cm2 (Whole Numbers Only Please): 188 normal/airway patent/breath sounds equal/good air movement/respirations non-labored/clear to auscultation bilaterally/no chest wall tenderness/no intercostal retractions/no rales/no rhonchi/no wheezes

## 2024-04-08 ENCOUNTER — APPOINTMENT (OUTPATIENT)
Dept: RHEUMATOLOGY | Facility: CLINIC | Age: 69
End: 2024-04-08

## 2024-04-09 ENCOUNTER — APPOINTMENT (OUTPATIENT)
Dept: VASCULAR SURGERY | Facility: CLINIC | Age: 69
End: 2024-04-09
Payer: MEDICARE

## 2024-04-09 VITALS
DIASTOLIC BLOOD PRESSURE: 74 MMHG | SYSTOLIC BLOOD PRESSURE: 138 MMHG | HEART RATE: 67 BPM | HEIGHT: 66 IN | BODY MASS INDEX: 32.95 KG/M2 | WEIGHT: 205 LBS

## 2024-04-09 VITALS — SYSTOLIC BLOOD PRESSURE: 90 MMHG | DIASTOLIC BLOOD PRESSURE: 60 MMHG | HEART RATE: 66 BPM

## 2024-04-09 DIAGNOSIS — I77.1 STRICTURE OF ARTERY: ICD-10-CM

## 2024-04-09 DIAGNOSIS — G45.8 OTHER TRANSIENT CEREBRAL ISCHEMIC ATTACKS AND RELATED SYNDROMES: ICD-10-CM

## 2024-04-09 DIAGNOSIS — I83.893 VARICOSE VEINS OF BILATERAL LOWER EXTREMITIES WITH OTHER COMPLICATIONS: ICD-10-CM

## 2024-04-09 PROCEDURE — ZZZZZ: CPT

## 2024-04-09 PROCEDURE — 93970 EXTREMITY STUDY: CPT

## 2024-04-09 PROCEDURE — 99204 OFFICE O/P NEW MOD 45 MIN: CPT

## 2024-04-09 NOTE — DATA REVIEWED
[FreeTextEntry1] : Outside facility study report  reviewed                      2/28/2024 CT Thorax mod to severe innominate art stenosis                       1/24/2024  Carotid Duplex  Lt ICA stenosis 70%                                         Rt VA  retrograde flow   4/9/2024  Venous Doppler Toñito LE  no acute dvt svt                            RLE  no sono evidence of reflux                            LLE  no sono evidence of reflux

## 2024-04-09 NOTE — PHYSICAL EXAM
[Alert] : alert [Oriented to Person] : oriented to person [Oriented to Place] : oriented to place [Oriented to Time] : oriented to time [Calm] : calm [Right Carotid Bruit] : right carotid bruit heard [Left Carotid Bruit] : left carotid bruit heard [0] : right 0 [2+] : left 2+ [JVD] : no jugular venous distention  [Ankle Swelling (On Exam)] : present [Ankle Swelling Bilaterally] : bilaterally  [Varicose Veins Of Lower Extremities] : bilaterally [] : bilaterally [Ankle Swelling On The Right] : mild [de-identified] : nad [de-identified] : wnl [de-identified] : cta kaylee  [FreeTextEntry1] : no evid of rue  ischemia  Mild bilateral  leg venous insufficiency w mild bilateral leg stasis dermatitis and mild bilateral leg edema Multiple  bilateral leg small  tortuous varicose  veins and spider veins  ant post medial calf and shin RLE Varicose veins measuring  1-3 mm in size on the calf /shin LLE Varicose veins measuring  1-3 mm in size on the calf /shin no wounds/ulcers  [de-identified] : wnl [de-identified] : Toñito Cranial nerves 2-12 toñito grossly intact [de-identified] : cooperative

## 2024-04-09 NOTE — HISTORY OF PRESENT ILLNESS
[FreeTextEntry1] : Pt  is a active smoker  Pt states  s/p card cath w dx by angiography of  innominate  artery stenosis pt c/o david arm pain w activity that is affecting her adl  pt states no cerebrovasc c/o    pt  also c/o kaylee leg swelling and discomfort w activity

## 2024-04-09 NOTE — ASSESSMENT
[Arterial/Venous Disease] : arterial/venous disease [Smoking Cessation] : smoking cessation [FreeTextEntry1] : Impression  innominate  artery stenosis  w  reversed rt va flow    Plan Med Conservative management leg elevation, knee high comp stockings  20-30mm Hg (rx given) d/w pt to cut back and quit smoking to allow  for maximum success  of any duture  possible endo or open  intervention and minimize the risk of  stenosis   d/w pt tent plan in the future  for  kaylee  ue angio and possible endo intervention possible left to rt   sa  sa bypass ov in 2 mo to re eval  w carotid duplex s/o stenosis  and  ue art duplex s/o sa steal syndrome

## 2024-04-16 ENCOUNTER — APPOINTMENT (OUTPATIENT)
Dept: OBGYN | Facility: HOSPITAL | Age: 69
End: 2024-04-16
Payer: MEDICARE

## 2024-04-16 ENCOUNTER — OUTPATIENT (OUTPATIENT)
Dept: OUTPATIENT SERVICES | Facility: HOSPITAL | Age: 69
LOS: 1 days | End: 2024-04-16

## 2024-04-16 ENCOUNTER — RESULT REVIEW (OUTPATIENT)
Age: 69
End: 2024-04-16

## 2024-04-16 VITALS
DIASTOLIC BLOOD PRESSURE: 51 MMHG | TEMPERATURE: 98 F | BODY MASS INDEX: 32.78 KG/M2 | HEIGHT: 66 IN | SYSTOLIC BLOOD PRESSURE: 132 MMHG | WEIGHT: 204 LBS | HEART RATE: 73 BPM

## 2024-04-16 DIAGNOSIS — Z98.890 OTHER SPECIFIED POSTPROCEDURAL STATES: Chronic | ICD-10-CM

## 2024-04-16 DIAGNOSIS — Z01.419 ENCOUNTER FOR GYNECOLOGICAL EXAMINATION (GENERAL) (ROUTINE) W/OUT ABNORMAL FINDINGS: ICD-10-CM

## 2024-04-16 PROCEDURE — 99213 OFFICE O/P EST LOW 20 MIN: CPT | Mod: GE

## 2024-04-16 RX ORDER — ESTRADIOL 0.1 MG/G
0.1 CREAM VAGINAL
Qty: 1 | Refills: 2 | Status: ACTIVE | COMMUNITY
Start: 2024-04-16 | End: 1900-01-01

## 2024-04-16 NOTE — PHYSICAL EXAM
[Chaperone Present] : A chaperone was present in the examining room during all aspects of the physical examination [[id number]] : A chaperone was present during the pelvic exam. [Appropriately responsive] : appropriately responsive [Alert] : alert [Soft] : soft [Non-tender] : non-tender [Non-distended] : non-distended [Oriented x3] : oriented x3 [Examination Of The Breasts] : a normal appearance [No Masses] : no breast masses were palpable [Labia Majora] : normal [Labia Minora] : normal [Normal] : normal [Uterine Adnexae] : normal

## 2024-04-17 DIAGNOSIS — Z01.419 ENCOUNTER FOR GYNECOLOGICAL EXAMINATION (GENERAL) (ROUTINE) WITHOUT ABNORMAL FINDINGS: ICD-10-CM

## 2024-04-17 LAB — HPV HIGH+LOW RISK DNA PNL CVX: SIGNIFICANT CHANGE UP

## 2024-04-18 LAB — CYTOLOGY SPEC DOC CYTO: SIGNIFICANT CHANGE UP

## 2024-04-19 ENCOUNTER — RX RENEWAL (OUTPATIENT)
Age: 69
End: 2024-04-19

## 2024-04-25 ENCOUNTER — RESULT REVIEW (OUTPATIENT)
Age: 69
End: 2024-04-25

## 2024-04-25 ENCOUNTER — APPOINTMENT (OUTPATIENT)
Dept: MAMMOGRAPHY | Facility: IMAGING CENTER | Age: 69
End: 2024-04-25
Payer: MEDICARE

## 2024-04-25 ENCOUNTER — OUTPATIENT (OUTPATIENT)
Dept: OUTPATIENT SERVICES | Facility: HOSPITAL | Age: 69
LOS: 1 days | End: 2024-04-25
Payer: MEDICARE

## 2024-04-25 DIAGNOSIS — Z98.890 OTHER SPECIFIED POSTPROCEDURAL STATES: Chronic | ICD-10-CM

## 2024-04-25 DIAGNOSIS — Z00.8 ENCOUNTER FOR OTHER GENERAL EXAMINATION: ICD-10-CM

## 2024-04-25 PROCEDURE — 77063 BREAST TOMOSYNTHESIS BI: CPT | Mod: 26

## 2024-04-25 PROCEDURE — 77063 BREAST TOMOSYNTHESIS BI: CPT

## 2024-04-25 PROCEDURE — 77067 SCR MAMMO BI INCL CAD: CPT | Mod: 26

## 2024-04-25 PROCEDURE — 77067 SCR MAMMO BI INCL CAD: CPT

## 2024-04-25 NOTE — PLAN
[FreeTextEntry1] : 68 yo  LMP 15+ years ago presenting for annual examination.   #HCM - Colonoscopy: 2020 - repeat  per GI - Mammogram, last 2022 BIRAD1, referral given  - Pap: previously NILM HPV negative. Discussed that pt no longer needs pap smears past age of 67 with no history of abnormal paps. Discussed risk and benefits of continued pap smears. Pt requesting pap smear. Pap +HPV collected.  - DEXA: 2023, repeat in    #Vaginal dryness - rx for vaginal estrogen sent to pharmacy on file   Mary Cortez, PGY3 d/w Dr. Ramirez  RTC 1 year or sooner as indicated

## 2024-04-25 NOTE — HISTORY OF PRESENT ILLNESS
[FreeTextEntry1] : 70 yo  LMP 15+ years ago presenting for annual examination. Pt last seen in this office in 2019 for annual. Pt denies sexual concerns, vaginal discharge, post menopausal bleeding. Endorses vaginal dryness. Not sexually active. Patient requesting pap smear.   Mammo: 2022 BIRAD1 Pap: 2018 NILM HPV negative,  NILM Colonoscopy: 2020 with polyps removed, path showing tubular adenomas and hyperplasia. GI recommending repeat colonoscopy in .  DEXA: 2023 - repeat in 2 years   OB:  x2, TOP x2 with D&C GYN: not sexually active, denies post menopausal bleeding, s/p BTL PMH: breast mass s/p lumpectomy, carotid artery stenosis, COPD, HTN, seronegative inflammatory arthritis, fibromyalgia, osteoarthritis Surg: breast lumpectomy, BTL Soc: denies JOSE, feels safe at home  Med: Amlodipine, Atorvastatin, Breo Ellipta, Duloxetine, Lisinopril, Methotrexate, Proventil, Cymbalta All: Aleve

## 2024-05-15 RX ORDER — ACETAMINOPHEN 500 MG/1
500 TABLET ORAL
Qty: 90 | Refills: 0 | Status: ACTIVE | COMMUNITY
Start: 2019-06-15 | End: 1900-01-01

## 2024-05-16 RX ORDER — DICLOFENAC SODIUM 1% 10 MG/G
1 GEL TOPICAL DAILY
Qty: 1 | Refills: 1 | Status: ACTIVE | COMMUNITY
Start: 2024-01-22 | End: 1900-01-01

## 2024-05-20 ENCOUNTER — APPOINTMENT (OUTPATIENT)
Dept: RHEUMATOLOGY | Facility: CLINIC | Age: 69
End: 2024-05-20
Payer: MEDICARE

## 2024-05-20 ENCOUNTER — RX RENEWAL (OUTPATIENT)
Age: 69
End: 2024-05-20

## 2024-05-20 ENCOUNTER — OUTPATIENT (OUTPATIENT)
Dept: OUTPATIENT SERVICES | Facility: HOSPITAL | Age: 69
LOS: 1 days | End: 2024-05-20

## 2024-05-20 VITALS
SYSTOLIC BLOOD PRESSURE: 115 MMHG | OXYGEN SATURATION: 97 % | DIASTOLIC BLOOD PRESSURE: 65 MMHG | TEMPERATURE: 98.2 F | HEIGHT: 66 IN | WEIGHT: 211 LBS | HEART RATE: 74 BPM | BODY MASS INDEX: 33.91 KG/M2 | RESPIRATION RATE: 16 BRPM

## 2024-05-20 DIAGNOSIS — Z98.890 OTHER SPECIFIED POSTPROCEDURAL STATES: Chronic | ICD-10-CM

## 2024-05-20 DIAGNOSIS — M06.00 RHEUMATOID ARTHRITIS W/OUT RHEUMATOID FACTOR, UNSPECIFIED SITE: ICD-10-CM

## 2024-05-20 DIAGNOSIS — M17.9 OSTEOARTHRITIS OF KNEE, UNSPECIFIED: ICD-10-CM

## 2024-05-20 DIAGNOSIS — Z79.899 OTHER LONG TERM (CURRENT) DRUG THERAPY: ICD-10-CM

## 2024-05-20 PROCEDURE — 99214 OFFICE O/P EST MOD 30 MIN: CPT | Mod: GC

## 2024-05-20 RX ORDER — METHOTREXATE 2.5 MG/1
2.5 TABLET ORAL
Qty: 64 | Refills: 1 | Status: ACTIVE | COMMUNITY
Start: 2023-10-10 | End: 1900-01-01

## 2024-05-20 RX ORDER — FOLIC ACID 1 MG/1
1 TABLET ORAL
Qty: 30 | Refills: 3 | Status: ACTIVE | COMMUNITY
Start: 2019-07-11 | End: 1900-01-01

## 2024-05-21 LAB
ALBUMIN SERPL ELPH-MCNC: 4.3 G/DL
ALP BLD-CCNC: 123 U/L
ALT SERPL-CCNC: 21 U/L
ANION GAP SERPL CALC-SCNC: 10 MMOL/L
AST SERPL-CCNC: 21 U/L
BILIRUB SERPL-MCNC: 0.4 MG/DL
BUN SERPL-MCNC: 10 MG/DL
CALCIUM SERPL-MCNC: 8.8 MG/DL
CHLORIDE SERPL-SCNC: 102 MMOL/L
CO2 SERPL-SCNC: 27 MMOL/L
CREAT SERPL-MCNC: 0.63 MG/DL
CRP SERPL-MCNC: 7 MG/L
EGFR: 97 ML/MIN/1.73M2
GLUCOSE SERPL-MCNC: 106 MG/DL
HAV IGM SER QL: NONREACTIVE
HBV CORE IGG+IGM SER QL: NONREACTIVE
HBV CORE IGM SER QL: NONREACTIVE
HBV SURFACE AG SER QL: NONREACTIVE
HCT VFR BLD CALC: 39.8 %
HCV AB SER QL: NONREACTIVE
HCV S/CO RATIO: 0.12 S/CO
HGB BLD-MCNC: 12.9 G/DL
MCHC RBC-ENTMCNC: 30.1 PG
MCHC RBC-ENTMCNC: 32.4 GM/DL
MCV RBC AUTO: 93 FL
PLATELET # BLD AUTO: 290 K/UL
POTASSIUM SERPL-SCNC: 4.1 MMOL/L
PROT SERPL-MCNC: 6.3 G/DL
RBC # BLD: 4.28 M/UL
RBC # FLD: 15.1 %
SODIUM SERPL-SCNC: 140 MMOL/L
WBC # FLD AUTO: 7.79 K/UL

## 2024-05-22 PROBLEM — M17.9 KNEE OSTEOARTHRITIS: Status: ACTIVE | Noted: 2019-05-24

## 2024-05-22 NOTE — PHYSICAL EXAM
[General Appearance - In No Acute Distress] : in no acute distress [Sclera] : the sclera and conjunctiva were normal [Extraocular Movements] : extraocular movements were intact [Examination Of The Oral Cavity] : the lips and gums were normal [Respiration, Rhythm And Depth] : normal respiratory rhythm and effort [Auscultation Breath Sounds / Voice Sounds] : lungs were clear to auscultation bilaterally [Heart Rate And Rhythm] : heart rate was normal and rhythm regular [Heart Sounds] : normal S1 and S2 [Arterial Pulses Carotid] : carotid pulses were normal with no bruits [] : no rash [No Focal Deficits] : no focal deficits [Oriented To Time, Place, And Person] : oriented to person, place, and time [FreeTextEntry1] : TTP in bilateral sternoclavicular with hypertrophy, no synovitis in bilateral MCPs and PIPs, cyst on dorsum of L hand, bilateral knee crepitus and limited ROM, no effusion, Tenderness wihtout swelling in DIP, PIP joints and muscles (trigger points)

## 2024-05-22 NOTE — HISTORY OF PRESENT ILLNESS
[Joint Swelling] : joint swelling [Joint Pain] : joint pain [Morning Stiffness] : morning stiffness [Fatigue] : fatigue [de-identified] : 1/24 [FreeTextEntry1] : Interval History 5/24: Pt presented for follow up  Reports pain in both knees, ankles, hands, right shoulder for whole day no swelling > feel better when she takes Tylenol 3 times daily and diclofenac gel  Follow with Vascular surgery, has next appt in 6/24, planning '' kaylee ue angio and possible endo intervention possible left to rt sa sa bypass'' Currently MTX 20 mg weekly, folic acid daily, Cymbalta 40 mg daily  [RF] : negative rheumatoid factor [CCP] : negative CCP antibody [14-3-3 eta] : negative 14-3-3 eta [SSA] : negative anti-Ro/SSA [SSB] : negative anti-La/SSB [Erosions] : no erosions [Rash] : no rash [Shortness of Breath] : no shortness of breath [Ocular Symptoms] : no ocular symptoms [Dysphonia] : no dysphonia [Chest Pain] : no chest pain [TextBox_2] : 2016 [TextBox_40] : MTX 20 mg weekly  [TextBox_42] : Golimumab 4/2022-9/2022

## 2024-05-22 NOTE — ASSESSMENT
[FreeTextEntry1] : 69 F is being seen in the Rheumatology clinic for seronegative inflammatory arthritis, fibromyalgia and b/l knee pain from OA here for follow up  # Seronegative inflammatory arthritis - On exam today, bilateral sternoclavicular with hypertrophy, no synovitis in bilateral MCPs and PIPs, cyst on dorsum of L hand, bilateral knee crepitus and limited ROM, no effusion, Tenderness without swelling in DIP, PIP joints and muscles (trigger points)  - Serologies: MITCHELL was 1:320, RF, CCP, HLA- B27, dsDNA was negative. - Imaging: MRI L hand in 6/17 showed persistent fluid within the extensor carpi radialis brevis and longus and extensor digitorum tendons. In 3/18, pt complained of R neck and R clavicular pain. Pt had US done, which showed sternoclavicular synovitis on 4/18. - Differential dx: seronegative inflammatory arthritis (SAPHO pattern but did no have acne or pustular lesions) - Current treatment: MTX 20 mg weekly, Folic acid, off Simponi since 10/23  - Since last year, she received only one dose of Simponi in 10/23  - Hepatitis and Quant gold negative Feb 2023, will repeat today   #Cervical neck pain -Pt with stiff neck pain and shooting pain from shoulder to her hand c/f radiculopathy -MRI C spine in 2019 with multilevel disc desiccation and trace endplate edema in c4/c5  #Bilateral Carotid Stenosis #BP unequal - Following with cards, under active evaluation - CTA heart: Focal heterogeneous plaque in the proximal LCx results in mild to moderate luminal narrowing, suboptimally evaluated due to motion artifact. Calcified plaque in the proximal LAD results in minimal luminal narrowing. -CTA chest (3/24): Large amount of calcified plaque at the origin of the brachiocephalic artery, resulting in moderate to severe stenosis.  Plan: 1. D/c Simponi 2. C/w MTX 20 mg weekly and folic acid daily will hold MTX if she is going to have bypass surgery next month 3. Will consider to decrease MTX to 15 mg next visit   RTC in 3 months   Discussed with Dr. Arun Jaramillo MD Rheumatology Fellow PGY-4

## 2024-05-23 DIAGNOSIS — M17.9 OSTEOARTHRITIS OF KNEE, UNSPECIFIED: ICD-10-CM

## 2024-05-23 DIAGNOSIS — Z79.899 OTHER LONG TERM (CURRENT) DRUG THERAPY: ICD-10-CM

## 2024-05-23 DIAGNOSIS — M06.00 RHEUMATOID ARTHRITIS WITHOUT RHEUMATOID FACTOR, UNSPECIFIED SITE: ICD-10-CM

## 2024-05-24 LAB
M TB IFN-G BLD-IMP: NEGATIVE
QUANTIFERON TB PLUS MITOGEN MINUS NIL: >10 IU/ML
QUANTIFERON TB PLUS NIL: 0.06 IU/ML
QUANTIFERON TB PLUS TB1 MINUS NIL: 0.2 IU/ML
QUANTIFERON TB PLUS TB2 MINUS NIL: 0.19 IU/ML

## 2024-06-03 ENCOUNTER — APPOINTMENT (OUTPATIENT)
Dept: INTERNAL MEDICINE | Facility: CLINIC | Age: 69
End: 2024-06-03

## 2024-06-11 ENCOUNTER — APPOINTMENT (OUTPATIENT)
Dept: VASCULAR SURGERY | Facility: CLINIC | Age: 69
End: 2024-06-11
Payer: MEDICARE

## 2024-06-11 VITALS — DIASTOLIC BLOOD PRESSURE: 66 MMHG | HEART RATE: 68 BPM | SYSTOLIC BLOOD PRESSURE: 91 MMHG

## 2024-06-11 VITALS
WEIGHT: 211 LBS | HEIGHT: 66 IN | DIASTOLIC BLOOD PRESSURE: 55 MMHG | SYSTOLIC BLOOD PRESSURE: 146 MMHG | BODY MASS INDEX: 33.91 KG/M2 | TEMPERATURE: 97.8 F | HEART RATE: 69 BPM

## 2024-06-11 DIAGNOSIS — I65.22 OCCLUSION AND STENOSIS OF LEFT CAROTID ARTERY: ICD-10-CM

## 2024-06-11 DIAGNOSIS — G45.8 OTHER TRANSIENT CEREBRAL ISCHEMIC ATTACKS AND RELATED SYNDROMES: ICD-10-CM

## 2024-06-11 DIAGNOSIS — I65.29 OCCLUSION AND STENOSIS OF UNSPECIFIED CAROTID ARTERY: ICD-10-CM

## 2024-06-11 PROCEDURE — 99214 OFFICE O/P EST MOD 30 MIN: CPT

## 2024-06-11 PROCEDURE — 93880 EXTRACRANIAL BILAT STUDY: CPT

## 2024-06-11 PROCEDURE — 93930 UPPER EXTREMITY STUDY: CPT

## 2024-06-11 NOTE — PHYSICAL EXAM
[Right Carotid Bruit] : right carotid bruit heard [Left Carotid Bruit] : left carotid bruit heard [0] : right 0 [2+] : left 2+ [Ankle Swelling (On Exam)] : present [Ankle Swelling Bilaterally] : bilaterally  [Varicose Veins Of Lower Extremities] : bilaterally [] : bilaterally [Ankle Swelling On The Right] : mild [Alert] : alert [Oriented to Person] : oriented to person [Oriented to Place] : oriented to place [Oriented to Time] : oriented to time [Calm] : calm [JVD] : no jugular venous distention  [de-identified] : nad [de-identified] : wnl [de-identified] : cta kaylee  [FreeTextEntry1] : no evid of rue  ischemia  Mild bilateral  leg venous insufficiency w mild bilateral leg stasis dermatitis and mild bilateral leg edema Multiple  bilateral leg small  tortuous varicose  veins and spider veins  ant post medial calf and shin RLE Varicose veins measuring  1-3 mm in size on the calf /shin LLE Varicose veins measuring  1-3 mm in size on the calf /shin no wounds/ulcers  [de-identified] : wnl [de-identified] : Toñito Cranial nerves 2-12 toñito grossly intact [de-identified] : cooperative

## 2024-06-11 NOTE — DATA REVIEWED
[FreeTextEntry1] : Outside facility study report  reviewed                      2/28/2024 CT Thorax mod to severe innominate art stenosis                       1/24/2024  Carotid Duplex  Lt ICA stenosis 70%                                         Rt VA  retrograde flow   4/9/2024  Venous Doppler Toñito LE  no acute dvt svt                            RLE  no sono evidence of reflux                            LLE  no sono evidence of reflux                                       6/11/2024 1/24/2024  Carotid Duplex  Lt ICA stenosis less 505  9 186/38)                                                            Rt ICA occlusion                                                            Rt VA  retrograde flow                                                             Lt  VA antegrade flow   6/11/2024 Toñito UE Duplex  RUE monophasic waveform                                           LUE normal flow

## 2024-06-11 NOTE — HISTORY OF PRESENT ILLNESS
[FreeTextEntry1] : Pt  is a active smoker  Pt states  s/p card cath w dx by angiography of  innominate  artery stenosis pt c/o david arm pain w activity that is affecting her adl  pt states no cerebrovasc c/o    pt  also c/o kaylee leg swelling and discomfort w activity  [de-identified] : pt states no new c/o pt is on baby asa  pt quit smoking

## 2024-06-11 NOTE — ASSESSMENT
[Arterial/Venous Disease] : arterial/venous disease [Smoking Cessation] : smoking cessation [FreeTextEntry1] : Impression  innominate  artery stenosis  w  reversed rt va flow currently asymptomatic  and quit smoking    Plan Med Conservative management leg elevation, knee high comp stockings  20-30mm Hg remain  smoke free  d/w pt tent plan in the future  for  kaylee  ue angio and possible endo intervention possible left to rt   sa  sa bypass for now will continue med conserv management  ov w carotid duplex s/o stenosis and rt ica occlusion and rt sa steal 12 mo june 2025 ov in 6 mo to re eval

## 2024-06-17 RX ORDER — ACETAMINOPHEN 500 MG/1
500 TABLET ORAL
Qty: 90 | Refills: 0 | Status: ACTIVE | COMMUNITY
Start: 2024-06-17 | End: 1900-01-01

## 2024-06-17 RX ORDER — CHROMIUM 200 MCG
25 MCG TABLET ORAL DAILY
Qty: 90 | Refills: 3 | Status: ACTIVE | COMMUNITY
Start: 2019-09-17 | End: 1900-01-01

## 2024-06-24 DIAGNOSIS — M54.2 CERVICALGIA: ICD-10-CM

## 2024-06-25 ENCOUNTER — RX RENEWAL (OUTPATIENT)
Age: 69
End: 2024-06-25

## 2024-06-25 RX ORDER — DULOXETINE HYDROCHLORIDE 60 MG/1
60 CAPSULE, DELAYED RELEASE PELLETS ORAL
Qty: 90 | Refills: 0 | Status: ACTIVE | COMMUNITY
Start: 2017-10-26 | End: 1900-01-01

## 2024-07-25 ENCOUNTER — NON-APPOINTMENT (OUTPATIENT)
Age: 69
End: 2024-07-25

## 2024-07-31 ENCOUNTER — APPOINTMENT (OUTPATIENT)
Dept: INTERNAL MEDICINE | Facility: CLINIC | Age: 69
End: 2024-07-31

## 2024-07-31 ENCOUNTER — OUTPATIENT (OUTPATIENT)
Dept: OUTPATIENT SERVICES | Facility: HOSPITAL | Age: 69
LOS: 1 days | End: 2024-07-31

## 2024-07-31 VITALS — BODY MASS INDEX: 33.59 KG/M2 | OXYGEN SATURATION: 97 % | WEIGHT: 209 LBS | HEIGHT: 66 IN | HEART RATE: 71 BPM

## 2024-07-31 DIAGNOSIS — M79.7 FIBROMYALGIA: ICD-10-CM

## 2024-07-31 DIAGNOSIS — K76.0 FATTY (CHANGE OF) LIVER, NOT ELSEWHERE CLASSIFIED: ICD-10-CM

## 2024-07-31 DIAGNOSIS — G45.8 OTHER TRANSIENT CEREBRAL ISCHEMIC ATTACKS AND RELATED SYNDROMES: ICD-10-CM

## 2024-07-31 DIAGNOSIS — M06.00 RHEUMATOID ARTHRITIS W/OUT RHEUMATOID FACTOR, UNSPECIFIED SITE: ICD-10-CM

## 2024-07-31 DIAGNOSIS — Z98.890 OTHER SPECIFIED POSTPROCEDURAL STATES: Chronic | ICD-10-CM

## 2024-07-31 DIAGNOSIS — M06.09 RHEUMATOID ARTHRITIS W/OUT RHEUMATOID FACTOR, MULTIPLE SITES: ICD-10-CM

## 2024-07-31 PROCEDURE — 99213 OFFICE O/P EST LOW 20 MIN: CPT | Mod: GE

## 2024-07-31 NOTE — PHYSICAL EXAM
[Normal] : normal rate, regular rhythm, normal S1 and S2 and no murmur heard [de-identified] : joints with intact ROM. no nodules. no erythema.

## 2024-07-31 NOTE — HISTORY OF PRESENT ILLNESS
[FreeTextEntry1] : follow up [de-identified] : 69F with mild asthma/COPD overlap, current smoker, hypertension, hyperlipidemia, fibromyalgia, seronegative inflammatory arthritis, bilateral knee OA, carpal tunnel syndrome, hepatic steatosis/NAFLD, anxiety, depression, colon polyps, peptic ulcer disease presenting for forms.   Continues to have arthritic pain in joints. Sees rheum and is getting titration of methotrexate. Not worsening.   Some sleep issues. Wakes up in the middle of the night from joint pain.   Requesting lipid panel.

## 2024-07-31 NOTE — ASSESSMENT
[FreeTextEntry1] : 69F with mild asthma/COPD overlap, current smoker, hypertension, hyperlipidemia, fibromyalgia, seronegative inflammatory arthritis, bilateral knee OA, carpal tunnel syndrome, hepatic steatosis/NAFLD, anxiety, depression, colon polyps, peptic ulcer disease presenting for forms.   #Hyperlipidemia #Seronegative Inflammatory Arthritis #Bilateral Knee OA - recommend diclofenac gel PRN on joints to assist with pain control - c/w methotrexate 20 mg qweekly and discuss titration next visit - forms for DME today for electronic walker  #Hepatic Steatosis - lipid panel in clinic today  Christine Rick MD PGY3 [employer] 4   Discussed w Dr. Goodwin   RTC in 6 months for CPE

## 2024-07-31 NOTE — END OF VISIT
[] : Resident [FreeTextEntry3] : Patient presents today to follow up on chronic medical conditions mainly seronegative inflammatory arthritis and osteoarthritis which is being managed by rheumatology. Patient needs forms for DME completed at this visit.

## 2024-08-01 LAB
CHOLEST SERPL-MCNC: 96 MG/DL
HDLC SERPL-MCNC: 38 MG/DL
LDLC SERPL CALC-MCNC: 28 MG/DL
NONHDLC SERPL-MCNC: 58 MG/DL
TRIGL SERPL-MCNC: 181 MG/DL

## 2024-08-01 RX ORDER — POLYETHYLENE GLYCOL 3350 17 G/17G
17 POWDER, FOR SOLUTION ORAL DAILY
Qty: 30 | Refills: 3 | Status: ACTIVE | COMMUNITY
Start: 2024-08-01 | End: 1900-01-01

## 2024-08-02 DIAGNOSIS — K76.0 FATTY (CHANGE OF) LIVER, NOT ELSEWHERE CLASSIFIED: ICD-10-CM

## 2024-08-02 DIAGNOSIS — M79.7 FIBROMYALGIA: ICD-10-CM

## 2024-08-02 DIAGNOSIS — G45.8 OTHER TRANSIENT CEREBRAL ISCHEMIC ATTACKS AND RELATED SYNDROMES: ICD-10-CM

## 2024-08-02 DIAGNOSIS — M06.9 RHEUMATOID ARTHRITIS, UNSPECIFIED: ICD-10-CM

## 2024-08-02 DIAGNOSIS — M06.00 RHEUMATOID ARTHRITIS WITHOUT RHEUMATOID FACTOR, UNSPECIFIED SITE: ICD-10-CM

## 2024-08-08 ENCOUNTER — NON-APPOINTMENT (OUTPATIENT)
Age: 69
End: 2024-08-08

## 2024-08-16 ENCOUNTER — APPOINTMENT (OUTPATIENT)
Dept: MRI IMAGING | Facility: IMAGING CENTER | Age: 69
End: 2024-08-16

## 2024-08-19 ENCOUNTER — APPOINTMENT (OUTPATIENT)
Dept: RHEUMATOLOGY | Facility: CLINIC | Age: 69
End: 2024-08-19

## 2024-08-22 ENCOUNTER — APPOINTMENT (OUTPATIENT)
Dept: GASTROENTEROLOGY | Facility: CLINIC | Age: 69
End: 2024-08-22

## 2024-08-22 ENCOUNTER — NON-APPOINTMENT (OUTPATIENT)
Age: 69
End: 2024-08-22

## 2024-09-03 ENCOUNTER — APPOINTMENT (OUTPATIENT)
Dept: VASCULAR SURGERY | Facility: CLINIC | Age: 69
End: 2024-09-03
Payer: MEDICARE

## 2024-09-03 VITALS
DIASTOLIC BLOOD PRESSURE: 73 MMHG | SYSTOLIC BLOOD PRESSURE: 153 MMHG | TEMPERATURE: 97.7 F | HEIGHT: 66 IN | WEIGHT: 209 LBS | HEART RATE: 77 BPM | BODY MASS INDEX: 33.59 KG/M2

## 2024-09-03 DIAGNOSIS — I65.23 OCCLUSION AND STENOSIS OF BILATERAL CAROTID ARTERIES: ICD-10-CM

## 2024-09-03 DIAGNOSIS — G45.8 OTHER TRANSIENT CEREBRAL ISCHEMIC ATTACKS AND RELATED SYNDROMES: ICD-10-CM

## 2024-09-03 PROCEDURE — 99214 OFFICE O/P EST MOD 30 MIN: CPT

## 2024-09-03 PROCEDURE — 93880 EXTRACRANIAL BILAT STUDY: CPT

## 2024-09-03 PROCEDURE — ZZZZZ: CPT

## 2024-09-03 NOTE — DATA REVIEWED
[FreeTextEntry1] : Outside facility study report  reviewed                      2/28/2024 CT Thorax mod to severe innominate art stenosis                       1/24/2024  Carotid Duplex  Lt ICA stenosis 70%                                         Rt VA  retrograde flow   4/9/2024  Venous Doppler Toñito LE  no acute dvt svt                            RLE  no sono evidence of reflux                            LLE  no sono evidence of reflux                                       6/11/2024  Carotid Duplex  Lt ICA stenosis less 505  9 186/38)                                                            Rt ICA occlusion                                                            Rt VA  retrograde flow                                                             Lt  VA antegrade flow   6/11/2024 Toñito UE Duplex  RUE monophasic waveform                                           LUE normal flow   9/3/2024  Carotid Duplex  Lt ICA stenosis less 50%  (159/28)                                                Rt ICA  monophasic  low restance flow  in  rt cca, ica and eca                                                Rt VA  retrograde flow w monophasic flow                                                  Lt  VA antegrade flow

## 2024-09-03 NOTE — PHYSICAL EXAM
[Right Carotid Bruit] : right carotid bruit heard [Left Carotid Bruit] : left carotid bruit heard [0] : right 0 [2+] : left 2+ [Ankle Swelling (On Exam)] : present [Ankle Swelling Bilaterally] : bilaterally  [Varicose Veins Of Lower Extremities] : bilaterally [] : bilaterally [Ankle Swelling On The Right] : mild [Alert] : alert [Oriented to Person] : oriented to person [Oriented to Place] : oriented to place [Oriented to Time] : oriented to time [Calm] : calm [JVD] : no jugular venous distention  [de-identified] : nad [de-identified] : wnl [de-identified] : no resp effort  [FreeTextEntry1] : Mild bilateral  leg venous insufficiency w mild bilateral leg stasis dermatitis and mild bilateral leg edema Multiple  bilateral leg small  tortuous varicose  veins and spider veins  ant post medial calf and shin RLE Varicose veins measuring  1-3 mm in size on the calf /shin LLE Varicose veins measuring  1-3 mm in size on the calf /shin no wounds/ulcers  [de-identified] : wnl [de-identified] : Toñito Cranial nerves 2-12 toñito grossly intact [de-identified] : cooperative

## 2024-09-03 NOTE — PHYSICAL EXAM
[Right Carotid Bruit] : right carotid bruit heard [Left Carotid Bruit] : left carotid bruit heard [0] : right 0 [2+] : left 2+ [Ankle Swelling (On Exam)] : present [Ankle Swelling Bilaterally] : bilaterally  [Varicose Veins Of Lower Extremities] : bilaterally [] : bilaterally [Ankle Swelling On The Right] : mild [Alert] : alert [Oriented to Person] : oriented to person [Oriented to Place] : oriented to place [Oriented to Time] : oriented to time [Calm] : calm [JVD] : no jugular venous distention  [de-identified] : nad [de-identified] : wnl [de-identified] : no resp effort  [de-identified] : wnl [FreeTextEntry1] : Mild bilateral  leg venous insufficiency w mild bilateral leg stasis dermatitis and mild bilateral leg edema Multiple  bilateral leg small  tortuous varicose  veins and spider veins  ant post medial calf and shin RLE Varicose veins measuring  1-3 mm in size on the calf /shin LLE Varicose veins measuring  1-3 mm in size on the calf /shin no wounds/ulcers  [de-identified] : Toñito Cranial nerves 2-12 toñito grossly intact [de-identified] : cooperative

## 2024-09-03 NOTE — HISTORY OF PRESENT ILLNESS
[FreeTextEntry1] : Pt  is a active smoker  Pt states  s/p card cath w dx by angiography of  innominate  artery stenosis pt c/o david arm pain w activity that is affecting her adl  pt states no cerebrovasc c/o    pt  also c/o kaylee leg swelling and discomfort w activity  [de-identified] : pt states recent   dizziness and fall from a chair at sisters  house  pt is on baby asa  pt quit smoking

## 2024-09-03 NOTE — HISTORY OF PRESENT ILLNESS
[FreeTextEntry1] : Pt  is a active smoker  Pt states  s/p card cath w dx by angiography of  innominate  artery stenosis pt c/o david arm pain w activity that is affecting her adl  pt states no cerebrovasc c/o    pt  also c/o kaylee leg swelling and discomfort w activity  [de-identified] : pt states recent   dizziness and fall from a chair at sisters  house  pt is on baby asa  pt quit smoking

## 2024-09-03 NOTE — ASSESSMENT
[Arterial/Venous Disease] : arterial/venous disease [Smoking Cessation] : smoking cessation [FreeTextEntry1] : Impression  s/o central arterial stenosis  w s/o rt SA steal    Plan Med Conservative management leg elevation, knee high comp stockings  20-30mm Hg remain  smoke free will proceed w cta of thorax s/o great vessel stenosis  vs  rt va steal syndrome d/w pt possible intervention  ov w carotid duplex s/o stenosis 12 mo jue  2025 ov in 2 weeks to  review  cta thorax

## 2024-09-06 NOTE — ASU PATIENT PROFILE, ADULT - FALL HARM RISK - DATE OF FALL
Pt states that she has constipation x 3 weeks. States that she has been taking OTC meds without relief  
29-Jul-2023

## 2024-09-17 ENCOUNTER — APPOINTMENT (OUTPATIENT)
Dept: VASCULAR SURGERY | Facility: CLINIC | Age: 69
End: 2024-09-17

## 2024-09-19 ENCOUNTER — APPOINTMENT (OUTPATIENT)
Dept: GASTROENTEROLOGY | Facility: CLINIC | Age: 69
End: 2024-09-19

## 2024-09-30 ENCOUNTER — RX RENEWAL (OUTPATIENT)
Age: 69
End: 2024-09-30

## 2024-10-14 ENCOUNTER — APPOINTMENT (OUTPATIENT)
Dept: RHEUMATOLOGY | Facility: CLINIC | Age: 69
End: 2024-10-14

## 2024-10-24 ENCOUNTER — APPOINTMENT (OUTPATIENT)
Dept: MRI IMAGING | Facility: IMAGING CENTER | Age: 69
End: 2024-10-24
Payer: MEDICARE

## 2024-10-24 ENCOUNTER — RESULT REVIEW (OUTPATIENT)
Age: 69
End: 2024-10-24

## 2024-10-24 ENCOUNTER — APPOINTMENT (OUTPATIENT)
Dept: CT IMAGING | Facility: IMAGING CENTER | Age: 69
End: 2024-10-24

## 2024-10-24 ENCOUNTER — OUTPATIENT (OUTPATIENT)
Dept: OUTPATIENT SERVICES | Facility: HOSPITAL | Age: 69
LOS: 1 days | End: 2024-10-24
Payer: MEDICARE

## 2024-10-24 DIAGNOSIS — Z98.890 OTHER SPECIFIED POSTPROCEDURAL STATES: Chronic | ICD-10-CM

## 2024-10-24 DIAGNOSIS — M06.00 RHEUMATOID ARTHRITIS WITHOUT RHEUMATOID FACTOR, UNSPECIFIED SITE: ICD-10-CM

## 2024-10-24 DIAGNOSIS — Z00.8 ENCOUNTER FOR OTHER GENERAL EXAMINATION: ICD-10-CM

## 2024-10-24 PROCEDURE — 71275 CT ANGIOGRAPHY CHEST: CPT

## 2024-10-24 PROCEDURE — 73218 MRI UPPER EXTREMITY W/O DYE: CPT

## 2024-10-24 PROCEDURE — 71275 CT ANGIOGRAPHY CHEST: CPT | Mod: 26

## 2024-10-24 PROCEDURE — 73218 MRI UPPER EXTREMITY W/O DYE: CPT | Mod: 26,LT

## 2024-11-04 ENCOUNTER — OUTPATIENT (OUTPATIENT)
Dept: OUTPATIENT SERVICES | Facility: HOSPITAL | Age: 69
LOS: 1 days | End: 2024-11-04

## 2024-11-04 ENCOUNTER — APPOINTMENT (OUTPATIENT)
Dept: RHEUMATOLOGY | Facility: CLINIC | Age: 69
End: 2024-11-04
Payer: MEDICAID

## 2024-11-04 VITALS
HEART RATE: 88 BPM | HEIGHT: 66 IN | BODY MASS INDEX: 33.75 KG/M2 | DIASTOLIC BLOOD PRESSURE: 62 MMHG | RESPIRATION RATE: 16 BRPM | WEIGHT: 210 LBS | OXYGEN SATURATION: 95 % | SYSTOLIC BLOOD PRESSURE: 77 MMHG

## 2024-11-04 DIAGNOSIS — M62.838 OTHER MUSCLE SPASM: ICD-10-CM

## 2024-11-04 DIAGNOSIS — Z98.890 OTHER SPECIFIED POSTPROCEDURAL STATES: Chronic | ICD-10-CM

## 2024-11-04 DIAGNOSIS — M19.90 UNSPECIFIED OSTEOARTHRITIS, UNSPECIFIED SITE: ICD-10-CM

## 2024-11-04 DIAGNOSIS — M06.09 RHEUMATOID ARTHRITIS W/OUT RHEUMATOID FACTOR, MULTIPLE SITES: ICD-10-CM

## 2024-11-04 PROCEDURE — G2211 COMPLEX E/M VISIT ADD ON: CPT | Mod: NC

## 2024-11-04 PROCEDURE — 99204 OFFICE O/P NEW MOD 45 MIN: CPT

## 2024-11-04 RX ORDER — CYCLOBENZAPRINE HYDROCHLORIDE 5 MG/1
5 TABLET, FILM COATED ORAL
Qty: 30 | Refills: 0 | Status: ACTIVE | COMMUNITY
Start: 2024-11-04 | End: 1900-01-01

## 2024-11-06 LAB
ALBUMIN SERPL ELPH-MCNC: 4.1 G/DL
ALP BLD-CCNC: 118 U/L
ALT SERPL-CCNC: 13 U/L
ANION GAP SERPL CALC-SCNC: 14 MMOL/L
AST SERPL-CCNC: 15 U/L
BASOPHILS # BLD AUTO: 0.03 K/UL
BASOPHILS NFR BLD AUTO: 0.4 %
BILIRUB SERPL-MCNC: 0.4 MG/DL
BUN SERPL-MCNC: 11 MG/DL
CALCIUM SERPL-MCNC: 9.1 MG/DL
CHLORIDE SERPL-SCNC: 105 MMOL/L
CO2 SERPL-SCNC: 24 MMOL/L
CREAT SERPL-MCNC: 0.66 MG/DL
CRP SERPL-MCNC: 16 MG/L
EGFR: 96 ML/MIN/1.73M2
EOSINOPHIL # BLD AUTO: 0.1 K/UL
EOSINOPHIL NFR BLD AUTO: 1.3 %
ERYTHROCYTE [SEDIMENTATION RATE] IN BLOOD BY WESTERGREN METHOD: 18 MM/HR
GLUCOSE SERPL-MCNC: 134 MG/DL
HCT VFR BLD CALC: 41.1 %
HGB BLD-MCNC: 12.9 G/DL
IMM GRANULOCYTES NFR BLD AUTO: 0.5 %
LYMPHOCYTES # BLD AUTO: 2.1 K/UL
LYMPHOCYTES NFR BLD AUTO: 28 %
MAN DIFF?: NORMAL
MCHC RBC-ENTMCNC: 29.9 PG
MCHC RBC-ENTMCNC: 31.4 G/DL
MCV RBC AUTO: 95.4 FL
MONOCYTES # BLD AUTO: 0.36 K/UL
MONOCYTES NFR BLD AUTO: 4.8 %
NEUTROPHILS # BLD AUTO: 4.87 K/UL
NEUTROPHILS NFR BLD AUTO: 65 %
PLATELET # BLD AUTO: 310 K/UL
POTASSIUM SERPL-SCNC: 3.9 MMOL/L
PROT SERPL-MCNC: 6.2 G/DL
RBC # BLD: 4.31 M/UL
RBC # FLD: 15.6 %
SODIUM SERPL-SCNC: 143 MMOL/L
WBC # FLD AUTO: 7.5 K/UL

## 2024-11-08 DIAGNOSIS — M19.90 UNSPECIFIED OSTEOARTHRITIS, UNSPECIFIED SITE: ICD-10-CM

## 2024-11-08 DIAGNOSIS — M62.838 OTHER MUSCLE SPASM: ICD-10-CM

## 2024-11-08 DIAGNOSIS — M06.09 RHEUMATOID ARTHRITIS WITHOUT RHEUMATOID FACTOR, MULTIPLE SITES: ICD-10-CM

## 2024-11-12 LAB — METHOTREXATE POLYGLUTAMATES CONCENTRATION: 52.5

## 2024-11-18 ENCOUNTER — RX RENEWAL (OUTPATIENT)
Age: 69
End: 2024-11-18

## 2024-12-10 ENCOUNTER — APPOINTMENT (OUTPATIENT)
Dept: VASCULAR SURGERY | Facility: CLINIC | Age: 69
End: 2024-12-10
Payer: MEDICARE

## 2024-12-10 VITALS
TEMPERATURE: 98.8 F | DIASTOLIC BLOOD PRESSURE: 41 MMHG | SYSTOLIC BLOOD PRESSURE: 67 MMHG | HEART RATE: 71 BPM | HEIGHT: 66 IN

## 2024-12-10 DIAGNOSIS — I65.23 OCCLUSION AND STENOSIS OF BILATERAL CAROTID ARTERIES: ICD-10-CM

## 2024-12-10 DIAGNOSIS — G45.8 OTHER TRANSIENT CEREBRAL ISCHEMIC ATTACKS AND RELATED SYNDROMES: ICD-10-CM

## 2024-12-10 DIAGNOSIS — I77.1 STRICTURE OF ARTERY: ICD-10-CM

## 2024-12-10 PROCEDURE — 99213 OFFICE O/P EST LOW 20 MIN: CPT | Mod: 25

## 2024-12-10 PROCEDURE — 99406 BEHAV CHNG SMOKING 3-10 MIN: CPT

## 2024-12-10 NOTE — H&P PST ADULT - PSYCHIATRIC DETAILS
Quality 226: Preventive Care And Screening: Tobacco Use: Screening And Cessation Intervention: Patient screened for tobacco use and is an ex/non-smoker Detail Level: Detailed normal affect/normal behavior

## 2024-12-16 ENCOUNTER — NON-APPOINTMENT (OUTPATIENT)
Age: 69
End: 2024-12-16

## 2024-12-16 ENCOUNTER — OUTPATIENT (OUTPATIENT)
Dept: OUTPATIENT SERVICES | Facility: HOSPITAL | Age: 69
LOS: 1 days | End: 2024-12-16

## 2024-12-16 ENCOUNTER — APPOINTMENT (OUTPATIENT)
Dept: CARDIOLOGY | Facility: CLINIC | Age: 69
End: 2024-12-16

## 2024-12-16 ENCOUNTER — APPOINTMENT (OUTPATIENT)
Dept: INTERNAL MEDICINE | Facility: CLINIC | Age: 69
End: 2024-12-16
Payer: MEDICARE

## 2024-12-16 VITALS
DIASTOLIC BLOOD PRESSURE: 70 MMHG | RESPIRATION RATE: 16 BRPM | TEMPERATURE: 98.3 F | WEIGHT: 209 LBS | HEART RATE: 65 BPM | BODY MASS INDEX: 33.73 KG/M2 | SYSTOLIC BLOOD PRESSURE: 127 MMHG | OXYGEN SATURATION: 98 %

## 2024-12-16 VITALS
RESPIRATION RATE: 16 BRPM | BODY MASS INDEX: 33.59 KG/M2 | WEIGHT: 209 LBS | HEART RATE: 65 BPM | OXYGEN SATURATION: 98 % | SYSTOLIC BLOOD PRESSURE: 108 MMHG | DIASTOLIC BLOOD PRESSURE: 70 MMHG | HEIGHT: 66 IN

## 2024-12-16 DIAGNOSIS — Z98.890 OTHER SPECIFIED POSTPROCEDURAL STATES: Chronic | ICD-10-CM

## 2024-12-16 DIAGNOSIS — G56.02 CARPAL TUNNEL SYNDROME, LEFT UPPER LIMB: ICD-10-CM

## 2024-12-16 DIAGNOSIS — G45.8 OTHER TRANSIENT CEREBRAL ISCHEMIC ATTACKS AND RELATED SYNDROMES: ICD-10-CM

## 2024-12-16 PROCEDURE — 93000 ELECTROCARDIOGRAM COMPLETE: CPT

## 2024-12-16 PROCEDURE — 99204 OFFICE O/P NEW MOD 45 MIN: CPT

## 2024-12-16 PROCEDURE — 99214 OFFICE O/P EST MOD 30 MIN: CPT | Mod: 25

## 2024-12-20 DIAGNOSIS — G56.02 CARPAL TUNNEL SYNDROME, LEFT UPPER LIMB: ICD-10-CM

## 2024-12-20 DIAGNOSIS — G45.8 OTHER TRANSIENT CEREBRAL ISCHEMIC ATTACKS AND RELATED SYNDROMES: ICD-10-CM

## 2025-01-03 ENCOUNTER — OUTPATIENT (OUTPATIENT)
Dept: OUTPATIENT SERVICES | Facility: HOSPITAL | Age: 70
LOS: 1 days | End: 2025-01-03
Payer: MEDICARE

## 2025-01-03 VITALS
HEIGHT: 66 IN | DIASTOLIC BLOOD PRESSURE: 78 MMHG | SYSTOLIC BLOOD PRESSURE: 155 MMHG | WEIGHT: 205.69 LBS | HEART RATE: 82 BPM | TEMPERATURE: 100 F | OXYGEN SATURATION: 99 % | RESPIRATION RATE: 20 BRPM

## 2025-01-03 DIAGNOSIS — Z98.890 OTHER SPECIFIED POSTPROCEDURAL STATES: Chronic | ICD-10-CM

## 2025-01-03 DIAGNOSIS — J44.9 CHRONIC OBSTRUCTIVE PULMONARY DISEASE, UNSPECIFIED: ICD-10-CM

## 2025-01-03 DIAGNOSIS — I77.1 STRICTURE OF ARTERY: ICD-10-CM

## 2025-01-03 DIAGNOSIS — Z01.818 ENCOUNTER FOR OTHER PREPROCEDURAL EXAMINATION: ICD-10-CM

## 2025-01-03 DIAGNOSIS — F12.90 CANNABIS USE, UNSPECIFIED, UNCOMPLICATED: ICD-10-CM

## 2025-01-03 PROBLEM — J30.2 OTHER SEASONAL ALLERGIC RHINITIS: Chronic | Status: INACTIVE | Noted: 2023-12-20 | Resolved: 2025-01-03

## 2025-01-03 LAB
ANION GAP SERPL CALC-SCNC: 13 MMOL/L — SIGNIFICANT CHANGE UP (ref 5–17)
BUN SERPL-MCNC: 13 MG/DL — SIGNIFICANT CHANGE UP (ref 7–23)
CALCIUM SERPL-MCNC: 9.4 MG/DL — SIGNIFICANT CHANGE UP (ref 8.4–10.5)
CHLORIDE SERPL-SCNC: 106 MMOL/L — SIGNIFICANT CHANGE UP (ref 96–108)
CO2 SERPL-SCNC: 22 MMOL/L — SIGNIFICANT CHANGE UP (ref 22–31)
CREAT SERPL-MCNC: 0.62 MG/DL — SIGNIFICANT CHANGE UP (ref 0.5–1.3)
EGFR: 98 ML/MIN/1.73M2 — SIGNIFICANT CHANGE UP
GLUCOSE SERPL-MCNC: 121 MG/DL — HIGH (ref 70–99)
HCT VFR BLD CALC: 40.4 % — SIGNIFICANT CHANGE UP (ref 34.5–45)
HGB BLD-MCNC: 13.2 G/DL — SIGNIFICANT CHANGE UP (ref 11.5–15.5)
MCHC RBC-ENTMCNC: 30.1 PG — SIGNIFICANT CHANGE UP (ref 27–34)
MCHC RBC-ENTMCNC: 32.7 G/DL — SIGNIFICANT CHANGE UP (ref 32–36)
MCV RBC AUTO: 92 FL — SIGNIFICANT CHANGE UP (ref 80–100)
NRBC # BLD: 0 /100 WBCS — SIGNIFICANT CHANGE UP (ref 0–0)
PLATELET # BLD AUTO: 286 K/UL — SIGNIFICANT CHANGE UP (ref 150–400)
POTASSIUM SERPL-MCNC: 4 MMOL/L — SIGNIFICANT CHANGE UP (ref 3.5–5.3)
POTASSIUM SERPL-SCNC: 4 MMOL/L — SIGNIFICANT CHANGE UP (ref 3.5–5.3)
RBC # BLD: 4.39 M/UL — SIGNIFICANT CHANGE UP (ref 3.8–5.2)
RBC # FLD: 15.4 % — HIGH (ref 10.3–14.5)
SODIUM SERPL-SCNC: 141 MMOL/L — SIGNIFICANT CHANGE UP (ref 135–145)
WBC # BLD: 8.18 K/UL — SIGNIFICANT CHANGE UP (ref 3.8–10.5)
WBC # FLD AUTO: 8.18 K/UL — SIGNIFICANT CHANGE UP (ref 3.8–10.5)

## 2025-01-03 PROCEDURE — G0463: CPT

## 2025-01-03 PROCEDURE — 86901 BLOOD TYPING SEROLOGIC RH(D): CPT

## 2025-01-03 PROCEDURE — 80048 BASIC METABOLIC PNL TOTAL CA: CPT

## 2025-01-03 PROCEDURE — 85027 COMPLETE CBC AUTOMATED: CPT

## 2025-01-03 PROCEDURE — 99222 1ST HOSP IP/OBS MODERATE 55: CPT

## 2025-01-03 PROCEDURE — 86900 BLOOD TYPING SEROLOGIC ABO: CPT

## 2025-01-03 PROCEDURE — 86850 RBC ANTIBODY SCREEN: CPT

## 2025-01-03 NOTE — H&P PST ADULT - ATTENDING COMMENTS
EDUARDO Strauss MD have participated in the daily care of this patient and have performed a history and physical exam of the patient and discussed  the findings and plan with the house officer. I reviewed the resident note and agree with the findings and plan   I Jonah Strauss MD have personally seen and examined the patient at bedside today at  7am      will proceed w left to right axillary artery to axillary artery bypass  indications risks and benefits d/w pt who consents  pt states  she quit smoking 2 weeks ago  was previously recently smoking 1 pack every 3 days

## 2025-01-03 NOTE — H&P PST ADULT - NSICDXPASTMEDICALHX_GEN_ALL_CORE_FT
PAST MEDICAL HISTORY:  COPD, mild     Depression     Fibromyalgia     Hyperlipidemia     Hypertension     LBBB (left bundle branch block)     Marijuana use, episodic     OA (osteoarthritis)     PAD (peripheral artery disease)     Rheumatoid arthritis     Tobacco dependence

## 2025-01-03 NOTE — H&P PST ADULT - NSICDXPASTSURGICALHX_GEN_ALL_CORE_FT
PAST SURGICAL HISTORY:  Endometriosis [id number]    H/O hand surgery Left childhood    S/P arthroscopy of knee Left    S/P carpal tunnel release     S/P colon polypectomy     S/P lumpectomy, left breast     Tubal ligation status

## 2025-01-03 NOTE — H&P PST ADULT - MS GEN HX ROS MEA POS PC
shoulders, B/L leg pain and cramping/arthritis/myalgia/muscle cramps/muscle weakness/stiffness/neck pain/back pain

## 2025-01-03 NOTE — H&P PST ADULT - PROBLEM SELECTOR PLAN 3
Pt instructed to refrain from smoking marijuana for 24 hrs prior to surgery   Pt reports she quit cigarette smoking 4 days ago

## 2025-01-03 NOTE — H&P PST ADULT - HISTORY OF PRESENT ILLNESS
71 y/o F with PMH of HTN, HLD, chronic smoker-pt reports she quit for 3 months, restarted and now quit again 4 days ago, occasional marijuana smoker for pain, mild COPD, on inhalers-has not been hospitalized for OPD exac, fibromyalgia, RA-on methotrexate, OA, LBBB presented to [facility] 4/2024 for LHC and subclavian artery imagining due to recent carotid dopplers showing left prox ICA of 60-70% blockage. As per the patient she has been having constant right neck and arm pain with associated left sided chest pain on exertion and at rest with SOB and JEFFERS. Patient endorsed this to the cardiologist who did a carotid doppler and CTA noted abnormalities and was referred for LHC. 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 (common origin of the inominate artery and left common carotid artery). Patient states she has B/L neck pain, R>L, that any movement in her left arm she would feel the pain radiating from the shoulder to the arm with associated neck stiffness. Patient otherwise denied any fevers, chills, N/V/D/C, abdominal pain, dysuria, melena, hematochezia, recent travel, sick contact, cough, body aches, pleuritic or positional chest pain. Plan for left axillary to right axillary bypass on 1/24/25 with Dr. Strauss.      69 y/o F with PMH of HTN, HLD, chronic smoker-pt reports she quit for 3 months, restarted and now quit again 4 days ago, occasional marijuana smoker for pain, mild COPD, on inhalers-has not been hospitalized for OPD exac, fibromyalgia, RA-on methotrexate, OA, LBBB presented to [facility] 4/2024 for LHC and subclavian artery imagining due to recent carotid dopplers showing left prox ICA of 60-70% blockage. As per the patient she has been having constant right neck and arm pain with associated left sided chest pain on exertion and at rest with SOB and JEFFERS. Patient endorsed this to the cardiologist who did a carotid doppler and CTA noted abnormalities and was referred for LHC. 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 (common origin of the inominate artery and left common carotid artery). Patient states she has B/L neck pain, R>L, that any movement in her left arm she would feel the pain radiating from the shoulder to the arm with associated neck stiffness. Patient otherwise denied any fevers, chills, N/V/D/C, abdominal pain, dysuria, melena, hematochezia, recent travel, sick contact, cough, body aches, pleuritic or positional chest pain. Plan for left axillary to right axillary bypass on 1/24/25 with Dr. Strauss.     VASCULAR SURGERY ATTENDING ADDENDUM  Pt w/u sig for symptomatic  right sa steal syndrome due to high grade innominate artery origin complex   stenosis

## 2025-01-03 NOTE — H&P PST ADULT - PROBLEM SELECTOR PLAN 1
Plan for left axillary to right axillary bypass on 1/24/25 with Dr. Strauss.   PST labs sent   Continue aspirin  Pre procedure surgical scrub instructions discussed  Pre-op education provided - all questions answered

## 2025-01-03 NOTE — H&P PST ADULT - ASSESSMENT
DASI score: 4.6 METS  DASI activity: Walks slowly with walker, can climb a few steps slowly, light house work, can not run, performs ADLs  Loose teeth or dentures: Full dentures   Airway: MP 2    CAPRINI SCORE    AGE RELATED RISK FACTORS                                                             [ ] Age 41-60 years                                            (1 Point)  [ x] Age: 61-74 years                                           (2 Points)                 [ ] Age= 75 years                                                (3 Points)             DISEASE RELATED RISK FACTORS                                                       [ ] Edema in the lower extremities                 (1 Point)                     [x ] Varicose veins                                               (1 Point)                                 [ x] BMI > 25 Kg/m2                                            (1 Point)                                  [ ] Serious infection (ie PNA)                            (1 Point)                     [ ] Lung disease ( COPD, Emphysema)            (1 Point)                                                                          [ ] Acute myocardial infarction                         (1 Point)                  [ ] Congestive heart failure (in the previous month)  (1 Point)         [ ] Inflammatory bowel disease                            (1 Point)                  [ ] Central venous access, PICC or Port               (2 points)       (within the last month)                                                                [ ] Stroke (in the previous month)                        (5 Points)    [ ] Previous or present malignancy                       (2 points)                                                                                                                                                         HEMATOLOGY RELATED FACTORS                                                         [ ] Prior episodes of VTE                                     (3 Points)                     [ ] Positive family history for VTE                      (3 Points)                  [ ] Prothrombin 86934 A                                     (3 Points)                     [ ] Factor V Leiden                                                (3 Points)                        [ ] Lupus anticoagulants                                      (3 Points)                                                           [ ] Anticardiolipin antibodies                              (3 Points)                                                       [ ] High homocysteine in the blood                   (3 Points)                                             [ ] Other congenital or acquired thrombophilia      (3 Points)                                                [ ] Heparin induced thrombocytopenia                  (3 Points)                                        MOBILITY RELATED FACTORS  [ ] Bed rest                                                         (1 Point)  [ ] Plaster cast                                                    (2 points)  [ ] Bed bound for more than 72 hours           (2 Points)    GENDER SPECIFIC FACTORS  [ ] Pregnancy or had a baby within the last month   (1 Point)  [ ] Post-partum < 6 weeks                                   (1 Point)  [ ] Hormonal therapy  or oral contraception   (1 Point)  [ ] History of pregnancy complications              (1 point)  [ ] Unexplained or recurrent              (1 Point)    OTHER RISK FACTORS                                           (1 Point)  [ ] BMI >40, smoking, diabetes requiring insulin, chemotherapy  blood transfusions and length of surgery over 2 hours    SURGERY RELATED RISK FACTORS  [ ]  Section within the last month     (1 Point)  [ ] Minor surgery                                                  (1 Point)  [ ] Arthroscopic surgery                                       (2 Points)  [x ] Planned major surgery lasting more            (2 Points)      than 45 minutes     [ ] Elective hip or knee joint replacement       (5 points)       surgery                                                TRAUMA RELATED RISK FACTORS  [ ] Fracture of the hip, pelvis, or leg                       (5 Points)  [ ] Spinal cord injury resulting in paralysis             (5 points)       (in the previous month)    [ ] Paralysis  (less than 1 month)                             (5 Points)  [ ] Multiple Trauma within 1 month                        (5 Points)    Total Score [     6   ]    Caprini Score 0-2: Low Risk, NO VTE prophylaxis required for most patients, encourage ambulation  Caprini Score 3-6: Moderate Risk , pharmacologic VTE prophylaxis is indicated for most patients (in the absence of contraindications)  Caprini Score Greater than or =7: High risk, pharmocologic VTE prophylaxis indicated for most patients (in the absence of contraindications)

## 2025-01-03 NOTE — H&P PST ADULT - OTHER CARE PROVIDERS
[id number] [phone]-last visit 12/16/24- for pre op eval/  Dr. [phone] rheum last visit 11/2024

## 2025-01-11 PROBLEM — F12.90 CANNABIS USE, UNSPECIFIED, UNCOMPLICATED: Chronic | Status: ACTIVE | Noted: 2025-01-03

## 2025-01-11 PROBLEM — I73.9 PERIPHERAL VASCULAR DISEASE, UNSPECIFIED: Chronic | Status: ACTIVE | Noted: 2025-01-03

## 2025-01-23 ENCOUNTER — NON-APPOINTMENT (OUTPATIENT)
Age: 70
End: 2025-01-23

## 2025-01-24 ENCOUNTER — TRANSCRIPTION ENCOUNTER (OUTPATIENT)
Age: 70
End: 2025-01-24

## 2025-01-24 ENCOUNTER — APPOINTMENT (OUTPATIENT)
Dept: VASCULAR SURGERY | Facility: HOSPITAL | Age: 70
End: 2025-01-24

## 2025-01-24 ENCOUNTER — INPATIENT (INPATIENT)
Facility: HOSPITAL | Age: 70
LOS: 2 days | Discharge: HOME | DRG: 253 | End: 2025-01-27
Attending: SURGERY | Admitting: SURGERY
Payer: MEDICARE

## 2025-01-24 VITALS
OXYGEN SATURATION: 97 % | HEIGHT: 66 IN | WEIGHT: 205.69 LBS | HEART RATE: 72 BPM | TEMPERATURE: 98 F | RESPIRATION RATE: 18 BRPM | SYSTOLIC BLOOD PRESSURE: 166 MMHG | DIASTOLIC BLOOD PRESSURE: 73 MMHG

## 2025-01-24 DIAGNOSIS — Z98.890 OTHER SPECIFIED POSTPROCEDURAL STATES: Chronic | ICD-10-CM

## 2025-01-24 DIAGNOSIS — I77.1 STRICTURE OF ARTERY: ICD-10-CM

## 2025-01-24 LAB
ANION GAP SERPL CALC-SCNC: 13 MMOL/L — SIGNIFICANT CHANGE UP (ref 5–17)
BUN SERPL-MCNC: 14 MG/DL — SIGNIFICANT CHANGE UP (ref 7–23)
CALCIUM SERPL-MCNC: 7.6 MG/DL — LOW (ref 8.4–10.5)
CHLORIDE SERPL-SCNC: 104 MMOL/L — SIGNIFICANT CHANGE UP (ref 96–108)
CO2 SERPL-SCNC: 23 MMOL/L — SIGNIFICANT CHANGE UP (ref 22–31)
CREAT SERPL-MCNC: 0.55 MG/DL — SIGNIFICANT CHANGE UP (ref 0.5–1.3)
EGFR: 100 ML/MIN/1.73M2 — SIGNIFICANT CHANGE UP
GAS PNL BLDA: SIGNIFICANT CHANGE UP
GAS PNL BLDV: SIGNIFICANT CHANGE UP
GLUCOSE SERPL-MCNC: 154 MG/DL — HIGH (ref 70–99)
HCT VFR BLD CALC: 32.3 % — LOW (ref 34.5–45)
HGB BLD-MCNC: 10.7 G/DL — LOW (ref 11.5–15.5)
MCHC RBC-ENTMCNC: 30.7 PG — SIGNIFICANT CHANGE UP (ref 27–34)
MCHC RBC-ENTMCNC: 33.1 G/DL — SIGNIFICANT CHANGE UP (ref 32–36)
MCV RBC AUTO: 92.8 FL — SIGNIFICANT CHANGE UP (ref 80–100)
NRBC # BLD: 0 /100 WBCS — SIGNIFICANT CHANGE UP (ref 0–0)
NRBC BLD-RTO: 0 /100 WBCS — SIGNIFICANT CHANGE UP (ref 0–0)
PLATELET # BLD AUTO: 234 K/UL — SIGNIFICANT CHANGE UP (ref 150–400)
POTASSIUM SERPL-MCNC: 3.8 MMOL/L — SIGNIFICANT CHANGE UP (ref 3.5–5.3)
POTASSIUM SERPL-SCNC: 3.8 MMOL/L — SIGNIFICANT CHANGE UP (ref 3.5–5.3)
RBC # BLD: 3.48 M/UL — LOW (ref 3.8–5.2)
RBC # FLD: 15.5 % — HIGH (ref 10.3–14.5)
SODIUM SERPL-SCNC: 140 MMOL/L — SIGNIFICANT CHANGE UP (ref 135–145)
WBC # BLD: 14.25 K/UL — HIGH (ref 3.8–10.5)
WBC # FLD AUTO: 14.25 K/UL — HIGH (ref 3.8–10.5)

## 2025-01-24 PROCEDURE — 35650 BPG AXILLARY-AXILLARY: CPT | Mod: LT

## 2025-01-24 DEVICE — CLIP APPLIER COVIDIEN SURGICLIP III 9" SM: Type: IMPLANTABLE DEVICE | Site: LEFT | Status: FUNCTIONAL

## 2025-01-24 DEVICE — BIOGLUE 10ML SYR: Type: IMPLANTABLE DEVICE | Site: LEFT | Status: FUNCTIONAL

## 2025-01-24 DEVICE — CLIP APPLIER COVIDIEN SURGICLIP II 9.75" MEDIUM: Type: IMPLANTABLE DEVICE | Site: LEFT | Status: FUNCTIONAL

## 2025-01-24 DEVICE — GRAFT VASC PROPATEN 7MMX60X80CM TW REMOV RING: Type: IMPLANTABLE DEVICE | Site: LEFT | Status: FUNCTIONAL

## 2025-01-24 DEVICE — ARISTA 3GR: Type: IMPLANTABLE DEVICE | Site: LEFT | Status: FUNCTIONAL

## 2025-01-24 DEVICE — INTRODUCER SET MICROPUNCTURE ACCESS 21G X 7CM: Type: IMPLANTABLE DEVICE | Site: LEFT | Status: FUNCTIONAL

## 2025-01-24 RX ORDER — HYDROMORPHONE HYDROCHLORIDE 4 MG/ML
0.25 INJECTION, SOLUTION INTRAMUSCULAR; INTRAVENOUS; SUBCUTANEOUS
Refills: 0 | Status: DISCONTINUED | OUTPATIENT
Start: 2025-01-24 | End: 2025-01-24

## 2025-01-24 RX ORDER — SODIUM CHLORIDE 9 G/ML
500 INJECTION, SOLUTION INTRAVENOUS ONCE
Refills: 0 | Status: COMPLETED | OUTPATIENT
Start: 2025-01-24 | End: 2025-01-24

## 2025-01-24 RX ORDER — DULOXETINE 20 MG/1
60 CAPSULE, DELAYED RELEASE ORAL DAILY
Refills: 0 | Status: DISCONTINUED | OUTPATIENT
Start: 2025-01-24 | End: 2025-01-27

## 2025-01-24 RX ORDER — ASPIRIN 81 MG/1
325 TABLET, COATED ORAL DAILY
Refills: 0 | Status: DISCONTINUED | OUTPATIENT
Start: 2025-01-25 | End: 2025-01-27

## 2025-01-24 RX ORDER — ANTISEPTIC SURGICAL SCRUB 0.04 MG/ML
1 SOLUTION TOPICAL ONCE
Refills: 0 | Status: DISCONTINUED | OUTPATIENT
Start: 2025-01-24 | End: 2025-01-24

## 2025-01-24 RX ORDER — ACETAMINOPHEN 160 MG/5ML
975 SUSPENSION ORAL EVERY 6 HOURS
Refills: 0 | Status: DISCONTINUED | OUTPATIENT
Start: 2025-01-24 | End: 2025-01-27

## 2025-01-24 RX ORDER — ASPIRIN 81 MG
1 TABLET, DELAYED RELEASE (ENTERIC COATED) ORAL
Refills: 0 | DISCHARGE

## 2025-01-24 RX ORDER — LIDOCAINE HYDROCHLORIDE 10 MG/ML
0.2 INJECTION EPIDURAL; INFILTRATION; INTRACAUDAL ONCE
Refills: 0 | Status: DISCONTINUED | OUTPATIENT
Start: 2025-01-24 | End: 2025-01-24

## 2025-01-24 RX ORDER — AMLODIPINE BESYLATE 5 MG
10 TABLET ORAL DAILY
Refills: 0 | Status: DISCONTINUED | OUTPATIENT
Start: 2025-01-24 | End: 2025-01-27

## 2025-01-24 RX ORDER — FLUTICASONE PROPIONATE AND SALMETEROL 113; 14 UG/1; UG/1
1 POWDER, METERED RESPIRATORY (INHALATION)
Refills: 0 | Status: DISCONTINUED | OUTPATIENT
Start: 2025-01-24 | End: 2025-01-27

## 2025-01-24 RX ORDER — ROSUVASTATIN CALCIUM 10 MG/1
20 TABLET, FILM COATED ORAL AT BEDTIME
Refills: 0 | Status: DISCONTINUED | OUTPATIENT
Start: 2025-01-24 | End: 2025-01-27

## 2025-01-24 RX ORDER — MORPHINE SULFATE 60 MG/1
4 TABLET, FILM COATED, EXTENDED RELEASE ORAL EVERY 4 HOURS
Refills: 0 | Status: DISCONTINUED | OUTPATIENT
Start: 2025-01-24 | End: 2025-01-25

## 2025-01-24 RX ORDER — MORPHINE SULFATE 60 MG/1
2 TABLET, FILM COATED, EXTENDED RELEASE ORAL EVERY 4 HOURS
Refills: 0 | Status: DISCONTINUED | OUTPATIENT
Start: 2025-01-24 | End: 2025-01-25

## 2025-01-24 RX ORDER — CEFAZOLIN SODIUM IN 0.9 % NACL 2 G/10 ML
2000 SYRINGE (ML) INTRAVENOUS ONCE
Refills: 0 | Status: COMPLETED | OUTPATIENT
Start: 2025-01-24 | End: 2025-01-24

## 2025-01-24 RX ORDER — ALBUTEROL 90 MCG
2 AEROSOL REFILL (GRAM) INHALATION EVERY 6 HOURS
Refills: 0 | Status: DISCONTINUED | OUTPATIENT
Start: 2025-01-24 | End: 2025-01-27

## 2025-01-24 RX ORDER — PHENYLEPHRINE HYDROCHLORIDE 10 MG/ML
0.3 INJECTION INTRAVENOUS
Qty: 40 | Refills: 0 | Status: DISCONTINUED | OUTPATIENT
Start: 2025-01-24 | End: 2025-01-24

## 2025-01-24 RX ORDER — ACETAMINOPHEN 160 MG/5ML
1000 SUSPENSION ORAL ONCE
Refills: 0 | Status: DISCONTINUED | OUTPATIENT
Start: 2025-01-24 | End: 2025-01-24

## 2025-01-24 RX ORDER — SODIUM CHLORIDE 9 G/ML
1000 INJECTION, SOLUTION INTRAVENOUS
Refills: 0 | Status: DISCONTINUED | OUTPATIENT
Start: 2025-01-24 | End: 2025-01-26

## 2025-01-24 RX ORDER — BACTERIOSTATIC SODIUM CHLORIDE 0.9 %
3 VIAL (ML) INJECTION EVERY 8 HOURS
Refills: 0 | Status: DISCONTINUED | OUTPATIENT
Start: 2025-01-24 | End: 2025-01-24

## 2025-01-24 RX ORDER — OXYCODONE HYDROCHLORIDE 30 MG/1
5 TABLET ORAL ONCE
Refills: 0 | Status: DISCONTINUED | OUTPATIENT
Start: 2025-01-24 | End: 2025-01-24

## 2025-01-24 RX ADMIN — MORPHINE SULFATE 2 MILLIGRAM(S): 60 TABLET, FILM COATED, EXTENDED RELEASE ORAL at 19:36

## 2025-01-24 RX ADMIN — SODIUM CHLORIDE 1000 MILLILITER(S): 9 INJECTION, SOLUTION INTRAVENOUS at 14:11

## 2025-01-24 RX ADMIN — MORPHINE SULFATE 2 MILLIGRAM(S): 60 TABLET, FILM COATED, EXTENDED RELEASE ORAL at 20:36

## 2025-01-24 RX ADMIN — HYDROMORPHONE HYDROCHLORIDE 0.25 MILLIGRAM(S): 4 INJECTION, SOLUTION INTRAMUSCULAR; INTRAVENOUS; SUBCUTANEOUS at 15:29

## 2025-01-24 RX ADMIN — HYDROMORPHONE HYDROCHLORIDE 0.25 MILLIGRAM(S): 4 INJECTION, SOLUTION INTRAMUSCULAR; INTRAVENOUS; SUBCUTANEOUS at 14:56

## 2025-01-24 RX ADMIN — ROSUVASTATIN CALCIUM 20 MILLIGRAM(S): 10 TABLET, FILM COATED ORAL at 22:20

## 2025-01-24 RX ADMIN — FLUTICASONE PROPIONATE AND SALMETEROL 1 DOSE(S): 113; 14 POWDER, METERED RESPIRATORY (INHALATION) at 22:19

## 2025-01-24 RX ADMIN — PHENYLEPHRINE HYDROCHLORIDE 10.5 MICROGRAM(S)/KG/MIN: 10 INJECTION INTRAVENOUS at 14:09

## 2025-01-24 NOTE — PATIENT PROFILE ADULT - FALL HARM RISK - RISK INTERVENTIONS

## 2025-01-25 LAB
A1C WITH ESTIMATED AVERAGE GLUCOSE RESULT: 5.8 % — HIGH (ref 4–5.6)
ALBUMIN SERPL ELPH-MCNC: 3.2 G/DL — LOW (ref 3.3–5)
ALP SERPL-CCNC: 85 U/L — SIGNIFICANT CHANGE UP (ref 40–120)
ALT FLD-CCNC: 12 U/L — SIGNIFICANT CHANGE UP (ref 10–45)
ANION GAP SERPL CALC-SCNC: 9 MMOL/L — SIGNIFICANT CHANGE UP (ref 5–17)
APTT BLD: 29.5 SEC — SIGNIFICANT CHANGE UP (ref 24.5–35.6)
AST SERPL-CCNC: 14 U/L — SIGNIFICANT CHANGE UP (ref 10–40)
BILIRUB SERPL-MCNC: 0.3 MG/DL — SIGNIFICANT CHANGE UP (ref 0.2–1.2)
BUN SERPL-MCNC: 12 MG/DL — SIGNIFICANT CHANGE UP (ref 7–23)
CALCIUM SERPL-MCNC: 8.4 MG/DL — SIGNIFICANT CHANGE UP (ref 8.4–10.5)
CHLORIDE SERPL-SCNC: 108 MMOL/L — SIGNIFICANT CHANGE UP (ref 96–108)
CHOLEST SERPL-MCNC: 90 MG/DL — SIGNIFICANT CHANGE UP
CO2 SERPL-SCNC: 24 MMOL/L — SIGNIFICANT CHANGE UP (ref 22–31)
CREAT SERPL-MCNC: 0.57 MG/DL — SIGNIFICANT CHANGE UP (ref 0.5–1.3)
EGFR: 99 ML/MIN/1.73M2 — SIGNIFICANT CHANGE UP
ESTIMATED AVERAGE GLUCOSE: 120 MG/DL — HIGH (ref 68–114)
GLUCOSE SERPL-MCNC: 133 MG/DL — HIGH (ref 70–99)
HCT VFR BLD CALC: 31.9 % — LOW (ref 34.5–45)
HDLC SERPL-MCNC: 46 MG/DL — LOW
HGB BLD-MCNC: 10.5 G/DL — LOW (ref 11.5–15.5)
INR BLD: 1.1 RATIO — SIGNIFICANT CHANGE UP (ref 0.85–1.16)
LIPID PNL WITH DIRECT LDL SERPL: 28 MG/DL — SIGNIFICANT CHANGE UP
MCHC RBC-ENTMCNC: 30.9 PG — SIGNIFICANT CHANGE UP (ref 27–34)
MCHC RBC-ENTMCNC: 32.9 G/DL — SIGNIFICANT CHANGE UP (ref 32–36)
MCV RBC AUTO: 93.8 FL — SIGNIFICANT CHANGE UP (ref 80–100)
NON HDL CHOLESTEROL: 44 MG/DL — SIGNIFICANT CHANGE UP
NRBC # BLD: 0 /100 WBCS — SIGNIFICANT CHANGE UP (ref 0–0)
NRBC BLD-RTO: 0 /100 WBCS — SIGNIFICANT CHANGE UP (ref 0–0)
PLATELET # BLD AUTO: 227 K/UL — SIGNIFICANT CHANGE UP (ref 150–400)
POTASSIUM SERPL-MCNC: 4 MMOL/L — SIGNIFICANT CHANGE UP (ref 3.5–5.3)
POTASSIUM SERPL-SCNC: 4 MMOL/L — SIGNIFICANT CHANGE UP (ref 3.5–5.3)
PROT SERPL-MCNC: 5.4 G/DL — LOW (ref 6–8.3)
PROTHROM AB SERPL-ACNC: 12.5 SEC — SIGNIFICANT CHANGE UP (ref 9.9–13.4)
RBC # BLD: 3.4 M/UL — LOW (ref 3.8–5.2)
RBC # FLD: 15.6 % — HIGH (ref 10.3–14.5)
SODIUM SERPL-SCNC: 141 MMOL/L — SIGNIFICANT CHANGE UP (ref 135–145)
TRIGL SERPL-MCNC: 80 MG/DL — SIGNIFICANT CHANGE UP
WBC # BLD: 14.06 K/UL — HIGH (ref 3.8–10.5)
WBC # FLD AUTO: 14.06 K/UL — HIGH (ref 3.8–10.5)

## 2025-01-25 RX ORDER — TRAMADOL HYDROCHLORIDE 100 MG/1
25 TABLET, EXTENDED RELEASE ORAL EVERY 6 HOURS
Refills: 0 | Status: DISCONTINUED | OUTPATIENT
Start: 2025-01-25 | End: 2025-01-27

## 2025-01-25 RX ORDER — HEPARIN SODIUM,PORCINE 10000/ML
5000 VIAL (ML) INJECTION EVERY 8 HOURS
Refills: 0 | Status: DISCONTINUED | OUTPATIENT
Start: 2025-01-25 | End: 2025-01-27

## 2025-01-25 RX ORDER — NICOTINE POLACRILEX 4 MG/1
2 LOZENGE ORAL THREE TIMES A DAY
Refills: 0 | Status: DISCONTINUED | OUTPATIENT
Start: 2025-01-25 | End: 2025-01-27

## 2025-01-25 RX ORDER — TRAMADOL HYDROCHLORIDE 100 MG/1
50 TABLET, EXTENDED RELEASE ORAL EVERY 6 HOURS
Refills: 0 | Status: DISCONTINUED | OUTPATIENT
Start: 2025-01-25 | End: 2025-01-27

## 2025-01-25 RX ADMIN — ACETAMINOPHEN 975 MILLIGRAM(S): 160 SUSPENSION ORAL at 17:37

## 2025-01-25 RX ADMIN — DULOXETINE 60 MILLIGRAM(S): 20 CAPSULE, DELAYED RELEASE ORAL at 11:18

## 2025-01-25 RX ADMIN — Medication 40 MILLIGRAM(S): at 05:53

## 2025-01-25 RX ADMIN — ROSUVASTATIN CALCIUM 20 MILLIGRAM(S): 10 TABLET, FILM COATED ORAL at 21:38

## 2025-01-25 RX ADMIN — NICOTINE POLACRILEX 2 MILLIGRAM(S): 4 LOZENGE ORAL at 20:18

## 2025-01-25 RX ADMIN — ACETAMINOPHEN 975 MILLIGRAM(S): 160 SUSPENSION ORAL at 05:53

## 2025-01-25 RX ADMIN — ACETAMINOPHEN 975 MILLIGRAM(S): 160 SUSPENSION ORAL at 01:14

## 2025-01-25 RX ADMIN — ACETAMINOPHEN 975 MILLIGRAM(S): 160 SUSPENSION ORAL at 06:53

## 2025-01-25 RX ADMIN — ASPIRIN 325 MILLIGRAM(S): 81 TABLET, COATED ORAL at 11:18

## 2025-01-25 RX ADMIN — ACETAMINOPHEN 975 MILLIGRAM(S): 160 SUSPENSION ORAL at 18:37

## 2025-01-25 RX ADMIN — ACETAMINOPHEN 975 MILLIGRAM(S): 160 SUSPENSION ORAL at 12:17

## 2025-01-25 RX ADMIN — TRAMADOL HYDROCHLORIDE 50 MILLIGRAM(S): 100 TABLET, EXTENDED RELEASE ORAL at 23:15

## 2025-01-25 RX ADMIN — ACETAMINOPHEN 975 MILLIGRAM(S): 160 SUSPENSION ORAL at 23:15

## 2025-01-25 RX ADMIN — Medication 10 MILLIGRAM(S): at 05:53

## 2025-01-25 RX ADMIN — FLUTICASONE PROPIONATE AND SALMETEROL 1 DOSE(S): 113; 14 POWDER, METERED RESPIRATORY (INHALATION) at 11:18

## 2025-01-25 RX ADMIN — FLUTICASONE PROPIONATE AND SALMETEROL 1 DOSE(S): 113; 14 POWDER, METERED RESPIRATORY (INHALATION) at 21:40

## 2025-01-25 RX ADMIN — Medication 5000 UNIT(S): at 21:37

## 2025-01-25 RX ADMIN — SODIUM CHLORIDE 75 MILLILITER(S): 9 INJECTION, SOLUTION INTRAVENOUS at 05:54

## 2025-01-25 RX ADMIN — NICOTINE POLACRILEX 2 MILLIGRAM(S): 4 LOZENGE ORAL at 14:39

## 2025-01-25 RX ADMIN — ACETAMINOPHEN 975 MILLIGRAM(S): 160 SUSPENSION ORAL at 11:17

## 2025-01-25 RX ADMIN — TRAMADOL HYDROCHLORIDE 25 MILLIGRAM(S): 100 TABLET, EXTENDED RELEASE ORAL at 16:14

## 2025-01-25 RX ADMIN — ACETAMINOPHEN 975 MILLIGRAM(S): 160 SUSPENSION ORAL at 02:14

## 2025-01-25 RX ADMIN — Medication 5000 UNIT(S): at 14:39

## 2025-01-25 RX ADMIN — TRAMADOL HYDROCHLORIDE 25 MILLIGRAM(S): 100 TABLET, EXTENDED RELEASE ORAL at 15:14

## 2025-01-25 NOTE — PHYSICAL THERAPY INITIAL EVALUATION ADULT - ACTIVE RANGE OF MOTION EXAMINATION, REHAB EVAL
B UE to 90 degrees as per Vascular restrictions/bilateral upper extremity Active ROM was WFL (within functional limits)/bilateral  lower extremity Active ROM was WFL (within functional limits)

## 2025-01-25 NOTE — PHYSICAL THERAPY INITIAL EVALUATION ADULT - ADDITIONAL COMMENTS
Pt lives in a house with 10 steps to enter and fligth of stairs to bedroom. Patient was independent with all ADLs and IADLs prior to admission. Amb with rollator. Has a home health aide for a few hours daily.  and son live at home.

## 2025-01-25 NOTE — PHYSICAL THERAPY INITIAL EVALUATION ADULT - PERTINENT HX OF CURRENT PROBLEM, REHAB EVAL
71 y/o F with PMH of HTN, HLD, chronic smoker-pt reports she quit for 3 months, restarted and now quit again 4 days ago, occasional marijuana smoker for pain, mild COPD, on inhalers-has not been hospitalized for OPD exac, fibromyalgia, RA-on methotrexate, OA, LBBB presented to [facility] 4/2024 for LHC and subclavian artery imagining due to recent carotid dopplers showing left prox ICA of 60-70% blockage. As per the patient she has been having constant right neck and arm pain with associated left sided chest pain on exertion and at rest with SOB and JEFFERS. Patient endorsed this to the cardiologist who did a carotid doppler and CTA noted abnormalities and was referred for LHC. 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 (common origin of the inominate artery and left common carotid artery). Patient states she has B/L neck pain, R>L, that any movement in her left arm she would feel the pain radiating from the shoulder to the arm with associated neck stiffness. Patient otherwise denied any fevers, chills, N/V/D/C, abdominal pain, dysuria, melena, hematochezia, recent travel, sick contact, cough, body aches, pleuritic or positional chest pain. 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

## 2025-01-26 LAB
ANION GAP SERPL CALC-SCNC: 12 MMOL/L — SIGNIFICANT CHANGE UP (ref 5–17)
BUN SERPL-MCNC: 9 MG/DL — SIGNIFICANT CHANGE UP (ref 7–23)
CALCIUM SERPL-MCNC: 8.7 MG/DL — SIGNIFICANT CHANGE UP (ref 8.4–10.5)
CHLORIDE SERPL-SCNC: 106 MMOL/L — SIGNIFICANT CHANGE UP (ref 96–108)
CO2 SERPL-SCNC: 24 MMOL/L — SIGNIFICANT CHANGE UP (ref 22–31)
CREAT SERPL-MCNC: 0.53 MG/DL — SIGNIFICANT CHANGE UP (ref 0.5–1.3)
EGFR: 101 ML/MIN/1.73M2 — SIGNIFICANT CHANGE UP
GLUCOSE SERPL-MCNC: 115 MG/DL — HIGH (ref 70–99)
HCT VFR BLD CALC: 33.3 % — LOW (ref 34.5–45)
HGB BLD-MCNC: 10.7 G/DL — LOW (ref 11.5–15.5)
MAGNESIUM SERPL-MCNC: 2 MG/DL — SIGNIFICANT CHANGE UP (ref 1.6–2.6)
MCHC RBC-ENTMCNC: 30.6 PG — SIGNIFICANT CHANGE UP (ref 27–34)
MCHC RBC-ENTMCNC: 32.1 G/DL — SIGNIFICANT CHANGE UP (ref 32–36)
MCV RBC AUTO: 95.1 FL — SIGNIFICANT CHANGE UP (ref 80–100)
NRBC # BLD: 0 /100 WBCS — SIGNIFICANT CHANGE UP (ref 0–0)
NRBC BLD-RTO: 0 /100 WBCS — SIGNIFICANT CHANGE UP (ref 0–0)
PHOSPHATE SERPL-MCNC: 3.4 MG/DL — SIGNIFICANT CHANGE UP (ref 2.5–4.5)
PLATELET # BLD AUTO: 212 K/UL — SIGNIFICANT CHANGE UP (ref 150–400)
POTASSIUM SERPL-MCNC: 4.2 MMOL/L — SIGNIFICANT CHANGE UP (ref 3.5–5.3)
POTASSIUM SERPL-SCNC: 4.2 MMOL/L — SIGNIFICANT CHANGE UP (ref 3.5–5.3)
RBC # BLD: 3.5 M/UL — LOW (ref 3.8–5.2)
RBC # FLD: 16.4 % — HIGH (ref 10.3–14.5)
SODIUM SERPL-SCNC: 142 MMOL/L — SIGNIFICANT CHANGE UP (ref 135–145)
WBC # BLD: 10.08 K/UL — SIGNIFICANT CHANGE UP (ref 3.8–10.5)
WBC # FLD AUTO: 10.08 K/UL — SIGNIFICANT CHANGE UP (ref 3.8–10.5)

## 2025-01-26 RX ORDER — ACETAMINOPHEN 160 MG/5ML
1000 SUSPENSION ORAL ONCE
Refills: 0 | Status: COMPLETED | OUTPATIENT
Start: 2025-01-26 | End: 2025-01-27

## 2025-01-26 RX ADMIN — Medication 10 MILLIGRAM(S): at 05:37

## 2025-01-26 RX ADMIN — ACETAMINOPHEN 975 MILLIGRAM(S): 160 SUSPENSION ORAL at 17:45

## 2025-01-26 RX ADMIN — Medication 5000 UNIT(S): at 05:36

## 2025-01-26 RX ADMIN — DULOXETINE 60 MILLIGRAM(S): 20 CAPSULE, DELAYED RELEASE ORAL at 11:01

## 2025-01-26 RX ADMIN — NICOTINE POLACRILEX 2 MILLIGRAM(S): 4 LOZENGE ORAL at 05:39

## 2025-01-26 RX ADMIN — FLUTICASONE PROPIONATE AND SALMETEROL 1 DOSE(S): 113; 14 POWDER, METERED RESPIRATORY (INHALATION) at 11:03

## 2025-01-26 RX ADMIN — Medication 5000 UNIT(S): at 13:50

## 2025-01-26 RX ADMIN — Medication 40 MILLIGRAM(S): at 05:37

## 2025-01-26 RX ADMIN — ACETAMINOPHEN 975 MILLIGRAM(S): 160 SUSPENSION ORAL at 06:37

## 2025-01-26 RX ADMIN — NICOTINE POLACRILEX 2 MILLIGRAM(S): 4 LOZENGE ORAL at 11:03

## 2025-01-26 RX ADMIN — ACETAMINOPHEN 975 MILLIGRAM(S): 160 SUSPENSION ORAL at 05:37

## 2025-01-26 RX ADMIN — TRAMADOL HYDROCHLORIDE 50 MILLIGRAM(S): 100 TABLET, EXTENDED RELEASE ORAL at 09:54

## 2025-01-26 RX ADMIN — TRAMADOL HYDROCHLORIDE 25 MILLIGRAM(S): 100 TABLET, EXTENDED RELEASE ORAL at 05:37

## 2025-01-26 RX ADMIN — ASPIRIN 325 MILLIGRAM(S): 81 TABLET, COATED ORAL at 11:00

## 2025-01-26 RX ADMIN — ROSUVASTATIN CALCIUM 20 MILLIGRAM(S): 10 TABLET, FILM COATED ORAL at 21:53

## 2025-01-26 RX ADMIN — TRAMADOL HYDROCHLORIDE 50 MILLIGRAM(S): 100 TABLET, EXTENDED RELEASE ORAL at 09:24

## 2025-01-26 RX ADMIN — TRAMADOL HYDROCHLORIDE 50 MILLIGRAM(S): 100 TABLET, EXTENDED RELEASE ORAL at 00:15

## 2025-01-26 RX ADMIN — Medication 5000 UNIT(S): at 21:53

## 2025-01-26 RX ADMIN — ACETAMINOPHEN 975 MILLIGRAM(S): 160 SUSPENSION ORAL at 00:15

## 2025-01-26 RX ADMIN — ACETAMINOPHEN 975 MILLIGRAM(S): 160 SUSPENSION ORAL at 11:31

## 2025-01-26 RX ADMIN — TRAMADOL HYDROCHLORIDE 25 MILLIGRAM(S): 100 TABLET, EXTENDED RELEASE ORAL at 14:19

## 2025-01-26 RX ADMIN — TRAMADOL HYDROCHLORIDE 25 MILLIGRAM(S): 100 TABLET, EXTENDED RELEASE ORAL at 13:49

## 2025-01-26 RX ADMIN — ACETAMINOPHEN 975 MILLIGRAM(S): 160 SUSPENSION ORAL at 11:01

## 2025-01-26 RX ADMIN — TRAMADOL HYDROCHLORIDE 25 MILLIGRAM(S): 100 TABLET, EXTENDED RELEASE ORAL at 06:37

## 2025-01-26 RX ADMIN — NICOTINE POLACRILEX 2 MILLIGRAM(S): 4 LOZENGE ORAL at 22:02

## 2025-01-26 RX ADMIN — ACETAMINOPHEN 975 MILLIGRAM(S): 160 SUSPENSION ORAL at 18:15

## 2025-01-26 NOTE — OCCUPATIONAL THERAPY INITIAL EVALUATION ADULT - REHAB POTENTIAL, OT EVAL
ROS:  GENERAL: No fever, no chills  EYES: no change in vision  HEENT: no trouble swallowing, no trouble speaking  CARDIAC: no chest pain  PULMONARY: no cough, no shortness of breath  GI: +abd pain, N/V. no diarrhea, no constipation  : No dysuria, no frequency, no change in appearance, or odor of urine  SKIN: no rashes  NEURO: no headache, no weakness  MSK: No joint pain    Tommy Huynh PGY2
good, to achieve stated therapy goals

## 2025-01-26 NOTE — CHART NOTE - NSCHARTNOTEFT_GEN_A_CORE
Patient will require a polyfly wheelchair due to vascular surgery of axillary-axillary bypass. The beneficiary has a mobility limitation that significantly impairs his ability to participate in one or more MRADLs such as toileting, feeding, dressing, grooming, and bathing in customary locations in the home. The patient’s mobility limitation cannot be sufficiently resolved by the use of an appropriately fitted cane or walker. The patient is unable to ambulated with a walker. Use of a manual wheelchair will significantly improve the beneficiary’s ability to participate in MRADLs and the beneficiary will use it on a regular basis in the home. The beneficiary is able and willing to use the wheelchair in the home. The beneficiary cannot self-propel in a standard wheelchair. The patient can self-propel in a polyfly wheelchair. The beneficiary has sufficient upper extremity function and other physical and mental capabilities needed to safely self-propel the manual lightweight wheelchair that is provided in the home during a typical day.
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 Patient reports appropriate surgical incisional pain; pain is controlled. Denies fevers/chills, chest pain, dyspnea, nausea, vomiting   --------------------------------------------------------------------------------------------------------------------------------------------------------------------------------------------------------------  OBJECTIVE:  Vital Signs Last 24 Hrs  T(C): 36.2 (24 Jan 2025 16:00), Max: 36.9 (24 Jan 2025 12:40)  T(F): 97.2 (24 Jan 2025 16:00), Max: 98.4 (24 Jan 2025 12:40)  HR: 79 (24 Jan 2025 16:15) (72 - 91)  BP: 115/77 (24 Jan 2025 16:15) (85/46 - 166/73)  BP(mean): 91 (24 Jan 2025 16:15) (61 - 91)  RR: 16 (24 Jan 2025 16:15) (16 - 19)  SpO2: 94% (24 Jan 2025 16:15) (94% - 100%)    Parameters below as of 24 Jan 2025 16:45  Patient On (Oxygen Delivery Method): room air      I&O's Summary    24 Jan 2025 07:01  -  24 Jan 2025 16:25  --------------------------------------------------------  IN: 743 mL / OUT: 400 mL / NET: 343 mL        PHYSICAL EXAM:  Constitutional: Well developed, well nourished, NAD  Chest: Symmetric chest rise bilaterally, no increased WOB  Abdomen: Soft, nontender, nondistended  Groin: Soft, nontender, no ecchymosis/hematoma, no erythema, no edema. R groin femoral A line removed during the case, dressing c/d/i.   Extremities: (+) b/l radial pulses. Warm to touch, soft, normal strength, sensory and motor intact. No cyanosis/erythema/edema/hematoma  ----------------------------------------------------------------------------------------------------------------------------------------------------------------------------------------------------------------  ASSESSMENT: HPI:  .   Pt is s/p axillary axillary bypass; POD #0    PLAN:  - Cheung removed; TOV patient passed TOV  - Diet: Regular diet  - Start Aspirin 325 tomorrow  - Restart methotrexate  - Flat for 6 hours for femoral A line removed.   - Analgesia and antiemetics as needed  - Continue LR @75  - Strict I&O's  - Monitor bowel function   - Incentive spirometry  - OOB as tolerated  - DVT prophylaxis: SCD    Vascular surgery  [phone]

## 2025-01-26 NOTE — OCCUPATIONAL THERAPY INITIAL EVALUATION ADULT - PERTINENT HX OF CURRENT PROBLEM, REHAB EVAL
69 y/o F with PMH of HTN, HLD, chronic smoker-pt reports she quit for 3 months, restarted and now quit again 4 days ago, occasional marijuana smoker for pain, mild COPD, on inhalers-has not been hospitalized for OPD exac, fibromyalgia, RA-on methotrexate, OA, LBBB presented to [facility] 4/2024 for LHC and subclavian artery imagining due to recent carotid dopplers showing left prox ICA of 60-70% blockage. As per the patient she has been having constant right neck and arm pain with associated left sided chest pain on exertion and at rest with SOB and JEFFERS. Patient endorsed this to the cardiologist who did a carotid doppler and CTA noted abnormalities and was referred for LHC. 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 (common origin of the inominate artery and left common carotid artery). Patient states she has B/L neck pain, R>L, that any movement in her left arm she would feel the pain radiating from the shoulder to the arm with associated neck stiffness. Patient otherwise denied any fevers, chills, N/V/D/C, abdominal pain, dysuria, melena, hematochezia, recent travel, sick contact, cough, body aches, pleuritic or positional chest pain. 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

## 2025-01-26 NOTE — OCCUPATIONAL THERAPY INITIAL EVALUATION ADULT - ADDITIONAL COMMENTS
Pt lives in elevator apt w/ramp entrance with  Pt lives in elevator apt w/ramp entrance with  who is "86 and unable to help her". PTA, pt had HHA 4-5 hours a day, 5-6 days a week, seeking to increase hours. Aide helped with IADL and some ADL, was standby assist for the rest of pt's ADL. Ambulated w RW, also owns cane and raised toilet seat.

## 2025-01-27 ENCOUNTER — TRANSCRIPTION ENCOUNTER (OUTPATIENT)
Age: 70
End: 2025-01-27

## 2025-01-27 ENCOUNTER — APPOINTMENT (OUTPATIENT)
Dept: RHEUMATOLOGY | Facility: CLINIC | Age: 70
End: 2025-01-27

## 2025-01-27 VITALS
HEART RATE: 81 BPM | DIASTOLIC BLOOD PRESSURE: 72 MMHG | SYSTOLIC BLOOD PRESSURE: 120 MMHG | RESPIRATION RATE: 17 BRPM | TEMPERATURE: 100 F | OXYGEN SATURATION: 98 %

## 2025-01-27 LAB
ANION GAP SERPL CALC-SCNC: 12 MMOL/L — SIGNIFICANT CHANGE UP (ref 5–17)
BUN SERPL-MCNC: 9 MG/DL — SIGNIFICANT CHANGE UP (ref 7–23)
CALCIUM SERPL-MCNC: 9.1 MG/DL — SIGNIFICANT CHANGE UP (ref 8.4–10.5)
CHLORIDE SERPL-SCNC: 105 MMOL/L — SIGNIFICANT CHANGE UP (ref 96–108)
CO2 SERPL-SCNC: 24 MMOL/L — SIGNIFICANT CHANGE UP (ref 22–31)
CREAT SERPL-MCNC: 0.54 MG/DL — SIGNIFICANT CHANGE UP (ref 0.5–1.3)
EGFR: 100 ML/MIN/1.73M2 — SIGNIFICANT CHANGE UP
GLUCOSE SERPL-MCNC: 88 MG/DL — SIGNIFICANT CHANGE UP (ref 70–99)
HCT VFR BLD CALC: 32.7 % — LOW (ref 34.5–45)
HGB BLD-MCNC: 10.5 G/DL — LOW (ref 11.5–15.5)
MAGNESIUM SERPL-MCNC: 2 MG/DL — SIGNIFICANT CHANGE UP (ref 1.6–2.6)
MCHC RBC-ENTMCNC: 30.4 PG — SIGNIFICANT CHANGE UP (ref 27–34)
MCHC RBC-ENTMCNC: 32.1 G/DL — SIGNIFICANT CHANGE UP (ref 32–36)
MCV RBC AUTO: 94.8 FL — SIGNIFICANT CHANGE UP (ref 80–100)
NRBC # BLD: 0 /100 WBCS — SIGNIFICANT CHANGE UP (ref 0–0)
NRBC BLD-RTO: 0 /100 WBCS — SIGNIFICANT CHANGE UP (ref 0–0)
PHOSPHATE SERPL-MCNC: 4.1 MG/DL — SIGNIFICANT CHANGE UP (ref 2.5–4.5)
PLATELET # BLD AUTO: 216 K/UL — SIGNIFICANT CHANGE UP (ref 150–400)
POTASSIUM SERPL-MCNC: 4.3 MMOL/L — SIGNIFICANT CHANGE UP (ref 3.5–5.3)
POTASSIUM SERPL-SCNC: 4.3 MMOL/L — SIGNIFICANT CHANGE UP (ref 3.5–5.3)
RBC # BLD: 3.45 M/UL — LOW (ref 3.8–5.2)
RBC # FLD: 15.9 % — HIGH (ref 10.3–14.5)
SODIUM SERPL-SCNC: 141 MMOL/L — SIGNIFICANT CHANGE UP (ref 135–145)
WBC # BLD: 8.65 K/UL — SIGNIFICANT CHANGE UP (ref 3.8–10.5)
WBC # FLD AUTO: 8.65 K/UL — SIGNIFICANT CHANGE UP (ref 3.8–10.5)

## 2025-01-27 PROCEDURE — 94640 AIRWAY INHALATION TREATMENT: CPT

## 2025-01-27 PROCEDURE — 82803 BLOOD GASES ANY COMBINATION: CPT

## 2025-01-27 PROCEDURE — 85730 THROMBOPLASTIN TIME PARTIAL: CPT

## 2025-01-27 PROCEDURE — 86850 RBC ANTIBODY SCREEN: CPT

## 2025-01-27 PROCEDURE — C1889: CPT

## 2025-01-27 PROCEDURE — 97161 PT EVAL LOW COMPLEX 20 MIN: CPT

## 2025-01-27 PROCEDURE — 85027 COMPLETE CBC AUTOMATED: CPT

## 2025-01-27 PROCEDURE — C1768: CPT

## 2025-01-27 PROCEDURE — C9399: CPT

## 2025-01-27 PROCEDURE — 86901 BLOOD TYPING SEROLOGIC RH(D): CPT

## 2025-01-27 PROCEDURE — 80053 COMPREHEN METABOLIC PANEL: CPT

## 2025-01-27 PROCEDURE — 84100 ASSAY OF PHOSPHORUS: CPT

## 2025-01-27 PROCEDURE — 84295 ASSAY OF SERUM SODIUM: CPT

## 2025-01-27 PROCEDURE — 82947 ASSAY GLUCOSE BLOOD QUANT: CPT

## 2025-01-27 PROCEDURE — 83605 ASSAY OF LACTIC ACID: CPT

## 2025-01-27 PROCEDURE — 83036 HEMOGLOBIN GLYCOSYLATED A1C: CPT

## 2025-01-27 PROCEDURE — 83735 ASSAY OF MAGNESIUM: CPT

## 2025-01-27 PROCEDURE — 84132 ASSAY OF SERUM POTASSIUM: CPT

## 2025-01-27 PROCEDURE — 97165 OT EVAL LOW COMPLEX 30 MIN: CPT

## 2025-01-27 PROCEDURE — 80061 LIPID PANEL: CPT

## 2025-01-27 PROCEDURE — C1894: CPT

## 2025-01-27 PROCEDURE — 85610 PROTHROMBIN TIME: CPT

## 2025-01-27 PROCEDURE — 82435 ASSAY OF BLOOD CHLORIDE: CPT

## 2025-01-27 PROCEDURE — 82330 ASSAY OF CALCIUM: CPT

## 2025-01-27 PROCEDURE — 85014 HEMATOCRIT: CPT

## 2025-01-27 PROCEDURE — 85018 HEMOGLOBIN: CPT

## 2025-01-27 PROCEDURE — 80048 BASIC METABOLIC PNL TOTAL CA: CPT

## 2025-01-27 PROCEDURE — 86923 COMPATIBILITY TEST ELECTRIC: CPT

## 2025-01-27 PROCEDURE — 86900 BLOOD TYPING SEROLOGIC ABO: CPT

## 2025-01-27 RX ORDER — TRAMADOL HYDROCHLORIDE 100 MG/1
0.5 TABLET, EXTENDED RELEASE ORAL
Qty: 8 | Refills: 0
Start: 2025-01-27 | End: 2025-02-02

## 2025-01-27 RX ORDER — ACETAMINOPHEN 160 MG/5ML
3 SUSPENSION ORAL
Qty: 0 | Refills: 0 | DISCHARGE
Start: 2025-01-27

## 2025-01-27 RX ADMIN — ASPIRIN 325 MILLIGRAM(S): 81 TABLET, COATED ORAL at 11:40

## 2025-01-27 RX ADMIN — ACETAMINOPHEN 975 MILLIGRAM(S): 160 SUSPENSION ORAL at 11:40

## 2025-01-27 RX ADMIN — FLUTICASONE PROPIONATE AND SALMETEROL 1 DOSE(S): 113; 14 POWDER, METERED RESPIRATORY (INHALATION) at 11:44

## 2025-01-27 RX ADMIN — ACETAMINOPHEN 1000 MILLIGRAM(S): 160 SUSPENSION ORAL at 00:48

## 2025-01-27 RX ADMIN — ACETAMINOPHEN 975 MILLIGRAM(S): 160 SUSPENSION ORAL at 07:20

## 2025-01-27 RX ADMIN — Medication 40 MILLIGRAM(S): at 06:50

## 2025-01-27 RX ADMIN — FLUTICASONE PROPIONATE AND SALMETEROL 1 DOSE(S): 113; 14 POWDER, METERED RESPIRATORY (INHALATION) at 00:16

## 2025-01-27 RX ADMIN — NICOTINE POLACRILEX 2 MILLIGRAM(S): 4 LOZENGE ORAL at 12:02

## 2025-01-27 RX ADMIN — ACETAMINOPHEN 975 MILLIGRAM(S): 160 SUSPENSION ORAL at 06:50

## 2025-01-27 RX ADMIN — Medication 10 MILLIGRAM(S): at 06:51

## 2025-01-27 RX ADMIN — Medication 5000 UNIT(S): at 06:50

## 2025-01-27 RX ADMIN — DULOXETINE 60 MILLIGRAM(S): 20 CAPSULE, DELAYED RELEASE ORAL at 11:40

## 2025-01-27 RX ADMIN — ACETAMINOPHEN 400 MILLIGRAM(S): 160 SUSPENSION ORAL at 00:18

## 2025-01-27 NOTE — ASU PREOP CHECKLIST - STERILIZATION AFFIRMATION
[de-identified] : 31-year-old male presents today with low back pains been going for quite some time.  Has been in physical therapy for many months now that significantly helped.  Feels pain in his low back which radiates to his right leg with numbness only when he works really hard.  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 n/a

## 2025-01-27 NOTE — DISCHARGE NOTE PROVIDER - NSDCCPTREATMENT_GEN_ALL_CORE_FT
PRINCIPAL PROCEDURE  Procedure: Creation, bypass, arterial, axillary to axillary  Findings and Treatment: Notify your surgeon and/or return to ER for temperatures greater than 101, chills sweats, pain not controlled with pain medications, significant swelling and/or skin changes in tone and/or color, significant numbness and/or tingling, significant weakness of extremity, persistent nausea and vomiting, or acutely concerning matters to you, that may require urgent medical attention.  wheelchair for 2 weeks.   Hold home methotrexate until follow up with Dr. Strauss and he will determine when it should be started

## 2025-01-27 NOTE — DISCHARGE NOTE PROVIDER - CARE PROVIDER_API CALL
Jonah Strauss  Vascular Surgery  [address], [address]  [place] [address]  Phone: [phone]  Fax: [phone]  Follow Up Time: 2 weeks

## 2025-01-27 NOTE — DISCHARGE NOTE PROVIDER - HOSPITAL COURSE
69 y/o F with PMH of HTN, HLD, chronic smoker-pt reports she quit for 3 months, restarted and now quit again 4 days ago, occasional marijuana smoker for pain, mild COPD, on inhalers-has not been hospitalized for OPD exac, fibromyalgia, RA-on methotrexate, OA, LBBB presented to [facility] 4/2024 for LHC and subclavian artery imagining due to recent carotid dopplers showing left prox ICA of 60-70% blockage. As per the patient she has been having constant right neck and arm pain with associated left sided chest pain on exertion and at rest with SOB and JEFFERS. Patient endorsed this to the cardiologist who did a carotid doppler and CTA noted abnormalities and was referred for LHC. 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 (common origin of the inominate artery and left common carotid artery). Patient states she has B/L neck pain, R>L, that any movement in her left arm she would feel the pain radiating from the shoulder to the arm with associated neck stiffness. Patient otherwise denied any fevers, chills, N/V/D/C, abdominal pain, dysuria, melena, hematochezia, recent travel, sick contact, cough, body aches, pleuritic or positional chest pain. Plan for left axillary to right axillary bypass on 1/24/25 with Dr. Strauss.     On 1/24/25 patient underwent axillary axillary bypass. The patient tolerated the procedure well without complications, was extubated, and transferred to the PACU in stable condition. The patient tolerated the procedure well without complications, was extubated, and transferred to the PACU in stable condition. Physical therapy worked with patient and recommended outpatient physical therapy. On day of discharge, the patient was tolerating diet, ambulating well and pain controlled.

## 2025-01-27 NOTE — DISCHARGE NOTE PROVIDER - NSDCMRMEDTOKEN_GEN_ALL_CORE_FT
Albuterol (Eqv-ProAir HFA) 90 mcg/inh inhalation aerosol: 2 puff(s) inhaled 2 times a day  amLODIPine 10 mg oral tablet: 1 orally once a day  aspirin 325 mg oral tablet: 1 tab(s) orally once a day  cholecalciferol 25 mcg (1000 intl units) oral tablet: 1 tab(s) orally once a day  cyanocobalamin 1000 mcg oral tablet: 1 tab(s) orally once a day  DULoxetine 60 mg oral delayed release capsule: 1 orally once a day  folic acid 1 mg oral tablet: 1 orally once a day  lisinopril 40 mg oral tablet: 1 orally once a day  methotrexate 2.5 mg oral tablet: 9 tab(s) orally once a week On friday-5 tabs in the morning and 4 tabs at night  rosuvastatin 20 mg oral tablet: 1 tab(s) orally once a day (at bedtime)  shower chair: for home  Symbicort 160 mcg-4.5 mcg/inh inhalation aerosol: 2 inhaled 2 times a day  Vitamin C 500 mg oral capsule: 1 cap(s) orally 2 times a day  Wheelchair: for dispo   acetaminophen 325 mg oral tablet: 3 tab(s) orally every 6 hours  Albuterol (Eqv-ProAir HFA) 90 mcg/inh inhalation aerosol: 2 puff(s) inhaled 2 times a day  amLODIPine 10 mg oral tablet: 1 orally once a day  aspirin 325 mg oral tablet: 1 tab(s) orally once a day  cholecalciferol 25 mcg (1000 intl units) oral tablet: 1 tab(s) orally once a day  cyanocobalamin 1000 mcg oral tablet: 1 tab(s) orally once a day  DULoxetine 60 mg oral delayed release capsule: 1 orally once a day  folic acid 1 mg oral tablet: 1 orally once a day  lisinopril 40 mg oral tablet: 1 orally once a day  rosuvastatin 20 mg oral tablet: 1 tab(s) orally once a day (at bedtime)  Symbicort 160 mcg-4.5 mcg/inh inhalation aerosol: 2 inhaled 2 times a day  traMADol 50 mg oral tablet: 0.5 tab(s) orally every 6 hours as needed for Moderate Pain (4 - 6) MDD: 4  Vitamin C 500 mg oral capsule: 1 cap(s) orally 2 times a day

## 2025-01-27 NOTE — DISCHARGE NOTE NURSING/CASE MANAGEMENT/SOCIAL WORK - FINANCIAL ASSISTANCE
[facility] provides services at a reduced cost to those who are determined to be eligible through [facility]’s financial assistance program. Information regarding [facility]’s financial assistance program can be found by going to [email or web address] or by calling [phone].

## 2025-01-27 NOTE — PROGRESS NOTE ADULT - SUBJECTIVE AND OBJECTIVE BOX
Surgery Progress Note    SUBJECTIVE:  - Patient seen and examined at bedside   --------------------------------------------------------------------------------------------------  OBJECTIVE:   Physical Exam:  Constitutional: Well developed, well nourished, NAD  Chest: Symmetric chest rise bilaterally, no increased WOB  Abdomen: Soft, nontender, nondistended  Groin: Soft, nontender, no ecchymosis/hematoma, no erythema, no edema. R groin femoral A line removed during the case, dressing c/d/i.   Extremities: (+) b/l radial pulses, + b/u ulnar and brachial signals. Warm to touch, soft, normal strength, sensory and motor intact. No cyanosis/erythema/edema/hematoma  --------------------------------------------------------------------------------------------------  V/S:  Vital Signs Last 24 Hrs  T(C): 36.9 (25 Jan 2025 05:29), Max: 37.4 (24 Jan 2025 21:45)  T(F): 98.5 (25 Jan 2025 05:29), Max: 99.3 (24 Jan 2025 21:45)  HR: 81 (25 Jan 2025 05:29) (72 - 92)  BP: 115/67 (25 Jan 2025 05:29) (85/46 - 166/73)  BP(mean): 86 (24 Jan 2025 18:00) (61 - 91)  RR: 18 (25 Jan 2025 05:29) (16 - 20)  SpO2: 97% (25 Jan 2025 05:29) (92% - 100%)    Parameters below as of 25 Jan 2025 05:29  Patient On (Oxygen Delivery Method): room air        --------------------------------------------------------------------------------------------------  I/Os:    24 Jan 2025 07:01  -  25 Jan 2025 07:00  --------------------------------------------------------  IN:    Lactated Ringers: 1425 mL    Lactated Ringers Bolus: 500 mL    Oral Fluid: 1500 mL    Phenylephrine: 18 mL  Total IN: 3443 mL    OUT:    Voided (mL): 2750 mL  Total OUT: 2750 mL    Total NET: 693 mL        --------------------------------------------------------------------------------------------------  LABS:                        10.5   14.06 )-----------( 227      ( 25 Jan 2025 05:48 )             31.9     25 Jan 2025 05:48    141    |  108    |  12     ----------------------------<  133    4.0     |  24     |  0.57     Ca    8.4        25 Jan 2025 05:48    TPro  5.4    /  Alb  3.2    /  TBili  0.3    /  DBili  x      /  AST  14     /  ALT  12     /  AlkPhos  85     25 Jan 2025 05:48    PT/INR - ( 25 Jan 2025 05:48 )   PT: 12.5 sec;   INR: 1.10 ratio         PTT - ( 25 Jan 2025 05:48 )  PTT:29.5 sec  CAPILLARY BLOOD GLUCOSE            LIVER FUNCTIONS - ( 25 Jan 2025 05:48 )  Alb: 3.2 g/dL / Pro: 5.4 g/dL / ALK PHOS: 85 U/L / ALT: 12 U/L / AST: 14 U/L / GGT: x             Urinalysis Basic - ( 25 Jan 2025 05:48 )    Color: x / Appearance: x / SG: x / pH: x  Gluc: 133 mg/dL / Ketone: x  / Bili: x / Urobili: x   Blood: x / Protein: x / Nitrite: x   Leuk Esterase: x / RBC: x / WBC x   Sq Epi: x / Non Sq Epi: x / Bacteria: x      --------------------------------------------------------------------------------------------------  MEDICATIONS  (STANDING):  acetaminophen     Tablet .. 975 milliGRAM(s) Oral every 6 hours  amLODIPine   Tablet 10 milliGRAM(s) Oral daily  aspirin 325 milliGRAM(s) Oral daily  DULoxetine 60 milliGRAM(s) Oral daily  fluticasone propionate/ salmeterol 100-50 MICROgram(s) Diskus 1 Dose(s) Inhalation two times a day  influenza  Vaccine (HIGH DOSE) 0.5 milliLiter(s) IntraMuscular once  lactated ringers. 1000 milliLiter(s) (75 mL/Hr) IV Continuous <Continuous>  lisinopril 40 milliGRAM(s) Oral daily  rosuvastatin 20 milliGRAM(s) Oral at bedtime    MEDICATIONS  (PRN):  albuterol    90 MICROgram(s) HFA Inhaler 2 Puff(s) Inhalation every 6 hours PRN Shortness of Breath and/or Wheezing  morphine  - Injectable 4 milliGRAM(s) IV Push every 4 hours PRN Severe Pain (7 - 10)  morphine  - Injectable 2 milliGRAM(s) IV Push every 4 hours PRN Moderate Pain (4 - 6)    --------------------------------------------------------------------------------------------------    
VASCULAR SURGERY DAILY PROGRESS NOTE:     SUBJECTIVE/ROS: Patient seen and examined. She is resting comfortably, no new events/complaints.     MEDICATIONS  (STANDING):  acetaminophen     Tablet .. 975 milliGRAM(s) Oral every 6 hours  amLODIPine   Tablet 10 milliGRAM(s) Oral daily  aspirin 325 milliGRAM(s) Oral daily  DULoxetine 60 milliGRAM(s) Oral daily  fluticasone propionate/ salmeterol 100-50 MICROgram(s) Diskus 1 Dose(s) Inhalation two times a day  heparin   Injectable 5000 Unit(s) SubCutaneous every 8 hours  influenza  Vaccine (HIGH DOSE) 0.5 milliLiter(s) IntraMuscular once  lisinopril 40 milliGRAM(s) Oral daily  rosuvastatin 20 milliGRAM(s) Oral at bedtime    MEDICATIONS  (PRN):  albuterol    90 MICROgram(s) HFA Inhaler 2 Puff(s) Inhalation every 6 hours PRN Shortness of Breath and/or Wheezing  nicotine  Polacrilex Gum 2 milliGRAM(s) Oral three times a day PRN Smoking Cessation  traMADol 25 milliGRAM(s) Oral every 6 hours PRN Moderate Pain (4 - 6)  traMADol 50 milliGRAM(s) Oral every 6 hours PRN Severe Pain (7 - 10)      OBJECTIVE:    Vital Signs Last 24 Hrs  T(C): 37 (27 Jan 2025 05:21), Max: 37.1 (27 Jan 2025 01:10)  T(F): 98.6 (27 Jan 2025 05:21), Max: 98.7 (27 Jan 2025 01:10)  HR: 70 (27 Jan 2025 05:21) (63 - 77)  BP: 119/71 (27 Jan 2025 06:50) (98/60 - 119/71)  BP(mean): --  RR: 18 (27 Jan 2025 05:21) (17 - 18)  SpO2: 93% (27 Jan 2025 05:21) (93% - 98%)    Parameters below as of 27 Jan 2025 05:21  Patient On (Oxygen Delivery Method): room air            I&O's Detail    26 Jan 2025 07:01  -  27 Jan 2025 07:00  --------------------------------------------------------  IN:    Oral Fluid: 1620 mL  Total IN: 1620 mL    OUT:    Voided (mL): 700 mL  Total OUT: 700 mL    Total NET: 920 mL          Daily     Daily     LABS:                        10.7   10.08 )-----------( 212      ( 26 Jan 2025 07:21 )             33.3     01-26    142  |  106  |  9   ----------------------------<  115[H]  4.2   |  24  |  0.53    Ca    8.7      26 Jan 2025 07:25  Phos  3.4     01-26  Mg     2.0     01-26        Urinalysis Basic - ( 26 Jan 2025 07:25 )    Color: x / Appearance: x / SG: x / pH: x  Gluc: 115 mg/dL / Ketone: x  / Bili: x / Urobili: x   Blood: x / Protein: x / Nitrite: x   Leuk Esterase: x / RBC: x / WBC x   Sq Epi: x / Non Sq Epi: x / Bacteria: x      PHYSICAL EXAM:    Constitutional: Well developed, well nourished, NAD  Chest: Symmetric chest rise bilaterally, no increased WOB  Abdomen: Soft, nontender, nondistended  Groin: Soft, nontender, no ecchymosis/hematoma, no erythema, no edema. R groin femoral A line removed during the case, dressing c/d/i.   Extremities: (+) b/l radial pulses, + b/u ulnar and brachial signals. Warm to touch, soft, normal strength, sensory and motor intact. No cyanosis/erythema/edema/hematoma      
Surgery Progress Note    SUBJECTIVE:  - Patient seen and examined at bedside   --------------------------------------------------------------------------------------------------  OBJECTIVE:   Physical Exam:  Constitutional: Well developed, well nourished, NAD  Chest: Symmetric chest rise bilaterally, no increased WOB  Abdomen: Soft, nontender, nondistended  Groin: Soft, nontender, no ecchymosis/hematoma, no erythema, no edema. R groin femoral A line removed during the case, dressing c/d/i.   Extremities: (+) b/l radial pulses, + b/u ulnar and brachial signals. Warm to touch, soft, normal strength, sensory and motor intact. No cyanosis/erythema/edema/hematoma  --------------------------------------------------------------------------------------------------  V/S:  Vital Signs Last 24 Hrs  T(C): 36.9 (26 Jan 2025 08:42), Max: 37.3 (25 Jan 2025 17:02)  T(F): 98.5 (26 Jan 2025 08:42), Max: 99.2 (25 Jan 2025 17:02)  HR: 75 (26 Jan 2025 09:15) (66 - 88)  BP: 113/70 (26 Jan 2025 09:15) (100/63 - 120/73)  BP(mean): --  RR: 18 (26 Jan 2025 08:42) (18 - 18)  SpO2: 96% (26 Jan 2025 09:15) (95% - 97%)    Parameters below as of 26 Jan 2025 09:15  Patient On (Oxygen Delivery Method): room air        --------------------------------------------------------------------------------------------------  I/Os:    25 Jan 2025 07:01  -  26 Jan 2025 07:00  --------------------------------------------------------  IN:    Lactated Ringers: 1725 mL    Oral Fluid: 1950 mL  Total IN: 3675 mL    OUT:    Voided (mL): 2700 mL  Total OUT: 2700 mL    Total NET: 975 mL        --------------------------------------------------------------------------------------------------  LABS:                        10.7   10.08 )-----------( 212      ( 26 Jan 2025 07:21 )             33.3     26 Jan 2025 07:25    142    |  106    |  9      ----------------------------<  115    4.2     |  24     |  0.53     Ca    8.7        26 Jan 2025 07:25  Phos  3.4       26 Jan 2025 07:25  Mg     2.0       26 Jan 2025 07:25    TPro  5.4    /  Alb  3.2    /  TBili  0.3    /  DBili  x      /  AST  14     /  ALT  12     /  AlkPhos  85     25 Jan 2025 05:48    PT/INR - ( 25 Jan 2025 05:48 )   PT: 12.5 sec;   INR: 1.10 ratio         PTT - ( 25 Jan 2025 05:48 )  PTT:29.5 sec  CAPILLARY BLOOD GLUCOSE            LIVER FUNCTIONS - ( 25 Jan 2025 05:48 )  Alb: 3.2 g/dL / Pro: 5.4 g/dL / ALK PHOS: 85 U/L / ALT: 12 U/L / AST: 14 U/L / GGT: x             Urinalysis Basic - ( 26 Jan 2025 07:25 )    Color: x / Appearance: x / SG: x / pH: x  Gluc: 115 mg/dL / Ketone: x  / Bili: x / Urobili: x   Blood: x / Protein: x / Nitrite: x   Leuk Esterase: x / RBC: x / WBC x   Sq Epi: x / Non Sq Epi: x / Bacteria: x      --------------------------------------------------------------------------------------------------  MEDICATIONS  (STANDING):  acetaminophen     Tablet .. 975 milliGRAM(s) Oral every 6 hours  amLODIPine   Tablet 10 milliGRAM(s) Oral daily  aspirin 325 milliGRAM(s) Oral daily  DULoxetine 60 milliGRAM(s) Oral daily  fluticasone propionate/ salmeterol 100-50 MICROgram(s) Diskus 1 Dose(s) Inhalation two times a day  heparin   Injectable 5000 Unit(s) SubCutaneous every 8 hours  influenza  Vaccine (HIGH DOSE) 0.5 milliLiter(s) IntraMuscular once  lisinopril 40 milliGRAM(s) Oral daily  rosuvastatin 20 milliGRAM(s) Oral at bedtime    MEDICATIONS  (PRN):  albuterol    90 MICROgram(s) HFA Inhaler 2 Puff(s) Inhalation every 6 hours PRN Shortness of Breath and/or Wheezing  nicotine  Polacrilex Gum 2 milliGRAM(s) Oral three times a day PRN Smoking Cessation  traMADol 25 milliGRAM(s) Oral every 6 hours PRN Moderate Pain (4 - 6)  traMADol 50 milliGRAM(s) Oral every 6 hours PRN Severe Pain (7 - 10)    --------------------------------------------------------------------------------------------------

## 2025-01-27 NOTE — DISCHARGE NOTE NURSING/CASE MANAGEMENT/SOCIAL WORK - PATIENT PORTAL LINK FT
You can access the FollowMyHealth Patient Portal offered by [facility] by registering at the following website: [email or web address]. By joining [facility]’s FollowMyHealth portal, you will also be able to view your health information using other applications (apps) compatible with our system.

## 2025-01-27 NOTE — DISCHARGE NOTE PROVIDER - NSDCFUSCHEDAPPT_GEN_ALL_CORE_FT
[facility]  RHEUM OP [id number]  Scheduled Appointment: 01/27/2025    [facility]  INTMED OP [id number] [facility]  Scheduled Appointment: 03/31/2025     [facility]  INTMED OP [id number]  Scheduled Appointment: 03/31/2025

## 2025-01-27 NOTE — PROGRESS NOTE ADULT - ASSESSMENT
71 y/o F with PMH of HTN, HLD, chronic smoker, COPD, on inhalers, fibromyalgia, RA-on methotrexate, OA. s/p axillary axillary bypass on 1/24.    Plan  - PT/OT   - PT - outpatient PT  - OT - need a shower chair  - Regular diet  - b/l arm elevation < 90 degrees  - Need a script for Wheelchair for ambulation for 2 weeks. Patient will stand using a walker, and ambulate using a wheelchair for 2 weeks.   - Restart losartan  - DVT ppx  - Continue  today    Vascular surgery   [phone]  
69 y/o F with PMH of HTN, HLD, chronic smoker, COPD, on inhalers, fibromyalgia, RA-on methotrexate, OA. s/p axillary axillary bypass on 1/24.    Plan  - PT/OT    - PT - outpatient PT   - OT - need a shower chair  - Regular diet  - b/l arm elevation < 90 degrees  - Need a script for Wheelchair for ambulation for 2 weeks. Patient will stand using a walker, and ambulate using a wheelchair for 2 weeks.   - Restart losartan  - DVT ppx  - Continue  today    Vascular surgery   [phone]  
71 y/o F with PMH of HTN, HLD, chronic smoker, COPD, on inhalers, fibromyalgia, RA-on methotrexate, OA. s/p axillary axillary bypass on 1/24.    Plan  - PT/OT   - Regular diet  - b/l arm elevation < 90 degrees  - Restart losartan  - DVT ppx  - Restart  today    Vascular surgery   [phone]

## 2025-02-11 ENCOUNTER — APPOINTMENT (OUTPATIENT)
Dept: VASCULAR SURGERY | Facility: CLINIC | Age: 70
End: 2025-02-11
Payer: MEDICARE

## 2025-02-11 VITALS
TEMPERATURE: 98 F | HEIGHT: 66 IN | BODY MASS INDEX: 33.59 KG/M2 | HEART RATE: 81 BPM | WEIGHT: 209 LBS | SYSTOLIC BLOOD PRESSURE: 113 MMHG | DIASTOLIC BLOOD PRESSURE: 67 MMHG

## 2025-02-11 DIAGNOSIS — I77.1 STRICTURE OF ARTERY: ICD-10-CM

## 2025-02-11 DIAGNOSIS — I80.8 PHLEBITIS AND THROMBOPHLEBITIS OF OTHER SITES: ICD-10-CM

## 2025-02-11 DIAGNOSIS — I65.23 OCCLUSION AND STENOSIS OF BILATERAL CAROTID ARTERIES: ICD-10-CM

## 2025-02-11 PROCEDURE — 93930 UPPER EXTREMITY STUDY: CPT

## 2025-02-11 PROCEDURE — 99024 POSTOP FOLLOW-UP VISIT: CPT

## 2025-02-11 PROCEDURE — 93971 EXTREMITY STUDY: CPT | Mod: LT

## 2025-02-11 PROCEDURE — ZZZZZ: CPT

## 2025-02-25 ENCOUNTER — APPOINTMENT (OUTPATIENT)
Dept: VASCULAR SURGERY | Facility: CLINIC | Age: 70
End: 2025-02-25
Payer: MEDICARE

## 2025-02-25 VITALS
BODY MASS INDEX: 31.39 KG/M2 | DIASTOLIC BLOOD PRESSURE: 74 MMHG | SYSTOLIC BLOOD PRESSURE: 113 MMHG | TEMPERATURE: 98.4 F | WEIGHT: 200 LBS | HEIGHT: 67 IN | HEART RATE: 70 BPM

## 2025-02-25 DIAGNOSIS — I65.23 OCCLUSION AND STENOSIS OF BILATERAL CAROTID ARTERIES: ICD-10-CM

## 2025-02-25 PROCEDURE — 99024 POSTOP FOLLOW-UP VISIT: CPT

## 2025-02-25 PROCEDURE — 93971 EXTREMITY STUDY: CPT

## 2025-02-25 PROCEDURE — 93971 EXTREMITY STUDY: CPT | Mod: LT

## 2025-02-25 RX ORDER — APIXABAN 2.5 MG/1
2.5 TABLET, FILM COATED ORAL
Qty: 60 | Refills: 1 | Status: ACTIVE | COMMUNITY
Start: 2025-02-25 | End: 1900-01-01

## 2025-02-27 ENCOUNTER — OUTPATIENT (OUTPATIENT)
Dept: OUTPATIENT SERVICES | Facility: HOSPITAL | Age: 70
LOS: 1 days | End: 2025-02-27

## 2025-02-27 ENCOUNTER — APPOINTMENT (OUTPATIENT)
Dept: INTERNAL MEDICINE | Facility: CLINIC | Age: 70
End: 2025-02-27
Payer: MEDICARE

## 2025-02-27 VITALS
BODY MASS INDEX: 33.74 KG/M2 | WEIGHT: 215 LBS | DIASTOLIC BLOOD PRESSURE: 69 MMHG | HEIGHT: 67 IN | SYSTOLIC BLOOD PRESSURE: 117 MMHG

## 2025-02-27 DIAGNOSIS — I77.1 STRICTURE OF ARTERY: ICD-10-CM

## 2025-02-27 DIAGNOSIS — Z98.890 OTHER SPECIFIED POSTPROCEDURAL STATES: Chronic | ICD-10-CM

## 2025-02-27 DIAGNOSIS — M25.419 EFFUSION, UNSPECIFIED SHOULDER: ICD-10-CM

## 2025-02-27 DIAGNOSIS — N64.4 MASTODYNIA: ICD-10-CM

## 2025-02-27 DIAGNOSIS — M67.442 GANGLION, RIGHT HAND: ICD-10-CM

## 2025-02-27 DIAGNOSIS — R10.13 EPIGASTRIC PAIN: ICD-10-CM

## 2025-02-27 DIAGNOSIS — T50.905A EPIGASTRIC PAIN: ICD-10-CM

## 2025-02-27 DIAGNOSIS — Z92.29 PERSONAL HISTORY OF OTHER DRUG THERAPY: ICD-10-CM

## 2025-02-27 DIAGNOSIS — J44.9 CHRONIC OBSTRUCTIVE PULMONARY DISEASE, UNSPECIFIED: ICD-10-CM

## 2025-02-27 DIAGNOSIS — M67.441 GANGLION, RIGHT HAND: ICD-10-CM

## 2025-02-27 DIAGNOSIS — M17.9 OSTEOARTHRITIS OF KNEE, UNSPECIFIED: ICD-10-CM

## 2025-02-27 DIAGNOSIS — G45.8 OTHER TRANSIENT CEREBRAL ISCHEMIC ATTACKS AND RELATED SYNDROMES: ICD-10-CM

## 2025-02-27 DIAGNOSIS — Z12.2 ENCOUNTER FOR SCREENING FOR MALIGNANT NEOPLASM OF RESPIRATORY ORGANS: ICD-10-CM

## 2025-02-27 DIAGNOSIS — Z87.39 PERSONAL HISTORY OF OTHER DISEASES OF THE MUSCULOSKELETAL SYSTEM AND CONNECTIVE TISSUE: ICD-10-CM

## 2025-02-27 DIAGNOSIS — J44.89 OTHER SPECIFIED CHRONIC OBSTRUCTIVE PULMONARY DISEASE: ICD-10-CM

## 2025-02-27 DIAGNOSIS — Z87.898 PERSONAL HISTORY OF OTHER SPECIFIED CONDITIONS: ICD-10-CM

## 2025-02-27 DIAGNOSIS — Z86.2 PERSONAL HISTORY OF DISEASES OF THE BLOOD AND BLOOD-FORMING ORGANS AND CERTAIN DISORDERS INVOLVING THE IMMUNE MECHANISM: ICD-10-CM

## 2025-02-27 DIAGNOSIS — R06.09 OTHER FORMS OF DYSPNEA: ICD-10-CM

## 2025-02-27 DIAGNOSIS — I80.8 PHLEBITIS AND THROMBOPHLEBITIS OF OTHER SITES: ICD-10-CM

## 2025-02-27 DIAGNOSIS — S69.90XS UNSPECIFIED INJURY OF UNSPECIFIED WRIST, HAND AND FINGER(S), SEQUELA: ICD-10-CM

## 2025-02-27 DIAGNOSIS — M79.18 MYALGIA, OTHER SITE: ICD-10-CM

## 2025-02-27 DIAGNOSIS — Z86.018 PERSONAL HISTORY OF OTHER BENIGN NEOPLASM: ICD-10-CM

## 2025-02-27 DIAGNOSIS — M06.00 RHEUMATOID ARTHRITIS W/OUT RHEUMATOID FACTOR, UNSPECIFIED SITE: ICD-10-CM

## 2025-02-27 DIAGNOSIS — R13.12 DYSPHAGIA, OROPHARYNGEAL PHASE: ICD-10-CM

## 2025-02-27 PROCEDURE — 99214 OFFICE O/P EST MOD 30 MIN: CPT

## 2025-02-28 ENCOUNTER — NON-APPOINTMENT (OUTPATIENT)
Age: 70
End: 2025-02-28

## 2025-02-28 PROBLEM — G45.8 SUBCLAVIAN STEAL SYNDROME OF RIGHT SUBCLAVIAN ARTERY: Status: RESOLVED | Noted: 2024-06-11 | Resolved: 2025-02-28

## 2025-02-28 PROBLEM — S69.90XS FINGER INJURY, SEQUELA: Status: RESOLVED | Noted: 2022-07-11 | Resolved: 2025-02-28

## 2025-02-28 PROBLEM — J44.89 COPD WITH CHRONIC BRONCHITIS: Status: RESOLVED | Noted: 2023-03-30 | Resolved: 2025-02-28

## 2025-02-28 PROBLEM — Z86.2 HISTORY OF LEUKOCYTOSIS: Status: RESOLVED | Noted: 2017-12-01 | Resolved: 2025-02-28

## 2025-02-28 PROBLEM — M25.419: Status: RESOLVED | Noted: 2018-02-26 | Resolved: 2025-02-28

## 2025-02-28 PROBLEM — Z86.018 HISTORY OF LIPOMA: Status: RESOLVED | Noted: 2022-12-19 | Resolved: 2025-02-28

## 2025-02-28 PROBLEM — I77.1 INNOMINATE ARTERY STENOSIS: Status: RESOLVED | Noted: 2024-04-09 | Resolved: 2025-02-28

## 2025-02-28 PROBLEM — Z12.2 ENCOUNTER FOR SCREENING FOR LUNG CANCER: Status: RESOLVED | Noted: 2020-07-28 | Resolved: 2025-02-28

## 2025-02-28 PROBLEM — Z87.898 HISTORY OF PARESTHESIA: Status: RESOLVED | Noted: 2019-03-25 | Resolved: 2025-02-28

## 2025-02-28 PROBLEM — R13.12 OROPHARYNGEAL DYSPHAGIA: Status: RESOLVED | Noted: 2021-01-14 | Resolved: 2025-02-28

## 2025-02-28 PROBLEM — Z92.29 HISTORY OF DRUG THERAPY: Status: RESOLVED | Noted: 2018-10-09 | Resolved: 2025-02-28

## 2025-02-28 PROBLEM — M79.18 BUTTOCK PAIN: Status: RESOLVED | Noted: 2019-03-25 | Resolved: 2025-02-28

## 2025-02-28 PROBLEM — R10.13 DRUG-INDUCED DYSPEPSIA: Status: RESOLVED | Noted: 2018-08-02 | Resolved: 2025-02-28

## 2025-02-28 PROBLEM — R06.09 DYSPNEA ON EXERTION: Status: RESOLVED | Noted: 2021-01-19 | Resolved: 2025-02-28

## 2025-02-28 PROBLEM — Z87.39 HISTORY OF SYNOVITIS: Status: RESOLVED | Noted: 2019-09-16 | Resolved: 2025-02-28

## 2025-02-28 PROBLEM — M67.441 GANGLION CYST OF BOTH HANDS: Status: RESOLVED | Noted: 2019-09-16 | Resolved: 2025-02-28

## 2025-03-04 ENCOUNTER — NON-APPOINTMENT (OUTPATIENT)
Age: 70
End: 2025-03-04

## 2025-03-04 DIAGNOSIS — I77.1 STRICTURE OF ARTERY: ICD-10-CM

## 2025-03-04 DIAGNOSIS — J44.9 CHRONIC OBSTRUCTIVE PULMONARY DISEASE, UNSPECIFIED: ICD-10-CM

## 2025-03-04 DIAGNOSIS — M06.00 RHEUMATOID ARTHRITIS WITHOUT RHEUMATOID FACTOR, UNSPECIFIED SITE: ICD-10-CM

## 2025-03-04 DIAGNOSIS — I80.8 PHLEBITIS AND THROMBOPHLEBITIS OF OTHER SITES: ICD-10-CM

## 2025-03-04 DIAGNOSIS — M17.9 OSTEOARTHRITIS OF KNEE, UNSPECIFIED: ICD-10-CM

## 2025-03-21 ENCOUNTER — NON-APPOINTMENT (OUTPATIENT)
Age: 70
End: 2025-03-21

## 2025-03-27 ENCOUNTER — NON-APPOINTMENT (OUTPATIENT)
Age: 70
End: 2025-03-27

## 2025-04-01 ENCOUNTER — RX RENEWAL (OUTPATIENT)
Age: 70
End: 2025-04-01

## 2025-04-07 ENCOUNTER — OUTPATIENT (OUTPATIENT)
Dept: OUTPATIENT SERVICES | Facility: HOSPITAL | Age: 70
LOS: 1 days | End: 2025-04-07

## 2025-04-07 ENCOUNTER — APPOINTMENT (OUTPATIENT)
Dept: RHEUMATOLOGY | Facility: CLINIC | Age: 70
End: 2025-04-07
Payer: MEDICARE

## 2025-04-07 VITALS
WEIGHT: 217 LBS | BODY MASS INDEX: 34.06 KG/M2 | HEART RATE: 68 BPM | HEIGHT: 67 IN | SYSTOLIC BLOOD PRESSURE: 106 MMHG | OXYGEN SATURATION: 96 % | DIASTOLIC BLOOD PRESSURE: 60 MMHG

## 2025-04-07 DIAGNOSIS — Z98.890 OTHER SPECIFIED POSTPROCEDURAL STATES: Chronic | ICD-10-CM

## 2025-04-07 DIAGNOSIS — M17.9 OSTEOARTHRITIS OF KNEE, UNSPECIFIED: ICD-10-CM

## 2025-04-07 DIAGNOSIS — Z00.00 ENCOUNTER FOR GENERAL ADULT MEDICAL EXAMINATION W/OUT ABNORMAL FINDINGS: ICD-10-CM

## 2025-04-07 DIAGNOSIS — M19.90 UNSPECIFIED OSTEOARTHRITIS, UNSPECIFIED SITE: ICD-10-CM

## 2025-04-07 PROCEDURE — G2211 COMPLEX E/M VISIT ADD ON: CPT

## 2025-04-07 PROCEDURE — 99214 OFFICE O/P EST MOD 30 MIN: CPT

## 2025-04-08 ENCOUNTER — NON-APPOINTMENT (OUTPATIENT)
Age: 70
End: 2025-04-08

## 2025-04-08 LAB
ALBUMIN SERPL ELPH-MCNC: 4.4 G/DL
ALP BLD-CCNC: 116 U/L
ALT SERPL-CCNC: 17 U/L
ANION GAP SERPL CALC-SCNC: 12 MMOL/L
AST SERPL-CCNC: 15 U/L
BASOPHILS # BLD AUTO: 0.04 K/UL
BASOPHILS NFR BLD AUTO: 0.6 %
BILIRUB SERPL-MCNC: 0.4 MG/DL
BUN SERPL-MCNC: 11 MG/DL
CALCIUM SERPL-MCNC: 8.9 MG/DL
CHLORIDE SERPL-SCNC: 106 MMOL/L
CO2 SERPL-SCNC: 25 MMOL/L
CREAT SERPL-MCNC: 0.58 MG/DL
EGFRCR SERPLBLD CKD-EPI 2021: 99 ML/MIN/1.73M2
EOSINOPHIL # BLD AUTO: 0.13 K/UL
EOSINOPHIL NFR BLD AUTO: 1.9 %
GLUCOSE SERPL-MCNC: 113 MG/DL
HAV IGM SER QL: NONREACTIVE
HBV CORE IGG+IGM SER QL: NONREACTIVE
HBV CORE IGM SER QL: NONREACTIVE
HBV SURFACE AG SER QL: NONREACTIVE
HCT VFR BLD CALC: 39.3 %
HCV AB SER QL: NONREACTIVE
HCV S/CO RATIO: 0.15 S/CO
HGB BLD-MCNC: 12.6 G/DL
IMM GRANULOCYTES NFR BLD AUTO: 0.1 %
LYMPHOCYTES # BLD AUTO: 2.18 K/UL
LYMPHOCYTES NFR BLD AUTO: 32.7 %
MAN DIFF?: NORMAL
MCHC RBC-ENTMCNC: 30.2 PG
MCHC RBC-ENTMCNC: 32.1 G/DL
MCV RBC AUTO: 94.2 FL
MONOCYTES # BLD AUTO: 0.4 K/UL
MONOCYTES NFR BLD AUTO: 6 %
NEUTROPHILS # BLD AUTO: 3.91 K/UL
NEUTROPHILS NFR BLD AUTO: 58.7 %
PLATELET # BLD AUTO: 330 K/UL
POTASSIUM SERPL-SCNC: 4.1 MMOL/L
PROT SERPL-MCNC: 6 G/DL
RBC # BLD: 4.17 M/UL
RBC # FLD: 16.1 %
SODIUM SERPL-SCNC: 143 MMOL/L
WBC # FLD AUTO: 6.67 K/UL

## 2025-04-09 DIAGNOSIS — M79.7 FIBROMYALGIA: ICD-10-CM

## 2025-04-09 DIAGNOSIS — M19.90 UNSPECIFIED OSTEOARTHRITIS, UNSPECIFIED SITE: ICD-10-CM

## 2025-04-09 DIAGNOSIS — M17.9 OSTEOARTHRITIS OF KNEE, UNSPECIFIED: ICD-10-CM

## 2025-04-09 LAB
M TB IFN-G BLD-IMP: NEGATIVE
QUANTIFERON TB PLUS MITOGEN MINUS NIL: 7.32 IU/ML
QUANTIFERON TB PLUS NIL: 0.05 IU/ML
QUANTIFERON TB PLUS TB1 MINUS NIL: 0.12 IU/ML
QUANTIFERON TB PLUS TB2 MINUS NIL: 0.14 IU/ML

## 2025-04-14 ENCOUNTER — APPOINTMENT (OUTPATIENT)
Dept: VASCULAR SURGERY | Facility: CLINIC | Age: 70
End: 2025-04-14

## 2025-04-16 LAB — METHOTREXATE POLYGLUTAMATES CONCENTRATION: 54.7

## 2025-04-17 ENCOUNTER — NON-APPOINTMENT (OUTPATIENT)
Age: 70
End: 2025-04-17

## 2025-04-29 ENCOUNTER — APPOINTMENT (OUTPATIENT)
Dept: VASCULAR SURGERY | Facility: CLINIC | Age: 70
End: 2025-04-29
Payer: MEDICARE

## 2025-04-29 ENCOUNTER — APPOINTMENT (OUTPATIENT)
Dept: VASCULAR SURGERY | Facility: CLINIC | Age: 70
End: 2025-04-29

## 2025-04-29 VITALS
HEIGHT: 67 IN | TEMPERATURE: 98.2 F | WEIGHT: 210 LBS | DIASTOLIC BLOOD PRESSURE: 76 MMHG | SYSTOLIC BLOOD PRESSURE: 124 MMHG | HEART RATE: 74 BPM | BODY MASS INDEX: 32.96 KG/M2

## 2025-04-29 DIAGNOSIS — I65.23 OCCLUSION AND STENOSIS OF BILATERAL CAROTID ARTERIES: ICD-10-CM

## 2025-04-29 DIAGNOSIS — I77.1 STRICTURE OF ARTERY: ICD-10-CM

## 2025-04-29 PROCEDURE — 99406 BEHAV CHNG SMOKING 3-10 MIN: CPT

## 2025-04-29 PROCEDURE — 93971 EXTREMITY STUDY: CPT

## 2025-04-29 PROCEDURE — 99214 OFFICE O/P EST MOD 30 MIN: CPT | Mod: 25

## 2025-04-29 PROCEDURE — 99214 OFFICE O/P EST MOD 30 MIN: CPT

## 2025-05-01 ENCOUNTER — APPOINTMENT (OUTPATIENT)
Dept: RADIOLOGY | Facility: IMAGING CENTER | Age: 70
End: 2025-05-01

## 2025-05-05 ENCOUNTER — APPOINTMENT (OUTPATIENT)
Dept: INTERNAL MEDICINE | Facility: CLINIC | Age: 70
End: 2025-05-05

## 2025-05-07 ENCOUNTER — OUTPATIENT (OUTPATIENT)
Dept: OUTPATIENT SERVICES | Facility: HOSPITAL | Age: 70
LOS: 1 days | End: 2025-05-07

## 2025-05-07 ENCOUNTER — APPOINTMENT (OUTPATIENT)
Dept: INTERNAL MEDICINE | Facility: CLINIC | Age: 70
End: 2025-05-07

## 2025-05-07 ENCOUNTER — NON-APPOINTMENT (OUTPATIENT)
Age: 70
End: 2025-05-07

## 2025-05-07 VITALS
HEIGHT: 67 IN | WEIGHT: 213.4 LBS | BODY MASS INDEX: 33.49 KG/M2 | SYSTOLIC BLOOD PRESSURE: 110 MMHG | HEART RATE: 76 BPM | OXYGEN SATURATION: 97 % | DIASTOLIC BLOOD PRESSURE: 64 MMHG

## 2025-05-07 DIAGNOSIS — Z98.890 OTHER SPECIFIED POSTPROCEDURAL STATES: Chronic | ICD-10-CM

## 2025-05-07 DIAGNOSIS — R32 UNSPECIFIED URINARY INCONTINENCE: ICD-10-CM

## 2025-05-07 DIAGNOSIS — Z00.00 ENCOUNTER FOR GENERAL ADULT MEDICAL EXAMINATION W/OUT ABNORMAL FINDINGS: ICD-10-CM

## 2025-05-07 DIAGNOSIS — F32.A ANXIETY DISORDER, UNSPECIFIED: ICD-10-CM

## 2025-05-07 DIAGNOSIS — F41.9 ANXIETY DISORDER, UNSPECIFIED: ICD-10-CM

## 2025-05-07 DIAGNOSIS — R39.81 FUNCTIONAL URINARY INCONTINENCE: ICD-10-CM

## 2025-05-07 DIAGNOSIS — F17.210 NICOTINE DEPENDENCE, CIGARETTES, UNCOMPLICATED: ICD-10-CM

## 2025-05-07 PROCEDURE — 99397 PER PM REEVAL EST PAT 65+ YR: CPT | Mod: GC

## 2025-05-07 RX ORDER — OLOPATADINE HCL 1 MG/ML
0.1 SOLUTION/ DROPS OPHTHALMIC TWICE DAILY
Qty: 1 | Refills: 3 | Status: ACTIVE | COMMUNITY
Start: 2025-05-07 | End: 1900-01-01

## 2025-05-07 RX ORDER — BUPROPION HYDROCHLORIDE 150 MG/1
150 TABLET, EXTENDED RELEASE ORAL DAILY
Qty: 30 | Refills: 1 | Status: ACTIVE | COMMUNITY
Start: 2025-05-07 | End: 1900-01-01

## 2025-05-07 RX ORDER — LORATADINE 10 MG/1
10 CAPSULE, LIQUID FILLED ORAL DAILY
Qty: 30 | Refills: 2 | Status: ACTIVE | COMMUNITY
Start: 2025-05-07 | End: 1900-01-01

## 2025-05-12 ENCOUNTER — APPOINTMENT (OUTPATIENT)
Dept: RHEUMATOLOGY | Facility: CLINIC | Age: 70
End: 2025-05-12
Payer: MEDICARE

## 2025-05-12 ENCOUNTER — OUTPATIENT (OUTPATIENT)
Dept: OUTPATIENT SERVICES | Facility: HOSPITAL | Age: 70
LOS: 1 days | End: 2025-05-12

## 2025-05-12 VITALS
WEIGHT: 215 LBS | BODY MASS INDEX: 33.74 KG/M2 | HEIGHT: 67 IN | DIASTOLIC BLOOD PRESSURE: 80 MMHG | SYSTOLIC BLOOD PRESSURE: 150 MMHG

## 2025-05-12 DIAGNOSIS — Z98.890 OTHER SPECIFIED POSTPROCEDURAL STATES: Chronic | ICD-10-CM

## 2025-05-12 DIAGNOSIS — R32 UNSPECIFIED URINARY INCONTINENCE: ICD-10-CM

## 2025-05-12 DIAGNOSIS — F17.210 NICOTINE DEPENDENCE, CIGARETTES, UNCOMPLICATED: ICD-10-CM

## 2025-05-12 DIAGNOSIS — Z00.00 ENCOUNTER FOR GENERAL ADULT MEDICAL EXAMINATION WITHOUT ABNORMAL FINDINGS: ICD-10-CM

## 2025-05-12 DIAGNOSIS — M65.939 UNSPECIFIED SYNOVITIS AND TENOSYNOVITIS, UNSPECIFIED FOREARM: ICD-10-CM

## 2025-05-12 DIAGNOSIS — R39.81 FUNCTIONAL URINARY INCONTINENCE: ICD-10-CM

## 2025-05-12 DIAGNOSIS — Z79.899 OTHER LONG TERM (CURRENT) DRUG THERAPY: ICD-10-CM

## 2025-05-12 DIAGNOSIS — M19.90 UNSPECIFIED OSTEOARTHRITIS, UNSPECIFIED SITE: ICD-10-CM

## 2025-05-12 DIAGNOSIS — M19.019 PRIMARY OSTEOARTHRITIS, UNSPECIFIED SHOULDER: ICD-10-CM

## 2025-05-12 PROCEDURE — G2211 COMPLEX E/M VISIT ADD ON: CPT

## 2025-05-12 PROCEDURE — 99214 OFFICE O/P EST MOD 30 MIN: CPT | Mod: GC

## 2025-05-13 PROBLEM — M65.939 TENOSYNOVITIS, WRIST: Status: ACTIVE | Noted: 2019-01-08

## 2025-05-14 DIAGNOSIS — M19.90 UNSPECIFIED OSTEOARTHRITIS, UNSPECIFIED SITE: ICD-10-CM

## 2025-05-14 DIAGNOSIS — M65.939 UNSPECIFIED SYNOVITIS AND TENOSYNOVITIS, UNSPECIFIED FOREARM: ICD-10-CM

## 2025-05-14 DIAGNOSIS — M19.019 PRIMARY OSTEOARTHRITIS, UNSPECIFIED SHOULDER: ICD-10-CM

## 2025-05-14 DIAGNOSIS — Z79.899 OTHER LONG TERM (CURRENT) DRUG THERAPY: ICD-10-CM

## 2025-05-23 ENCOUNTER — APPOINTMENT (OUTPATIENT)
Dept: INTERNAL MEDICINE | Facility: CLINIC | Age: 70
End: 2025-05-23

## 2025-06-03 ENCOUNTER — NON-APPOINTMENT (OUTPATIENT)
Age: 70
End: 2025-06-03

## 2025-06-03 VITALS — WEIGHT: 215 LBS | BODY MASS INDEX: 33.74 KG/M2 | HEIGHT: 67 IN

## 2025-06-03 DIAGNOSIS — F17.200 NICOTINE DEPENDENCE, UNSPECIFIED, UNCOMPLICATED: ICD-10-CM

## 2025-06-12 ENCOUNTER — APPOINTMENT (OUTPATIENT)
Dept: ULTRASOUND IMAGING | Facility: CLINIC | Age: 70
End: 2025-06-12

## 2025-06-12 ENCOUNTER — OUTPATIENT (OUTPATIENT)
Dept: OUTPATIENT SERVICES | Facility: HOSPITAL | Age: 70
LOS: 1 days | End: 2025-06-12
Payer: MEDICARE

## 2025-06-12 DIAGNOSIS — Z98.890 OTHER SPECIFIED POSTPROCEDURAL STATES: Chronic | ICD-10-CM

## 2025-06-12 DIAGNOSIS — M17.9 OSTEOARTHRITIS OF KNEE, UNSPECIFIED: ICD-10-CM

## 2025-06-12 DIAGNOSIS — M19.90 UNSPECIFIED OSTEOARTHRITIS, UNSPECIFIED SITE: ICD-10-CM

## 2025-06-12 PROCEDURE — 76881 US COMPL JOINT R-T W/IMG: CPT

## 2025-06-12 PROCEDURE — 76881 US COMPL JOINT R-T W/IMG: CPT | Mod: 26,LT

## 2025-06-12 PROCEDURE — 76881 US COMPL JOINT R-T W/IMG: CPT | Mod: 26,76,RT

## 2025-06-16 ENCOUNTER — NON-APPOINTMENT (OUTPATIENT)
Age: 70
End: 2025-06-16

## 2025-06-17 ENCOUNTER — APPOINTMENT (OUTPATIENT)
Dept: VASCULAR SURGERY | Facility: CLINIC | Age: 70
End: 2025-06-17

## 2025-06-19 ENCOUNTER — OUTPATIENT (OUTPATIENT)
Dept: OUTPATIENT SERVICES | Facility: HOSPITAL | Age: 70
LOS: 1 days | End: 2025-06-19
Payer: MEDICARE

## 2025-06-19 ENCOUNTER — APPOINTMENT (OUTPATIENT)
Dept: RADIOLOGY | Facility: IMAGING CENTER | Age: 70
End: 2025-06-19
Payer: MEDICARE

## 2025-06-19 ENCOUNTER — APPOINTMENT (OUTPATIENT)
Dept: MAMMOGRAPHY | Facility: IMAGING CENTER | Age: 70
End: 2025-06-19
Payer: MEDICARE

## 2025-06-19 ENCOUNTER — APPOINTMENT (OUTPATIENT)
Dept: CT IMAGING | Facility: IMAGING CENTER | Age: 70
End: 2025-06-19
Payer: MEDICARE

## 2025-06-19 DIAGNOSIS — Z00.00 ENCOUNTER FOR GENERAL ADULT MEDICAL EXAMINATION WITHOUT ABNORMAL FINDINGS: ICD-10-CM

## 2025-06-19 DIAGNOSIS — Z98.890 OTHER SPECIFIED POSTPROCEDURAL STATES: Chronic | ICD-10-CM

## 2025-06-19 DIAGNOSIS — R91.1 SOLITARY PULMONARY NODULE: ICD-10-CM

## 2025-06-19 DIAGNOSIS — Z00.8 ENCOUNTER FOR OTHER GENERAL EXAMINATION: ICD-10-CM

## 2025-06-19 PROCEDURE — 71271 CT THORAX LUNG CANCER SCR C-: CPT | Mod: 26

## 2025-06-19 PROCEDURE — 77067 SCR MAMMO BI INCL CAD: CPT

## 2025-06-19 PROCEDURE — 77080 DXA BONE DENSITY AXIAL: CPT | Mod: 26

## 2025-06-19 PROCEDURE — 77067 SCR MAMMO BI INCL CAD: CPT | Mod: 26

## 2025-06-19 PROCEDURE — 77063 BREAST TOMOSYNTHESIS BI: CPT | Mod: 26

## 2025-06-19 PROCEDURE — 71271 CT THORAX LUNG CANCER SCR C-: CPT

## 2025-06-19 PROCEDURE — 77063 BREAST TOMOSYNTHESIS BI: CPT

## 2025-06-19 PROCEDURE — 77080 DXA BONE DENSITY AXIAL: CPT

## 2025-06-23 ENCOUNTER — APPOINTMENT (OUTPATIENT)
Dept: RHEUMATOLOGY | Facility: CLINIC | Age: 70
End: 2025-06-23

## 2025-07-28 ENCOUNTER — RX RENEWAL (OUTPATIENT)
Age: 70
End: 2025-07-28

## 2025-08-11 ENCOUNTER — APPOINTMENT (OUTPATIENT)
Dept: VASCULAR SURGERY | Facility: CLINIC | Age: 70
End: 2025-08-11
Payer: MEDICARE

## 2025-08-11 ENCOUNTER — NON-APPOINTMENT (OUTPATIENT)
Age: 70
End: 2025-08-11

## 2025-08-11 VITALS
HEIGHT: 67 IN | DIASTOLIC BLOOD PRESSURE: 75 MMHG | WEIGHT: 215 LBS | BODY MASS INDEX: 33.74 KG/M2 | TEMPERATURE: 97.5 F | HEART RATE: 67 BPM | SYSTOLIC BLOOD PRESSURE: 168 MMHG

## 2025-08-11 DIAGNOSIS — I77.1 STRICTURE OF ARTERY: ICD-10-CM

## 2025-08-11 DIAGNOSIS — I65.23 OCCLUSION AND STENOSIS OF BILATERAL CAROTID ARTERIES: ICD-10-CM

## 2025-08-11 PROCEDURE — 93930 UPPER EXTREMITY STUDY: CPT

## 2025-08-11 PROCEDURE — 99406 BEHAV CHNG SMOKING 3-10 MIN: CPT

## 2025-08-11 PROCEDURE — 93880 EXTRACRANIAL BILAT STUDY: CPT

## 2025-08-11 PROCEDURE — 99214 OFFICE O/P EST MOD 30 MIN: CPT | Mod: 25

## 2025-08-18 ENCOUNTER — OUTPATIENT (OUTPATIENT)
Dept: OUTPATIENT SERVICES | Facility: HOSPITAL | Age: 70
LOS: 1 days | End: 2025-08-18

## 2025-08-18 ENCOUNTER — APPOINTMENT (OUTPATIENT)
Dept: RHEUMATOLOGY | Facility: CLINIC | Age: 70
End: 2025-08-18
Payer: MEDICARE

## 2025-08-18 VITALS
OXYGEN SATURATION: 99 % | WEIGHT: 215 LBS | BODY MASS INDEX: 33.74 KG/M2 | SYSTOLIC BLOOD PRESSURE: 143 MMHG | DIASTOLIC BLOOD PRESSURE: 76 MMHG | HEIGHT: 67 IN | HEART RATE: 87 BPM

## 2025-08-18 DIAGNOSIS — Z79.899 OTHER LONG TERM (CURRENT) DRUG THERAPY: ICD-10-CM

## 2025-08-18 DIAGNOSIS — Z98.890 OTHER SPECIFIED POSTPROCEDURAL STATES: Chronic | ICD-10-CM

## 2025-08-18 DIAGNOSIS — M25.50 PAIN IN UNSPECIFIED JOINT: ICD-10-CM

## 2025-08-18 DIAGNOSIS — M25.462 EFFUSION, LEFT KNEE: ICD-10-CM

## 2025-08-18 DIAGNOSIS — M06.09 RHEUMATOID ARTHRITIS W/OUT RHEUMATOID FACTOR, MULTIPLE SITES: ICD-10-CM

## 2025-08-18 PROCEDURE — 20610 DRAIN/INJ JOINT/BURSA W/O US: CPT | Mod: LT

## 2025-08-18 PROCEDURE — 99214 OFFICE O/P EST MOD 30 MIN: CPT | Mod: GC,25

## 2025-08-19 ENCOUNTER — NON-APPOINTMENT (OUTPATIENT)
Age: 70
End: 2025-08-19

## 2025-08-19 DIAGNOSIS — Z79.899 OTHER LONG TERM (CURRENT) DRUG THERAPY: ICD-10-CM

## 2025-08-19 DIAGNOSIS — M25.50 PAIN IN UNSPECIFIED JOINT: ICD-10-CM

## 2025-08-19 LAB
25(OH)D3 SERPL-MCNC: 38.1 NG/ML
ALBUMIN SERPL ELPH-MCNC: 4.4 G/DL
ALP BLD-CCNC: 123 U/L
ALT SERPL-CCNC: 36 U/L
ANION GAP SERPL CALC-SCNC: 13 MMOL/L
AST SERPL-CCNC: 29 U/L
B PERT IGG+IGM PNL SER: CLEAR
BASOPHILS # BLD AUTO: 0.02 K/UL
BASOPHILS NFR BLD AUTO: 0.3 %
BILIRUB SERPL-MCNC: 0.5 MG/DL
BUN SERPL-MCNC: 14 MG/DL
CALCIUM SERPL-MCNC: 9 MG/DL
CHLORIDE SERPL-SCNC: 107 MMOL/L
CO2 SERPL-SCNC: 23 MMOL/L
COLOR FLD: YELLOW
CREAT SERPL-MCNC: 0.56 MG/DL
CRP SERPL-MCNC: 6 MG/L
EGFRCR SERPLBLD CKD-EPI 2021: 99 ML/MIN/1.73M2
EOSINOPHIL # BLD AUTO: 0.06 K/UL
EOSINOPHIL # FLD MANUAL: 0 %
EOSINOPHIL NFR BLD AUTO: 0.9 %
ERYTHROCYTE [SEDIMENTATION RATE] IN BLOOD BY WESTERGREN METHOD: 23 MM/HR
FLUID INTAKE SUBSTANCE CLASS: NORMAL
GLUCOSE SERPL-MCNC: 100 MG/DL
HCT VFR BLD CALC: 39.9 %
HGB BLD-MCNC: 13.2 G/DL
IMM GRANULOCYTES NFR BLD AUTO: 0.3 %
JOINT PCR PANEL: NOT DETECTED
JOINT PCR SOURCE: NORMAL
LYMPHOCYTES # BLD AUTO: 1.54 K/UL
LYMPHOCYTES # FLD MANUAL: 9 %
LYMPHOCYTES NFR BLD AUTO: 22.7 %
MAN DIFF?: NORMAL
MCHC RBC-ENTMCNC: 30.3 PG
MCHC RBC-ENTMCNC: 33.1 G/DL
MCV RBC AUTO: 91.5 FL
MESOTHL CELL NFR FLD: 0 %
MONOCYTES # BLD AUTO: 0.24 K/UL
MONOCYTES NFR BLD AUTO: 3.5 %
MONOS+MACROS NFR FLD MANUAL: 28 %
NEUTROPHILS # BLD AUTO: 4.9 K/UL
NEUTROPHILS NFR BLD AUTO: 72.3 %
NEUTS SEG # FLD MANUAL: 63 %
NRBC # FLD: 0 %
PLATELET # BLD AUTO: 291 K/UL
POTASSIUM SERPL-SCNC: 4.2 MMOL/L
PROT SERPL-MCNC: 6.5 G/DL
RBC # BLD: 4.36 M/UL
RBC # FLD MANUAL: 3000 CELLS/UL
RBC # FLD: 16.3 %
SODIUM SERPL-SCNC: 142 MMOL/L
SYCRY CLARITY: ABNORMAL
SYCRY COLOR: YELLOW
SYCRY ID: ABNORMAL
SYCRY TUBE: NORMAL
TOTAL CELLS COUNTED FLD: 323 CELLS/UL
TUBE TYPE: NORMAL
UNIDENT CELLS NFR FLD MANUAL: 0 %
VARIANT LYMPHS # FLD MANUAL: 0 %
WBC # FLD AUTO: 6.78 K/UL
WBC COUNT: 318 CELLS/UL

## 2025-08-25 LAB — JOINT CULTURE: NORMAL

## 2025-08-28 ENCOUNTER — APPOINTMENT (OUTPATIENT)
Dept: INTERNAL MEDICINE | Facility: CLINIC | Age: 70
End: 2025-08-28
Payer: MEDICARE

## 2025-09-03 ENCOUNTER — APPOINTMENT (OUTPATIENT)
Dept: INTERNAL MEDICINE | Facility: CLINIC | Age: 70
End: 2025-09-03
Payer: MEDICARE

## 2025-09-03 ENCOUNTER — OUTPATIENT (OUTPATIENT)
Dept: OUTPATIENT SERVICES | Facility: HOSPITAL | Age: 70
LOS: 1 days | End: 2025-09-03

## 2025-09-03 VITALS
DIASTOLIC BLOOD PRESSURE: 72 MMHG | BODY MASS INDEX: 33.74 KG/M2 | OXYGEN SATURATION: 98 % | SYSTOLIC BLOOD PRESSURE: 132 MMHG | HEART RATE: 82 BPM | WEIGHT: 215 LBS | HEIGHT: 67 IN

## 2025-09-03 DIAGNOSIS — Z98.890 OTHER SPECIFIED POSTPROCEDURAL STATES: Chronic | ICD-10-CM

## 2025-09-03 DIAGNOSIS — F17.210 NICOTINE DEPENDENCE, CIGARETTES, UNCOMPLICATED: ICD-10-CM

## 2025-09-03 DIAGNOSIS — L57.0 ACTINIC KERATOSIS: ICD-10-CM

## 2025-09-03 PROCEDURE — 99213 OFFICE O/P EST LOW 20 MIN: CPT | Mod: GC

## 2025-09-03 PROCEDURE — G2211 COMPLEX E/M VISIT ADD ON: CPT | Mod: GC

## 2025-09-10 ENCOUNTER — RX RENEWAL (OUTPATIENT)
Age: 70
End: 2025-09-10

## 2025-09-15 ENCOUNTER — APPOINTMENT (OUTPATIENT)
Dept: DERMATOLOGY | Facility: CLINIC | Age: 70
End: 2025-09-15

## (undated) DEVICE — DRAPE TOWEL BLUE 17" X 24"

## (undated) DEVICE — NDL HYPO REGULAR BEVEL 25G X 1.5" (BLUE)

## (undated) DEVICE — SUT PROLENE 6-0 4-30" C-1

## (undated) DEVICE — DRSG STERISTRIPS 0.5 X 4"

## (undated) DEVICE — SUT PROLENE 6-0 4-30" BV-1

## (undated) DEVICE — POSITIONER FOAM EGG CRATE ULNAR 2PCS (PINK)

## (undated) DEVICE — SOL IRR POUR H2O 250ML

## (undated) DEVICE — DRAPE IOBAN 23" X 23"

## (undated) DEVICE — SPECIMEN CONTAINER PET

## (undated) DEVICE — SUT PROLENE 7-0 4-24" BV-1

## (undated) DEVICE — FOLEY TRAY 16FR 5CC LTX UMETER CLOSED

## (undated) DEVICE — DRSG TEGADERM 6 X 8"

## (undated) DEVICE — DRAPE INSTRUMENT POUCH 6.75" X 11"

## (undated) DEVICE — MEDICATION LABELS W MARKER

## (undated) DEVICE — NDL SAFETY BUTTERFLY 21G X 3/4

## (undated) DEVICE — VENODYNE/SCD SLEEVE CALF MEDIUM

## (undated) DEVICE — LAP PAD 18 X 18"

## (undated) DEVICE — SYS ENDO STRATOS RELEASE 30 DEG 4MM

## (undated) DEVICE — POSITIONER FOAM HEAD CRADLE (PINK)

## (undated) DEVICE — SUCTION YANKAUER NO CONTROL VENT

## (undated) DEVICE — SUT PROLENE 5-0 30" RB-2

## (undated) DEVICE — DRSG WEBRIL 3"

## (undated) DEVICE — SUT BIOSYN 4-0 18" P-12

## (undated) DEVICE — DRSG KLING 4"

## (undated) DEVICE — SLING ARM CHIEFTAIN MESH LARGE

## (undated) DEVICE — CLAMP BULLDOG MIDI 45 DEGREE (GREEN) DISP

## (undated) DEVICE — SUT MONOCRYL 5-0 18" P-3 UNDYED

## (undated) DEVICE — DRAPE 3/4 SHEET 52X76"

## (undated) DEVICE — PACK FEM/POP

## (undated) DEVICE — BLADE CARPAL TUNNEL SNGL

## (undated) DEVICE — SUT SOFSILK 0 18" TIES

## (undated) DEVICE — SUT POLYSORB 2-0 30" GS-21 UNDYED

## (undated) DEVICE — SUT SOFSILK 4-0 18" TIES

## (undated) DEVICE — BULLDOG SPRING CLIP 6MM SOFT/SOFT

## (undated) DEVICE — GLV 7 PROTEXIS (WHITE)

## (undated) DEVICE — VISITEC 4X4

## (undated) DEVICE — SOL IRR POUR NS 0.9% 500ML

## (undated) DEVICE — STOPCOCK 4-WAY W SWIVEL MALE LUER LOCK NON VENTED RED CAP

## (undated) DEVICE — STAPLER SKIN VISI-STAT 35 WIDE

## (undated) DEVICE — POSITIONER STRAP ARMBOARD VELCRO TS-30

## (undated) DEVICE — DRAPE MAYO STAND 30"

## (undated) DEVICE — WARMING BLANKET UPPER ADULT

## (undated) DEVICE — SLING ARM CHIEFTAIN MESH MEDIUM

## (undated) DEVICE — SOL INJ NS 0.9% 1000ML

## (undated) DEVICE — SUT SOFSILK 3-0 30" TIES

## (undated) DEVICE — TOURNIQUET ESMARK 4"

## (undated) DEVICE — TUNNELER SHEATH GREEN LARGE

## (undated) DEVICE — DRSG KLING 3"

## (undated) DEVICE — GOWN LG

## (undated) DEVICE — PACK UPPER EXTREMITY

## (undated) DEVICE — PREP CHLORAPREP HI-LITE ORANGE 26ML

## (undated) DEVICE — TOURNIQUET CUFF 18" DUAL PORT SINGLE BLADDER W PLC  (BLACK)

## (undated) DEVICE — SUT SOFSILK 2-0 18" TIES

## (undated) DEVICE — GLV 7.5 PROTEXIS (BLUE)

## (undated) DEVICE — DRSG OPSITE 13.75 X 4"

## (undated) DEVICE — WARMING BLANKET LOWER ADULT

## (undated) DEVICE — SPECIMEN CONTAINER 100ML